# Patient Record
Sex: MALE | Race: WHITE | NOT HISPANIC OR LATINO | Employment: OTHER | URBAN - METROPOLITAN AREA
[De-identification: names, ages, dates, MRNs, and addresses within clinical notes are randomized per-mention and may not be internally consistent; named-entity substitution may affect disease eponyms.]

---

## 2017-03-09 ENCOUNTER — GENERIC CONVERSION - ENCOUNTER (OUTPATIENT)
Dept: OTHER | Facility: OTHER | Age: 57
End: 2017-03-09

## 2017-05-04 ENCOUNTER — ALLSCRIPTS OFFICE VISIT (OUTPATIENT)
Dept: OTHER | Facility: OTHER | Age: 57
End: 2017-05-04

## 2017-05-04 DIAGNOSIS — E11.40 TYPE 2 DIABETES MELLITUS WITH DIABETIC NEUROPATHY (HCC): ICD-10-CM

## 2017-05-04 DIAGNOSIS — E11.9 TYPE 2 DIABETES MELLITUS WITHOUT COMPLICATIONS (HCC): ICD-10-CM

## 2017-05-17 LAB
A/G RATIO (HISTORICAL): 1.1 (CALC) (ref 1–2.5)
ALBUMIN SERPL BCP-MCNC: 3.9 G/DL (ref 3.6–5.1)
ALP SERPL-CCNC: 105 U/L (ref 40–115)
ALT SERPL W P-5'-P-CCNC: 29 U/L (ref 9–46)
AST SERPL W P-5'-P-CCNC: 20 U/L (ref 10–35)
BILIRUB SERPL-MCNC: 0.5 MG/DL (ref 0.2–1.2)
BUN SERPL-MCNC: 14 MG/DL (ref 7–25)
BUN/CREA RATIO (HISTORICAL): ABNORMAL (CALC) (ref 6–22)
CALCIUM SERPL-MCNC: 8.9 MG/DL (ref 8.6–10.3)
CHLORIDE SERPL-SCNC: 100 MMOL/L (ref 98–110)
CO2 SERPL-SCNC: 30 MMOL/L (ref 20–31)
CREAT SERPL-MCNC: 0.95 MG/DL (ref 0.7–1.33)
EGFR AFRICAN AMERICAN (HISTORICAL): 102 ML/MIN/1.73M2
EGFR-AMERICAN CALC (HISTORICAL): 88 ML/MIN/1.73M2
GAMMA GLOBULIN (HISTORICAL): 3.6 G/DL (CALC) (ref 1.9–3.7)
GLUCOSE (HISTORICAL): 131 MG/DL (ref 65–99)
POTASSIUM SERPL-SCNC: 4.3 MMOL/L (ref 3.5–5.3)
SODIUM SERPL-SCNC: 136 MMOL/L (ref 135–146)
TOTAL PROTEIN (HISTORICAL): 7.5 G/DL (ref 6.1–8.1)

## 2017-05-18 ENCOUNTER — LAB CONVERSION - ENCOUNTER (OUTPATIENT)
Dept: OTHER | Facility: OTHER | Age: 57
End: 2017-05-18

## 2017-05-18 LAB
BASOPHILS # BLD AUTO: 0.4 %
BASOPHILS # BLD AUTO: 26 CELLS/UL (ref 0–200)
CREATININE, RANDOM URINE (HISTORICAL): 261 MG/DL (ref 20–370)
DEPRECATED RDW RBC AUTO: 15.9 % (ref 11–15)
EOSINOPHIL # BLD AUTO: 262 CELLS/UL (ref 15–500)
EOSINOPHIL # BLD AUTO: 4.1 %
HBA1C MFR BLD HPLC: 6.7 % OF TOTAL HGB
HCT VFR BLD AUTO: 38.9 % (ref 38.5–50)
HGB BLD-MCNC: 13.1 G/DL (ref 13.2–17.1)
LYMPHOCYTES # BLD AUTO: 1427 CELLS/UL (ref 850–3900)
LYMPHOCYTES # BLD AUTO: 22.3 %
MAGNESIUM, UR (HISTORICAL): 1.2 MG/DL
MCH RBC QN AUTO: 29.1 PG (ref 27–33)
MCHC RBC AUTO-ENTMCNC: 33.8 G/DL (ref 32–36)
MCV RBC AUTO: 86.2 FL (ref 80–100)
MICROALBUMIN/CREATININE RATIO (HISTORICAL): 5 MCG/MG CREAT
MONOCYTES # BLD AUTO: 339 CELLS/UL (ref 200–950)
MONOCYTES (HISTORICAL): 5.3 %
NEUTROPHILS # BLD AUTO: 4346 CELLS/UL (ref 1500–7800)
NEUTROPHILS # BLD AUTO: 67.9 %
PLATELET # BLD AUTO: 221 THOUSAND/UL (ref 140–400)
PMV BLD AUTO: 8.1 FL (ref 7.5–12.5)
RBC # BLD AUTO: 4.51 MILLION/UL (ref 4.2–5.8)
WBC # BLD AUTO: 6.4 THOUSAND/UL (ref 3.8–10.8)

## 2017-05-26 ENCOUNTER — HOSPITAL ENCOUNTER (EMERGENCY)
Facility: HOSPITAL | Age: 57
Discharge: HOME | End: 2017-05-26
Admitting: EMERGENCY MEDICINE
Payer: COMMERCIAL

## 2017-05-26 VITALS
DIASTOLIC BLOOD PRESSURE: 80 MMHG | RESPIRATION RATE: 20 BRPM | WEIGHT: 315 LBS | SYSTOLIC BLOOD PRESSURE: 143 MMHG | OXYGEN SATURATION: 94 % | HEART RATE: 88 BPM | TEMPERATURE: 98.2 F

## 2017-05-26 DIAGNOSIS — S46.212A SPRAIN, BICEP, LEFT, INITIAL ENCOUNTER: Primary | ICD-10-CM

## 2017-05-26 PROCEDURE — 99283 EMERGENCY DEPT VISIT LOW MDM: CPT

## 2017-05-26 PROCEDURE — 96372 THER/PROPH/DIAG INJ SC/IM: CPT

## 2017-05-26 RX ORDER — METHOCARBAMOL 500 MG/1
500 TABLET, FILM COATED ORAL 4 TIMES DAILY
Qty: 40 TABLET | Refills: 0 | Status: SHIPPED | OUTPATIENT
Start: 2017-05-26 | End: 2017-07-26

## 2017-05-26 RX ORDER — KETOROLAC TROMETHAMINE 30 MG/ML
15 INJECTION, SOLUTION INTRAMUSCULAR; INTRAVENOUS ONCE
Status: COMPLETED | OUTPATIENT
Start: 2017-05-26 | End: 2017-05-26

## 2017-05-26 RX ORDER — NAPROXEN 500 MG/1
500 TABLET ORAL 2 TIMES DAILY WITH MEALS
Qty: 60 TABLET | Refills: 0 | Status: SHIPPED | OUTPATIENT
Start: 2017-05-26 | End: 2017-07-26

## 2017-05-26 RX ADMIN — KETOROLAC TROMETHAMINE 15 MG: 30 INJECTION, SOLUTION INTRAMUSCULAR at 13:35

## 2017-07-03 ENCOUNTER — ALLSCRIPTS OFFICE VISIT (OUTPATIENT)
Dept: OTHER | Facility: OTHER | Age: 57
End: 2017-07-03

## 2017-07-05 DIAGNOSIS — Z01.818 ENCOUNTER FOR OTHER PREPROCEDURAL EXAMINATION: ICD-10-CM

## 2017-07-28 ENCOUNTER — APPOINTMENT (OUTPATIENT)
Dept: LAB | Facility: HOSPITAL | Age: 57
End: 2017-07-28
Attending: SURGERY
Payer: COMMERCIAL

## 2017-07-28 ENCOUNTER — TRANSCRIBE ORDERS (OUTPATIENT)
Dept: LAB | Facility: HOSPITAL | Age: 57
End: 2017-07-28

## 2017-07-28 ENCOUNTER — OFFICE VISIT (OUTPATIENT)
Dept: LAB | Facility: HOSPITAL | Age: 57
End: 2017-07-28
Attending: SURGERY
Payer: COMMERCIAL

## 2017-07-28 ENCOUNTER — ANESTHESIA EVENT (OUTPATIENT)
Dept: PERIOP | Facility: HOSPITAL | Age: 57
End: 2017-07-28

## 2017-07-28 DIAGNOSIS — Z01.818 PREOP EXAMINATION: ICD-10-CM

## 2017-07-28 DIAGNOSIS — Z01.818 PREOP EXAMINATION: Primary | ICD-10-CM

## 2017-07-28 DIAGNOSIS — Z01.818 ENCOUNTER FOR OTHER PREPROCEDURAL EXAMINATION: ICD-10-CM

## 2017-07-28 LAB
ABO GROUP BLD: NORMAL
ANION GAP SERPL CALCULATED.3IONS-SCNC: 8 MMOL/L (ref 4–13)
ATRIAL RATE: 83 BPM
BLD GP AB SCN SERPL QL: NEGATIVE
BUN SERPL-MCNC: 13 MG/DL (ref 5–25)
CALCIUM SERPL-MCNC: 8.9 MG/DL (ref 8.3–10.1)
CHLORIDE SERPL-SCNC: 103 MMOL/L (ref 100–108)
CO2 SERPL-SCNC: 27 MMOL/L (ref 21–32)
CREAT SERPL-MCNC: 0.82 MG/DL (ref 0.6–1.3)
EST. AVERAGE GLUCOSE BLD GHB EST-MCNC: 148 MG/DL
GFR SERPL CREATININE-BSD FRML MDRD: 97 ML/MIN/1.73SQ M
GLUCOSE P FAST SERPL-MCNC: 110 MG/DL (ref 65–99)
HBA1C MFR BLD: 6.8 % (ref 4.2–6.3)
P AXIS: 38 DEGREES
POTASSIUM SERPL-SCNC: 4.1 MMOL/L (ref 3.5–5.3)
PR INTERVAL: 174 MS
QRS AXIS: 8 DEGREES
QRSD INTERVAL: 90 MS
QT INTERVAL: 360 MS
QTC INTERVAL: 423 MS
RH BLD: POSITIVE
SODIUM SERPL-SCNC: 138 MMOL/L (ref 136–145)
SPECIMEN EXPIRATION DATE: NORMAL
T WAVE AXIS: 40 DEGREES
VENTRICULAR RATE: 83 BPM

## 2017-07-28 PROCEDURE — 86850 RBC ANTIBODY SCREEN: CPT

## 2017-07-28 PROCEDURE — 93005 ELECTROCARDIOGRAM TRACING: CPT

## 2017-07-28 PROCEDURE — 86901 BLOOD TYPING SEROLOGIC RH(D): CPT

## 2017-07-28 PROCEDURE — 80048 BASIC METABOLIC PNL TOTAL CA: CPT

## 2017-07-28 PROCEDURE — 83036 HEMOGLOBIN GLYCOSYLATED A1C: CPT

## 2017-07-28 PROCEDURE — 86900 BLOOD TYPING SEROLOGIC ABO: CPT

## 2017-07-28 PROCEDURE — 36415 COLL VENOUS BLD VENIPUNCTURE: CPT

## 2017-07-31 ENCOUNTER — ANESTHESIA (OUTPATIENT)
Dept: PERIOP | Facility: HOSPITAL | Age: 57
End: 2017-07-31

## 2017-08-02 LAB
ATRIAL RATE: 83 BPM
P AXIS: 38 DEGREES
PR INTERVAL: 174 MS
QRS AXIS: 8 DEGREES
QRSD INTERVAL: 90 MS
QT INTERVAL: 360 MS
QTC INTERVAL: 423 MS
T WAVE AXIS: 40 DEGREES
VENTRICULAR RATE: 83 BPM

## 2017-08-03 ENCOUNTER — ALLSCRIPTS OFFICE VISIT (OUTPATIENT)
Dept: OTHER | Facility: OTHER | Age: 57
End: 2017-08-03

## 2017-08-07 DIAGNOSIS — Z01.818 ENCOUNTER FOR OTHER PREPROCEDURAL EXAMINATION: ICD-10-CM

## 2017-08-09 ENCOUNTER — APPOINTMENT (OUTPATIENT)
Dept: LAB | Facility: HOSPITAL | Age: 57
End: 2017-08-09
Attending: SURGERY
Payer: COMMERCIAL

## 2017-08-09 DIAGNOSIS — Z01.818 ENCOUNTER FOR OTHER PREPROCEDURAL EXAMINATION: ICD-10-CM

## 2017-08-09 LAB
BASOPHILS # BLD AUTO: 0.02 THOUSANDS/ΜL (ref 0–0.1)
BASOPHILS NFR BLD AUTO: 0 % (ref 0–1)
EOSINOPHIL # BLD AUTO: 0.32 THOUSAND/ΜL (ref 0–0.61)
EOSINOPHIL NFR BLD AUTO: 5 % (ref 0–6)
ERYTHROCYTE [DISTWIDTH] IN BLOOD BY AUTOMATED COUNT: 14.8 % (ref 11.6–15.1)
HCT VFR BLD AUTO: 40.4 % (ref 36.5–49.3)
HGB BLD-MCNC: 12.8 G/DL (ref 12–17)
LYMPHOCYTES # BLD AUTO: 1.27 THOUSANDS/ΜL (ref 0.6–4.47)
LYMPHOCYTES NFR BLD AUTO: 19 % (ref 14–44)
MCH RBC QN AUTO: 28.8 PG (ref 26.8–34.3)
MCHC RBC AUTO-ENTMCNC: 31.7 G/DL (ref 31.4–37.4)
MCV RBC AUTO: 91 FL (ref 82–98)
MONOCYTES # BLD AUTO: 0.34 THOUSAND/ΜL (ref 0.17–1.22)
MONOCYTES NFR BLD AUTO: 5 % (ref 4–12)
NEUTROPHILS # BLD AUTO: 4.7 THOUSANDS/ΜL (ref 1.85–7.62)
NEUTS SEG NFR BLD AUTO: 71 % (ref 43–75)
NRBC BLD AUTO-RTO: 0 /100 WBCS
PLATELET # BLD AUTO: 215 THOUSANDS/UL (ref 149–390)
PMV BLD AUTO: 9.6 FL (ref 8.9–12.7)
RBC # BLD AUTO: 4.45 MILLION/UL (ref 3.88–5.62)
WBC # BLD AUTO: 6.67 THOUSAND/UL (ref 4.31–10.16)

## 2017-08-09 PROCEDURE — 36415 COLL VENOUS BLD VENIPUNCTURE: CPT

## 2017-08-09 PROCEDURE — 85025 COMPLETE CBC W/AUTO DIFF WBC: CPT

## 2017-08-17 ENCOUNTER — TRANSCRIBE ORDERS (OUTPATIENT)
Dept: SLEEP CENTER | Facility: CLINIC | Age: 57
End: 2017-08-17

## 2017-08-17 DIAGNOSIS — J44.9 OSA AND COPD OVERLAP SYNDROME (HCC): Primary | ICD-10-CM

## 2017-08-17 DIAGNOSIS — G47.33 OSA AND COPD OVERLAP SYNDROME (HCC): Primary | ICD-10-CM

## 2017-08-20 ENCOUNTER — ANESTHESIA EVENT (OUTPATIENT)
Dept: PERIOP | Facility: HOSPITAL | Age: 57
End: 2017-08-20
Payer: COMMERCIAL

## 2017-08-21 ENCOUNTER — HOSPITAL ENCOUNTER (OUTPATIENT)
Facility: HOSPITAL | Age: 57
Setting detail: OUTPATIENT SURGERY
Discharge: HOME | End: 2017-08-21
Attending: SURGERY | Admitting: SURGERY
Payer: COMMERCIAL

## 2017-08-21 ENCOUNTER — ANESTHESIA (OUTPATIENT)
Dept: PERIOP | Facility: HOSPITAL | Age: 57
End: 2017-08-21
Payer: COMMERCIAL

## 2017-08-21 VITALS
BODY MASS INDEX: 41.75 KG/M2 | TEMPERATURE: 98.7 F | HEART RATE: 89 BPM | OXYGEN SATURATION: 95 % | HEIGHT: 73 IN | RESPIRATION RATE: 16 BRPM | DIASTOLIC BLOOD PRESSURE: 87 MMHG | SYSTOLIC BLOOD PRESSURE: 131 MMHG | WEIGHT: 315 LBS

## 2017-08-21 DIAGNOSIS — R22.30 LOCALIZED SWELLING, MASS, OR LUMP OF UPPER EXTREMITY: ICD-10-CM

## 2017-08-21 DIAGNOSIS — R22.1 LOCALIZED SWELLING, MASS OR LUMP OF NECK: ICD-10-CM

## 2017-08-21 LAB
GLUCOSE SERPL-MCNC: 161 MG/DL (ref 65–140)
GLUCOSE SERPL-MCNC: 97 MG/DL (ref 65–140)

## 2017-08-21 PROCEDURE — 82948 REAGENT STRIP/BLOOD GLUCOSE: CPT

## 2017-08-21 PROCEDURE — 88307 TISSUE EXAM BY PATHOLOGIST: CPT | Performed by: SURGERY

## 2017-08-21 RX ORDER — PROPOFOL 10 MG/ML
INJECTION, EMULSION INTRAVENOUS CONTINUOUS PRN
Status: DISCONTINUED | OUTPATIENT
Start: 2017-08-21 | End: 2017-08-21 | Stop reason: SURG

## 2017-08-21 RX ORDER — METOCLOPRAMIDE HYDROCHLORIDE 5 MG/ML
10 INJECTION INTRAMUSCULAR; INTRAVENOUS ONCE AS NEEDED
Status: COMPLETED | OUTPATIENT
Start: 2017-08-21 | End: 2017-08-21

## 2017-08-21 RX ORDER — SODIUM CHLORIDE, SODIUM LACTATE, POTASSIUM CHLORIDE, CALCIUM CHLORIDE 600; 310; 30; 20 MG/100ML; MG/100ML; MG/100ML; MG/100ML
50 INJECTION, SOLUTION INTRAVENOUS CONTINUOUS
Status: DISCONTINUED | OUTPATIENT
Start: 2017-08-21 | End: 2017-08-21 | Stop reason: HOSPADM

## 2017-08-21 RX ORDER — SODIUM CHLORIDE, SODIUM LACTATE, POTASSIUM CHLORIDE, CALCIUM CHLORIDE 600; 310; 30; 20 MG/100ML; MG/100ML; MG/100ML; MG/100ML
125 INJECTION, SOLUTION INTRAVENOUS CONTINUOUS
Status: DISCONTINUED | OUTPATIENT
Start: 2017-08-21 | End: 2017-08-21 | Stop reason: HOSPADM

## 2017-08-21 RX ORDER — OXYCODONE HYDROCHLORIDE AND ACETAMINOPHEN 5; 325 MG/1; MG/1
2 TABLET ORAL EVERY 4 HOURS PRN
Status: DISCONTINUED | OUTPATIENT
Start: 2017-08-21 | End: 2017-08-21 | Stop reason: HOSPADM

## 2017-08-21 RX ORDER — FENTANYL CITRATE/PF 50 MCG/ML
50 SYRINGE (ML) INJECTION
Status: DISCONTINUED | OUTPATIENT
Start: 2017-08-21 | End: 2017-08-21 | Stop reason: HOSPADM

## 2017-08-21 RX ORDER — OXYCODONE HYDROCHLORIDE AND ACETAMINOPHEN 5; 325 MG/1; MG/1
1 TABLET ORAL EVERY 4 HOURS PRN
Status: DISCONTINUED | OUTPATIENT
Start: 2017-08-21 | End: 2017-08-21 | Stop reason: HOSPADM

## 2017-08-21 RX ORDER — ONDANSETRON 2 MG/ML
INJECTION INTRAMUSCULAR; INTRAVENOUS AS NEEDED
Status: DISCONTINUED | OUTPATIENT
Start: 2017-08-21 | End: 2017-08-21 | Stop reason: SURG

## 2017-08-21 RX ORDER — ONDANSETRON 2 MG/ML
4 INJECTION INTRAMUSCULAR; INTRAVENOUS ONCE AS NEEDED
Status: COMPLETED | OUTPATIENT
Start: 2017-08-21 | End: 2017-08-21

## 2017-08-21 RX ORDER — ONDANSETRON 2 MG/ML
4 INJECTION INTRAMUSCULAR; INTRAVENOUS EVERY 4 HOURS PRN
Status: DISCONTINUED | OUTPATIENT
Start: 2017-08-21 | End: 2017-08-21 | Stop reason: HOSPADM

## 2017-08-21 RX ORDER — LIDOCAINE HYDROCHLORIDE 10 MG/ML
INJECTION, SOLUTION INFILTRATION; PERINEURAL AS NEEDED
Status: DISCONTINUED | OUTPATIENT
Start: 2017-08-21 | End: 2017-08-21 | Stop reason: SURG

## 2017-08-21 RX ORDER — PROPOFOL 10 MG/ML
INJECTION, EMULSION INTRAVENOUS AS NEEDED
Status: DISCONTINUED | OUTPATIENT
Start: 2017-08-21 | End: 2017-08-21 | Stop reason: SURG

## 2017-08-21 RX ORDER — FENTANYL CITRATE 50 UG/ML
INJECTION, SOLUTION INTRAMUSCULAR; INTRAVENOUS AS NEEDED
Status: DISCONTINUED | OUTPATIENT
Start: 2017-08-21 | End: 2017-08-21 | Stop reason: SURG

## 2017-08-21 RX ORDER — OXYCODONE HYDROCHLORIDE AND ACETAMINOPHEN 5; 325 MG/1; MG/1
TABLET ORAL
Qty: 20 TABLET | Refills: 0 | Status: SHIPPED | OUTPATIENT
Start: 2017-08-21 | End: 2018-04-16 | Stop reason: ALTCHOICE

## 2017-08-21 RX ORDER — BUPIVACAINE HYDROCHLORIDE AND EPINEPHRINE 2.5; 5 MG/ML; UG/ML
INJECTION, SOLUTION EPIDURAL; INFILTRATION; INTRACAUDAL; PERINEURAL AS NEEDED
Status: DISCONTINUED | OUTPATIENT
Start: 2017-08-21 | End: 2017-08-21 | Stop reason: HOSPADM

## 2017-08-21 RX ORDER — MEPERIDINE HYDROCHLORIDE 25 MG/ML
12.5 INJECTION INTRAMUSCULAR; INTRAVENOUS; SUBCUTANEOUS ONCE AS NEEDED
Status: DISCONTINUED | OUTPATIENT
Start: 2017-08-21 | End: 2017-08-21 | Stop reason: HOSPADM

## 2017-08-21 RX ORDER — SUCCINYLCHOLINE CHLORIDE 20 MG/ML
INJECTION INTRAMUSCULAR; INTRAVENOUS AS NEEDED
Status: DISCONTINUED | OUTPATIENT
Start: 2017-08-21 | End: 2017-08-21 | Stop reason: SURG

## 2017-08-21 RX ADMIN — FENTANYL CITRATE 100 MCG: 50 INJECTION, SOLUTION INTRAMUSCULAR; INTRAVENOUS at 13:04

## 2017-08-21 RX ADMIN — ONDANSETRON 4 MG: 2 INJECTION INTRAMUSCULAR; INTRAVENOUS at 15:17

## 2017-08-21 RX ADMIN — SODIUM CHLORIDE, SODIUM LACTATE, POTASSIUM CHLORIDE, AND CALCIUM CHLORIDE 125 ML/HR: .6; .31; .03; .02 INJECTION, SOLUTION INTRAVENOUS at 12:02

## 2017-08-21 RX ADMIN — PROPOFOL 100 MCG/KG/MIN: 10 INJECTION, EMULSION INTRAVENOUS at 12:42

## 2017-08-21 RX ADMIN — DEXAMETHASONE SODIUM PHOSPHATE 10 MG: 10 INJECTION INTRAMUSCULAR; INTRAVENOUS at 12:42

## 2017-08-21 RX ADMIN — LIDOCAINE HYDROCHLORIDE 50 MG: 10 INJECTION, SOLUTION INFILTRATION; PERINEURAL at 12:30

## 2017-08-21 RX ADMIN — PROPOFOL 200 MG: 10 INJECTION, EMULSION INTRAVENOUS at 12:30

## 2017-08-21 RX ADMIN — METOCLOPRAMIDE 10 MG: 5 INJECTION, SOLUTION INTRAMUSCULAR; INTRAVENOUS at 15:52

## 2017-08-21 RX ADMIN — CEFAZOLIN SODIUM 2000 MG: 2 SOLUTION INTRAVENOUS at 12:25

## 2017-08-21 RX ADMIN — SUCCINYLCHOLINE CHLORIDE 140 MG: 20 INJECTION, SOLUTION INTRAMUSCULAR; INTRAVENOUS at 12:30

## 2017-08-21 RX ADMIN — ONDANSETRON 4 MG: 2 INJECTION INTRAMUSCULAR; INTRAVENOUS at 12:42

## 2017-08-22 LAB
LEFT EYE DIABETIC RETINOPATHY: NORMAL
RIGHT EYE DIABETIC RETINOPATHY: NORMAL

## 2017-08-25 ENCOUNTER — GENERIC CONVERSION - ENCOUNTER (OUTPATIENT)
Dept: OTHER | Facility: OTHER | Age: 57
End: 2017-08-25

## 2017-08-29 ENCOUNTER — ALLSCRIPTS OFFICE VISIT (OUTPATIENT)
Dept: OTHER | Facility: OTHER | Age: 57
End: 2017-08-29

## 2017-09-06 ENCOUNTER — GENERIC CONVERSION - ENCOUNTER (OUTPATIENT)
Dept: OTHER | Facility: OTHER | Age: 57
End: 2017-09-06

## 2017-09-20 ENCOUNTER — TRANSCRIBE ORDERS (OUTPATIENT)
Dept: SLEEP CENTER | Facility: CLINIC | Age: 57
End: 2017-09-20

## 2017-09-20 ENCOUNTER — HOSPITAL ENCOUNTER (OUTPATIENT)
Dept: SLEEP CENTER | Facility: CLINIC | Age: 57
Discharge: HOME | End: 2017-09-20
Payer: COMMERCIAL

## 2017-09-20 DIAGNOSIS — J44.9 OSA AND COPD OVERLAP SYNDROME (HCC): ICD-10-CM

## 2017-09-20 DIAGNOSIS — G47.33 OSA AND COPD OVERLAP SYNDROME (HCC): ICD-10-CM

## 2017-09-20 DIAGNOSIS — G47.33 OSA (OBSTRUCTIVE SLEEP APNEA): Primary | ICD-10-CM

## 2017-09-22 ENCOUNTER — HOSPITAL ENCOUNTER (OUTPATIENT)
Dept: SLEEP CENTER | Facility: CLINIC | Age: 57
Discharge: HOME | End: 2017-09-22
Payer: COMMERCIAL

## 2017-09-22 ENCOUNTER — ALLSCRIPTS OFFICE VISIT (OUTPATIENT)
Dept: OTHER | Facility: OTHER | Age: 57
End: 2017-09-22

## 2017-09-22 ENCOUNTER — LAB REQUISITION (OUTPATIENT)
Dept: LAB | Facility: HOSPITAL | Age: 57
End: 2017-09-22
Payer: COMMERCIAL

## 2017-09-22 DIAGNOSIS — G47.33 OSA (OBSTRUCTIVE SLEEP APNEA): ICD-10-CM

## 2017-09-22 DIAGNOSIS — R35.0 FREQUENCY OF MICTURITION: ICD-10-CM

## 2017-09-22 DIAGNOSIS — Z11.3 ENCOUNTER FOR SCREENING FOR INFECTIONS WITH PREDOMINANTLY SEXUAL MODE OF TRANSMISSION: ICD-10-CM

## 2017-09-22 LAB
BILIRUB UR QL STRIP: NEGATIVE
CLARITY UR: NORMAL
COLOR UR: YELLOW
GLUCOSE (HISTORICAL): NEGATIVE
HGB UR QL STRIP.AUTO: NEGATIVE
KETONES UR STRIP-MCNC: NEGATIVE MG/DL
LEUKOCYTE ESTERASE UR QL STRIP: NORMAL
NITRITE UR QL STRIP: NEGATIVE
PH UR STRIP.AUTO: 7.5 [PH]
PROT UR STRIP-MCNC: NORMAL MG/DL
SP GR UR STRIP.AUTO: 1.01
UROBILINOGEN UR QL STRIP.AUTO: 0.2

## 2017-09-22 PROCEDURE — 95810 POLYSOM 6/> YRS 4/> PARAM: CPT

## 2017-09-22 PROCEDURE — 87147 CULTURE TYPE IMMUNOLOGIC: CPT | Performed by: FAMILY MEDICINE

## 2017-09-22 PROCEDURE — 87086 URINE CULTURE/COLONY COUNT: CPT | Performed by: FAMILY MEDICINE

## 2017-09-22 PROCEDURE — 87491 CHLMYD TRACH DNA AMP PROBE: CPT | Performed by: FAMILY MEDICINE

## 2017-09-22 PROCEDURE — 87077 CULTURE AEROBIC IDENTIFY: CPT | Performed by: FAMILY MEDICINE

## 2017-09-22 PROCEDURE — 87591 N.GONORRHOEAE DNA AMP PROB: CPT | Performed by: FAMILY MEDICINE

## 2017-09-22 PROCEDURE — 87186 SC STD MICRODIL/AGAR DIL: CPT | Performed by: FAMILY MEDICINE

## 2017-09-24 LAB — BACTERIA UR CULT: NORMAL

## 2017-09-25 LAB
CHLAMYDIA DNA CVX QL NAA+PROBE: NORMAL
N GONORRHOEA DNA GENITAL QL NAA+PROBE: NORMAL

## 2017-10-26 ENCOUNTER — HOSPITAL ENCOUNTER (OUTPATIENT)
Dept: SLEEP CENTER | Facility: CLINIC | Age: 57
Discharge: HOME | End: 2017-10-26
Payer: COMMERCIAL

## 2017-10-26 DIAGNOSIS — G47.33 OSA (OBSTRUCTIVE SLEEP APNEA): ICD-10-CM

## 2017-10-26 PROCEDURE — 95811 POLYSOM 6/>YRS CPAP 4/> PARM: CPT

## 2017-11-20 ENCOUNTER — TRANSCRIBE ORDERS (OUTPATIENT)
Dept: SLEEP CENTER | Facility: CLINIC | Age: 57
End: 2017-11-20

## 2017-11-20 ENCOUNTER — HOSPITAL ENCOUNTER (OUTPATIENT)
Dept: SLEEP CENTER | Facility: CLINIC | Age: 57
Discharge: HOME | End: 2017-11-20
Payer: COMMERCIAL

## 2017-11-20 DIAGNOSIS — G47.33 OSA (OBSTRUCTIVE SLEEP APNEA): Primary | ICD-10-CM

## 2017-11-20 DIAGNOSIS — G47.33 OSA (OBSTRUCTIVE SLEEP APNEA): ICD-10-CM

## 2018-01-13 VITALS
DIASTOLIC BLOOD PRESSURE: 86 MMHG | SYSTOLIC BLOOD PRESSURE: 136 MMHG | WEIGHT: 315 LBS | HEART RATE: 84 BPM | HEIGHT: 73 IN | TEMPERATURE: 98.1 F | BODY MASS INDEX: 41.75 KG/M2 | RESPIRATION RATE: 16 BRPM

## 2018-01-13 VITALS
BODY MASS INDEX: 44.1 KG/M2 | RESPIRATION RATE: 16 BRPM | DIASTOLIC BLOOD PRESSURE: 82 MMHG | WEIGHT: 315 LBS | HEIGHT: 71 IN | SYSTOLIC BLOOD PRESSURE: 120 MMHG | HEART RATE: 82 BPM

## 2018-01-14 VITALS
HEIGHT: 71 IN | TEMPERATURE: 97.9 F | BODY MASS INDEX: 44.1 KG/M2 | RESPIRATION RATE: 16 BRPM | SYSTOLIC BLOOD PRESSURE: 146 MMHG | WEIGHT: 315 LBS | HEART RATE: 80 BPM | DIASTOLIC BLOOD PRESSURE: 86 MMHG

## 2018-01-15 NOTE — MISCELLANEOUS
[facility] 9, 2017      To whom it may concern,    Elena Izquierdo has recently been diagnosed with Type 2 diabetes mellitus  He is able to proceed with deep dental cleaning as indicated, and is recommended for his  course of treatment to prevent complications of diabetes  Kindly direct any questions or concerns to my attention  Sincerely,        LEVY Freitas  Electronically signed Jennie Zuñiga    Mar  9 2017 10:10AM EST          Electronically signed Kassie DYKES    May  4 2017  9:39AM EST

## 2018-01-18 NOTE — CONSULTS
Assessment  56yoM with right posterior neck/shoulder mass and midline posterior neck nodule  no prior imaging  no history of similar lesions, cancer, or radiation to this area  given the slow growth and lack of concerning symptoms and risk factors this most likely represents a benign lesion and we will forego imaging  Plan    1  OR for excision of right posterior neck mass and posterior midline neck nodule  2  discussed risks including bleeding, wound infection, damage to surrounding structures, and recurrence if capsule is violated     Chief Complaint  Chief Complaint Free Text Note Form: "I have this thing on my neck"      History of Present Illness  HPI: 59yoM with posterior right shoulder mass that has been present for 24 years  has been slow growing  it is now causing him some discomfort and he has become hyperaware of it  He also noticed a sattelite lesion in the midline of the posterior neck  this is causing him pain with neck extension  wants the nodule and mass removed  denies history of prior cancer  denies prior radiation to head and neck  Hospital Based Practices Required Assessment:   Pain Assessment   the patient states they do not have pain  (on a scale of 0 to 10, the patient rates the pain at 0 )    Prefered Language is  english  Primary Language is  english  Review of Systems  Complete-Male:   Constitutional: No fever or chills, feels well, no tiredness, no recent weight gain or weight loss  Eyes: No complaints of eye pain, no red eyes, no discharge from eyes, no itchy eyes  ENT: no complaints of earache, no hearing loss, no nosebleeds, no nasal discharge, no sore throat, no hoarseness  Cardiovascular: No complaints of slow heart rate, no fast heart rate, no chest pain, no palpitations, no leg claudication, no lower extremity  Respiratory: No complaints of shortness of breath, no wheezing, no cough, no SOB on exertion, no orthopnea or PND     Gastrointestinal: No complaints of abdominal pain, no constipation, no nausea or vomiting, no diarrhea or bloody stools  Musculoskeletal: No complaints of arthralgia, no myalgias, no joint swelling or stiffness, no limb pain or swelling  Integumentary: mass on posterior neck and R shoulder  Neurological: No compliants of headache, no confusion, no convulsions, no numbness or tingling, no dizziness or fainting, no limb weakness, no difficulty walking  Psychiatric: Is not suicidal, no sleep disturbances, no anxiety or depression, no change in personality, no emotional problems  Hematologic/Lymphatic: No complaints of swollen glands, no swollen glands in the neck, does not bleed easily, no easy bruising  Active Problems    1  Adrenal incidentaloma (255 8) (E27 8)   2  Allergic rhinitis (477 9) (J30 9)   3  Kent's esophagus (530 85) (K22 70)   4  Benign essential hypertension (401 1) (I10)   5  Benign paroxysmal positional vertigo (386 11) (H81 10)   6  Colon cancer screening (V76 51) (Z12 11)   7  Diabetic neuropathy (250 60,357 2) (E11 40)   8  Hyperlipidemia (272 4) (E78 5)   9  Lipoma of neck (214 1) (D17 0)   10  Liver lesion (573 8) (K76 9)   11  Moderate persistent asthma without complication (502 02) (E08 92)   12  Need for influenza vaccination (V04 81) (Z23)   13  Need for tetanus booster (V03 7) (Z23)   14  Numbness in left leg (782 0) (R20 0)   15  Tinnitus (388 30) (H93 19)   16  Type 2 diabetes mellitus (250 00) (E11 9)   17  Umbilical hernia (329 5) (K42 9)   18  URI with cough and congestion (465 9) (J06 9)    Past Medical History    1  History of Cough (786 2) (R05)   2  History of Dyslipidemia (272 4) (E78 5)   3  History of Encounter for screening colonoscopy (V76 51) (Z12 11)   4  History of acute bronchitis (V12 69) (Z87 09)   5  History of Screening for colon cancer (V76 51) (Z12 11)   6  History of Wheezing (786 07) (R06 2)  Active Problems And Past Medical History Reviewed:    The active problems and past medical history were reviewed and updated today  Surgical History    1  History of Diagnostic Esophagogastroduodenoscopy  Surgical History Reviewed: The surgical history was reviewed and updated today  Family History  Mother    1  Family history of Malignant Neoplasm Of The Gastrointestinal Tract (V16 0)   2  Family history of Type 2 Diabetes Mellitus  Father    3  Family history of Arteriosclerotic Cardiovascular Disease (ASCVD)   4  Family history of Dementia   5  Family history of Essential Hypercholesterolemia   6  Family history of Hypertension (V17 49)    Social History    · Being A Social Drinker   · Never A Smoker  Social History Reviewed: The social history was reviewed and updated today  Current Meds   1  Albuterol Sulfate (2 5 MG/3ML) 0 083% Inhalation Nebulization Solution; USE 1 UNIT   DOSE EVERY 4-6 HOURS AS NEEDED FOR WHEEZING ; Therapy: 34UOC5498 to ([phone])  Requested for: 42BII7773; Last   Rx:14Jan2015 Ordered   2  B-50 Complex Oral Tablet Extended Release; TAKE 1 TABLET DAILY; Therapy: 31QIX2433 to (Evaluate:92Ohi0962); Last Rx:18Jun2015 Ordered   3  Flovent  MCG/ACT Inhalation Aerosol; INHALE TWO PUFFS BY MOUTH TWICE   DAILY  RINSE MOUTH AFTER USE; Therapy: 07YTE1273 to (Revonda [place])  Requested for: 18DXC5507; Last   Rx:28Nov2016 Ordered   4  Fluticasone Propionate 50 MCG/ACT Nasal Suspension; USE 1 TO 2 SPRAYS IN EACH   NOSTRIL ONCE DAILY; Therapy: 46SYD5327 to (Last Rx:01Jun2016)  Requested for: 01Jun2016 Ordered   5  Gabapentin 300 MG Oral Capsule; TAKE 1-3 capsules at bedtime daily for feet pain; Therapy: 37MTN8443 to [phone])  Requested for: 33REF2581; Last   FF:14OZL0238 Ordered   6  Lisinopril 20 MG Oral Tablet; TAKE ONE TABLET BY MOUTH ONCE DAILY; Therapy: 28IZX1015 to (Randy Victorialuciana)  Requested for: 91NAN8358; Last   Rx:10Nov2016 Ordered   7  Loratadine 10 MG Oral Tablet; take 1 tablet daily as needed;    Therapy: 28JYP2671 to (Last Rx:10Nov2016)  Requested for: 04IAU7542 Ordered   8  Magnesium Citrate 100 MG Oral Tablet; Take 1 tablet daily; Therapy: 31FED8246 to (Evaluate:23Fcm9793); Last Rx:18Jun2015 Ordered   9  MetFORMIN HCl - 500 MG Oral Tablet; take 1 tablet by mouth twice a day; Therapy: 85QJV7371 to (Evaluate:31Oct2017); Last OZ:71MOK5777 Ordered   10  Ocean Nasal Spray 0 65 % Nasal Solution; USE 2 SPRAYS IN EACH NOSTRIL TWICE    DAILY; Therapy: 26KGT8241 to (Last Rx:01Jun2016)  Requested for: 01Jun2016 Ordered   11  Omeprazole 40 MG Oral Capsule Delayed Release; Therapy: 75TOQ2604 to Recorded   12  Simvastatin 20 MG Oral Tablet; TAKE 1 TABLET DAILY IN THE EVENING; Therapy: 33RLJ3397 to (Janie Strong)  Requested for: 49HRX8455; Last    Rx:10Nov2016 Ordered   13  Ventolin  (90 Base) MCG/ACT Inhalation Aerosol Solution; INHALE 1 TO 2 PUFFS    EVERY 4 TO 6 HOURS AS NEEDED; Therapy: 82BOS1011 to ([phone])  Requested for: 20ROF8980; Last    Rx:31Mar2016 Ordered    Allergies    1  Shellfish-derived Products    Vitals  Vital Signs    Recorded: 16FEN8964 08:45AM   Temperature 98 5 F   Heart Rate 88   Systolic 772   Diastolic 72   Height 5 ft 11 25 in   Weight 318 lb 12 18 oz   BMI Calculated 44 15   BSA Calculated 2 58     Physical Exam    Constitutional   General appearance: No acute distress, well appearing and well nourished  Eyes   Conjunctiva and lids: No swelling, erythema, or discharge  Neck palpable Right posterior neck over trapeizium 7x5cm subcutaneous mass  mobile and rubbery  non tender  also has nodule in midline of posterior neck at 11 oc lock to the mass  this is palpable  Pulmonary   Respiratory effort: No increased work of breathing or signs of respiratory distress  Cardiovascular   Auscultation of heart: Normal rate and rhythm, normal S1 and S2, without murmurs         Future Appointments    Date/Time Provider Specialty Site   08/03/2017 09:10 AM Leobardo LEVY Galvan  [address] [facility]     Signatures  Electronically signed by :  Sonia George MD; Jul  3 2017  9:33AM EST                       (Author)

## 2018-01-22 VITALS
WEIGHT: 315 LBS | SYSTOLIC BLOOD PRESSURE: 118 MMHG | TEMPERATURE: 98.5 F | BODY MASS INDEX: 44.1 KG/M2 | HEIGHT: 71 IN | HEART RATE: 88 BPM | DIASTOLIC BLOOD PRESSURE: 72 MMHG

## 2018-01-22 VITALS
SYSTOLIC BLOOD PRESSURE: 116 MMHG | DIASTOLIC BLOOD PRESSURE: 84 MMHG | BODY MASS INDEX: 44.1 KG/M2 | WEIGHT: 315 LBS | HEIGHT: 71 IN

## 2018-01-29 DIAGNOSIS — I10 ESSENTIAL HYPERTENSION: Primary | ICD-10-CM

## 2018-01-29 RX ORDER — LISINOPRIL 20 MG/1
TABLET ORAL
Qty: 90 TABLET | Refills: 0 | Status: SHIPPED | OUTPATIENT
Start: 2018-01-29 | End: 2018-04-16 | Stop reason: SDUPTHER

## 2018-02-01 DIAGNOSIS — E11.9 TYPE 2 DIABETES MELLITUS WITHOUT COMPLICATIONS (HCC): ICD-10-CM

## 2018-02-01 DIAGNOSIS — I10 ESSENTIAL (PRIMARY) HYPERTENSION: ICD-10-CM

## 2018-02-07 ENCOUNTER — TELEPHONE (OUTPATIENT)
Dept: FAMILY MEDICINE CLINIC | Facility: CLINIC | Age: 58
End: 2018-02-07

## 2018-02-12 ENCOUNTER — OFFICE VISIT (OUTPATIENT)
Dept: FAMILY MEDICINE CLINIC | Facility: CLINIC | Age: 58
End: 2018-02-12
Payer: COMMERCIAL

## 2018-02-12 VITALS
DIASTOLIC BLOOD PRESSURE: 80 MMHG | WEIGHT: 315 LBS | BODY MASS INDEX: 41.75 KG/M2 | HEIGHT: 73 IN | RESPIRATION RATE: 18 BRPM | TEMPERATURE: 98.9 F | SYSTOLIC BLOOD PRESSURE: 140 MMHG | HEART RATE: 82 BPM

## 2018-02-12 DIAGNOSIS — J45.41 MODERATE PERSISTENT ASTHMA WITH ACUTE EXACERBATION: Primary | ICD-10-CM

## 2018-02-12 DIAGNOSIS — Z13.220 SCREENING FOR CHOLESTEROL LEVEL: ICD-10-CM

## 2018-02-12 DIAGNOSIS — I10 ESSENTIAL HYPERTENSION: ICD-10-CM

## 2018-02-12 PROCEDURE — 99213 OFFICE O/P EST LOW 20 MIN: CPT | Performed by: FAMILY MEDICINE

## 2018-02-12 RX ORDER — PREDNISONE 20 MG/1
40 TABLET ORAL DAILY
Qty: 10 TABLET | Refills: 0 | Status: SHIPPED | OUTPATIENT
Start: 2018-02-12 | End: 2018-02-17

## 2018-02-12 RX ORDER — FLUTICASONE PROPIONATE 50 MCG
2 SPRAY, SUSPENSION (ML) NASAL DAILY
COMMUNITY
Start: 2016-06-01 | End: 2021-11-05 | Stop reason: SDUPTHER

## 2018-02-12 RX ORDER — ECHINACEA PURPUREA EXTRACT 125 MG
2 TABLET ORAL 2 TIMES DAILY
COMMUNITY
Start: 2016-06-01 | End: 2021-05-03

## 2018-02-12 NOTE — ASSESSMENT & PLAN NOTE
Stable, continue current medicaitons  - will order CMP and A1C due to impaired fasting glucose  - follow up with Dr Kahty Correia

## 2018-02-12 NOTE — PROGRESS NOTES
Assessment/Plan:    Screening for cholesterol level  Will order screening cholesterol level, last cholesterol was 11/2016     Essential hypertension  Stable, continue current medicaitons  - will order CMP and A1C due to impaired fasting glucose  - follow up with Dr Tanner Flores     Moderate persistent asthma with acute exacerbation  Mild exacerbation likely secondary to viral URI  - will prescribe prednisone 40 mg for 5 days and supportive care, can continue flonase BID and nasal saline spray   - can do schedule nebulizer treatment for net 24 hours until symptoms improve        Diagnoses and all orders for this visit:    Moderate persistent asthma with acute exacerbation  -     albuterol (5 mg/mL) 0 5 % nebulizer solution; Take 0 5 mL (2 5 mg total) by nebulization every 6 (six) hours as needed for wheezing  -     predniSONE 20 mg tablet; Take 2 tablets (40 mg total) by mouth daily for 5 days    Essential hypertension  -     Comprehensive metabolic panel; Future  -     Lipid panel; Future  -     Hemoglobin A1c; Future    Screening for cholesterol level  -     Lipid panel; Future            Subjective:      Patient ID: Miladys Ventura is a 59 y o  male  This 58 yo M who presents for 2 week history of body aches, cough with sputum production, fatigue and headache, and nasal congestion  He denies any fever  He has tried saline nasal spray, flonase and mucinex that has not improved his symptoms  He has a history of asthma, has Flovent as maintenance, has been using albuterol inhaler more frequently in past two week, at least once per day but at most uses twice a day  He is also is using nebulized albuterol twice per day  No smoking history           The following portions of the patient's history were reviewed and updated as appropriate: allergies, current medications, past family history, past medical history, past social history, past surgical history and problem list     Review of Systems   Constitutional: Positive for fatigue  Negative for fever  HENT: Positive for congestion and rhinorrhea  Respiratory: Positive for cough and shortness of breath  Cardiovascular: Negative for chest pain and palpitations  Gastrointestinal: Negative for abdominal pain, constipation, diarrhea, nausea and vomiting  Genitourinary: Negative for difficulty urinating  Neurological: Positive for headaches  Objective:    Vitals:    02/12/18 1448   BP: 140/80   Pulse: 82   Resp: 18   Temp: 98 9 °F (37 2 °C)        Physical Exam   Constitutional: He is oriented to person, place, and time  He appears well-developed and well-nourished  HENT:   Head: Normocephalic and atraumatic  Mouth/Throat: Oropharynx is clear and moist    Eyes: Conjunctivae are normal    Neck: Normal range of motion  Neck supple  Cardiovascular: Normal rate and regular rhythm  No murmur heard  Pulmonary/Chest: Effort normal  No respiratory distress  He has wheezes  Fair aeration, with diffuse expiratory wheezing in all lung fields  Abdominal: Soft  Bowel sounds are normal  He exhibits no distension  There is no tenderness  Musculoskeletal: He exhibits no edema  Neurological: He is alert and oriented to person, place, and time  Skin: Skin is warm and dry

## 2018-02-12 NOTE — ASSESSMENT & PLAN NOTE
Mild exacerbation likely secondary to viral URI     - will prescribe prednisone 40 mg for 5 days and supportive care, can continue flonase BID and nasal saline spray   - can do schedule nebulizer treatment for net 24 hours until symptoms improve

## 2018-02-13 ENCOUNTER — TELEPHONE (OUTPATIENT)
Dept: FAMILY MEDICINE CLINIC | Facility: CLINIC | Age: 58
End: 2018-02-13

## 2018-02-13 DIAGNOSIS — J45.41 MODERATE PERSISTENT ASTHMA WITH ACUTE EXACERBATION: Primary | ICD-10-CM

## 2018-02-13 RX ORDER — ALBUTEROL SULFATE 2.5 MG/3ML
2.5 SOLUTION RESPIRATORY (INHALATION) EVERY 6 HOURS PRN
Qty: 30 VIAL | Refills: 2 | Status: SHIPPED | OUTPATIENT
Start: 2018-02-13 | End: 2022-03-15 | Stop reason: SDUPTHER

## 2018-02-13 NOTE — TELEPHONE ENCOUNTER
Patient is not able to get saline to mix with the albuterol ordered yesterday  Are you able to change rx to premix ampules for nebulizer  If so, please send to [facility]   Thanks

## 2018-02-15 DIAGNOSIS — E78.5 HYPERLIPIDEMIA, UNSPECIFIED HYPERLIPIDEMIA TYPE: Primary | ICD-10-CM

## 2018-02-15 RX ORDER — SIMVASTATIN 20 MG
TABLET ORAL
Qty: 90 TABLET | Refills: 3 | Status: SHIPPED | OUTPATIENT
Start: 2018-02-15 | End: 2019-03-04 | Stop reason: SDUPTHER

## 2018-02-26 ENCOUNTER — OFFICE VISIT (OUTPATIENT)
Dept: SLEEP CENTER | Facility: CLINIC | Age: 58
End: 2018-02-26
Payer: COMMERCIAL

## 2018-02-26 VITALS
DIASTOLIC BLOOD PRESSURE: 82 MMHG | HEART RATE: 80 BPM | HEIGHT: 71 IN | WEIGHT: 315 LBS | SYSTOLIC BLOOD PRESSURE: 120 MMHG | BODY MASS INDEX: 44.1 KG/M2

## 2018-02-26 DIAGNOSIS — E66.01 MORBID OBESITY WITH BMI OF 40.0-44.9, ADULT (HCC): ICD-10-CM

## 2018-02-26 DIAGNOSIS — J31.0 CHRONIC RHINITIS, UNSPECIFIED TYPE: ICD-10-CM

## 2018-02-26 DIAGNOSIS — G47.33 OSA (OBSTRUCTIVE SLEEP APNEA): Primary | ICD-10-CM

## 2018-02-26 DIAGNOSIS — G47.09 OTHER INSOMNIA: ICD-10-CM

## 2018-02-26 DIAGNOSIS — I10 ESSENTIAL HYPERTENSION: ICD-10-CM

## 2018-02-26 DIAGNOSIS — F51.12 INSUFFICIENT SLEEP SYNDROME: ICD-10-CM

## 2018-02-26 DIAGNOSIS — G47.10 HYPERSOMNIA: ICD-10-CM

## 2018-02-26 NOTE — PROGRESS NOTES
Follow-Up Note - [address]  59 y o  male  KGX:[birth year]  [facility]:[id number]    CC: I saw this patient for follow-up in clinic today for his Sleep Disordered Breathing, Coexisting Sleep and Medical Problems  Medications, Past, Family & Social History were reviewed  ROS: as attached reviewed  His nasal congestion has improved  Asthma is stable  HPI:  With respect to positive airway pressure therapy (PAP) and compliance data [ he is using PAP > 4 hours per night 93 % of the time  and AHI is 1 1/hour at 90th percentile pressure of 15 2 cm H2O ]   Brittany reports:   - no difficulty tolerating PAP;  dry mouth  -   enefiting from use [   sleeping better, improved drowsiness and improved nocturia    -         Sleep Routine:  Ashli Sam reports getting 4-5 hours sleep on week nights and a little more on weekends; he has no difficulty initiating or maintaining sleep   He awakens spontaneously feeling refreshed He denies excessive drowsiness  He rated himself at Total score: 4 /24 on the [place] sleepiness scale  EXAM: Vitals are stable: Blood pressure 120/82, pulse 80, height 5' 11" (1 803 m), weight (!) 149 kg (328 lb)  Body mass index is 45 75 kg/m²  Gerhardt Sep Neck Circumference: 49 5 cm  Patient is alert, orientated, cooperative and in no distress  Mental state appears normal     Physical findings are essentially unchanged from previous  IMPRESSION:     1  KYLEIGH (obstructive sleep apnea)     2  Other insomnia     3  Hypersomnia     4  Insufficient sleep syndrome     5  Morbid obesity with BMI of 40 0-44 9, adult ([facility] [address] )     6  Chronic rhinitis, unspecified type     7  Essential hypertension      His sleep difficulties have improved, but he still does not get enough sleep  Comorbidities are stable    PLAN:  1  Treatment with  PAP is medically necessary and Ashli Sam is agreable to continue use  2  Pressure setting: [Change a Pap to 15-17 cm H2O      3   Instruction on care of equipment and strategies to improve comfort with use of PAP were discussed  Care was coordinated with DME provider and prescription to replace supplies as needed was provided  4  Need for compliance with therapy and weight reduction were emphasized  5  I also advised allowing sufficient opportunity for sleep  6  With your consent, follow-up is advised in 1 year r sooner if needed to monitor progress, compliance and to adjust therapy]  Thank you for allowing me to participate in the care of this patient      Sincerely,    Yosvany Booth MD  Board Certified Specialist

## 2018-02-26 NOTE — PROGRESS NOTES
Review of Systems      Genitourinary erectile dysfunction   Cardiology none   Gastrointestinal none   Neurology numbness/tingling of an extremity, forgetfulness and decreased concentration   Constitutional none   Integumentary none   Psychiatry none   Musculoskeletal none   Pulmonary none   ENT ringing in ears   Endocrine none   Hematological none

## 2018-03-06 ENCOUNTER — OFFICE VISIT (OUTPATIENT)
Dept: NEUROLOGY | Facility: CLINIC | Age: 58
End: 2018-03-06
Payer: COMMERCIAL

## 2018-03-06 VITALS
HEART RATE: 91 BPM | WEIGHT: 315 LBS | DIASTOLIC BLOOD PRESSURE: 84 MMHG | RESPIRATION RATE: 14 BRPM | SYSTOLIC BLOOD PRESSURE: 162 MMHG | BODY MASS INDEX: 44.1 KG/M2 | HEIGHT: 71 IN

## 2018-03-06 DIAGNOSIS — G57.12 MERALGIA PARESTHETICA OF LEFT SIDE: ICD-10-CM

## 2018-03-06 DIAGNOSIS — E11.42 DIABETIC PERIPHERAL NEUROPATHY (HCC): Primary | ICD-10-CM

## 2018-03-06 DIAGNOSIS — R20.2 PARESTHESIA: ICD-10-CM

## 2018-03-06 PROCEDURE — 99243 OFF/OP CNSLTJ NEW/EST LOW 30: CPT | Performed by: PSYCHIATRY & NEUROLOGY

## 2018-03-06 NOTE — PROGRESS NOTES
Consultation - Neurology   Smith Jamison 59 y o  male MRN: [id number]  @ Encounter: [id number]      Assessment/Plan   Problem List Items Addressed This Visit     Diabetic peripheral neuropathy ([address] ) - Primary    Relevant Orders    EMG 2 limb lower extremity    Paresthesia    Relevant Orders    EMG 2 limb lower extremity    Vitamin B12    TSH, 3rd generation    Heavy metals screen    Lyme Antibody Profile with reflex to WB    Copper Level    Protein electrophoresis, serum    Folate    Meralgia paresthetica of left side    Relevant Orders    EMG 2 limb lower extremity          Assessment:  Diabetic peripheral polyneuropathy   paresthesia   Meralgia paresthetica    Plan:  Diabetic peripheral polyneuropathy:  Patient most likely has neuropathy in his feet related to diabetes  Will obtain EMG for further evaluation of peripheral neuropathy  Will also obtain blood work to evaluate for other treatable causes of neuropathy including heavy metal screen as patient is exposed to let at work for a long time  Patient to follow-up with PCP for management of diabetes and other risk factors and strict glycemic control  Good foot hygiene and foot care is recommended  Patient recommended to send us the records for skin biopsy to evaluate for small fiber neuropathy 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  Protective footwear all the time is recommended  Meralgia paresthetica of the left: Will follow-up EMG  X-ray of the lumbar spine was unremarkable  Discussed option of using lidocaine patch and Neurontin however patient wants to hold off on any medications at this time  Will follow up the patient in 3-4 months  Reason for Consult / Principal Problem:   Paresthesias/peripheral neuropathy  HPI: Smith Jamison is a 59 y o  right handed male who presents for a neurological consultation for paresthesias/peripheral polyneuropathy and leg pain      Patient has history of diabetes for about two years and complains of numbness and tingling in both his feet and left lower extremity  Patient states that he feels that he is standing on josé mostly under the toes for about 6 months and he also has a dull pain in his feet as well  He was evaluated by Podiatrist and states he had a skin biopsy and they did not find any nerves  He also states that he is exposed to lead at work and has been working there for 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 years  He also has numbness in the left lateral and anterior aspect of the thigh for about 6 months  He feels constant numbness feeling and burning around 3:30 pm after standing most day  He also has intermittent dull pain when going up or down the stairs  He did not gain or loose weight and denies any tight belts  He denies any back pain  Review of Systems   Constitutional: Positive for fatigue and unexpected weight change  Negative for appetite change and fever  HENT: Positive for hearing loss, sinus pressure and tinnitus  Negative for trouble swallowing and voice change  Eyes: Positive for visual disturbance  Negative for photophobia and pain  Respiratory: Positive for shortness of breath  Cardiovascular: Negative  Negative for palpitations  Gastrointestinal: Negative  Negative for nausea and vomiting  Endocrine: Negative  Negative for cold intolerance and heat intolerance  Erection difficulty   Genitourinary: Positive for urgency  Negative for dysuria and frequency  Musculoskeletal: Positive for arthralgias and myalgias  Negative for neck pain  Pain while walking   Skin: Negative  Negative for rash  Neurological: Positive for dizziness and numbness  Negative for tremors, seizures, syncope, facial asymmetry, speech difficulty, weakness, light-headedness and headaches  Tingling   Hematological: Negative  Does not bruise/bleed easily  Psychiatric/Behavioral: Negative  Negative for confusion, hallucinations and sleep disturbance          Memory problems         Historical Information   Past Medical History:   Diagnosis Date    Asthma     Benign positional vertigo     Diabetes mellitus ([facility] [address] )     borderline; diet controlled    Hyperlipidemia     Hypertension     Sleep apnea     has symptoms but not been diagnosed     Past Surgical History:   Procedure Laterality Date    MASS EXCISION Right 8/21/2017    Procedure: POSTERIOR SHOULDER MASS EXCISION; POSTERIOR NECK MASS EXCISION; CHEST WALL MASS EXCISION;  Surgeon: Rhea Hendricks MD;  Location: [facility];  Service: General    THROAT SURGERY       Social History   History   Alcohol Use No     History   Drug Use No     History   Smoking Status    Never Smoker   Smokeless Tobacco    Never Used     Family History   Problem Relation Age of Onset    Hypertension Mother     Diabetes Mother     Hyperlipidemia Mother     Hypertension Father     Hyperlipidemia Father        Meds/Allergies     Current Outpatient Prescriptions:     albuterol (2 5 mg/3 mL) 0 083 % nebulizer solution, Take 3 mL (2 5 mg total) by nebulization every 6 (six) hours as needed for wheezing, Disp: 30 vial, Rfl: 2    albuterol (PROVENTIL HFA,VENTOLIN HFA) 90 mcg/act inhaler, Inhale 2 puffs every 6 (six) hours as needed for wheezing, Disp: , Rfl:     ascorbic acid (VITAMIN C) 500 mg tablet, Take 500 mg by mouth daily, Disp: , Rfl:     b complex vitamins capsule, Take 1 capsule by mouth daily, Disp: , Rfl:     fluticasone (FLONASE) 50 mcg/act nasal spray, 2 sprays into each nostril daily, Disp: , Rfl:     fluticasone (FLOVENT HFA) 220 mcg/act inhaler, Inhale 2 puffs 2 (two) times a day, Disp: , Rfl:     lisinopril (ZESTRIL) 20 mg tablet, TAKE ONE TABLET BY MOUTH ONCE DAILY, Disp: 90 tablet, Rfl: 0    loratadine (CLARITIN) 10 mg tablet, Take 10 mg by mouth daily, Disp: , Rfl:     MAGNESIUM CITRATE PO, Take 1 tablet by mouth daily  , Disp: , Rfl:     metFORMIN (GLUCOPHAGE) 500 mg tablet, Take 1 tablet by mouth 2 (two) times a day, Disp: , Rfl:     omeprazole (PriLOSEC) 40 MG capsule, Take 40 mg by mouth daily, Disp: , Rfl:     oxyCODONE-acetaminophen (PERCOCET) 5-325 mg per tablet, Take 1-2 tabs by mouth every 4-6 hours as needed for pain, Disp: 20 tablet, Rfl: 0    simvastatin (ZOCOR) 20 mg tablet, TAKE ONE TABLET BY MOUTH IN THE EVENING, Disp: 90 tablet, Rfl: 3    sodium chloride (OCEAN NASAL SPRAY) 0 65 % nasal spray, 2 sprays into each nostril 2 (two) times a day, Disp: , Rfl:     NON FORMULARY, NeuRx-TF Peripheral nerve health, Disp: , Rfl:     Allergies   Allergen Reactions    Shellfish-Derived Products Anaphylaxis     Clams and mussels, oysters  Lobster and crab ok    Diphenhydramine      Lightheadedness, nauseous, rapid heartbeat       Objective   Vitals:Blood pressure 162/84, pulse 91, resp  rate 14, height 5' 11" (1 803 m), weight (!) 152 kg (334 lb)  ,Body mass index is 46 58 kg/m²  General examination:  Patient is awake and alert  Eyes: Conjunctiva and sclera are clear  HEENT: External examination is normal  Neck: Supple  Lungs: Clear to auscultation bilaterally  CVS: S1, S2 heard  Abdomen: Soft, nontender  Extremities: No clubbing, edema, cyanosis  Skin: No rashes  Neurological examination:   Mental status: Patient is awake, alert, oriented to time place and person  Attention, concentration, fund of knowledge is intact  Language: No evidence of aphasia or dysarthria  Memory: Repetition 3/3 and recall 3/3  Cranial nerves: Pupils equal, reacting to light and accommodation  Extraocular movements intact  Visual fields are full  Fundi are difficult to visualize bilaterally  Facial sensation is intact  No facial weakness is noted  Finger rub test is intact bilaterally  Tongue and uvula are in midline  Gag is intact  Shoulder shrug is 5/5 bilaterally  Motor examination: Tone is normal  No atrophy noted  Strength is 5/5 throughout  Sensory examination: Light touch is intact  Pinprick, temperature are impaired till mid feet bilaterally   Vibration is decreased at the toes bilaterally  Proprioception is intact  Deep tendon reflexes: Difficult to elicit throughout  Plantars are downgoing bilaterally  Coordination: Finger-nose test and finger tapping intact bilaterally  Heel-to-shin test is intact bilaterally  Gait: Patient has a normal base and stride  Lab Results: The fingerstick glucose in August 2017 was 161, HbA1c in July 2017 was 6 8  Imaging Studies: I have personally reviewed pertinent reports      X-ray lumbar spine in March was normal     Counseling / Coordination of Care  I spent 50 minutes with the patient and greater than 50% of the time was spent in coordination of care and counselling

## 2018-04-11 ENCOUNTER — APPOINTMENT (OUTPATIENT)
Dept: LAB | Facility: CLINIC | Age: 58
End: 2018-04-11
Payer: COMMERCIAL

## 2018-04-11 DIAGNOSIS — R20.2 PARESTHESIA: ICD-10-CM

## 2018-04-11 DIAGNOSIS — Z13.220 SCREENING FOR CHOLESTEROL LEVEL: ICD-10-CM

## 2018-04-11 DIAGNOSIS — I10 ESSENTIAL HYPERTENSION: ICD-10-CM

## 2018-04-11 LAB
ALBUMIN SERPL BCP-MCNC: 3.6 G/DL (ref 3.5–5)
ALP SERPL-CCNC: 111 U/L (ref 46–116)
ALT SERPL W P-5'-P-CCNC: 41 U/L (ref 12–78)
ANION GAP SERPL CALCULATED.3IONS-SCNC: 5 MMOL/L (ref 4–13)
AST SERPL W P-5'-P-CCNC: 27 U/L (ref 5–45)
BILIRUB SERPL-MCNC: 0.7 MG/DL (ref 0.2–1)
BUN SERPL-MCNC: 12 MG/DL (ref 5–25)
CALCIUM SERPL-MCNC: 9 MG/DL
CHLORIDE SERPL-SCNC: 101 MMOL/L (ref 100–108)
CHOLEST SERPL-MCNC: 161 MG/DL (ref 50–200)
CO2 SERPL-SCNC: 32 MMOL/L (ref 21–32)
CREAT SERPL-MCNC: 0.87 MG/DL (ref 0.6–1.3)
EST. AVERAGE GLUCOSE BLD GHB EST-MCNC: 177 MG/DL
FOLATE SERPL-MCNC: >20 NG/ML (ref 3.1–17.5)
GFR SERPL CREATININE-BSD FRML MDRD: 94 ML/MIN/1.73SQ M
GLUCOSE P FAST SERPL-MCNC: 161 MG/DL (ref 65–99)
HBA1C MFR BLD: 7.8 % (ref 4.2–6.3)
HDLC SERPL-MCNC: 46 MG/DL (ref 40–60)
LDLC SERPL CALC-MCNC: 86 MG/DL (ref 0–100)
NONHDLC SERPL-MCNC: 115 MG/DL
POTASSIUM SERPL-SCNC: 4.2 MMOL/L (ref 3.5–5.3)
PROT SERPL-MCNC: 8.4 G/DL (ref 6.4–8.2)
SODIUM SERPL-SCNC: 138 MMOL/L (ref 136–145)
TRIGL SERPL-MCNC: 144 MG/DL
TSH SERPL DL<=0.05 MIU/L-ACNC: 2.53 UIU/ML (ref 0.36–3.74)
VIT B12 SERPL-MCNC: 456 PG/ML (ref 100–900)

## 2018-04-11 PROCEDURE — 80053 COMPREHEN METABOLIC PANEL: CPT

## 2018-04-11 PROCEDURE — 84165 PROTEIN E-PHORESIS SERUM: CPT | Performed by: PATHOLOGY

## 2018-04-11 PROCEDURE — 80061 LIPID PANEL: CPT

## 2018-04-11 PROCEDURE — 82300 ASSAY OF CADMIUM: CPT

## 2018-04-11 PROCEDURE — 82525 ASSAY OF COPPER: CPT

## 2018-04-11 PROCEDURE — 83655 ASSAY OF LEAD: CPT

## 2018-04-11 PROCEDURE — 86334 IMMUNOFIX E-PHORESIS SERUM: CPT | Performed by: PATHOLOGY

## 2018-04-11 PROCEDURE — 86618 LYME DISEASE ANTIBODY: CPT

## 2018-04-11 PROCEDURE — 36415 COLL VENOUS BLD VENIPUNCTURE: CPT

## 2018-04-11 PROCEDURE — 83036 HEMOGLOBIN GLYCOSYLATED A1C: CPT

## 2018-04-11 PROCEDURE — 86334 IMMUNOFIX E-PHORESIS SERUM: CPT

## 2018-04-11 PROCEDURE — 84165 PROTEIN E-PHORESIS SERUM: CPT

## 2018-04-11 PROCEDURE — 82175 ASSAY OF ARSENIC: CPT

## 2018-04-11 PROCEDURE — 84443 ASSAY THYROID STIM HORMONE: CPT

## 2018-04-11 PROCEDURE — 82607 VITAMIN B-12: CPT

## 2018-04-11 PROCEDURE — 82746 ASSAY OF FOLIC ACID SERUM: CPT

## 2018-04-11 PROCEDURE — 83825 ASSAY OF MERCURY: CPT

## 2018-04-12 LAB
B BURGDOR IGG SER IA-ACNC: 0.16
B BURGDOR IGM SER IA-ACNC: 0.43

## 2018-04-13 LAB
ARSENIC BLD-MCNC: 5 UG/L (ref 2–23)
CADMIUM BLD-MCNC: NORMAL UG/L (ref 0–1.2)
LEAD BLD-MCNC: 19 UG/DL (ref 0–19)
MERCURY BLD-MCNC: NORMAL UG/L (ref 0–14.9)

## 2018-04-14 LAB
ALBUMIN SERPL ELPH-MCNC: 4.31 G/DL (ref 3.5–5)
ALBUMIN SERPL ELPH-MCNC: 52.5 % (ref 52–65)
ALPHA1 GLOB SERPL ELPH-MCNC: 0.35 G/DL (ref 0.1–0.4)
ALPHA1 GLOB SERPL ELPH-MCNC: 4.3 % (ref 2.5–5)
ALPHA2 GLOB SERPL ELPH-MCNC: 0.88 G/DL (ref 0.4–1.2)
ALPHA2 GLOB SERPL ELPH-MCNC: 10.7 % (ref 7–13)
BETA GLOB ABNORMAL SERPL ELPH-MCNC: 0.48 G/DL (ref 0.4–0.8)
BETA1 GLOB SERPL ELPH-MCNC: 5.9 % (ref 5–13)
BETA2 GLOB SERPL ELPH-MCNC: 3.9 % (ref 2–8)
BETA2+GAMMA GLOB SERPL ELPH-MCNC: 0.32 G/DL (ref 0.2–0.5)
COPPER SERPL-MCNC: 121 UG/DL (ref 72–166)
GAMMA GLOB ABNORMAL SERPL ELPH-MCNC: 1.86 G/DL (ref 0.5–1.6)
GAMMA GLOB SERPL ELPH-MCNC: 22.7 % (ref 12–22)
IGG/ALB SER: 1.11 {RATIO} (ref 1.1–1.8)
INTERPRETATION UR IFE-IMP: NORMAL
M PROTEIN 1 MFR SERPL ELPH: 17.1 %
M PROTEIN 1 SERPL ELPH-MCNC: 1.4 G/DL
PROT PATTERN SERPL ELPH-IMP: ABNORMAL
PROT SERPL-MCNC: 8.2 G/DL (ref 6.4–8.2)

## 2018-04-16 ENCOUNTER — TELEPHONE (OUTPATIENT)
Dept: NEUROLOGY | Facility: CLINIC | Age: 58
End: 2018-04-16

## 2018-04-16 ENCOUNTER — OFFICE VISIT (OUTPATIENT)
Dept: FAMILY MEDICINE CLINIC | Facility: CLINIC | Age: 58
End: 2018-04-16
Payer: COMMERCIAL

## 2018-04-16 VITALS
RESPIRATION RATE: 16 BRPM | WEIGHT: 315 LBS | SYSTOLIC BLOOD PRESSURE: 138 MMHG | BODY MASS INDEX: 42.66 KG/M2 | HEIGHT: 72 IN | TEMPERATURE: 96.8 F | HEART RATE: 68 BPM | DIASTOLIC BLOOD PRESSURE: 72 MMHG

## 2018-04-16 DIAGNOSIS — D47.2 MONOCLONAL GAMMOPATHY: Primary | ICD-10-CM

## 2018-04-16 DIAGNOSIS — K22.70 BARRETT'S ESOPHAGUS WITHOUT DYSPLASIA: ICD-10-CM

## 2018-04-16 DIAGNOSIS — J45.40 MODERATE PERSISTENT ASTHMA WITHOUT COMPLICATION: ICD-10-CM

## 2018-04-16 DIAGNOSIS — G47.33 OSA (OBSTRUCTIVE SLEEP APNEA): ICD-10-CM

## 2018-04-16 DIAGNOSIS — E78.49 OTHER HYPERLIPIDEMIA: ICD-10-CM

## 2018-04-16 DIAGNOSIS — I10 ESSENTIAL HYPERTENSION: ICD-10-CM

## 2018-04-16 DIAGNOSIS — E11.42 TYPE 2 DIABETES MELLITUS WITH DIABETIC POLYNEUROPATHY, WITHOUT LONG-TERM CURRENT USE OF INSULIN (HCC): Primary | ICD-10-CM

## 2018-04-16 PROBLEM — Z13.220 SCREENING FOR CHOLESTEROL LEVEL: Status: RESOLVED | Noted: 2018-02-12 | Resolved: 2018-04-16

## 2018-04-16 PROBLEM — E11.9 TYPE 2 DIABETES MELLITUS (HCC): Status: ACTIVE | Noted: 2017-03-08

## 2018-04-16 PROBLEM — J45.41 MODERATE PERSISTENT ASTHMA WITH ACUTE EXACERBATION: Status: RESOLVED | Noted: 2018-02-12 | Resolved: 2018-04-16

## 2018-04-16 PROCEDURE — 3725F SCREEN DEPRESSION PERFORMED: CPT | Performed by: FAMILY MEDICINE

## 2018-04-16 PROCEDURE — 99214 OFFICE O/P EST MOD 30 MIN: CPT | Performed by: FAMILY MEDICINE

## 2018-04-16 RX ORDER — OMEPRAZOLE 40 MG/1
40 CAPSULE, DELAYED RELEASE ORAL DAILY
Qty: 90 CAPSULE | Refills: 3 | Status: SHIPPED | OUTPATIENT
Start: 2018-04-16 | End: 2019-12-02 | Stop reason: SDUPTHER

## 2018-04-16 RX ORDER — LISINOPRIL 20 MG/1
TABLET ORAL
Qty: 90 TABLET | Refills: 1 | Status: SHIPPED | OUTPATIENT
Start: 2018-04-16 | End: 2018-09-18 | Stop reason: SDUPTHER

## 2018-04-16 NOTE — ASSESSMENT & PLAN NOTE
Doing better with CPAP, not more sleep, but feels more rested  Continue CPAP and follow up with Sleep Center periodically  Should see improvement in metabolic issues

## 2018-04-16 NOTE — TELEPHONE ENCOUNTER
----- Message from Caden Leach PA-C sent at 4/16/2018  8:40 AM EDT -----  (Dr Dexter Blackburn patient)  Please let patient know that his protein electrophoresis showed a monoclonal gammopathy, which is essentially an abnormal protein in the blood  We test this as part of our neuropathy workup, because in some cases these proteins can contribute to neuropathy  Typically these are benign, but we usually send to a hematologist to evaluate further    If he is agreeable, I will enter heme referral

## 2018-04-16 NOTE — ASSESSMENT & PLAN NOTE
Will recommend better adherence to diet, refer for MNT  Increase metformin to 1000 mg BID  Repeat A1c in 3 months, due for urine micro-albumin screening at that time  Still advise regular Eye Exams and Podiatry for nail care (onychomycosis)  Need to optimize glucose control to help reduce symptoms of neuropathy  Still needs to follow up with Neurology regarding other workup

## 2018-04-16 NOTE — ASSESSMENT & PLAN NOTE
Acceptable control with use of MARIBEL less than even monthly  Only required once during acute respiratory illness 2 months ago  Continue Flovent BID

## 2018-04-16 NOTE — PROGRESS NOTES
Alicia Ricks 1960 male MRN: [id number]    Family Medicine Follow-up Visit    ASSESSMENT/PLAN  Problem List Items Addressed This Visit        Digestive    Kent's esophagus    Relevant Medications    omeprazole (PriLOSEC) 40 MG capsule       Endocrine    Type 2 diabetes mellitus ([address] ) - Primary     Will recommend better adherence to diet, refer for MNT  Increase metformin to 1000 mg BID  Repeat A1c in 3 months, due for urine micro-albumin screening at that time  Still advise regular Eye Exams and Podiatry for nail care (onychomycosis)  Need to optimize glucose control to help reduce symptoms of neuropathy  Still needs to follow up with Neurology regarding other workup  Relevant Medications    metFORMIN (GLUCOPHAGE) 1000 MG tablet    Other Relevant Orders    Ambulatory referral to medical nutrition therapy for diabetes    HEMOGLOBIN A1C W/ EAG ESTIMATION    Microalbumin / creatinine urine ratio    Basic metabolic panel       Respiratory    KYLEIGH (obstructive sleep apnea)     Doing better with CPAP, not more sleep, but feels more rested  Continue CPAP and follow up with Sleep Center periodically  Should see improvement in metabolic issues  Moderate persistent asthma without complication     Acceptable control with use of MARIBEL less than even monthly  Only required once during acute respiratory illness 2 months ago  Continue Flovent BID  Cardiovascular and Mediastinum    Essential hypertension     At goal <140/<90 on current lisinopril 20 mg daily  No changes  Discussed weight loss, DASH diet (referral for MNT for DM) and regular exercise  Relevant Orders    Basic metabolic panel       Other    Other hyperlipidemia     Stable on statin  Continue and repeat lipid panel annually  Follow up with me in office after labs done in 3 months        Future Appointments  Date Time Provider Greg Barrios   5/1/2018 8:00 AM [facility] [address] 7/24/2018 8:00 AM Karie Aguilar MD [facility]   3/4/2019 9:45 AM Janis Brown MD [facility]        SUBJECTIVE  CC: Follow-up    HPI:  Olga Albarran is a 59 y o  male who presents for lab follow up of chronic issues:  DM2 - Sees Ophth and Podiatry  Podiatry Rxd med for his feet but he didn't want to take it  Has gained weight since last A1c, and doesn't do much physical activity  HTN - taking his meds  Denies problems  No home BPs  Hyperlipidemia - taking statin, denies muscle pain  Asthma - MARIBEL infrequently  Was seen in mid-February with mild exacerbation  Usually has more problems in winter  Paresthesias - under evaluation by Neurology  Still present all the time, feels like walking on josé  KYLEIGH - under treatment through Sleep Center, using CPAP  Review of Systems   Constitutional: Positive for unexpected weight change (gain)  Negative for activity change, appetite change, fatigue and fever  Eyes: Negative for visual disturbance  Respiratory: Positive for shortness of breath (with exertion (brisk walk))  Negative for cough and wheezing  Cardiovascular: Positive for palpitations (two episodes since last visit, last seconds) and leg swelling (intermittent end of day)  Negative for chest pain  Endocrine: Negative for polydipsia, polyphagia and polyuria  Genitourinary: Negative for dysuria  Musculoskeletal: Positive for arthralgias and gait problem  Neurological: Positive for dizziness (intermittent, improved since adding vitmains)  Negative for headaches  Trouble with memory (to do labs)   Psychiatric/Behavioral: Positive for sleep disturbance  Negative for dysphoric mood         Historical Information   The patient history was reviewed as follows:    Past Medical History:   Diagnosis Date    Asthma     Benign positional vertigo     Diabetes mellitus ([facility] [address] )     borderline; diet controlled    Hyperlipidemia     Hypertension     Sleep apnea     has symptoms but not been diagnosed     Past Surgical History:   Procedure Laterality Date    MASS EXCISION Right 8/21/2017    Procedure: POSTERIOR SHOULDER MASS EXCISION; POSTERIOR NECK MASS EXCISION; CHEST WALL MASS EXCISION;  Surgeon: James Hernandez MD;  Location: [facility];  Service: General    THROAT SURGERY       Family History   Problem Relation Age of Onset    Hypertension Mother     Diabetes Mother     Hyperlipidemia Mother     Hypertension Father     Hyperlipidemia Father       Social History   History   Alcohol Use No     History   Drug Use No     History   Smoking Status    Never Smoker   Smokeless Tobacco    Never Used       Medications:     Current Outpatient Prescriptions:     albuterol (2 5 mg/3 mL) 0 083 % nebulizer solution, Take 3 mL (2 5 mg total) by nebulization every 6 (six) hours as needed for wheezing, Disp: 30 vial, Rfl: 2    albuterol (PROVENTIL HFA,VENTOLIN HFA) 90 mcg/act inhaler, Inhale 2 puffs every 6 (six) hours as needed for wheezing, Disp: , Rfl:     ascorbic acid (VITAMIN C) 500 mg tablet, Take 500 mg by mouth daily, Disp: , Rfl:     b complex vitamins capsule, Take 1 capsule by mouth daily, Disp: , Rfl:     fluticasone (FLONASE) 50 mcg/act nasal spray, 2 sprays into each nostril daily, Disp: , Rfl:     fluticasone (FLOVENT HFA) 220 mcg/act inhaler, Inhale 2 puffs 2 (two) times a day, Disp: , Rfl:     loratadine (CLARITIN) 10 mg tablet, Take 10 mg by mouth daily, Disp: , Rfl:     MAGNESIUM CITRATE PO, Take 1 tablet by mouth daily  , Disp: , Rfl:     metFORMIN (GLUCOPHAGE) 1000 MG tablet, Take 1 tablet (1,000 mg total) by mouth 2 (two) times a day with meals, Disp: 180 tablet, Rfl: 1    omeprazole (PriLOSEC) 40 MG capsule, Take 1 capsule (40 mg total) by mouth daily, Disp: 90 capsule, Rfl: 3    simvastatin (ZOCOR) 20 mg tablet, TAKE ONE TABLET BY MOUTH IN THE EVENING, Disp: 90 tablet, Rfl: 3    lisinopril (ZESTRIL) 20 mg tablet, TAKE ONE TABLET BY MOUTH ONCE DAILY, Disp: 90 tablet, Rfl: 1    NON FORMULARY, NeuRx-TF Peripheral nerve health, Disp: , Rfl:     sodium chloride (OCEAN NASAL SPRAY) 0 65 % nasal spray, 2 sprays into each nostril 2 (two) times a day, Disp: , Rfl:   Allergies   Allergen Reactions    Shellfish-Derived Products Anaphylaxis     Clams and mussels, oysters  Lobster and crab ok    Diphenhydramine      Lightheadedness, nauseous, rapid heartbeat       OBJECTIVE    Vitals:   Vitals:    04/16/18 0829   BP: 138/72   Pulse: 68   Resp: 16   Temp: (!) 96 8 °F (36 °C)   Weight: (!) 154 kg (338 lb 12 8 oz)   Height: 5' 11 8" (1 824 m)       Physical Exam   Constitutional: He is oriented to person, place, and time  He appears well-developed and well-nourished  No distress  Morbidly obese   Eyes: Conjunctivae and EOM are normal  No scleral icterus  Neck: Neck supple  No thyromegaly present  Cardiovascular: Normal rate, regular rhythm, normal heart sounds and intact distal pulses  Pulses are no weak pulses  Pulses:       Dorsalis pedis pulses are 2+ on the right side, and 2+ on the left side  Posterior tibial pulses are 2+ on the right side, and 2+ on the left side  No carotid bruits   Pulmonary/Chest: Effort normal and breath sounds normal  No respiratory distress  Musculoskeletal: He exhibits no edema  Feet:   Right Foot:   Skin Integrity: Negative for ulcer, callus or dry skin  Left Foot:   Skin Integrity: Negative for ulcer, callus or dry skin  Lymphadenopathy:     He has no cervical adenopathy  Neurological: He is alert and oriented to person, place, and time  No cranial nerve deficit  Skin: Skin is warm and dry  No erythema  No pallor  Psychiatric: He has a normal mood and affect  His behavior is normal  Thought content normal           Patient's shoes and socks removed  Right Foot/Ankle   Right Foot Inspection  Skin Exam: skin not intact, no dry skin, no callus, no ulcer and no callus                            Sensory Monofilament testing: intact  Vascular    The right DP pulse is 2+  The right PT pulse is 2+  Left Foot/Ankle  Left Foot Inspection  Skin Exam: skin not intact, no dry skin, no ulcer and no callus                                         Sensory       Monofilament: intact  Vascular    The left DP pulse is 2+  The left PT pulse is 2+  Assign Risk Category:  No deformity present; No loss of protective sensation;  No weak pulses       Risk: 0      Appointment on 04/11/2018   Component Date Value Ref Range Status    Sodium 04/11/2018 138  136 - 145 mmol/L Final    Potassium 04/11/2018 4 2  3 5 - 5 3 mmol/L Final    Chloride 04/11/2018 101  100 - 108 mmol/L Final    CO2 04/11/2018 32  21 - 32 mmol/L Final    Anion Gap 04/11/2018 5  4 - 13 mmol/L Final    BUN 04/11/2018 12  5 - 25 mg/dL Final    Creatinine 04/11/2018 0 87  0 60 - 1 30 mg/dL Final    Glucose, Fasting 04/11/2018 161* 65 - 99 mg/dL Final    Calcium 04/11/2018 9 0  mg/dL Final    AST 04/11/2018 27  5 - 45 U/L Final    ALT 04/11/2018 41  12 - 78 U/L Final    Alkaline Phosphatase 04/11/2018 111  46 - 116 U/L Final    Total Protein 04/11/2018 8 4* 6 4 - 8 2 g/dL Final    Albumin 04/11/2018 3 6  3 5 - 5 0 g/dL Final    Total Bilirubin 04/11/2018 0 70  0 20 - 1 00 mg/dL Final    eGFR 04/11/2018 94  ml/min/1 73sq m Final    Cholesterol 04/11/2018 161  50 - 200 mg/dL Final    Triglycerides 04/11/2018 144  <=150 mg/dL Final    HDL, Direct 04/11/2018 46  40 - 60 mg/dL Final    LDL Calculated 04/11/2018 86  0 - 100 mg/dL Final    Non-HDL-Chol (CHOL-HDL) 04/11/2018 115  mg/dl Final    Hemoglobin A1C 04/11/2018 7 8* 4 2 - 6 3 % Final    EAG 04/11/2018 177  mg/dl Final    Vitamin B-12 04/11/2018 456  100 - 900 pg/mL Final    TSH 3RD GENERATON 04/11/2018 2 530  0 358 - 3 740 uIU/mL Final    Arsenic 04/11/2018 5  2 - 23 ug/L Final    Cadmium 04/11/2018 None Detected  0 0 - 1 2 ug/L Final    Mercury 04/11/2018 None Detected  0 0 - 14 9 ug/L Final    Lead Blood 04/11/2018 19  0 - 19 ug/dL Final    LYME AB IGG 04/11/2018 0 16  0 00 - 0 79 Final    LYME AB IGM 04/11/2018 0 43  0 00 - 0 79 Final    Copper 04/11/2018 121  72 - 166 ug/dL Final    A/G Ratio 04/11/2018 1 11  1 10 - 1 80 Final    Albumin Electrophoresis 04/11/2018 52 5  52 0 - 65 0 % Final    Albumin CONC 04/11/2018 4 31  3 50 - 5 00 g/dl Final    Alpha 1 04/11/2018 4 3  2 5 - 5 0 % Final    ALPHA 1 CONC 04/11/2018 0 35  0 10 - 0 40 g/dL Final    Alpha 2 04/11/2018 10 7  7 0 - 13 0 % Final    ALPHA 2 CONC 04/11/2018 0 88  0 40 - 1 20 g/dL Final    Beta-1 04/11/2018 5 9  5 0 - 13 0 % Final    BETA 1 CONC 04/11/2018 0 48  0 40 - 0 80 g/dL Final    Beta-2 04/11/2018 3 9  2 0 - 8 0 % Final    BETA 2 CONC 04/11/2018 0 32  0 20 - 0 50 g/dL Final    Gamma Globulin 04/11/2018 22 7* 12 0 - 22 0 % Final    GAMMA CONC 04/11/2018 1 86* 0 50 - 1 60 g/dL Final    M Peak ID 1 04/11/2018 17 10  % Final    M PEAK 1 CONC 04/11/2018 1 40  g/dL Final    SPEP Interpretation 04/11/2018 The serum total protein and albumin are within normal limits  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

## 2018-04-16 NOTE — ASSESSMENT & PLAN NOTE
At goal <140/<90 on current lisinopril 20 mg daily  No changes  Discussed weight loss, DASH diet (referral for MNT for DM) and regular exercise

## 2018-04-27 ENCOUNTER — OFFICE VISIT (OUTPATIENT)
Dept: HEMATOLOGY ONCOLOGY | Facility: CLINIC | Age: 58
End: 2018-04-27
Payer: COMMERCIAL

## 2018-04-27 VITALS
RESPIRATION RATE: 16 BRPM | OXYGEN SATURATION: 96 % | HEIGHT: 71 IN | BODY MASS INDEX: 44.1 KG/M2 | WEIGHT: 315 LBS | SYSTOLIC BLOOD PRESSURE: 150 MMHG | DIASTOLIC BLOOD PRESSURE: 90 MMHG | TEMPERATURE: 97 F | HEART RATE: 97 BPM

## 2018-04-27 DIAGNOSIS — D47.2 MONOCLONAL GAMMOPATHY: Primary | ICD-10-CM

## 2018-04-27 PROCEDURE — 99244 OFF/OP CNSLTJ NEW/EST MOD 40: CPT | Performed by: INTERNAL MEDICINE

## 2018-04-27 NOTE — PROGRESS NOTES
Oncology Consult Note  Yennifer Zion 59 y o  male MRN: [id number]  Unit/Bed#:  Encounter: [id number]      Presenting Complaint: IgG kappa monoclonal gammopathy with M protein of 1 4 grams/deciliters    History of Presenting Illness: 66-year-old  male with history of obesity, asthma, hypertension, dyslipidemia, diabetes mellitus type 2, had been complaining of tingling and numbness in his feet and hand for the past year, most recently a feeling of hypersensitivity involving the anterior aspect of the left thigh area, evaluation by Neurology on March 2018 showed monoclonal gammopathy with M protein of 1 40 grams/deciliters, IgG kappa  He has normal vitamin B12 level, TSH, heavy metal screen, Lyme disease  The last CBC was done  On August 2017 showed hemoglobin of 12 8, WBC 6 6, platelets [id number], 84% neutrophils, 19% lymphocytes  He does not smoke or drink  Family history significant for father with prostate cancer and mother with colon cancer  He denies any headache blurred vision diplopia odynophagia dysphagia chest pain abdominal pain dysuria hematuria melena hematochezia  He reported shoulder pain bilaterally, upper back pain, lower back pain, tingling and numbness involving the hands and the feet    Review of Systems - As stated in the HPI otherwise the fourteen point review of systems was negative      Past Medical History:   Diagnosis Date    Asthma     Benign positional vertigo     Diabetes mellitus (HCC)     borderline; diet controlled    Hyperlipidemia     Hypertension     Sleep apnea     has symptoms but not been diagnosed       Social History     Social History    Marital status: /Civil Union     Spouse name: N/A    Number of children: N/A    Years of education: N/A     Social History Main Topics    Smoking status: Never Smoker    Smokeless tobacco: Never Used    Alcohol use No    Drug use: No    Sexual activity: Not Asked     Other Topics Concern    None     Social History Narrative    None       Family History   Problem Relation Age of Onset    Hypertension Mother     Diabetes Mother     Hyperlipidemia Mother     Hypertension Father     Hyperlipidemia Father        Allergies   Allergen Reactions    Shellfish-Derived Products Anaphylaxis     Clams and mussels, oysters  Lobster and crab ok    Diphenhydramine      Lightheadedness, nauseous, rapid heartbeat         Current Outpatient Prescriptions:     albuterol (2 5 mg/3 mL) 0 083 % nebulizer solution, Take 3 mL (2 5 mg total) by nebulization every 6 (six) hours as needed for wheezing, Disp: 30 vial, Rfl: 2    albuterol (PROVENTIL HFA,VENTOLIN HFA) 90 mcg/act inhaler, Inhale 2 puffs every 6 (six) hours as needed for wheezing, Disp: , Rfl:     ascorbic acid (VITAMIN C) 500 mg tablet, Take 500 mg by mouth daily, Disp: , Rfl:     b complex vitamins capsule, Take 1 capsule by mouth daily, Disp: , Rfl:     fluticasone (FLONASE) 50 mcg/act nasal spray, 2 sprays into each nostril daily, Disp: , Rfl:     fluticasone (FLOVENT HFA) 220 mcg/act inhaler, Inhale 2 puffs 2 (two) times a day, Disp: , Rfl:     lisinopril (ZESTRIL) 20 mg tablet, TAKE ONE TABLET BY MOUTH ONCE DAILY, Disp: 90 tablet, Rfl: 1    loratadine (CLARITIN) 10 mg tablet, Take 10 mg by mouth daily, Disp: , Rfl:     MAGNESIUM CITRATE PO, Take 1 tablet by mouth daily  , Disp: , Rfl:     metFORMIN (GLUCOPHAGE) 1000 MG tablet, Take 1 tablet (1,000 mg total) by mouth 2 (two) times a day with meals, Disp: 180 tablet, Rfl: 1    NON FORMULARY, NeuRx-TF Peripheral nerve health, Disp: , Rfl:     omeprazole (PriLOSEC) 40 MG capsule, Take 1 capsule (40 mg total) by mouth daily, Disp: 90 capsule, Rfl: 3    simvastatin (ZOCOR) 20 mg tablet, TAKE ONE TABLET BY MOUTH IN THE EVENING, Disp: 90 tablet, Rfl: 3    sodium chloride (OCEAN NASAL SPRAY) 0 65 % nasal spray, 2 sprays into each nostril 2 (two) times a day, Disp: , Rfl:       /90   Pulse 97   Temp (!) 97 °F (36 1 °C) (Tympanic)   Resp 16   Ht 5' 11" (1 803 m)   Wt (!) 151 kg (333 lb)   SpO2 96%   BMI 46 44 kg/m²       General Appearance:    Alert, oriented, obese        Eyes:    PERRL   Ears:    Normal external ear canals, both ears   Nose:   Nares normal, septum midline   Throat:   Mucosa moist  Pharynx without injection  Neck:   Supple       Lungs:     Clear to auscultation bilaterally   Chest Wall:    No tenderness or deformity    Heart:    Regular rate and rhythm       Abdomen:     Soft, non-tender, bowel sounds +, no organomegaly, abdominal wall hernia           Extremities:   Extremities no cyanosis or edema       Skin:   no rash or icterus  Lymph nodes:   Cervical, supraclavicular, and axillary nodes normal   Neurologic:   CNII-XII intact, normal strength, sensation and reflexes     Throughout             Assessment:  IgG kappa monoclonal gammopathy of undetermined significance, M protein 1 4 grams/deciliter, with tingling and numbness    Plan: this might be related to MGUS versus multiple myeloma versus sarcoidosis, will arrange for bone marrow biopsy and aspirate by IR  Bone skeletal survey  Lambda and kappa  Light chain titer in the serum, sed rate  He will follow up with Neurology for evaluation, he might have diabetic neuropathy or cervical disc disease  Follow-up in 1 month I will keep you updated

## 2018-04-27 NOTE — LETTER
April 27, 2018     Farzad Hernández MD  [facility] [address] [address]    Patient: Hue Mullen   YOB: 1960   Date of Visit: 4/27/2018       Dear Dr Boss Began: Thank you for referring Keaton Enriquez to me for evaluation  Below are my notes for this consultation  If you have questions, please do not hesitate to call me  I look forward to following your patient along with you  Sincerely,        Ck Black MD        CC: No Recipients  Ck Black MD  4/27/2018 12:35 PM  Sign at close encounter  Oncology Consult Note  Hue Mullen 59 y o  male MRN: [id number]  Unit/Bed#:  Encounter: [id number]      Presenting Complaint: IgG kappa monoclonal gammopathy with M protein of 1 4 grams/deciliters    History of Presenting Illness: 78-year-old  male with history of obesity, asthma, hypertension, dyslipidemia, diabetes mellitus type 2, had been complaining of tingling and numbness in his feet and hand for the past year, most recently a feeling of hypersensitivity involving the anterior aspect of the left thigh area, evaluation by Neurology on March 2018 showed monoclonal gammopathy with M protein of 1 40 grams/deciliters, IgG kappa  He has normal vitamin B12 level, TSH, heavy metal screen, Lyme disease  The last CBC was done  On August 2017 showed hemoglobin of 12 8, WBC 6 6, platelets [id number], 69% neutrophils, 19% lymphocytes  He does not smoke or drink  Family history significant for father with prostate cancer and mother with colon cancer  He denies any headache blurred vision diplopia odynophagia dysphagia chest pain abdominal pain dysuria hematuria melena hematochezia  He reported shoulder pain bilaterally, upper back pain, lower back pain, tingling and numbness involving the hands and the feet    Review of Systems - As stated in the HPI otherwise the fourteen point review of systems was negative      Past Medical History:   Diagnosis Date    Asthma     Benign positional vertigo     Diabetes mellitus ([facility] [address] )     borderline; diet controlled    Hyperlipidemia     Hypertension     Sleep apnea     has symptoms but not been diagnosed       Social History     Social History    Marital status: /Civil Union     Spouse name: N/A    Number of children: N/A    Years of education: N/A     Social History Main Topics    Smoking status: Never Smoker    Smokeless tobacco: Never Used    Alcohol use No    Drug use: No    Sexual activity: Not Asked     Other Topics Concern    None     Social History Narrative    None       Family History   Problem Relation Age of Onset    Hypertension Mother     Diabetes Mother     Hyperlipidemia Mother     Hypertension Father     Hyperlipidemia Father        Allergies   Allergen Reactions    Shellfish-Derived Products Anaphylaxis     Clams and mussels, oysters  Lobster and crab ok    Diphenhydramine      Lightheadedness, nauseous, rapid heartbeat         Current Outpatient Prescriptions:     albuterol (2 5 mg/3 mL) 0 083 % nebulizer solution, Take 3 mL (2 5 mg total) by nebulization every 6 (six) hours as needed for wheezing, Disp: 30 vial, Rfl: 2    albuterol (PROVENTIL HFA,VENTOLIN HFA) 90 mcg/act inhaler, Inhale 2 puffs every 6 (six) hours as needed for wheezing, Disp: , Rfl:     ascorbic acid (VITAMIN C) 500 mg tablet, Take 500 mg by mouth daily, Disp: , Rfl:     b complex vitamins capsule, Take 1 capsule by mouth daily, Disp: , Rfl:     fluticasone (FLONASE) 50 mcg/act nasal spray, 2 sprays into each nostril daily, Disp: , Rfl:     fluticasone (FLOVENT HFA) 220 mcg/act inhaler, Inhale 2 puffs 2 (two) times a day, Disp: , Rfl:     lisinopril (ZESTRIL) 20 mg tablet, TAKE ONE TABLET BY MOUTH ONCE DAILY, Disp: 90 tablet, Rfl: 1    loratadine (CLARITIN) 10 mg tablet, Take 10 mg by mouth daily, Disp: , Rfl:     MAGNESIUM CITRATE PO, Take 1 tablet by mouth daily  , Disp: , Rfl:     metFORMIN (GLUCOPHAGE) 1000 MG tablet, Take 1 tablet (1,000 mg total) by mouth 2 (two) times a day with meals, Disp: 180 tablet, Rfl: 1    NON FORMULARY, NeuRx-TF Peripheral nerve health, Disp: , Rfl:     omeprazole (PriLOSEC) 40 MG capsule, Take 1 capsule (40 mg total) by mouth daily, Disp: 90 capsule, Rfl: 3    simvastatin (ZOCOR) 20 mg tablet, TAKE ONE TABLET BY MOUTH IN THE EVENING, Disp: 90 tablet, Rfl: 3    sodium chloride (OCEAN NASAL SPRAY) 0 65 % nasal spray, 2 sprays into each nostril 2 (two) times a day, Disp: , Rfl:       /90   Pulse 97   Temp (!) 97 °F (36 1 °C) (Tympanic)   Resp 16   Ht 5' 11" (1 803 m)   Wt (!) 151 kg (333 lb)   SpO2 96%   BMI 46 44 kg/m²        General Appearance:    Alert, oriented, obese        Eyes:    PERRL   Ears:    Normal external ear canals, both ears   Nose:   Nares normal, septum midline   Throat:   Mucosa moist  Pharynx without injection  Neck:   Supple       Lungs:     Clear to auscultation bilaterally   Chest Wall:    No tenderness or deformity    Heart:    Regular rate and rhythm       Abdomen:     Soft, non-tender, bowel sounds +, no organomegaly, abdominal wall hernia           Extremities:   Extremities no cyanosis or edema       Skin:   no rash or icterus  Lymph nodes:   Cervical, supraclavicular, and axillary nodes normal   Neurologic:   CNII-XII intact, normal strength, sensation and reflexes     Throughout             Assessment:  IgG kappa monoclonal gammopathy of undetermined significance, M protein 1 4 grams/deciliter, with tingling and numbness    Plan: this might be related to MGUS versus multiple myeloma versus sarcoidosis, will arrange for bone marrow biopsy and aspirate by IR  Bone skeletal survey  Lambda and kappa  Light chain titer in the serum, sed rate  He will follow up with Neurology for evaluation, he might have diabetic neuropathy or cervical disc disease  Follow-up in 1 month I will keep you updated

## 2018-04-30 ENCOUNTER — TRANSCRIBE ORDERS (OUTPATIENT)
Dept: LAB | Facility: HOSPITAL | Age: 58
End: 2018-04-30

## 2018-04-30 ENCOUNTER — APPOINTMENT (OUTPATIENT)
Dept: LAB | Facility: HOSPITAL | Age: 58
End: 2018-04-30
Attending: INTERNAL MEDICINE
Payer: COMMERCIAL

## 2018-04-30 DIAGNOSIS — D47.2 MONOCLONAL GAMMOPATHY: ICD-10-CM

## 2018-04-30 DIAGNOSIS — R20.2 PARESTHESIA: Primary | ICD-10-CM

## 2018-04-30 DIAGNOSIS — R20.2 PARESTHESIA: ICD-10-CM

## 2018-04-30 LAB
BASOPHILS # BLD AUTO: 0.03 THOUSANDS/ΜL (ref 0–0.1)
BASOPHILS NFR BLD AUTO: 1 % (ref 0–1)
CRP SERPL QL: 12.6 MG/L
EOSINOPHIL # BLD AUTO: 0.29 THOUSAND/ΜL (ref 0–0.61)
EOSINOPHIL NFR BLD AUTO: 4 % (ref 0–6)
ERYTHROCYTE [DISTWIDTH] IN BLOOD BY AUTOMATED COUNT: 15 % (ref 11.6–15.1)
ERYTHROCYTE [SEDIMENTATION RATE] IN BLOOD: 38 MM/HOUR (ref 0–10)
FOLATE SERPL-MCNC: >20 NG/ML (ref 3.1–17.5)
HCT VFR BLD AUTO: 40.7 % (ref 36.5–49.3)
HGB BLD-MCNC: 12.9 G/DL (ref 12–17)
IGA SERPL-MCNC: 85 MG/DL (ref 70–400)
IGG SERPL-MCNC: 1870 MG/DL (ref 700–1600)
IGM SERPL-MCNC: 99 MG/DL (ref 40–230)
LYMPHOCYTES # BLD AUTO: 1.33 THOUSANDS/ΜL (ref 0.6–4.47)
LYMPHOCYTES NFR BLD AUTO: 20 % (ref 14–44)
MCH RBC QN AUTO: 28.6 PG (ref 26.8–34.3)
MCHC RBC AUTO-ENTMCNC: 31.7 G/DL (ref 31.4–37.4)
MCV RBC AUTO: 90 FL (ref 82–98)
MONOCYTES # BLD AUTO: 0.47 THOUSAND/ΜL (ref 0.17–1.22)
MONOCYTES NFR BLD AUTO: 7 % (ref 4–12)
NEUTROPHILS # BLD AUTO: 4.45 THOUSANDS/ΜL (ref 1.85–7.62)
NEUTS SEG NFR BLD AUTO: 68 % (ref 43–75)
NRBC BLD AUTO-RTO: 0 /100 WBCS
PLATELET # BLD AUTO: 232 THOUSANDS/UL (ref 149–390)
PMV BLD AUTO: 9.9 FL (ref 8.9–12.7)
RBC # BLD AUTO: 4.51 MILLION/UL (ref 3.88–5.62)
TSH SERPL DL<=0.05 MIU/L-ACNC: 2.41 UIU/ML (ref 0.36–3.74)
URATE SERPL-MCNC: 5.8 MG/DL (ref 4.2–8)
VIT B12 SERPL-MCNC: 428 PG/ML (ref 100–900)
WBC # BLD AUTO: 6.58 THOUSAND/UL (ref 4.31–10.16)

## 2018-04-30 PROCEDURE — 85025 COMPLETE CBC W/AUTO DIFF WBC: CPT

## 2018-04-30 PROCEDURE — 82607 VITAMIN B-12: CPT

## 2018-04-30 PROCEDURE — 36415 COLL VENOUS BLD VENIPUNCTURE: CPT

## 2018-04-30 PROCEDURE — 86140 C-REACTIVE PROTEIN: CPT

## 2018-04-30 PROCEDURE — 82784 ASSAY IGA/IGD/IGG/IGM EACH: CPT

## 2018-04-30 PROCEDURE — 83825 ASSAY OF MERCURY: CPT

## 2018-04-30 PROCEDURE — 82746 ASSAY OF FOLIC ACID SERUM: CPT

## 2018-04-30 PROCEDURE — 84165 PROTEIN E-PHORESIS SERUM: CPT | Performed by: PATHOLOGY

## 2018-04-30 PROCEDURE — 83883 ASSAY NEPHELOMETRY NOT SPEC: CPT

## 2018-04-30 PROCEDURE — 82525 ASSAY OF COPPER: CPT

## 2018-04-30 PROCEDURE — 84165 PROTEIN E-PHORESIS SERUM: CPT

## 2018-04-30 PROCEDURE — 86618 LYME DISEASE ANTIBODY: CPT

## 2018-04-30 PROCEDURE — 86038 ANTINUCLEAR ANTIBODIES: CPT

## 2018-04-30 PROCEDURE — 84443 ASSAY THYROID STIM HORMONE: CPT

## 2018-04-30 PROCEDURE — 83655 ASSAY OF LEAD: CPT

## 2018-04-30 PROCEDURE — 82300 ASSAY OF CADMIUM: CPT

## 2018-04-30 PROCEDURE — 82175 ASSAY OF ARSENIC: CPT

## 2018-04-30 PROCEDURE — 85652 RBC SED RATE AUTOMATED: CPT

## 2018-04-30 PROCEDURE — 84550 ASSAY OF BLOOD/URIC ACID: CPT

## 2018-05-01 ENCOUNTER — HOSPITAL ENCOUNTER (OUTPATIENT)
Dept: NEUROLOGY | Facility: AMBULATORY SURGERY CENTER | Age: 58
Discharge: HOME | End: 2018-05-01
Payer: COMMERCIAL

## 2018-05-01 DIAGNOSIS — R20.2 PARESTHESIA: ICD-10-CM

## 2018-05-01 DIAGNOSIS — E11.42 DIABETIC PERIPHERAL NEUROPATHY (HCC): ICD-10-CM

## 2018-05-01 DIAGNOSIS — G57.12 MERALGIA PARESTHETICA OF LEFT SIDE: ICD-10-CM

## 2018-05-01 LAB
B BURGDOR IGG SER IA-ACNC: 0.13
B BURGDOR IGM SER IA-ACNC: 0.35
KAPPA LC FREE SER-MCNC: 16.9 MG/L (ref 3.3–19.4)
KAPPA LC FREE/LAMBDA FREE SER: 2.09 {RATIO} (ref 0.26–1.65)
LAMBDA LC FREE SERPL-MCNC: 8.1 MG/L (ref 5.7–26.3)

## 2018-05-01 PROCEDURE — 95886 MUSC TEST DONE W/N TEST COMP: CPT | Performed by: PSYCHIATRY & NEUROLOGY

## 2018-05-01 PROCEDURE — 95911 NRV CNDJ TEST 9-10 STUDIES: CPT | Performed by: PSYCHIATRY & NEUROLOGY

## 2018-05-02 LAB
ARSENIC BLD-MCNC: 10 UG/L (ref 2–23)
CADMIUM BLD-MCNC: NORMAL UG/L (ref 0–1.2)
COPPER SERPL-MCNC: 116 UG/DL (ref 72–166)
LEAD BLD-MCNC: 17 UG/DL (ref 0–19)
MERCURY BLD-MCNC: 1.1 UG/L (ref 0–14.9)
RYE IGE QN: NEGATIVE

## 2018-05-03 LAB
ALBUMIN SERPL ELPH-MCNC: 4 G/DL (ref 3.5–5)
ALBUMIN SERPL ELPH-MCNC: 52 % (ref 52–65)
ALPHA1 GLOB SERPL ELPH-MCNC: 0.33 G/DL (ref 0.1–0.4)
ALPHA1 GLOB SERPL ELPH-MCNC: 4.3 % (ref 2.5–5)
ALPHA2 GLOB SERPL ELPH-MCNC: 0.85 G/DL (ref 0.4–1.2)
ALPHA2 GLOB SERPL ELPH-MCNC: 11 % (ref 7–13)
BETA GLOB ABNORMAL SERPL ELPH-MCNC: 0.46 G/DL (ref 0.4–0.8)
BETA1 GLOB SERPL ELPH-MCNC: 6 % (ref 5–13)
BETA2 GLOB SERPL ELPH-MCNC: 4 % (ref 2–8)
BETA2+GAMMA GLOB SERPL ELPH-MCNC: 0.31 G/DL (ref 0.2–0.5)
GAMMA GLOB ABNORMAL SERPL ELPH-MCNC: 1.75 G/DL (ref 0.5–1.6)
GAMMA GLOB SERPL ELPH-MCNC: 22.7 % (ref 12–22)
IGG/ALB SER: 1.08 {RATIO} (ref 1.1–1.8)
M PROTEIN 1 MFR SERPL ELPH: 16.4 %
M PROTEIN 1 SERPL ELPH-MCNC: 1.26 G/DL
PROT SERPL-MCNC: 7.7 G/DL (ref 6.4–8.2)

## 2018-05-09 ENCOUNTER — HOSPITAL ENCOUNTER (OUTPATIENT)
Dept: RADIOLOGY | Facility: HOSPITAL | Age: 58
Discharge: HOME | End: 2018-05-09
Attending: INTERNAL MEDICINE | Admitting: RADIOLOGY
Payer: COMMERCIAL

## 2018-05-09 VITALS
SYSTOLIC BLOOD PRESSURE: 152 MMHG | HEART RATE: 68 BPM | WEIGHT: 315 LBS | OXYGEN SATURATION: 95 % | HEIGHT: 71 IN | DIASTOLIC BLOOD PRESSURE: 86 MMHG | RESPIRATION RATE: 20 BRPM | TEMPERATURE: 98 F | BODY MASS INDEX: 44.1 KG/M2

## 2018-05-09 DIAGNOSIS — D47.2 MONOCLONAL GAMMOPATHY: ICD-10-CM

## 2018-05-09 PROCEDURE — 88341 IMHCHEM/IMCYTCHM EA ADD ANTB: CPT | Performed by: PATHOLOGY

## 2018-05-09 PROCEDURE — 88313 SPECIAL STAINS GROUP 2: CPT | Performed by: PATHOLOGY

## 2018-05-09 PROCEDURE — 99152 MOD SED SAME PHYS/QHP 5/>YRS: CPT

## 2018-05-09 PROCEDURE — 88311 DECALCIFY TISSUE: CPT | Performed by: PATHOLOGY

## 2018-05-09 PROCEDURE — 85097 BONE MARROW INTERPRETATION: CPT | Performed by: PATHOLOGY

## 2018-05-09 PROCEDURE — 99153 MOD SED SAME PHYS/QHP EA: CPT

## 2018-05-09 PROCEDURE — 88305 TISSUE EXAM BY PATHOLOGIST: CPT | Performed by: PATHOLOGY

## 2018-05-09 PROCEDURE — 88342 IMHCHEM/IMCYTCHM 1ST ANTB: CPT | Performed by: PATHOLOGY

## 2018-05-09 PROCEDURE — 88185 FLOWCYTOMETRY/TC ADD-ON: CPT

## 2018-05-09 PROCEDURE — 88184 FLOWCYTOMETRY/ TC 1 MARKER: CPT | Performed by: INTERNAL MEDICINE

## 2018-05-09 PROCEDURE — 88365 INSITU HYBRIDIZATION (FISH): CPT | Performed by: PATHOLOGY

## 2018-05-09 PROCEDURE — 88360 TUMOR IMMUNOHISTOCHEM/MANUAL: CPT | Performed by: PATHOLOGY

## 2018-05-09 PROCEDURE — 88333 PATH CONSLTJ SURG CYTO XM 1: CPT | Performed by: PATHOLOGY

## 2018-05-09 PROCEDURE — 88367 INSITU HYBRIDIZATION AUTO: CPT

## 2018-05-09 PROCEDURE — 88188 FLOWCYTOMETRY/READ 9-15: CPT | Performed by: PATHOLOGY

## 2018-05-09 PROCEDURE — 77012 CT SCAN FOR NEEDLE BIOPSY: CPT

## 2018-05-09 PROCEDURE — 38222 DX BONE MARROW BX & ASPIR: CPT

## 2018-05-09 PROCEDURE — 88374 M/PHMTRC ALYS ISHQUANT/SEMIQ: CPT | Performed by: INTERNAL MEDICINE

## 2018-05-09 PROCEDURE — 88373 M/PHMTRC ALYS ISHQUANT/SEMIQ: CPT

## 2018-05-09 RX ORDER — SODIUM CHLORIDE 9 MG/ML
75 INJECTION, SOLUTION INTRAVENOUS CONTINUOUS
Status: DISCONTINUED | OUTPATIENT
Start: 2018-05-09 | End: 2018-05-09 | Stop reason: HOSPADM

## 2018-05-09 RX ORDER — FENTANYL CITRATE 50 UG/ML
INJECTION, SOLUTION INTRAMUSCULAR; INTRAVENOUS CODE/TRAUMA/SEDATION MEDICATION
Status: COMPLETED | OUTPATIENT
Start: 2018-05-09 | End: 2018-05-09

## 2018-05-09 RX ORDER — MIDAZOLAM HYDROCHLORIDE 1 MG/ML
INJECTION INTRAMUSCULAR; INTRAVENOUS CODE/TRAUMA/SEDATION MEDICATION
Status: COMPLETED | OUTPATIENT
Start: 2018-05-09 | End: 2018-05-09

## 2018-05-09 RX ORDER — ONDANSETRON 2 MG/ML
INJECTION INTRAMUSCULAR; INTRAVENOUS CODE/TRAUMA/SEDATION MEDICATION
Status: DISCONTINUED | OUTPATIENT
Start: 2018-05-09 | End: 2018-05-09 | Stop reason: HOSPADM

## 2018-05-09 RX ADMIN — ONDANSETRON 4 MG: 2 INJECTION INTRAMUSCULAR; INTRAVENOUS at 10:35

## 2018-05-09 RX ADMIN — FENTANYL CITRATE 50 MCG: 50 INJECTION, SOLUTION INTRAMUSCULAR; INTRAVENOUS at 10:03

## 2018-05-09 RX ADMIN — SODIUM CHLORIDE 75 ML/HR: 0.9 INJECTION, SOLUTION INTRAVENOUS at 07:42

## 2018-05-09 RX ADMIN — FENTANYL CITRATE 50 MCG: 50 INJECTION, SOLUTION INTRAMUSCULAR; INTRAVENOUS at 10:09

## 2018-05-09 RX ADMIN — MIDAZOLAM 1 MG: 1 INJECTION INTRAMUSCULAR; INTRAVENOUS at 09:56

## 2018-05-09 RX ADMIN — MIDAZOLAM 1 MG: 1 INJECTION INTRAMUSCULAR; INTRAVENOUS at 10:09

## 2018-05-09 RX ADMIN — MIDAZOLAM 1 MG: 1 INJECTION INTRAMUSCULAR; INTRAVENOUS at 10:03

## 2018-05-09 RX ADMIN — FENTANYL CITRATE 50 MCG: 50 INJECTION, SOLUTION INTRAMUSCULAR; INTRAVENOUS at 09:56

## 2018-05-09 NOTE — BRIEF OP NOTE (RAD/CATH)
CT BONE MARROW BIOPSY AND ASPIRATION  Procedure Note    PATIENT NAME: Ana Whitt  : 1960  MRN: [id number]     Pre-op Diagnosis:   1  Monoclonal gammopathy      Post-op Diagnosis:   1  Monoclonal gammopathy        Surgeon:   Adis Leblanc DO  Assistants:     No qualified resident was available  Estimated Blood Loss:  None  Findings:  Right iliac bone marrow biopsy and core bone sampling  Specimens:  Bone marrow aspirate and core bone  Complications:  None      Anesthesia: Conscious sedation and Local    Adis Leblanc DO     Date: 2018  Time: 10:28 AM

## 2018-05-09 NOTE — DISCHARGE INSTRUCTIONS
Bone Marrow Biopsy     WHAT YOU NEED TO KNOW:   A bone marrow biopsy is a procedure to remove a small amount of bone marrow from your bone  Bone marrow is the soft tissue inside your bone that helps to make blood cells  The sample is tested for disease or infection  DISCHARGE INSTRUCTIONS:     1  Limit your activities day of biopsy as directed by your doctor  2  Use medication as ordered  3  Return to your normal diet  Small sips of flat soda will help with nausea  4  Remove band-aid or dressing 24 hours after procedure  Contact Interventional Radiology at [phone] Siobhan PATIENTS: Contact Interventional Radiology at [phone]) Rosario Saldana PATIENTS: Contact Interventional Radiology at [phone]) if:    1  Difficulty breathing, nausea or vomiting  2  Chills or fever above 101 F     3  Pain at biopsy site not relieved by medication  4  Develop any redness, swelling, heat, unusual drainage, heavy bruising or bleeding from biopsy site

## 2018-05-11 LAB — MISCELLANEOUS LAB TEST RESULT: NORMAL

## 2018-05-15 LAB — MISCELLANEOUS LAB TEST RESULT: NORMAL

## 2018-05-17 LAB — MISCELLANEOUS LAB TEST RESULT: NORMAL

## 2018-05-24 ENCOUNTER — TRANSCRIBE ORDERS (OUTPATIENT)
Dept: RADIOLOGY | Facility: HOSPITAL | Age: 58
End: 2018-05-24

## 2018-05-24 ENCOUNTER — HOSPITAL ENCOUNTER (OUTPATIENT)
Dept: RADIOLOGY | Facility: HOSPITAL | Age: 58
Discharge: HOME | End: 2018-05-24
Attending: INTERNAL MEDICINE
Payer: COMMERCIAL

## 2018-05-24 DIAGNOSIS — D47.2 MONOCLONAL GAMMOPATHY: ICD-10-CM

## 2018-05-24 PROCEDURE — 77075 RADEX OSSEOUS SURVEY COMPL: CPT

## 2018-05-29 ENCOUNTER — OFFICE VISIT (OUTPATIENT)
Dept: HEMATOLOGY ONCOLOGY | Facility: CLINIC | Age: 58
End: 2018-05-29
Payer: COMMERCIAL

## 2018-05-29 VITALS
TEMPERATURE: 97.1 F | DIASTOLIC BLOOD PRESSURE: 82 MMHG | OXYGEN SATURATION: 98 % | HEART RATE: 99 BPM | WEIGHT: 315 LBS | BODY MASS INDEX: 42.66 KG/M2 | HEIGHT: 72 IN | RESPIRATION RATE: 16 BRPM | SYSTOLIC BLOOD PRESSURE: 152 MMHG

## 2018-05-29 DIAGNOSIS — C90.00 MULTIPLE MYELOMA NOT HAVING ACHIEVED REMISSION (HCC): Primary | ICD-10-CM

## 2018-05-29 PROCEDURE — 99215 OFFICE O/P EST HI 40 MIN: CPT | Performed by: PHYSICIAN ASSISTANT

## 2018-05-29 NOTE — PROGRESS NOTES
Oncology Consult Note  Daniel Diehl 59 y o  male MRN: [id number]  Unit/Bed#:  Encounter: [id number]      Presenting Complaint: IgG kappa monoclonal gammopathy with M protein of 1 4 grams/deciliters    History of Presenting Illness: Yadi Chun is 57-year-old  male seen for initial consultation at the referral of Sriarm Schwarz regarding IgG kappa monoclonal gammopathy with M protein of 1 4 grams/deciliters    He underwent evaluation by Neurology on March 2018 for neuralgia/neuropathy  4/30/2018:  Hemoglobin 12 9, MCV of 90, RDW 15, white blood cell count 6 58 68% neutrophils, 20% lymphocytes  4/11/2018  calcium of 9, total protein 8 4, albumin 3 6 creatinine 0 87    EVENS negative, sed rate of 38, vitamin B12 428, uric acid 5 8, TSH 2 4, heavy metal screen normal, Lyme antibody profile normal    Folate greater than 20  Serum protein electrophoresis showed monoclonal gammopathy with M protein of 1 40 grams/deciliters, IgG kappa  Serum kappa free light chain 16 9, serum lambda free light chains 8 1, ratio 2 09  IgG 1870, IgA 85, IgM 99      PMH of obesity, asthma, hypertension, dyslipidemia, diabetes mellitus type 2  He has been complaining of tingling and numbness in his feet and hand for the past year  He has had a feeling of hypersensitivity involving the anterior aspect of the left thigh area  He reported shoulder pain bilaterally, upper back pain, lower back pain, tingling and numbness involving the hands and the feet    He does not smoke or drink  Family history significant for father with prostate cancer and mother with colon cancer    He denies any headache blurred vision diplopia odynophagia dysphagia chest pain abdominal pain dysuria hematuria melena or hematochezia  BMBX 5/9/2018: Monoclonal plasma cell dyscrasia (IgG Kappa, 12-15%),  46 XY consistent with plasma cell myeloma  5/24/2018:  Skeletal survey - Degenerative change of the spine    No destructive osseous lesion of the axial or appendicular skeleton  Interval History:  No changes  Review of Systems - As stated in the HPI otherwise the fourteen point review of systems was negative          Past Medical History:   Diagnosis Date    Asthma     Benign positional vertigo     Diabetes mellitus (HCC)     borderline; diet controlled    Hyperlipidemia     Hypertension     Sleep apnea     has symptoms but not been diagnosed       Social History     Social History    Marital status: /Civil Union     Spouse name: N/A    Number of children: N/A    Years of education: N/A     Social History Main Topics    Smoking status: Never Smoker    Smokeless tobacco: Never Used    Alcohol use No    Drug use: No    Sexual activity: Not on file     Other Topics Concern    Not on file     Social History Narrative    No narrative on file       Family History   Problem Relation Age of Onset    Hypertension Mother     Diabetes Mother     Hyperlipidemia Mother     Hypertension Father     Hyperlipidemia Father        Allergies   Allergen Reactions    Shellfish-Derived Products Anaphylaxis     Clams and mussels, oysters  Lobster and crab ok    Diphenhydramine      Lightheadedness, nauseous, rapid heartbeat         Current Outpatient Prescriptions:     albuterol (2 5 mg/3 mL) 0 083 % nebulizer solution, Take 3 mL (2 5 mg total) by nebulization every 6 (six) hours as needed for wheezing, Disp: 30 vial, Rfl: 2    albuterol (PROVENTIL HFA,VENTOLIN HFA) 90 mcg/act inhaler, Inhale 2 puffs every 6 (six) hours as needed for wheezing, Disp: , Rfl:     ascorbic acid (VITAMIN C) 500 mg tablet, Take 500 mg by mouth daily, Disp: , Rfl:     b complex vitamins capsule, Take 1 capsule by mouth daily, Disp: , Rfl:     fluticasone (FLONASE) 50 mcg/act nasal spray, 2 sprays into each nostril daily, Disp: , Rfl:     fluticasone (FLOVENT HFA) 220 mcg/act inhaler, Inhale 2 puffs 2 (two) times a day, Disp: , Rfl:     lisinopril (ZESTRIL) 20 mg tablet, TAKE ONE TABLET BY MOUTH ONCE DAILY, Disp: 90 tablet, Rfl: 1    loratadine (CLARITIN) 10 mg tablet, Take 10 mg by mouth daily, Disp: , Rfl:     MAGNESIUM CITRATE PO, Take 1 tablet by mouth daily  , Disp: , Rfl:     metFORMIN (GLUCOPHAGE) 1000 MG tablet, Take 1 tablet (1,000 mg total) by mouth 2 (two) times a day with meals, Disp: 180 tablet, Rfl: 1    NON FORMULARY, NeuRx-TF Peripheral nerve health, Disp: , Rfl:     omeprazole (PriLOSEC) 40 MG capsule, Take 1 capsule (40 mg total) by mouth daily, Disp: 90 capsule, Rfl: 3    simvastatin (ZOCOR) 20 mg tablet, TAKE ONE TABLET BY MOUTH IN THE EVENING, Disp: 90 tablet, Rfl: 3    sodium chloride (OCEAN NASAL SPRAY) 0 65 % nasal spray, 2 sprays into each nostril 2 (two) times a day, Disp: , Rfl:       There were no vitals taken for this visit  General Appearance:    Alert, oriented, obese        Eyes:    PERRL   Ears:    Normal external ear canals, both ears   Nose:   Nares normal, septum midline   Throat:   Mucosa moist  Pharynx without injection  Neck:   Supple       Lungs:     Clear to auscultation bilaterally   Chest Wall:    No tenderness or deformity    Heart:    Regular rate and rhythm       Abdomen:     Soft, non-tender, bowel sounds +, no organomegaly, abdominal wall hernia           Extremities:   Extremities no cyanosis or edema       Skin:   no rash or icterus  Lymph nodes:   Cervical, supraclavicular, and axillary nodes normal   Neurologic:   CNII-XII intact, normal strength, sensation and reflexes     Throughout             Assessment/Plan: Smoldering IgG kappa MM  SPEP performed as part of peripheral neuropathy workup  M protein 1 4 grams/deciliters  Bone marrow aspiration biopsy May 9, 2018  Monoclonal plasma cell dyscrasia (IgG Kappa, 12-15%), consistent with plasma cell myeloma  Normal cytogenetics  Normal hemoglobin, renal function and calcium    Skeletal survey normal     Reviewed with patient and his wife the criteria for MM and why his is considered smoldering MM  We discussed that his extent of bone marrow involvement is relatively small (15%)  He has normal cytogenetics  No evidence of immunoparesis  According to a validated [address] [place] [address] group trial looking at the size of the M protein in the extent of bone marrow plasma cells, he is in group 2 (<3g dL M protein and >10%BMPCs) with the median time to progression within this group of 8 years  We reviewed that the standard of care for smoldering MM is observation as there is lack of clear data of an overall survival or QOL benefit with early treatment  We reviewed that treatment for symptomatic MM is chemotherapy and currently, there is no cure for MM  This disease is certainly treatable and patients have lived many years with this malignancy on and off treatment  Copy of lab work reviewed and given  She was given resources to investigate further online  It is uncertain if his neuropathy issues are secondary to multiple myeloma  Discussed that it could be  Neuropathy is currently involving his LE bilaterally  He has a sensation of a "balled up sock" under the pads of his feet, intermittent sharp stabbing pain mid shin and left lateral thigh numbness  He is an artist working with stained glass  Will certainly need to monitor and observe for any progression to involve his UE B  In regards to the left lateral thigh numbness, we discussed that his wallet which she always puts it is left front pocket may be pushing on a nerve  He is advised to try putting in a different pocket to see if this helps  Reviewed that we will continue to monitor the M protein, IgG level, serum protein electrophoresis, CBC and CMP via peripheral blood work to evaluate for any progression  He is asked to follow up in approximately 6-8 weeks, preceded by blood work patient and his wife, [place] verbalized understanding

## 2018-07-05 ENCOUNTER — OFFICE VISIT (OUTPATIENT)
Dept: FAMILY MEDICINE CLINIC | Facility: CLINIC | Age: 58
End: 2018-07-05
Payer: COMMERCIAL

## 2018-07-05 VITALS
BODY MASS INDEX: 42.66 KG/M2 | RESPIRATION RATE: 16 BRPM | WEIGHT: 315 LBS | HEART RATE: 84 BPM | DIASTOLIC BLOOD PRESSURE: 80 MMHG | SYSTOLIC BLOOD PRESSURE: 134 MMHG | HEIGHT: 72 IN | TEMPERATURE: 97 F

## 2018-07-05 DIAGNOSIS — R10.13 EPIGASTRIC PAIN: ICD-10-CM

## 2018-07-05 DIAGNOSIS — R19.7 DIARRHEA, UNSPECIFIED TYPE: Primary | ICD-10-CM

## 2018-07-05 DIAGNOSIS — R10.84 GENERALIZED ABDOMINAL PAIN: ICD-10-CM

## 2018-07-05 PROCEDURE — 99213 OFFICE O/P EST LOW 20 MIN: CPT | Performed by: FAMILY MEDICINE

## 2018-07-05 RX ORDER — DICYCLOMINE HYDROCHLORIDE 10 MG/1
10 CAPSULE ORAL
Qty: 20 CAPSULE | Refills: 0 | Status: SHIPPED | OUTPATIENT
Start: 2018-07-05 | End: 2018-07-17 | Stop reason: SDUPTHER

## 2018-07-05 NOTE — ASSESSMENT & PLAN NOTE
4 day history,  Mostly epigastric,  It is worse with food,  Resolves with bowel movement    Diarrhea 3 times a day   wife recently diagnosed with C diff   was sent for C diff,  Right upper quadrant ultrasound,  Bentyl for cramps   if gets worse- go to ER

## 2018-07-05 NOTE — PROGRESS NOTES
Assessment/Plan:    Problem List Items Addressed This Visit        Other    Generalized abdominal pain      4 day history,  Mostly epigastric,  It is worse with food,  Resolves with bowel movement  Diarrhea 3 times a day   wife recently diagnosed with C diff   was sent for C diff,  Right upper quadrant ultrasound,  Bentyl for cramps   if gets worse- go to ER           Other Visit Diagnoses     Diarrhea, unspecified type    -  Primary    Relevant Medications    dicyclomine (BENTYL) 10 mg capsule    Other Relevant Orders    Clostridium difficile toxin by PCR    Epigastric pain        Relevant Orders    US gallbladder          Subjective:      Patient ID: Salinas Thakkar is a 59 y o  male  Patient states wife has also has ulcerative colitis  She was recently diagnosed with C diff  And treated with antibiotics  Abdominal Pain   This is a new problem  Episode onset: Four days  The onset quality is sudden  The problem occurs intermittently  The most recent episode lasted 4 days  The problem has been gradually worsening  The pain is located in the epigastric region and RUQ  The pain is moderate  The quality of the pain is colicky  The abdominal pain radiates to the epigastric region, RUQ and LUQ  Associated symptoms include diarrhea and nausea  Pertinent negatives include no anorexia, dysuria, fever, flatus, headaches or vomiting  Associated symptoms comments: 3 times a day diarrhea,  No blood  The pain is aggravated by eating  The pain is relieved by bowel movements  He has tried nothing for the symptoms  His past medical history is significant for GERD  There is no history of abdominal surgery  The following portions of the patient's history were reviewed and updated as appropriate: allergies, current medications, past family history, past medical history, past social history, past surgical history and problem list     Review of Systems   Constitutional: Negative for fever     Gastrointestinal: Positive for abdominal pain, diarrhea and nausea  Negative for anorexia, flatus and vomiting  Genitourinary: Negative for dysuria  Neurological: Negative for headaches  Objective:    Vitals:    07/05/18 1545   BP: 134/80   Pulse: 84   Resp: 16   Temp: (!) 97 °F (36 1 °C)        Physical Exam   Constitutional: He is oriented to person, place, and time  He appears well-developed and well-nourished  No distress  HENT:   Head: Normocephalic  Right Ear: External ear normal    Left Ear: External ear normal    Mouth/Throat: Oropharynx is clear and moist  No oropharyngeal exudate  Eyes: Conjunctivae are normal  Pupils are equal, round, and reactive to light  Neck: Normal range of motion  Cardiovascular: Normal rate, regular rhythm, normal heart sounds and intact distal pulses  Exam reveals no gallop and no friction rub  No murmur heard  Pulmonary/Chest: Effort normal and breath sounds normal  No respiratory distress  He has no wheezes  He has no rales  He exhibits no tenderness  Abdominal: Soft  He exhibits no distension and no mass  There is no tenderness  There is no rebound and no guarding  Umbilical hernia present,  easily reducible   Musculoskeletal: Normal range of motion  He exhibits no edema, tenderness or deformity  Lymphadenopathy:     He has no cervical adenopathy  Neurological: He is alert and oriented to person, place, and time  Skin: Skin is warm and dry  No rash noted  He is not diaphoretic  No pallor  Psychiatric: He has a normal mood and affect  Vitals reviewed

## 2018-07-06 ENCOUNTER — TRANSCRIBE ORDERS (OUTPATIENT)
Dept: LAB | Facility: CLINIC | Age: 58
End: 2018-07-06

## 2018-07-06 ENCOUNTER — APPOINTMENT (OUTPATIENT)
Dept: LAB | Facility: CLINIC | Age: 58
End: 2018-07-06
Payer: COMMERCIAL

## 2018-07-06 LAB — C DIFF TOX GENS STL QL NAA+PROBE: NORMAL

## 2018-07-06 PROCEDURE — 87493 C DIFF AMPLIFIED PROBE: CPT | Performed by: FAMILY MEDICINE

## 2018-07-07 ENCOUNTER — HOSPITAL ENCOUNTER (OUTPATIENT)
Dept: ULTRASOUND IMAGING | Facility: HOSPITAL | Age: 58
Discharge: HOME | End: 2018-07-07
Payer: COMMERCIAL

## 2018-07-07 DIAGNOSIS — R10.13 EPIGASTRIC PAIN: ICD-10-CM

## 2018-07-07 PROCEDURE — 76705 ECHO EXAM OF ABDOMEN: CPT

## 2018-07-10 ENCOUNTER — TELEPHONE (OUTPATIENT)
Dept: FAMILY MEDICINE CLINIC | Facility: CLINIC | Age: 58
End: 2018-07-10

## 2018-07-10 DIAGNOSIS — K76.0 STEATOSIS OF LIVER: Primary | ICD-10-CM

## 2018-07-10 NOTE — TELEPHONE ENCOUNTER
Pt called about his results of his u/s , I did give him the information and told him that dr Raman Richmond referred him to gastro, but he would like a call back to explain one of the findings the number to reach him at today is [phone]

## 2018-07-13 DIAGNOSIS — J45.909 UNCOMPLICATED ASTHMA, UNSPECIFIED ASTHMA SEVERITY, UNSPECIFIED WHETHER PERSISTENT: Primary | ICD-10-CM

## 2018-07-17 DIAGNOSIS — R19.7 DIARRHEA, UNSPECIFIED TYPE: ICD-10-CM

## 2018-07-18 ENCOUNTER — APPOINTMENT (OUTPATIENT)
Dept: LAB | Facility: CLINIC | Age: 58
End: 2018-07-18
Payer: COMMERCIAL

## 2018-07-18 DIAGNOSIS — C90.00 MULTIPLE MYELOMA NOT HAVING ACHIEVED REMISSION (HCC): ICD-10-CM

## 2018-07-18 LAB
ALBUMIN SERPL BCP-MCNC: 3.4 G/DL (ref 3.5–5)
ALP SERPL-CCNC: 120 U/L (ref 46–116)
ALT SERPL W P-5'-P-CCNC: 40 U/L (ref 12–78)
ANION GAP SERPL CALCULATED.3IONS-SCNC: 4 MMOL/L (ref 4–13)
AST SERPL W P-5'-P-CCNC: 24 U/L (ref 5–45)
BASOPHILS # BLD AUTO: 0.08 THOUSANDS/ΜL (ref 0–0.1)
BASOPHILS NFR BLD AUTO: 1 % (ref 0–1)
BILIRUB SERPL-MCNC: 0.43 MG/DL (ref 0.2–1)
BUN SERPL-MCNC: 13 MG/DL (ref 5–25)
CALCIUM SERPL-MCNC: 8.8 MG/DL (ref 8.3–10.1)
CHLORIDE SERPL-SCNC: 102 MMOL/L (ref 100–108)
CO2 SERPL-SCNC: 30 MMOL/L (ref 21–32)
CREAT SERPL-MCNC: 0.97 MG/DL (ref 0.6–1.3)
EOSINOPHIL # BLD AUTO: 2.29 THOUSAND/ΜL (ref 0–0.61)
EOSINOPHIL NFR BLD AUTO: 24 % (ref 0–6)
ERYTHROCYTE [DISTWIDTH] IN BLOOD BY AUTOMATED COUNT: 15.5 % (ref 11.6–15.1)
GFR SERPL CREATININE-BSD FRML MDRD: 85 ML/MIN/1.73SQ M
GLUCOSE P FAST SERPL-MCNC: 159 MG/DL (ref 65–99)
HCT VFR BLD AUTO: 42.3 % (ref 36.5–49.3)
HGB BLD-MCNC: 12.8 G/DL (ref 12–17)
IGA SERPL-MCNC: 78 MG/DL (ref 70–400)
IGG SERPL-MCNC: 1750 MG/DL (ref 700–1600)
IGM SERPL-MCNC: 103 MG/DL (ref 40–230)
IMM GRANULOCYTES # BLD AUTO: 0.02 THOUSAND/UL (ref 0–0.2)
IMM GRANULOCYTES NFR BLD AUTO: 0 % (ref 0–2)
LYMPHOCYTES # BLD AUTO: 1.87 THOUSANDS/ΜL (ref 0.6–4.47)
LYMPHOCYTES NFR BLD AUTO: 20 % (ref 14–44)
MCH RBC QN AUTO: 28.1 PG (ref 26.8–34.3)
MCHC RBC AUTO-ENTMCNC: 30.3 G/DL (ref 31.4–37.4)
MCV RBC AUTO: 93 FL (ref 82–98)
MONOCYTES # BLD AUTO: 0.58 THOUSAND/ΜL (ref 0.17–1.22)
MONOCYTES NFR BLD AUTO: 6 % (ref 4–12)
NEUTROPHILS # BLD AUTO: 4.76 THOUSANDS/ΜL (ref 1.85–7.62)
NEUTS SEG NFR BLD AUTO: 49 % (ref 43–75)
NRBC BLD AUTO-RTO: 0 /100 WBCS
PLATELET # BLD AUTO: 219 THOUSANDS/UL (ref 149–390)
PMV BLD AUTO: 10.4 FL (ref 8.9–12.7)
POTASSIUM SERPL-SCNC: 4.2 MMOL/L (ref 3.5–5.3)
PROT SERPL-MCNC: 8 G/DL (ref 6.4–8.2)
RBC # BLD AUTO: 4.56 MILLION/UL (ref 3.88–5.62)
SODIUM SERPL-SCNC: 136 MMOL/L (ref 136–145)
WBC # BLD AUTO: 9.6 THOUSAND/UL (ref 4.31–10.16)

## 2018-07-18 PROCEDURE — 80053 COMPREHEN METABOLIC PANEL: CPT

## 2018-07-18 PROCEDURE — 83883 ASSAY NEPHELOMETRY NOT SPEC: CPT

## 2018-07-18 PROCEDURE — 85025 COMPLETE CBC W/AUTO DIFF WBC: CPT

## 2018-07-18 PROCEDURE — 82784 ASSAY IGA/IGD/IGG/IGM EACH: CPT

## 2018-07-18 PROCEDURE — 36415 COLL VENOUS BLD VENIPUNCTURE: CPT

## 2018-07-18 PROCEDURE — 84165 PROTEIN E-PHORESIS SERUM: CPT

## 2018-07-18 PROCEDURE — 84165 PROTEIN E-PHORESIS SERUM: CPT | Performed by: PATHOLOGY

## 2018-07-18 RX ORDER — DICYCLOMINE HYDROCHLORIDE 10 MG/1
CAPSULE ORAL
Qty: 20 CAPSULE | Refills: 0 | Status: SHIPPED | OUTPATIENT
Start: 2018-07-18 | End: 2018-07-25 | Stop reason: SDUPTHER

## 2018-07-19 LAB
KAPPA LC FREE SER-MCNC: 18.6 MG/L (ref 3.3–19.4)
KAPPA LC FREE/LAMBDA FREE SER: 1.86 {RATIO} (ref 0.26–1.65)
LAMBDA LC FREE SERPL-MCNC: 10 MG/L (ref 5.7–26.3)

## 2018-07-20 ENCOUNTER — TELEPHONE (OUTPATIENT)
Dept: FAMILY MEDICINE CLINIC | Facility: CLINIC | Age: 58
End: 2018-07-20

## 2018-07-20 LAB
ALBUMIN SERPL ELPH-MCNC: 4.01 G/DL (ref 3.5–5)
ALBUMIN SERPL ELPH-MCNC: 52.1 % (ref 52–65)
ALPHA1 GLOB SERPL ELPH-MCNC: 0.36 G/DL (ref 0.1–0.4)
ALPHA1 GLOB SERPL ELPH-MCNC: 4.7 % (ref 2.5–5)
ALPHA2 GLOB SERPL ELPH-MCNC: 0.92 G/DL (ref 0.4–1.2)
ALPHA2 GLOB SERPL ELPH-MCNC: 12 % (ref 7–13)
BETA GLOB ABNORMAL SERPL ELPH-MCNC: 0.44 G/DL (ref 0.4–0.8)
BETA1 GLOB SERPL ELPH-MCNC: 5.7 % (ref 5–13)
BETA2 GLOB SERPL ELPH-MCNC: 4 % (ref 2–8)
BETA2+GAMMA GLOB SERPL ELPH-MCNC: 0.31 G/DL (ref 0.2–0.5)
GAMMA GLOB ABNORMAL SERPL ELPH-MCNC: 1.66 G/DL (ref 0.5–1.6)
GAMMA GLOB SERPL ELPH-MCNC: 21.5 % (ref 12–22)
IGG/ALB SER: 1.09 {RATIO} (ref 1.1–1.8)
M PROTEIN 1 MFR SERPL ELPH: 16.4 %
M PROTEIN 1 SERPL ELPH-MCNC: 1.26 G/DL
PROT SERPL-MCNC: 7.7 G/DL (ref 6.4–8.2)

## 2018-07-24 DIAGNOSIS — G63: Primary | ICD-10-CM

## 2018-07-24 DIAGNOSIS — E88.9: Primary | ICD-10-CM

## 2018-07-24 NOTE — TELEPHONE ENCOUNTER
Patient sees neurology for Diabetic peripheral neuropathy and Paresthesia   Need you to enter referral for [facility] Neurology for his follow up appointment

## 2018-07-25 ENCOUNTER — OFFICE VISIT (OUTPATIENT)
Dept: NEUROLOGY | Facility: CLINIC | Age: 58
End: 2018-07-25
Payer: COMMERCIAL

## 2018-07-25 VITALS
HEIGHT: 72 IN | SYSTOLIC BLOOD PRESSURE: 142 MMHG | WEIGHT: 315 LBS | HEART RATE: 77 BPM | DIASTOLIC BLOOD PRESSURE: 80 MMHG | BODY MASS INDEX: 42.66 KG/M2

## 2018-07-25 DIAGNOSIS — G62.9 PERIPHERAL POLYNEUROPATHY: Primary | ICD-10-CM

## 2018-07-25 DIAGNOSIS — C90.00 MULTIPLE MYELOMA NOT HAVING ACHIEVED REMISSION (HCC): ICD-10-CM

## 2018-07-25 DIAGNOSIS — R19.7 DIARRHEA, UNSPECIFIED TYPE: ICD-10-CM

## 2018-07-25 DIAGNOSIS — G57.12 MERALGIA PARESTHETICA OF LEFT SIDE: ICD-10-CM

## 2018-07-25 DIAGNOSIS — E88.9: ICD-10-CM

## 2018-07-25 DIAGNOSIS — G63: ICD-10-CM

## 2018-07-25 PROCEDURE — 99214 OFFICE O/P EST MOD 30 MIN: CPT | Performed by: PHYSICIAN ASSISTANT

## 2018-07-25 NOTE — ASSESSMENT & PLAN NOTE
EMG confirmed entrapment neuropathy of the left lateral femoral cutaneous nerve, c/w meralgia paresthetica  Suggested lidocaine patches, however he would like to hold off at this time

## 2018-07-25 NOTE — ASSESSMENT & PLAN NOTE
Patient with evidence of peripheral neuropathy in the setting of known diabetes  Additional workup ordered at consult revealed an abnormal serum protein electrophoresis  He was referred to hematology  Other labs normal including B12, folate, TSH, heavy metals, Lyme  EMG LEs demonstrated a peripheral neuropathy involving mainly the sensory fibers  There is also evidence of an entrapment neuropathy of the left lateral femoral cutaneous nerve, consistent with meralgia paresthetica  Discussed with patient that his neuropathy may be caused by his diabetes and/or Multiple myeloma  We discussed treatment for neuropathy, including medication for symptomatic control, balance therapy if needed, and control of the risk factors  At this time, he does not want any medications for his neuropathy  He feels symptoms are bearable  If he does want meds, would start with gabapentin  He should continue to work with PCP to achieve strict glycemic control, and continue to work with heme on MM treatment, if indicated  He will follow up in 3-4 months or sooner if needed  He will call the office for any new or worsening symptoms

## 2018-07-25 NOTE — ASSESSMENT & PLAN NOTE
Following with Dr Nicanor Field  Diagnosed as smoldering MM  For now, looks like they are just going to watch his labs and initiate treatment if needed  He will follow closely with heme

## 2018-07-25 NOTE — PATIENT INSTRUCTIONS
Continue to work on diabetic control with your PCP  Continue to follow with Dr Arvin Hi for multiple myeloma  As discussed, we will hold off on any medications for the neuropathy at this time  However, if you feel you need some at any time, you can always call me  We can also use lidocaine patches for the thigh if needed  Follow up in 3-4 months or sooner if needed  Call for any new or worsening symptoms

## 2018-07-26 RX ORDER — DICYCLOMINE HYDROCHLORIDE 10 MG/1
CAPSULE ORAL
Qty: 20 CAPSULE | Refills: 0 | Status: SHIPPED | OUTPATIENT
Start: 2018-07-26 | End: 2021-05-03

## 2018-07-27 ENCOUNTER — OFFICE VISIT (OUTPATIENT)
Dept: HEMATOLOGY ONCOLOGY | Facility: CLINIC | Age: 58
End: 2018-07-27
Payer: COMMERCIAL

## 2018-07-27 VITALS
HEIGHT: 72 IN | HEART RATE: 90 BPM | RESPIRATION RATE: 18 BRPM | BODY MASS INDEX: 42.66 KG/M2 | WEIGHT: 315 LBS | DIASTOLIC BLOOD PRESSURE: 82 MMHG | SYSTOLIC BLOOD PRESSURE: 121 MMHG | TEMPERATURE: 99.3 F | OXYGEN SATURATION: 95 %

## 2018-07-27 DIAGNOSIS — C90.00 INDOLENT MULTIPLE MYELOMA (HCC): Primary | ICD-10-CM

## 2018-07-27 PROCEDURE — 99214 OFFICE O/P EST MOD 30 MIN: CPT | Performed by: INTERNAL MEDICINE

## 2018-07-27 NOTE — LETTER
July 27, 2018     Shamika Carwford MD  [facility] [id number] [address]    Patient: Daniel Diehl   YOB: 1960   Date of Visit: 7/27/2018       Dear Dr Sherryle Jaegers: Thank you for referring Yadi Chun to me for evaluation  Below are my notes for this consultation  If you have questions, please do not hesitate to call me  I look forward to following your patient along with you           Sincerely,        Renee Adamson MD        CC: MD Renee Roberts MD  7/27/2018  9:12 AM  Sign at close encounter  Hematology Outpatient Follow - Up Note  Daniel Diehl 59 y o  male MRN: @ Encounter: [id number]        Date:  7/27/2018        Assessment/ Plan:   Indolent multiple myeloma, IgG kappa subtype when he presented with abnormal serum protein electrophoresis with IgG kappa M protein 1 4 grams/deciliters, he had neuropathy with tingling and numbness, diabetes mellitus type 2, obesity, a bone marrow biopsy in May 2018 showed 10-12% plasma cells, fish panel for multiple myeloma was reported normal, normal cytogenetics with deletion of chromosome Y consistent with normal finding   He has no evidence of lytic lesion in the bone skeletal survey, no evidence of hypercalcemia, renal insufficiency or anemia   For the sake of the completion I called the pathology Department to add Congo red staining to rule out amyloidosis  Otherwise follow-up in 6 months with CBC, CMP, SPEP, free light chain, quantitative immunoglobulins  There is a risk of progression into stephany multiple myeloma 10% a year that is why the need for watchful observation           HPI: 47-year-old  male with history of obesity, asthma, hypertension, dyslipidemia, diabetes mellitus type 2, had been complaining of tingling and numbness in his feet and hand for the past year, most recently a feeling of hypersensitivity involving the anterior aspect of the left thigh area, evaluation by Neurology on March 2018 showed monoclonal gammopathy with M protein of 1 40 grams/deciliters, IgG kappa  He has normal vitamin B12 level, TSH, heavy metal screen, Lyme disease  The last CBC was done  On August 2017 showed hemoglobin of 12 8, WBC 6 6, platelets [id number], 99% neutrophils, 19% lymphocytes  A bone marrow biopsy in May 2018 showed 10-12% plasma cells, fish panel for multiple myeloma was negative, cytogenetics showed normal male chromosomes and deletion of Y chromosome in some of the cells consistent with normal finding in advanced age patient   Bone skeletal survey showed no evidence of lytic lesions     Interval History:        Previous Treatment:         Test Results:    Imaging: Us Gallbladder    Result Date: 7/10/2018  Narrative: RIGHT UPPER QUADRANT ULTRASOUND INDICATION:     R10 13: Epigastric pain  COMPARISON:  CT abdomen 7/27/2015 TECHNIQUE:   Real-time ultrasound of the right upper quadrant was performed with a curvilinear transducer with both volumetric sweeps and still imaging techniques  FINDINGS: PANCREAS:  Portions of the pancreas are obscured by bowel gas  Visualized portions of the pancreas are unremarkable  AORTA AND IVC:  Visualized portions are normal for patient age  LIVER: Size:  Within normal range  The liver measures 19 3 cm in the midclavicular line  Contour:  Surface contour is smooth  Parenchyma: There is marked diffuse increased echogenicity with smooth echotexture and significant beam attenuation with loss of periportal echogenicity  Most consistent with severe hepatic steatosis  No evidence of suspicious mass  Limited imaging of the main portal vein shows it to be patent and hepatopetal   BILIARY: The gallbladder is normal in caliber  No wall thickening or pericholecystic fluid  No stones or sludge identified  No sonographic Dick's sign  No intrahepatic biliary dilatation  CBD measures 3 mm  No choledocholithiasis  KIDNEY: Right kidney measures 13 5 x 4 8 cm  Within normal limits  ASCITES:   None       Impression: Severe hepatic steatosis  No morphologic features of cirrhosis or discrete liver mass  Remainder of the examination is normal  Workstation performed: [id number]       Labs:   Lab Results   Component Value Date    WBC 9 60 07/18/2018    HGB 12 8 07/18/2018    HCT 42 3 07/18/2018    MCV 93 07/18/2018     07/18/2018     Lab Results   Component Value Date     07/18/2018    K 4 2 07/18/2018     07/18/2018    CO2 30 07/18/2018    ANIONGAP 4 07/18/2018    BUN 13 07/18/2018    CREATININE 0 97 07/18/2018    GLUCOSE 206 (H) 01/13/2015    GLUF 159 (H) 07/18/2018    CALCIUM 8 8 07/18/2018    AST 24 07/18/2018    ALT 40 07/18/2018    ALKPHOS 120 (H) 07/18/2018    PROT 8 0 07/18/2018    PROT 7 7 07/18/2018    BILITOT 0 43 07/18/2018    EGFR 85 07/18/2018       No results found for: IRON, TIBC, FERRITIN    Lab Results   Component Value Date    TWVOKFKS16 428 04/30/2018    IgG 1750, IgA 78, IgM 103, kappa light chain 18 6, lambda light chain 10 with a ratio of 1 86      ROS:   Review of Systems      Current Medications: Reviewed  Allergies: Reviewed  PMH/FH/SH:  Reviewed      Physical Exam:    Body surface area is 2 62 meters squared  Wt Readings from Last 3 Encounters:   07/27/18 (!) 147 kg (325 lb)   07/25/18 (!) 146 kg (321 lb 3 2 oz)   07/05/18 (!) 146 kg (321 lb 12 8 oz)        Temp Readings from Last 3 Encounters:   07/27/18 99 3 °F (37 4 °C) (Tympanic)   07/05/18 (!) 97 °F (36 1 °C)   05/29/18 (!) 97 1 °F (36 2 °C)        BP Readings from Last 3 Encounters:   07/27/18 121/82   07/25/18 142/80   07/05/18 134/80         Pulse Readings from Last 3 Encounters:   07/27/18 90   07/25/18 77   07/05/18 84        Physical Exam        Goals and Barriers:  Current Goal: Minimize effects of disease  Barriers: None  Patient's Capacity to Self Care:  Patient is able to self care      Code Status: [unfilled]

## 2018-07-27 NOTE — PROGRESS NOTES
Hematology Outpatient Follow - Up Note  Nicole Roberto 59 y o  male MRN: @ Encounter: [id number]        Date:  7/27/2018        Assessment/ Plan:   Indolent multiple myeloma, IgG kappa subtype when he presented with abnormal serum protein electrophoresis with IgG kappa M protein 1 4 grams/deciliters, he had neuropathy with tingling and numbness, diabetes mellitus type 2, obesity, a bone marrow biopsy in May 2018 showed 10-12% plasma cells, fish panel for multiple myeloma was reported normal, normal cytogenetics with deletion of chromosome Y consistent with normal finding   He has no evidence of lytic lesion in the bone skeletal survey, no evidence of hypercalcemia, renal insufficiency or anemia   For the sake of the completion I called the pathology Department to add Congo red staining to rule out amyloidosis  Otherwise follow-up in 6 months with CBC, CMP, SPEP, free light chain, quantitative immunoglobulins  There is a risk of progression into stephany multiple myeloma 10% a year that is why the need for watchful observation           HPI: 12-year-old  male with history of obesity, asthma, hypertension, dyslipidemia, diabetes mellitus type 2, had been complaining of tingling and numbness in his feet and hand for the past year, most recently a feeling of hypersensitivity involving the anterior aspect of the left thigh area, evaluation by Neurology on March 2018 showed monoclonal gammopathy with M protein of 1 40 grams/deciliters, IgG kappa  He has normal vitamin B12 level, TSH, heavy metal screen, Lyme disease  The last CBC was done  On August 2017 showed hemoglobin of 12 8, WBC 6 6, platelets [id number], 00% neutrophils, 19% lymphocytes  A bone marrow biopsy in May 2018 showed 10-12% plasma cells, fish panel for multiple myeloma was negative, cytogenetics showed normal male chromosomes and deletion of Y chromosome in some of the cells consistent with normal finding in advanced age patient   Bone skeletal survey showed no evidence of lytic lesions     Interval History:        Previous Treatment:         Test Results:    Imaging: Us Gallbladder    Result Date: 7/10/2018  Narrative: RIGHT UPPER QUADRANT ULTRASOUND INDICATION:     R10 13: Epigastric pain  COMPARISON:  CT abdomen 7/27/2015 TECHNIQUE:   Real-time ultrasound of the right upper quadrant was performed with a curvilinear transducer with both volumetric sweeps and still imaging techniques  FINDINGS: PANCREAS:  Portions of the pancreas are obscured by bowel gas  Visualized portions of the pancreas are unremarkable  AORTA AND IVC:  Visualized portions are normal for patient age  LIVER: Size:  Within normal range  The liver measures 19 3 cm in the midclavicular line  Contour:  Surface contour is smooth  Parenchyma: There is marked diffuse increased echogenicity with smooth echotexture and significant beam attenuation with loss of periportal echogenicity  Most consistent with severe hepatic steatosis  No evidence of suspicious mass  Limited imaging of the main portal vein shows it to be patent and hepatopetal   BILIARY: The gallbladder is normal in caliber  No wall thickening or pericholecystic fluid  No stones or sludge identified  No sonographic Dick's sign  No intrahepatic biliary dilatation  CBD measures 3 mm  No choledocholithiasis  KIDNEY: Right kidney measures 13 5 x 4 8 cm  Within normal limits  ASCITES:   None  Impression: Severe hepatic steatosis  No morphologic features of cirrhosis or discrete liver mass   Remainder of the examination is normal  Workstation performed: [id number]       Labs:   Lab Results   Component Value Date    WBC 9 60 07/18/2018    HGB 12 8 07/18/2018    HCT 42 3 07/18/2018    MCV 93 07/18/2018     07/18/2018     Lab Results   Component Value Date     07/18/2018    K 4 2 07/18/2018     07/18/2018    CO2 30 07/18/2018    ANIONGAP 4 07/18/2018    BUN 13 07/18/2018    CREATININE 0 97 07/18/2018    GLUCOSE 206 (H) 01/13/2015    GLUF 159 (H) 07/18/2018    CALCIUM 8 8 07/18/2018    AST 24 07/18/2018    ALT 40 07/18/2018    ALKPHOS 120 (H) 07/18/2018    PROT 8 0 07/18/2018    PROT 7 7 07/18/2018    BILITOT 0 43 07/18/2018    EGFR 85 07/18/2018       No results found for: IRON, TIBC, FERRITIN    Lab Results   Component Value Date    [id number] 428 04/30/2018    IgG 1750, IgA 78, IgM 103, kappa light chain 18 6, lambda light chain 10 with a ratio of 1 86      ROS:   Review of Systems      Current Medications: Reviewed  Allergies: Reviewed  PMH/FH/SH:  Reviewed      Physical Exam:    Body surface area is 2 62 meters squared  Wt Readings from Last 3 Encounters:   07/27/18 (!) 147 kg (325 lb)   07/25/18 (!) 146 kg (321 lb 3 2 oz)   07/05/18 (!) 146 kg (321 lb 12 8 oz)        Temp Readings from Last 3 Encounters:   07/27/18 99 3 °F (37 4 °C) (Tympanic)   07/05/18 (!) 97 °F (36 1 °C)   05/29/18 (!) 97 1 °F (36 2 °C)        BP Readings from Last 3 Encounters:   07/27/18 121/82   07/25/18 142/80   07/05/18 134/80         Pulse Readings from Last 3 Encounters:   07/27/18 90   07/25/18 77   07/05/18 84        Physical Exam        Goals and Barriers:  Current Goal: Minimize effects of disease  Barriers: None  Patient's Capacity to Self Care:  Patient is able to self care      Code Status: [unfilled]

## 2018-09-18 DIAGNOSIS — I10 ESSENTIAL HYPERTENSION: ICD-10-CM

## 2018-09-18 DIAGNOSIS — J45.40 MODERATE PERSISTENT ASTHMA WITHOUT COMPLICATION: Primary | ICD-10-CM

## 2018-09-18 PROCEDURE — 4010F ACE/ARB THERAPY RXD/TAKEN: CPT | Performed by: FAMILY MEDICINE

## 2018-09-18 RX ORDER — LISINOPRIL 20 MG/1
TABLET ORAL
Qty: 90 TABLET | Refills: 0 | Status: SHIPPED | OUTPATIENT
Start: 2018-09-18 | End: 2019-03-04 | Stop reason: SDUPTHER

## 2018-09-18 RX ORDER — FLUTICASONE PROPIONATE 220 UG/1
AEROSOL, METERED RESPIRATORY (INHALATION)
Qty: 1 INHALER | Refills: 1 | Status: SHIPPED | OUTPATIENT
Start: 2018-09-18 | End: 2019-03-04 | Stop reason: SDUPTHER

## 2018-09-20 ENCOUNTER — OFFICE VISIT (OUTPATIENT)
Dept: FAMILY MEDICINE CLINIC | Facility: CLINIC | Age: 58
End: 2018-09-20
Payer: COMMERCIAL

## 2018-09-20 VITALS
HEART RATE: 84 BPM | DIASTOLIC BLOOD PRESSURE: 76 MMHG | TEMPERATURE: 98.3 F | WEIGHT: 315 LBS | RESPIRATION RATE: 16 BRPM | HEIGHT: 72 IN | BODY MASS INDEX: 42.66 KG/M2 | SYSTOLIC BLOOD PRESSURE: 122 MMHG

## 2018-09-20 DIAGNOSIS — L25.9 CONTACT DERMATITIS, UNSPECIFIED CONTACT DERMATITIS TYPE, UNSPECIFIED TRIGGER: ICD-10-CM

## 2018-09-20 DIAGNOSIS — K52.9 GASTROENTERITIS: Primary | ICD-10-CM

## 2018-09-20 PROCEDURE — 99213 OFFICE O/P EST LOW 20 MIN: CPT | Performed by: FAMILY MEDICINE

## 2018-09-20 PROCEDURE — 3008F BODY MASS INDEX DOCD: CPT | Performed by: FAMILY MEDICINE

## 2018-09-20 RX ORDER — TRIAMCINOLONE ACETONIDE 1 MG/G
CREAM TOPICAL 2 TIMES DAILY
Qty: 30 G | Refills: 0 | Status: SHIPPED | OUTPATIENT
Start: 2018-09-20 | End: 2019-01-28

## 2018-09-20 NOTE — PROGRESS NOTES
Dahiana Tellez 1960 male MRN: [id number]    Family Medicine Acute Visit    ASSESSMENT/PLAN   Problem List Items Addressed This Visit     Gastroenteritis - Primary     Likely secondary to food intake   - Symptoms now resolving --> will continue supportive care, with good hydration and slow reintroduction of food   - Discussed precautions, including inability to maintain PO or intractable nausea/vomiting          Contact dermatitis     - Pt states that his work bench touches his abdomen at the area in question   - Will trial Triamcinolone cream BID and encourage being more cognizant of leaning/rubbing against surfaces on her abdomen   - Discussed precautions, including spreading of rash and/or drainage          Relevant Medications    triamcinolone (KENALOG) 0 1 % cream              Future Appointments  Date Time Provider Greg Barrios   10/8/2018 2:20 Tatiana Thornton MD S [facility]   1/21/2019 2:00 PM RADHA Faith U  8    3/4/2019 9:45 AM Meg Inman MD [facility]          SUBJECTIVE  CC: Diarrhea      HPI:  Dahiana Tellez is a 59 y o  male who presents for an acute visit  Diarrhea x 1 week  - Watery, loose stools; non-bloody; started to become more formed 2 days ago; no episodes today   - Associated with cramping ("rumbling"), diffuse, improves with bowel movement  - Took Immodium, which helped stopped the bowel movements, but not associated cramping   - Associated with decreased PO intake and appetite  - One episode of nausea without vomiting a few days ago, which has since resolved   - Recent new foods -- chinese buffet (sushi) 2 days prior; pork roll on day of symptom onset   - No known sick contacts     Review of Systems   Constitutional: Positive for appetite change, chills and fatigue  Negative for fever  Gastrointestinal: Positive for abdominal pain, diarrhea and nausea  Negative for vomiting  Musculoskeletal: Positive for myalgias  Historical Information   The patient history was reviewed as follows:  Past Medical History:   Diagnosis Date    Asthma     Benign positional vertigo     Diabetes mellitus ([facility] [address] )     borderline; diet controlled    Dyslipidemia     Hyperlipidemia     Hypertension     Lipoma of neck     last assessed - [id number]    [facility]     Sleep apnea     has symptoms but not been diagnosed    Wheezing     last assessed - [id number]         Past Surgical History:   Procedure Laterality Date    ESOPHAGOGASTRODUODENOSCOPY  2010    Diagnostic    MASS EXCISION Right 8/21/2017    Procedure: POSTERIOR SHOULDER MASS EXCISION; POSTERIOR NECK MASS EXCISION; CHEST WALL MASS EXCISION;  Surgeon: Keyon Eaton MD;  Location: [facility] MAIN OR;  Service: General    THROAT SURGERY       Family History   Problem Relation Age of Onset    Hypertension Mother     Diabetes Mother     Hyperlipidemia Mother     Cancer Mother         Malignant neoplasm of the gastrointestinal tract    Diabetes type II Mother         Type 2 diabetes mellitus    Hypertension Father         Essential hypercholesterolemia    Hyperlipidemia Father     Heart disease Father         Arteriosclerotic cardiovascular disease (ASCVD)    Dementia Father       Social History   History   Alcohol Use No     Comment: Social drinker per [employer]     History   Drug Use No     History   Smoking Status    Never Smoker   Smokeless Tobacco    Never Used       Medications:     Current Outpatient Prescriptions:     b complex vitamins capsule, Take 1 capsule by mouth daily, Disp: , Rfl:     dicyclomine (BENTYL) 10 mg capsule, TAKE 1 CAPSULE BY MOUTH 4 TIMES DAILY BEFORE MEAL(S) AND AT BEDTIME, Disp: 20 capsule, Rfl: 0    FLOVENT  MCG/ACT inhaler, INHALE TWO PUFFS BY MOUTH TWICE DAILY   RINSE MOUTH AFTER USE , Disp: 1 Inhaler, Rfl: 1    fluticasone (FLONASE) 50 mcg/act nasal spray, 2 sprays into each nostril daily, Disp: , Rfl:     lisinopril (ZESTRIL) 20 mg tablet, TAKE 1 TABLET BY MOUTH ONCE DAILY, Disp: 90 tablet, Rfl: 0    loratadine (CLARITIN) 10 mg tablet, Take 10 mg by mouth daily, Disp: , Rfl:     MAGNESIUM CITRATE PO, Take 1 tablet by mouth daily  , Disp: , Rfl:     metFORMIN (GLUCOPHAGE) 1000 MG tablet, Take 1 tablet (1,000 mg total) by mouth 2 (two) times a day with meals, Disp: 180 tablet, Rfl: 1    omeprazole (PriLOSEC) 40 MG capsule, Take 1 capsule (40 mg total) by mouth daily, Disp: 90 capsule, Rfl: 3    simvastatin (ZOCOR) 20 mg tablet, TAKE ONE TABLET BY MOUTH IN THE EVENING, Disp: 90 tablet, Rfl: 3    VENTOLIN  (90 Base) MCG/ACT inhaler, INHALE ONE TO TWO PUFFS BY MOUTH EVERY 4 TO 6 HOURS AS NEEDED, Disp: 18 each, Rfl: 11    albuterol (2 5 mg/3 mL) 0 083 % nebulizer solution, Take 3 mL (2 5 mg total) by nebulization every 6 (six) hours as needed for wheezing, Disp: 30 vial, Rfl: 2    ascorbic acid (VITAMIN C) 500 mg tablet, Take 500 mg by mouth daily, Disp: , Rfl:     NON FORMULARY, NeuRx- Peripheral nerve health, Disp: , Rfl:     sodium chloride (OCEAN NASAL SPRAY) 0 65 % nasal spray, 2 sprays into each nostril 2 (two) times a day, Disp: , Rfl:     triamcinolone (KENALOG) 0 1 % cream, Apply topically 2 (two) times a day, Disp: 30 g, Rfl: 0    Allergies   Allergen Reactions    Shellfish-Derived Products Anaphylaxis     Clams and mussels, oysters  Lobster and crab ok    Diphenhydramine      Lightheadedness, nauseous, rapid heartbeat    Other Palpitations       OBJECTIVE  Vitals:   Vitals:    09/20/18 1308   BP: 122/76   Pulse: 84   Resp: 16   Temp: 98 3 °F (36 8 °C)   Weight: (!) 144 kg (318 lb)   Height: 5' 11 9" (1 826 m)         Physical Exam   Constitutional: He is oriented to person, place, and time  He appears well-developed and well-nourished  No distress  HENT:   Mouth/Throat: Oropharynx is clear and moist    Cardiovascular: Normal rate, regular rhythm and normal heart sounds      Pulmonary/Chest: Effort normal and breath sounds normal  No respiratory distress  Abdominal: Soft  Bowel sounds are normal  He exhibits no distension  There is no tenderness  A hernia (umbilical: previously known and evaluated by surgery, non-tender, reducible) is present  Neurological: He is alert and oriented to person, place, and time  Skin: Skin is warm and dry  Rash noted  Capillary refill <2 seconds   Psychiatric: He has a normal mood and affect                    Justin Conde DO, PGY-3  [facility]   9/20/2018

## 2018-09-20 NOTE — ASSESSMENT & PLAN NOTE
Likely secondary to food intake   - Symptoms now resolving --> will continue supportive care, with good hydration and slow reintroduction of food   - Discussed precautions, including inability to maintain PO or intractable nausea/vomiting

## 2018-09-20 NOTE — ASSESSMENT & PLAN NOTE
- Pt states that his work bench touches his abdomen at the area in question   - Will trial Triamcinolone cream BID and encourage being more cognizant of leaning/rubbing against surfaces on her abdomen   - Discussed precautions, including spreading of rash and/or drainage

## 2018-10-08 ENCOUNTER — OFFICE VISIT (OUTPATIENT)
Dept: FAMILY MEDICINE CLINIC | Facility: CLINIC | Age: 58
End: 2018-10-08
Payer: COMMERCIAL

## 2018-10-08 VITALS
DIASTOLIC BLOOD PRESSURE: 72 MMHG | HEART RATE: 86 BPM | TEMPERATURE: 97.8 F | RESPIRATION RATE: 18 BRPM | HEIGHT: 72 IN | BODY MASS INDEX: 42.66 KG/M2 | WEIGHT: 315 LBS | SYSTOLIC BLOOD PRESSURE: 120 MMHG

## 2018-10-08 DIAGNOSIS — E11.42 TYPE 2 DIABETES MELLITUS WITH DIABETIC POLYNEUROPATHY, WITHOUT LONG-TERM CURRENT USE OF INSULIN (HCC): ICD-10-CM

## 2018-10-08 DIAGNOSIS — I10 ESSENTIAL HYPERTENSION: ICD-10-CM

## 2018-10-08 DIAGNOSIS — G47.33 OSA (OBSTRUCTIVE SLEEP APNEA): Primary | ICD-10-CM

## 2018-10-08 DIAGNOSIS — J45.40 MODERATE PERSISTENT ASTHMA WITHOUT COMPLICATION: ICD-10-CM

## 2018-10-08 DIAGNOSIS — Z23 NEED FOR IMMUNIZATION AGAINST INFLUENZA: ICD-10-CM

## 2018-10-08 PROBLEM — R10.84 GENERALIZED ABDOMINAL PAIN: Status: RESOLVED | Noted: 2018-07-05 | Resolved: 2018-10-08

## 2018-10-08 LAB — SL AMB POCT HEMOGLOBIN AIC: 6.8

## 2018-10-08 PROCEDURE — 99213 OFFICE O/P EST LOW 20 MIN: CPT | Performed by: FAMILY MEDICINE

## 2018-10-08 PROCEDURE — 83036 HEMOGLOBIN GLYCOSYLATED A1C: CPT | Performed by: FAMILY MEDICINE

## 2018-10-08 PROCEDURE — 90471 IMMUNIZATION ADMIN: CPT | Performed by: FAMILY MEDICINE

## 2018-10-08 PROCEDURE — 3074F SYST BP LT 130 MM HG: CPT | Performed by: FAMILY MEDICINE

## 2018-10-08 PROCEDURE — 3044F HG A1C LEVEL LT 7.0%: CPT | Performed by: FAMILY MEDICINE

## 2018-10-08 PROCEDURE — 3078F DIAST BP <80 MM HG: CPT | Performed by: FAMILY MEDICINE

## 2018-10-08 PROCEDURE — 90686 IIV4 VACC NO PRSV 0.5 ML IM: CPT | Performed by: FAMILY MEDICINE

## 2018-10-08 NOTE — PROGRESS NOTES
Assessment/Plan:    KYLEIGH (obstructive sleep apnea)  Per patient, compliant with CPAP  Follows with sleep center  Advised to lose weight as this can help with any airway obstruction AND is overall beneficial for his health  Patient states he has been dieting but admits he is not exercising  Inquiring about a weight loss program or medication  Explained the risk of weight lost medication but would consider SGLT2 inhibitor or GLP 1 agonist such as victoza which have been shown to help loose weight as well as manage BS  Since his Aic is improving will hold off and refer to MNT  Essential hypertension  At goal of less than 140/90  Continue lisinopril 20 mg daily  Limit salt intake and endorse 150 min of moderate intensity exercise a week  Moderate persistent asthma without complication  Daytime cough improving after viral illness  Lung exam unremarkable  Continue MARIBEL prn  Call with worsening symptoms  No tobacco use in the past  May consider PFT in the future to look at lung capacity  Continue ventolin prn and flovent BID  ER/ return precuations discussed     Type 2 diabetes mellitus ([facility] [address] )  Lab Results   Component Value Date    HGBA1C 6 8 10/08/2018       No results for input(s): POCGLU in the last 72 hours  Blood Sugar Average: Last 72 hrs:   Aic improved from 7 8 in April 2018  Refer to MNT  Will need eye exam and regular foot exam  Continue metformin 1000 mg BID for now as his aic is improving  No need to need check glucose routinely as he is not on a agent that would cause hypoglycemia  Continue to watch diet  Review healthy living patient handout  Recheck a1c in 6 months  Problem List Items Addressed This Visit     Essential hypertension     At goal of less than 140/90  Continue lisinopril 20 mg daily  Limit salt intake and endorse 150 min of moderate intensity exercise a week  KYLEIGH (obstructive sleep apnea) - Primary     Per patient, compliant with CPAP  Follows with sleep center  Advised to lose weight as this can help with any airway obstruction AND is overall beneficial for his health  Patient states he has been dieting but admits he is not exercising  Inquiring about a weight loss program or medication  Explained the risk of weight lost medication but would consider SGLT2 inhibitor or GLP 1 agonist such as victoza which have been shown to help loose weight as well as manage BS  Since his Aic is improving will hold off and refer to MNT  Moderate persistent asthma without complication     Daytime cough improving after viral illness  Lung exam unremarkable  Continue MARIBEL prn  Call with worsening symptoms  No tobacco use in the past  May consider PFT in the future to look at lung capacity  Continue ventolin prn and flovent BID  ER/ return precuations discussed          Type 2 diabetes mellitus ([facility] [address] )     Lab Results   Component Value Date    HGBA1C 6 8 10/08/2018       No results for input(s): POCGLU in the last 72 hours  Blood Sugar Average: Last 72 hrs:   Aic improved from 7 8 in April 2018  Refer to MNT  Will need eye exam and regular foot exam  Continue metformin 1000 mg BID for now as his aic is improving  No need to need check glucose routinely as he is not on a agent that would cause hypoglycemia  Continue to watch diet  Review healthy living patient handout  Recheck a1c in 6 months  Relevant Orders    POCT hemoglobin A1c (Completed)    Lipid panel    HEMOGLOBIN A1C W/ EAG ESTIMATION    Microalbumin / creatinine urine ratio      Other Visit Diagnoses     Need for immunization against influenza        Relevant Orders    SYRINGE/SINGLE-DOSE VIAL: influenza vaccine, 1829-1613, quadrivalent, 0 5 mL, preservative-free, for patients 3+ yr (FLUZONE) (Completed)            Subjective:      Patient ID: Miladys Ventura is a 59 y o  male  Patient is here to establish care with new provider as his old provider left the practice   Just got over a viral illness but still has a residual cough  Does have a history of asthma triggered by pollen, dust, cat hair, or cut grass  For past 3 weeks patient has used the nebulizer twice a day and ventolin a few times a day  Otherwise he uses the ventolin, 8-9 x a month  No nighttime cough  Also reports numbness on the left side  It started 3 months ago, intermittent, improves with movement  Not associated with migraines, pain, or weakness  Denies dysphagia or slurred speech  Patient also asking about getting a glucometer and if he should be doing daily gluicse checks  The following portions of the patient's history were reviewed and updated as appropriate: allergies, current medications, past family history, past medical history, past social history, past surgical history and problem list     Review of Systems   Constitutional: Negative for activity change, chills, fever and unexpected weight change  HENT: Negative for congestion, postnasal drip, sinus pressure, sore throat and trouble swallowing  Eyes: Negative for visual disturbance  Respiratory: Positive for cough (improving, almost gone ) and wheezing (ocassionally )  Negative for chest tightness and shortness of breath  Cardiovascular: Negative for chest pain, palpitations and leg swelling  Gastrointestinal: Negative for abdominal pain, constipation, diarrhea, nausea and vomiting  Genitourinary: Negative for decreased urine volume, difficulty urinating, dysuria, frequency and urgency  Musculoskeletal: Positive for back pain  Negative for arthralgias and gait problem  Skin: Negative  Allergic/Immunologic: Negative for environmental allergies  Neurological: Positive for numbness  Negative for dizziness, syncope, speech difficulty, weakness, light-headedness and headaches  Psychiatric/Behavioral: Negative for agitation, sleep disturbance and suicidal ideas  The patient is not nervous/anxious            Objective:      /72 (BP Location: Left arm, Patient Position: Sitting, Cuff Size: Large)   Pulse 86   Temp 97 8 °F (36 6 °C) (Tympanic)   Resp 18   Ht 5' 11 7" (1 821 m)   Wt (!) 146 kg (321 lb)   BMI 43 90 kg/m²          Physical Exam   Constitutional: He is oriented to person, place, and time  He appears well-developed and well-nourished  No distress  HENT:   Head: Normocephalic and atraumatic  Eyes: Pupils are equal, round, and reactive to light  EOM are normal  Right eye exhibits no discharge  Left eye exhibits no discharge  Neck: No JVD present  No thyromegaly present  Cardiovascular: Normal rate, regular rhythm, normal heart sounds and intact distal pulses  No murmur heard  Pulmonary/Chest: Effort normal and breath sounds normal  No respiratory distress  He has no wheezes  He has no rales  Abdominal: Soft  Bowel sounds are normal  He exhibits no distension  There is no tenderness  There is no rebound and no guarding  Musculoskeletal: He exhibits edema (trace edema )  Lymphadenopathy:     He has no cervical adenopathy  Neurological: He is alert and oriented to person, place, and time  Smile symmetric  Speech clear and coherent   Sensation equal in all 4 extremities   Finger to nose intact   Strength 5/5 in all 4 extremities   Handgrip 5/5 b/l   No pronator drift   EOM intact   CARMEN   Skin: Skin is warm  No rash noted  Psychiatric: He has a normal mood and affect   His behavior is normal  Judgment and thought content normal

## 2018-10-09 NOTE — ASSESSMENT & PLAN NOTE
At goal of less than 140/90  Continue lisinopril 20 mg daily  Limit salt intake and endorse 150 min of moderate intensity exercise a week

## 2018-10-09 NOTE — ASSESSMENT & PLAN NOTE
Per patient, compliant with CPAP  Follows with sleep center  Advised to lose weight as this can help with any airway obstruction AND is overall beneficial for his health  Patient states he has been dieting but admits he is not exercising  Inquiring about a weight loss program or medication  Explained the risk of weight lost medication but would consider SGLT2 inhibitor or GLP 1 agonist such as victoza which have been shown to help loose weight as well as manage BS  Since his Aic is improving will hold off and refer to MNT

## 2018-10-09 NOTE — ASSESSMENT & PLAN NOTE
Lab Results   Component Value Date    HGBA1C 6 8 10/08/2018       No results for input(s): POCGLU in the last 72 hours  Blood Sugar Average: Last 72 hrs:   Aic improved from 7 8 in April 2018  Refer to MNT  Will need eye exam and regular foot exam  Continue metformin 1000 mg BID for now as his aic is improving  No need to need check glucose routinely as he is not on a agent that would cause hypoglycemia  Continue to watch diet  Review healthy living patient handout  Recheck a1c in 6 months

## 2018-10-09 NOTE — PATIENT INSTRUCTIONS
Nutrition: How to Make Marcelina Grajeda6  A healthy diet has a lot of benefits  It can prevent certain health conditions like heart disease and cancer, and it can lower your cholesterol  It can give you more energy, help you focus, and improve your mood  It can also help you lose weight or stay at a healthy weight  Path to Improved Health  The choices you make about what you eat and drink matter  They should add up to a balanced, nutritious diet  We all have different calorie needs based on our age, sex, and activity level  Health conditions can have a role, too  Fruits and Vegetables  Fruits and vegetables are rich in fiber, vitamins, and minerals  They should be the basis of your diet  Try to get many different colors of fruits and vegetables each day to add flavor and variety  Fruits and vegetables should cover half of your plate at each meal  Try not to add saturated fats and sugar to vegetables and fruits  This means avoiding margarine, butter, mayonnaise, and sour cream  You can use yogurt, healthy oils (such as canola or olive oil), or herbs instead  Potatoes and corn are not considered vegetables  Your body processes them more like grains  FRUITS & VEGETABLES   INSTEAD OF THIS: TRY THIS:   Regular or fried vegetables served with cream, cheese, or butter Raw, steamed, boiled, sautéed, or baked vegetables tossed with olive oil, salt, and pepper, or with onions or spices added (like garlic and cumin)   Fruits served with cream cheese or sugary sauces Fresh fruit with peanut, almond, or cashew butter or plain yogurt   Fried potatoes, including french fries, hash browns, and potato chips Baked sweet potatoes or other vegetables     Grains  Choose products that list whole grains as the first ingredient  Whole grains are high in fiber, protein, and vitamins  They are digested slowly, which helps you feel full longer and keeps you from overeating  Avoid products that say enriched    Hot cereals like oatmeal are usually low in saturated fat  However, instant cereals with cream may contain processed oils and can be high in sugar  Granola cereals usually contain a lot of sugar  Cold cereals are generally made with refined grains and are high in sugars  Look for whole-grain, low-sugar options instead  Try not to eat rich sweets, such as doughnuts, rolls, and muffins  Consider fruit or a piece of dark chocolate instead to satisfy your sweet tooth  GRAINS   INSTEAD OF THIS: TRY THIS:   Croissants, rolls, biscuits, and white breads Whole-grain breads, including wheat, rye, and pumpernickel   Doughnuts, pastries, and scones Whole-grain English muffins and small whole-grain bagels   Fried tortillas Soft tortillas (corn or whole wheat) without trans fats   Sugary cereals and regular granola Whole-grain cereal, oatmeal, and reduced-sugar granola   Snack crackers Whole-grain crackers   Potato or corn chips and buttered popcorn Unbuttered popcorn   White pasta Whole-wheat pasta   White rice Brown or wild rice   Fried rice or pasta mixes Brown rice or whole-grain pasta with low-sodium vegetable sauce   All-purpose white flour Whole-wheat flour     Protein  Protein can come from animal and vegetable sources  People who get more of their protein from animal sources tend to have more health problems that can lead to illness and early death  It is healthier to eat meat less often and get most of your protein from plant sources  When you eat meat, choose leaner cuts  Vegetable Protein Sources  There are many ways to get protein in your diet even if you do not eat meat  Most vegetables have some protein  When you eat these vegetables with whole grains, seeds, nuts, and especially beans, you can get a good amount of protein  You can swap beans for meat in recipes like lasagna or chili  Soy foods such as tofu, tempeh, and edamame are also good sources of protein      Beef, Pork, Veal, and [employer] and veal cuts have the words loin or round in their names  Lean pork cuts have the words loin or leg in their names  Trim off the outside fat before cooking the meat  Trim any inside fat before eating it  Use herbs, spices, and low-sodium marinades to season meat  Baking, broiling, grilling, and roasting are the healthiest ways to cook meats  Lean cuts can be panbroiled or stir-fried  Use a nonstick pan, canola oil, or olive oil instead of butter or margarine  Don't serve meat with high-fat sauces and gravies  Poultry  Chicken breasts are a good choice because they are low in fat and high in protein  Only eat duck and goose once in a while, because they are higher in saturated fat  Remove skin and visible fat before cooking  Baking, broiling, grilling, and roasting are the healthiest ways to [employer]  Skinless poultry can be pan broiled or stir fried  Use a nonstick pan, canola oil, or olive oil instead of butter or margarine  Seafood  Most seafood is high in healthy polyunsaturated fats  Healthy omega-3 fatty acids also are found in some fish, such as salmon and cold-water trout  If good-quality fresh fish isn't available, buy frozen fish  To prepare fish, you should poach, steam, bake, broil, or grill it      PROTEIN   INSTEAD OF THIS: TRY THIS:   Prime and marbled cuts of meat Select-grade lean beef, such as round, sirloin, and loin cuts   Pork spare ribs and aparicio Lean pork, such as tenderloin and loin chop, turkey aparicio, tofu aparicio   Regular ground beef Lean or extra-lean ground beef, ground chicken or turkey, tempeh, or beans   Lunch meats, such as pepperoni, salami, bologna, and liverwurst Lean lunch meats, such as turkey, chicken, and ham   Regular hot dogs and sausage Fat-free hot dogs, turkey dogs, tofu hot dogs   Breaded fish sticks and cakes, fish canned in oil, or seafood prepared with butter or served with high-fat sauce Fish (fresh, frozen, or canned in water), grilled fish sticks and cakes, or shellfish     Dairy  Choose low-fat, skim, or nondairy milk, such as soy, rice, or almond milk  Try low-fat or part-skim cheeses and other dairy products, or choose smaller portions of foods high in saturated fat  Yogurt can replace sour cream in many recipes  It is important to pick yogurt without added sugar  Try mixing yogurt with fruit for dessert  Sorbet and frozen yogurt are lower in fat than ice cream     DAIRY   INSTEAD OF THIS: TRY THIS:   Whole milk Skim (nonfat), 1% or 2% (low fat), or nondairy milk, such as soy, rice, almond, or cashew milk   Cream or evaporated milk Evaporated skim milk   Regular buttermilk Low-fat buttermilk   Yogurt made with whole milk Low-fat or nonfat yogurt   Regular cheese, including American, blue, Brie, cheddar, [place], and Parmesan Low-fat cheese with less than 3 g fat per serving, or nondairy soy cheese   Regular cottage cheese Low-fat cottage cheese (less than 2% fat)   Regular cream cheese Low-fat cream cheese with less than 3 g fat per 1 oz, or skim ricotta   Ice cream Sorbet, sherbet, or frozen yogurt with less than 3 g fat per ½-cup serving     Fats and Oils  Although high-fat foods are higher in calories, they can help you feel satisfied with eating less  Don't be afraid to have fats in your diet, but try to limit saturated and trans fats  You need saturated and unsaturated fats in your diet, but most Americans get too much saturated fat  Heart disease, diabetes, some cancers, and arthritis have been linked to diets high in saturated fat, particularly saturated fats from animal products  FATS & OILS   INSTEAD OF THIS: TRY THIS:   Cookies Fruit or whole-grain cookies   Shortening, butter, and margarine Olive, canola, and soybean oils   Regular mayonnaise Yogurt   Regular salad dressing Vinaigrette with olive oil and vinegar   Butter or fat to grease pans Nonstick cooking spray, olive oil, or canola oil           Beverages  It is important that you stay hydrated  However, drinks that contain sugar are not healthy  This includes fruit juices, soda, sports and energy drinks, sweetened or flavored milk, and sweet tea  Artificial sweeteners may also be bad for your health  Drink mostly water or other unsweetened drinks  Don't drink too much alcohol  Women should have no more than one drink per day  Men should have no more than two drinks per day  Exercises    Set an Exercise Goal & Make a Plan  If youre ready to start getting active, its time to set a goal and make a plan  Staying Motivated  Its easy to start an exercise routine once youve decided its time for a change, but keeping it up is a challenge for many people  Positive Self-Talk Makes a Difference  The conversations you have with yourself about physical activity and your fitness abilities can have an impact on your performance  Overcoming Barriers to Activity Think about what is keeping you from being active and then check out some of our solutions to the most common barriers to physical activity  · Most adults with with type 1 and type 2 diabetes should engage in 150 min or more of moderate-to-vigorous intensity physical activity per week, spread over at least 3 days/week, with no more than 2 consecutive days without activity  [place] durations (minimum 75 min/week) of vigorous-intensity or interval training may be sufficient for younger and more physically fit individuals  · Adults with type 1  and type 2  diabetes should engage in 2-3 sessions/week of resistance exercise on nonconsecutive days  · All adults, and particularly those with type 2 diabetes, should decrease the amount of time spent in daily sedentary behavior  Prolonged sitting should be interrupted every 30 min for blood glucose benefits, particularly in adults with type 2 diabetes  · Flexibility training and balance training are recommended 2-3 times/week for older adults with diabetes   Yoga and garett chi may be included based on individual preferences to increase flexibility, muscular strength, and balance  Patient Education     [employer] Healthy Eating Plate [facility]  Department of Agriculture, Choose My Plate [employer] com br  Http://care  diabetesjournals  org/content/40/Supplement_1/S33    [employer]  org: Exercise & Fitness   http://familyG2 Web Servicesctor  org/familydoctor/en/prevention-wellness/exercise-fitness html     American Diabetes Association (ADA): Weight Loss   https://www [employer]/  org/food-and-fitness/fitness/weight-loss/? utm_source=WWW&utm_medium=DropDownFF&utm_content=WeightLoss&utm_camp aign=CON     My Fitness Pal   http://www  [employer]   Online nutrition and calorie tracker  National Diabetes Education Program (NDEP): Tasty Recipes for People with Diabetes and Their Chang Sheerer (English and Serbian)   https://www [employer]/     Drake [place]ia Recipes is a bilingual booklet filled with recipes specifically designed for Latin Americans  Recipes are accompanied by their nutritional facts table  The booklet also includes diabetes health information and resources  [employer]  org: Body Mass Index Calculator   http://familyG2 Web Servicesctor  org/familydoctor/en/health-tools/bmi-calculator html     [employer]  org: Food and Nutrition Topics   http://familyG2 Web Servicesctor  org/familydoctor/en/prevention-wellness/food-nutrition  html     ADA: Preventing Diabetes with Good Nutrition   https://www [employer]/  org/advocate/our-priorities/prevention/preventing-diabetes-nutrition  html     Health Power for Minorities: 10 Tips about Diabetes Prevention and Control   [employer]/HealthChannelDetails  aspx?gq=413

## 2018-10-09 NOTE — ASSESSMENT & PLAN NOTE
Daytime cough improving after viral illness  Lung exam unremarkable  Continue MARIBEL prn  Call with worsening symptoms  No tobacco use in the past  May consider PFT in the future to look at lung capacity  Continue ventolin prn and flovent BID   ER/ return precuations discussed

## 2018-11-12 ENCOUNTER — TELEPHONE (OUTPATIENT)
Dept: HEMATOLOGY ONCOLOGY | Facility: CLINIC | Age: 58
End: 2018-11-12

## 2018-11-12 DIAGNOSIS — M79.673 PAIN OF FOOT, UNSPECIFIED LATERALITY: Primary | ICD-10-CM

## 2018-11-12 NOTE — TELEPHONE ENCOUNTER
Spoke with patient  He had second opinion with Dr Kiley Yanez MD, oncologist affiliated with [facility]  Dr Chelita White wanted to have pet/ct performed  Reviewed with patient  Pet/ CT can evaluate for bone damage  This is not something that we routinely do for all patients with MM  Discussed that this can provide another layer of information, however he has no evidence of end organ damage  He does have B neuropathy  He states he is not inclined to have this test done  He is having 1 month history of left heel pain in addition to the neuropathy  We discussed podiatry evaluation  He is agreeable

## 2018-11-13 NOTE — TELEPHONE ENCOUNTER
Spoke with [place] (spouse) and gave her the appt info    She will give the info to the pt  Pt was scheduled for Dr Sosa December [address]  [phone]  Nov 26 at 3pm

## 2018-12-03 ENCOUNTER — OFFICE VISIT (OUTPATIENT)
Dept: FAMILY MEDICINE CLINIC | Facility: CLINIC | Age: 58
End: 2018-12-03
Payer: COMMERCIAL

## 2018-12-03 VITALS
HEART RATE: 76 BPM | TEMPERATURE: 99.4 F | WEIGHT: 315 LBS | RESPIRATION RATE: 14 BRPM | BODY MASS INDEX: 42.66 KG/M2 | DIASTOLIC BLOOD PRESSURE: 80 MMHG | HEIGHT: 72 IN | SYSTOLIC BLOOD PRESSURE: 142 MMHG

## 2018-12-03 DIAGNOSIS — J45.41 MODERATE PERSISTENT ASTHMA WITH EXACERBATION: Primary | ICD-10-CM

## 2018-12-03 PROCEDURE — 99213 OFFICE O/P EST LOW 20 MIN: CPT | Performed by: FAMILY MEDICINE

## 2018-12-03 PROCEDURE — 3008F BODY MASS INDEX DOCD: CPT | Performed by: FAMILY MEDICINE

## 2018-12-03 RX ORDER — PREDNISONE 20 MG/1
60 TABLET ORAL DAILY
Qty: 15 TABLET | Refills: 0 | Status: SHIPPED | OUTPATIENT
Start: 2018-12-03 | End: 2018-12-27 | Stop reason: SDUPTHER

## 2018-12-03 RX ORDER — ALBUTEROL SULFATE 2.5 MG/3ML
2.5 SOLUTION RESPIRATORY (INHALATION) ONCE
Status: COMPLETED | OUTPATIENT
Start: 2018-12-03 | End: 2018-12-03

## 2018-12-03 RX ORDER — GUAIFENESIN 600 MG
1200 TABLET, EXTENDED RELEASE 12 HR ORAL EVERY 12 HOURS SCHEDULED
Qty: 60 TABLET | Refills: 0 | Status: SHIPPED | OUTPATIENT
Start: 2018-12-03 | End: 2019-01-28

## 2018-12-03 RX ADMIN — ALBUTEROL SULFATE 2.5 MG: 2.5 SOLUTION RESPIRATORY (INHALATION) at 15:35

## 2018-12-03 NOTE — ASSESSMENT & PLAN NOTE
-  Progressively worsening cough and shortness of breath  -  Tan colored sputum with pleuritic chest pain  - History of asthma with exacerbations requiring hospitalization once a year  -  Physical exam:  VSS  SpO2 95% Inspiratory stridor and expiratory wheeze throughout all lung fields  Bibasilar crackles  1+ edema in lower extremities  -  Pre- peak flow measurements: 350, 275,300  Post DuoNeb treatment peak flow showed significant improvement ( 450, 400,350)   -  Suspect asthma exacerbation with possible viral bronchitis and/or pneumonia  -  Unfortunately,  DuoNeb not covered by insurance  Will continue albuterol nebulizer treatment q 6 hours for 3 days, then transition to p r n   -  Prednisone burst, 60 mg 4 x 5 days  Mucinex daily    - Return precautions reviewed in detail  Follow-up in 1 week    -  Should symptoms worsen or develops fevers, will order chest x-ray and initiate antibiotic treatment

## 2018-12-04 ENCOUNTER — TELEPHONE (OUTPATIENT)
Dept: FAMILY MEDICINE CLINIC | Facility: CLINIC | Age: 58
End: 2018-12-04

## 2018-12-04 ENCOUNTER — HOSPITAL ENCOUNTER (OUTPATIENT)
Dept: RADIOLOGY | Facility: HOSPITAL | Age: 58
Discharge: HOME | End: 2018-12-04
Payer: COMMERCIAL

## 2018-12-04 DIAGNOSIS — R05.9 COUGH: ICD-10-CM

## 2018-12-04 DIAGNOSIS — R05.9 COUGH: Primary | ICD-10-CM

## 2018-12-04 DIAGNOSIS — J06.9 UPPER RESPIRATORY TRACT INFECTION, UNSPECIFIED TYPE: ICD-10-CM

## 2018-12-04 PROCEDURE — 71046 X-RAY EXAM CHEST 2 VIEWS: CPT

## 2018-12-04 RX ORDER — AZITHROMYCIN 250 MG/1
TABLET, FILM COATED ORAL
Qty: 6 TABLET | Refills: 0 | Status: SHIPPED | OUTPATIENT
Start: 2018-12-04 | End: 2018-12-08

## 2018-12-04 NOTE — TELEPHONE ENCOUNTER
Chest x-ray negative for pneumonia  Reviewed Dr Anna Cai note, Char Gates written for patient  Advise close follow up if not improving

## 2018-12-04 NOTE — TELEPHONE ENCOUNTER
Patient of Dr Viktoriya Espinosa in yesterday astham and congestion  He was prescribed meds still with same symptoms and now coughing up blood  Patient requesting a call back with advice  Please leave message with either wife or home number

## 2018-12-04 NOTE — TELEPHONE ENCOUNTER
Reviewed Dr Neymar Whitt note, she has noted chest xray and antibiotic would be considered if symptoms worsened  Please review

## 2018-12-27 ENCOUNTER — OFFICE VISIT (OUTPATIENT)
Dept: FAMILY MEDICINE CLINIC | Facility: CLINIC | Age: 58
End: 2018-12-27

## 2018-12-27 VITALS
WEIGHT: 315 LBS | DIASTOLIC BLOOD PRESSURE: 80 MMHG | BODY MASS INDEX: 44.1 KG/M2 | OXYGEN SATURATION: 95 % | SYSTOLIC BLOOD PRESSURE: 140 MMHG | HEIGHT: 71 IN | HEART RATE: 86 BPM | TEMPERATURE: 98.7 F | RESPIRATION RATE: 18 BRPM

## 2018-12-27 DIAGNOSIS — J30.9 ALLERGIC RHINITIS, UNSPECIFIED SEASONALITY, UNSPECIFIED TRIGGER: ICD-10-CM

## 2018-12-27 DIAGNOSIS — J45.41 MODERATE PERSISTENT ASTHMA WITH EXACERBATION: Primary | ICD-10-CM

## 2018-12-27 PROBLEM — J31.0 CHRONIC RHINITIS: Status: RESOLVED | Noted: 2018-02-26 | Resolved: 2018-12-27

## 2018-12-27 PROCEDURE — 99214 OFFICE O/P EST MOD 30 MIN: CPT | Performed by: FAMILY MEDICINE

## 2018-12-27 PROCEDURE — 1036F TOBACCO NON-USER: CPT | Performed by: FAMILY MEDICINE

## 2018-12-27 RX ORDER — ALBUTEROL SULFATE 90 UG/1
2 AEROSOL, METERED RESPIRATORY (INHALATION) EVERY 6 HOURS PRN
COMMUNITY
End: 2020-08-20

## 2018-12-27 RX ORDER — MONTELUKAST SODIUM 10 MG/1
10 TABLET ORAL
Qty: 90 TABLET | Refills: 0 | Status: SHIPPED | OUTPATIENT
Start: 2018-12-27 | End: 2020-08-20

## 2018-12-27 RX ORDER — PREDNISONE 20 MG/1
40 TABLET ORAL DAILY
Qty: 10 TABLET | Refills: 1 | Status: SHIPPED | OUTPATIENT
Start: 2018-12-27 | End: 2019-01-28

## 2018-12-27 NOTE — ASSESSMENT & PLAN NOTE
Symptomatic due to exposure to cat, has known allergy  Advised to continue Claritin and add Singulair  Flonase as need it

## 2018-12-27 NOTE — ASSESSMENT & PLAN NOTE
Mild exacerbation due to allergies  Short course of Prednisone 40 mg daily for 5 days, low carbs diet discussed  Continue Flovent BID, Ventolin and nebs as need it

## 2018-12-27 NOTE — PROGRESS NOTES
Assessment/Plan:    Allergic rhinitis  Symptomatic due to exposure to cat, has known allergy  Advised to continue Claritin and add Singulair  Flonase as need it  Moderate persistent asthma with exacerbation  Mild exacerbation due to allergies  Short course of Prednisone 40 mg daily for 5 days, low carbs diet discussed  Continue Flovent BID, Ventolin and nebs as need it  Diagnoses and all orders for this visit:    Moderate persistent asthma with exacerbation  -     predniSONE 20 mg tablet; Take 2 tablets (40 mg total) by mouth daily    Allergic rhinitis, unspecified seasonality, unspecified trigger  -     montelukast (SINGULAIR) 10 mg tablet; Take 1 tablet (10 mg total) by mouth daily at bedtime    Other orders  -     albuterol (VENTOLIN HFA) 90 mcg/act inhaler; Inhale 2 puffs every 6 (six) hours as needed      FU in one month with Dr Caleb Starks    Subjective:      Patient ID: Bassem Hernández is a 59 y o  male  Was exposed to cat beginning of month, seen for exacerbation, got better after Prednisone  Started with cough, SOB and wheezing again about 2 weeks ago, progressive worse  Using rescue inhaler three times daily and nebs two times daily  Asthma   He complains of cough, shortness of breath and wheezing  Pertinent negatives include no ear pain, fever or myalgias  His past medical history is significant for asthma  The following portions of the patient's history were reviewed and updated as appropriate: allergies, current medications, past family history, past medical history, past social history, past surgical history and problem list     Review of Systems   Constitutional: Negative for chills and fever  HENT: Negative for congestion, ear pain and nosebleeds  Respiratory: Positive for cough, shortness of breath and wheezing  Negative for chest tightness  Musculoskeletal: Negative for myalgias  Hematological: Negative  Psychiatric/Behavioral: Negative            Objective:      BP 140/80   Pulse 86   Temp 98 7 °F (37 1 °C)   Resp 18   Ht 5' 11" (1 803 m)   Wt (!) 147 kg (323 lb)   SpO2 95%   BMI 45 05 kg/m²       Current Outpatient Prescriptions:     albuterol (2 5 mg/3 mL) 0 083 % nebulizer solution, Take 3 mL (2 5 mg total) by nebulization every 6 (six) hours as needed for wheezing, Disp: 30 vial, Rfl: 2    albuterol (VENTOLIN HFA) 90 mcg/act inhaler, Inhale 2 puffs every 6 (six) hours as needed, Disp: , Rfl:     ascorbic acid (VITAMIN C) 500 mg tablet, Take 500 mg by mouth daily, Disp: , Rfl:     b complex vitamins capsule, Take 1 capsule by mouth daily, Disp: , Rfl:     dicyclomine (BENTYL) 10 mg capsule, TAKE 1 CAPSULE BY MOUTH 4 TIMES DAILY BEFORE MEAL(S) AND AT BEDTIME, Disp: 20 capsule, Rfl: 0    FLOVENT  MCG/ACT inhaler, INHALE TWO PUFFS BY MOUTH TWICE DAILY   RINSE MOUTH AFTER USE , Disp: 1 Inhaler, Rfl: 1    fluticasone (FLONASE) 50 mcg/act nasal spray, 2 sprays into each nostril daily, Disp: , Rfl:     guaiFENesin (MUCINEX) 600 mg 12 hr tablet, Take 2 tablets (1,200 mg total) by mouth every 12 (twelve) hours, Disp: 60 tablet, Rfl: 0    lisinopril (ZESTRIL) 20 mg tablet, TAKE 1 TABLET BY MOUTH ONCE DAILY, Disp: 90 tablet, Rfl: 0    loratadine (CLARITIN) 10 mg tablet, Take 10 mg by mouth daily, Disp: , Rfl:     MAGNESIUM CITRATE PO, Take 1 tablet by mouth daily  , Disp: , Rfl:     metFORMIN (GLUCOPHAGE) 1000 MG tablet, Take 1 tablet (1,000 mg total) by mouth 2 (two) times a day with meals, Disp: 180 tablet, Rfl: 1    NON FORMULARY, NeuRx- Peripheral nerve health, Disp: , Rfl:     omeprazole (PriLOSEC) 40 MG capsule, Take 1 capsule (40 mg total) by mouth daily, Disp: 90 capsule, Rfl: 3    predniSONE 20 mg tablet, Take 2 tablets (40 mg total) by mouth daily, Disp: 10 tablet, Rfl: 1    simvastatin (ZOCOR) 20 mg tablet, TAKE ONE TABLET BY MOUTH IN THE EVENING, Disp: 90 tablet, Rfl: 3    sodium chloride (OCEAN NASAL SPRAY) 0 65 % nasal spray, 2 sprays into each nostril 2 (two) times a day, Disp: , Rfl:     triamcinolone (KENALOG) 0 1 % cream, Apply topically 2 (two) times a day, Disp: 30 g, Rfl: 0    VENTOLIN  (90 Base) MCG/ACT inhaler, INHALE ONE TO TWO PUFFS BY MOUTH EVERY 4 TO 6 HOURS AS NEEDED, Disp: 18 each, Rfl: 11    montelukast (SINGULAIR) 10 mg tablet, Take 1 tablet (10 mg total) by mouth daily at bedtime, Disp: 90 tablet, Rfl: 0     Physical Exam   Constitutional: He is oriented to person, place, and time  He appears well-developed and well-nourished  HENT:   Head: Normocephalic  Right Ear: External ear normal    Left Ear: External ear normal    Mouth/Throat: Oropharynx is clear and moist    Eyes: Conjunctivae are normal    Pulmonary/Chest: He is in respiratory distress  He has wheezes  He has no rales  Lymphadenopathy:     He has no cervical adenopathy  Neurological: He is alert and oriented to person, place, and time  Psychiatric: He has a normal mood and affect

## 2019-01-14 ENCOUNTER — APPOINTMENT (OUTPATIENT)
Dept: LAB | Facility: CLINIC | Age: 59
End: 2019-01-14
Payer: COMMERCIAL

## 2019-01-14 DIAGNOSIS — C90.00 INDOLENT MULTIPLE MYELOMA (HCC): ICD-10-CM

## 2019-01-14 LAB
ALBUMIN SERPL BCP-MCNC: 3.5 G/DL (ref 3.5–5)
ALP SERPL-CCNC: 108 U/L (ref 46–116)
ALT SERPL W P-5'-P-CCNC: 44 U/L (ref 12–78)
ANION GAP SERPL CALCULATED.3IONS-SCNC: 5 MMOL/L (ref 4–13)
AST SERPL W P-5'-P-CCNC: 19 U/L (ref 5–45)
BASOPHILS # BLD AUTO: 0.05 THOUSANDS/ΜL (ref 0–0.1)
BASOPHILS NFR BLD AUTO: 1 % (ref 0–1)
BILIRUB SERPL-MCNC: 0.4 MG/DL (ref 0.2–1)
BUN SERPL-MCNC: 10 MG/DL (ref 5–25)
CALCIUM SERPL-MCNC: 9.2 MG/DL (ref 8.3–10.1)
CHLORIDE SERPL-SCNC: 102 MMOL/L (ref 100–108)
CO2 SERPL-SCNC: 30 MMOL/L (ref 21–32)
CREAT SERPL-MCNC: 0.89 MG/DL (ref 0.6–1.3)
EOSINOPHIL # BLD AUTO: 0.25 THOUSAND/ΜL (ref 0–0.61)
EOSINOPHIL NFR BLD AUTO: 4 % (ref 0–6)
ERYTHROCYTE [DISTWIDTH] IN BLOOD BY AUTOMATED COUNT: 15 % (ref 11.6–15.1)
GFR SERPL CREATININE-BSD FRML MDRD: 93 ML/MIN/1.73SQ M
GLUCOSE P FAST SERPL-MCNC: 160 MG/DL (ref 65–99)
HCT VFR BLD AUTO: 41.6 % (ref 36.5–49.3)
HGB BLD-MCNC: 12.8 G/DL (ref 12–17)
IGA SERPL-MCNC: 75 MG/DL (ref 70–400)
IGG SERPL-MCNC: 1600 MG/DL (ref 700–1600)
IGM SERPL-MCNC: 87 MG/DL (ref 40–230)
IMM GRANULOCYTES # BLD AUTO: 0.02 THOUSAND/UL (ref 0–0.2)
IMM GRANULOCYTES NFR BLD AUTO: 0 % (ref 0–2)
LYMPHOCYTES # BLD AUTO: 1.3 THOUSANDS/ΜL (ref 0.6–4.47)
LYMPHOCYTES NFR BLD AUTO: 21 % (ref 14–44)
MCH RBC QN AUTO: 28.5 PG (ref 26.8–34.3)
MCHC RBC AUTO-ENTMCNC: 30.8 G/DL (ref 31.4–37.4)
MCV RBC AUTO: 93 FL (ref 82–98)
MONOCYTES # BLD AUTO: 0.38 THOUSAND/ΜL (ref 0.17–1.22)
MONOCYTES NFR BLD AUTO: 6 % (ref 4–12)
NEUTROPHILS # BLD AUTO: 4.24 THOUSANDS/ΜL (ref 1.85–7.62)
NEUTS SEG NFR BLD AUTO: 68 % (ref 43–75)
NRBC BLD AUTO-RTO: 0 /100 WBCS
PLATELET # BLD AUTO: 204 THOUSANDS/UL (ref 149–390)
PMV BLD AUTO: 10 FL (ref 8.9–12.7)
POTASSIUM SERPL-SCNC: 4.5 MMOL/L (ref 3.5–5.3)
PROT SERPL-MCNC: 7.9 G/DL (ref 6.4–8.2)
RBC # BLD AUTO: 4.49 MILLION/UL (ref 3.88–5.62)
SODIUM SERPL-SCNC: 137 MMOL/L (ref 136–145)
WBC # BLD AUTO: 6.24 THOUSAND/UL (ref 4.31–10.16)

## 2019-01-14 PROCEDURE — 36415 COLL VENOUS BLD VENIPUNCTURE: CPT

## 2019-01-14 PROCEDURE — 80053 COMPREHEN METABOLIC PANEL: CPT

## 2019-01-14 PROCEDURE — 84165 PROTEIN E-PHORESIS SERUM: CPT

## 2019-01-14 PROCEDURE — 84165 PROTEIN E-PHORESIS SERUM: CPT | Performed by: PATHOLOGY

## 2019-01-14 PROCEDURE — 83883 ASSAY NEPHELOMETRY NOT SPEC: CPT

## 2019-01-14 PROCEDURE — 85025 COMPLETE CBC W/AUTO DIFF WBC: CPT

## 2019-01-14 PROCEDURE — 82784 ASSAY IGA/IGD/IGG/IGM EACH: CPT

## 2019-01-15 LAB
KAPPA LC FREE SER-MCNC: 15.6 MG/L (ref 3.3–19.4)
KAPPA LC FREE/LAMBDA FREE SER: 1.84 {RATIO} (ref 0.26–1.65)
LAMBDA LC FREE SERPL-MCNC: 8.5 MG/L (ref 5.7–26.3)

## 2019-01-17 LAB
ALBUMIN SERPL ELPH-MCNC: 4.15 G/DL (ref 3.5–5)
ALBUMIN SERPL ELPH-MCNC: 53.9 % (ref 52–65)
ALPHA1 GLOB SERPL ELPH-MCNC: 0.35 G/DL (ref 0.1–0.4)
ALPHA1 GLOB SERPL ELPH-MCNC: 4.5 % (ref 2.5–5)
ALPHA2 GLOB SERPL ELPH-MCNC: 0.89 G/DL (ref 0.4–1.2)
ALPHA2 GLOB SERPL ELPH-MCNC: 11.6 % (ref 7–13)
BETA GLOB ABNORMAL SERPL ELPH-MCNC: 0.48 G/DL (ref 0.4–0.8)
BETA1 GLOB SERPL ELPH-MCNC: 6.2 % (ref 5–13)
BETA2 GLOB SERPL ELPH-MCNC: 3.9 % (ref 2–8)
BETA2+GAMMA GLOB SERPL ELPH-MCNC: 0.3 G/DL (ref 0.2–0.5)
GAMMA GLOB ABNORMAL SERPL ELPH-MCNC: 1.53 G/DL (ref 0.5–1.6)
GAMMA GLOB SERPL ELPH-MCNC: 19.9 % (ref 12–22)
IGG/ALB SER: 1.17 {RATIO} (ref 1.1–1.8)
M PROTEIN 1 MFR SERPL ELPH: 14.1 %
M PROTEIN 1 SERPL ELPH-MCNC: 1.09 G/DL
PROT SERPL-MCNC: 7.7 G/DL (ref 6.4–8.2)

## 2019-01-21 ENCOUNTER — OFFICE VISIT (OUTPATIENT)
Dept: HEMATOLOGY ONCOLOGY | Facility: CLINIC | Age: 59
End: 2019-01-21
Payer: COMMERCIAL

## 2019-01-21 VITALS
HEART RATE: 83 BPM | WEIGHT: 315 LBS | RESPIRATION RATE: 14 BRPM | TEMPERATURE: 97.7 F | OXYGEN SATURATION: 97 % | BODY MASS INDEX: 44.1 KG/M2 | HEIGHT: 71 IN | SYSTOLIC BLOOD PRESSURE: 148 MMHG | DIASTOLIC BLOOD PRESSURE: 88 MMHG

## 2019-01-21 DIAGNOSIS — C90.00 MULTIPLE MYELOMA NOT HAVING ACHIEVED REMISSION (HCC): Primary | ICD-10-CM

## 2019-01-21 PROCEDURE — 99214 OFFICE O/P EST MOD 30 MIN: CPT | Performed by: PHYSICIAN ASSISTANT

## 2019-01-21 NOTE — PROGRESS NOTES
Hematology/Oncology Outpatient Follow- up Note  Vic Wills 59 y o  male MRN: @ Encounter: [id number]        Date:  1/21/2019      Assessment / Plan:    Indolent multiple myeloma, IgG kappa subtype dx 4/2018 when he presented with abnormal serum protein electrophoresis with IgG kappa M protein 1 4 grams/deciliters, he had neuropathy with tingling and numbness, diabetes mellitus type 2, obesity, a bone marrow biopsy in May 2018 showed 10-12% plasma cells, fish panel for multiple myeloma was reported normal, normal cytogenetics with deletion of chromosome Y consistent with normal finding   He has no evidence of lytic lesion in the bone skeletal survey 5/2018, no evidence of hypercalcemia, renal insufficiency or anemia   For the sake of the completion  Congo red staining to rule out amyloidosis performed  Negative  We reviewed that IgG level and serum kappa light chain level decreased due to the steroids he was on for his asthma  Patient wishes to follow closer f/u than 6 months  F/U in 3 months with repeat labs              HPI: 70-year-old  male with history of obesity, asthma, hypertension, dyslipidemia, diabetes mellitus type 2, had been complaining of tingling and numbness in his feet and hand for the past year, most recently a feeling of hypersensitivity involving the anterior aspect of the left thigh area, evaluation by Neurology on March 2018 showed monoclonal gammopathy with M protein of 1 40 grams/deciliters, IgG kappa  He has normal vitamin B12 level, TSH, heavy metal screen, Lyme disease  CBC perforemed August 2017 showed hemoglobin of 12 8, WBC 6 6, platelets [id number], 58% neutrophils, 19% lymphocytes  A bone marrow biopsy in May 2018 showed 10-12% plasma cells, fish panel for multiple myeloma was negative, cytogenetics showed normal male chromosomes and deletion of Y chromosome in some of the cells consistent with normal finding in advanced age patient    Bone skeletal survey 5/2018 showed no evidence of lytic lesions       Interval History:  Spoke with patient on the phone 11/12/2018:  "He had second opinion with Dr Fredy Sharma MD, oncologist affiliated with [facility]  Dr Derek Tilley wanted to have pet/ct performed  Reviewed with patient  Pet/ CT can evaluate for bone damage  This is not something that we routinely do for all patients with MM  Discussed that this can provide another layer of information, however he has no evidence of end organ damage  He does have B neuropathy  He states he is not inclined to have this test done  He is having 1 month history of left heel pain in addition to the neuropathy  We discussed podiatry evaluation  He is agreeable "    Saw podiatrist   Dx with plantar fasciitis  Exercises help  He did have a burning feeling in his left lateral leg which has improved  Neuropathy persists  Continues to have fatigue  Was on steroids for his asthma  Test Results:        Labs:   Lab Results   Component Value Date    HGB 12 8 01/14/2019    HCT 41 6 01/14/2019    MCV 93 01/14/2019     01/14/2019    WBC 6 24 01/14/2019    NRBC 0 01/14/2019     Lab Results   Component Value Date     05/17/2017    K 4 5 01/14/2019     01/14/2019    CO2 30 01/14/2019    ANIONGAP 9 01/13/2015    BUN 10 01/14/2019    CREATININE 0 89 01/14/2019    GLUCOSE 206 (H) 01/13/2015    GLUF 160 (H) 01/14/2019    CALCIUM 9 2 01/14/2019    AST 19 01/14/2019    ALT 44 01/14/2019    ALKPHOS 108 01/14/2019    PROT 7 5 05/17/2017    BILITOT 0 5 05/17/2017    EGFR 93 01/14/2019       Imaging: No results found  ROS:  As mentioned in HPI & Interval History otherwise 14 point ROS negative  Allergies:    Allergies   Allergen Reactions    Shellfish-Derived Products Anaphylaxis     Clams and mussels, oysters  Lobster and crab ok    Diphenhydramine      Lightheadedness, nauseous, rapid heartbeat    Other Palpitations and Other (See Comments)     Clams     Current Medications: Reviewed  PMH/FH/SH:  Reviewed      Physical Exam:    There is no height or weight on file to calculate BSA  Ht Readings from Last 3 Encounters:   12/27/18 5' 11" (1 803 m)   12/03/18 5' 11 7" (1 821 m)   10/08/18 5' 11 7" (1 821 m)        Wt Readings from Last 3 Encounters:   12/27/18 (!) 147 kg (323 lb)   12/03/18 (!) 148 kg (325 lb 6 4 oz)   10/08/18 (!) 146 kg (321 lb)        Temp Readings from Last 3 Encounters:   12/27/18 98 7 °F (37 1 °C)   12/03/18 99 4 °F (37 4 °C) (Tympanic)   10/08/18 97 8 °F (36 6 °C) (Tympanic)        BP Readings from Last 3 Encounters:   12/27/18 140/80   12/03/18 142/80   10/08/18 120/72           Physical Exam   Constitutional: He is oriented to person, place, and time  He appears well-developed and well-nourished  No distress  HENT:   Head: Normocephalic and atraumatic  Eyes: Pupils are equal, round, and reactive to light  Conjunctivae and EOM are normal    Neck: Normal range of motion  Neck supple  No tracheal deviation present  Cardiovascular: Normal rate and regular rhythm  Exam reveals no gallop and no friction rub  No murmur heard  Pulmonary/Chest: Effort normal and breath sounds normal  No respiratory distress  He has no wheezes  He has no rales  He exhibits no tenderness  Abdominal: Soft  Bowel sounds are normal  He exhibits no distension  There is no tenderness  Central obesity  Lymphadenopathy:     He has no cervical adenopathy  Neurological: He is alert and oriented to person, place, and time  Skin: Skin is warm and dry  He is not diaphoretic  No erythema  Psychiatric: He has a normal mood and affect  His behavior is normal  Judgment and thought content normal    Vitals reviewed            Emergency Contacts:    Merry Lee, ,

## 2019-01-28 ENCOUNTER — OFFICE VISIT (OUTPATIENT)
Dept: FAMILY MEDICINE CLINIC | Facility: CLINIC | Age: 59
End: 2019-01-28

## 2019-01-28 VITALS
RESPIRATION RATE: 16 BRPM | BODY MASS INDEX: 44.1 KG/M2 | DIASTOLIC BLOOD PRESSURE: 70 MMHG | TEMPERATURE: 97.5 F | HEIGHT: 71 IN | SYSTOLIC BLOOD PRESSURE: 144 MMHG | HEART RATE: 80 BPM | WEIGHT: 315 LBS

## 2019-01-28 DIAGNOSIS — J45.20 MILD INTERMITTENT ASTHMA WITHOUT COMPLICATION: Primary | ICD-10-CM

## 2019-01-28 PROBLEM — Z23 NEED FOR INFLUENZA VACCINATION: Status: RESOLVED | Noted: 2018-10-08 | Resolved: 2019-01-28

## 2019-01-28 PROBLEM — J45.31 MILD PERSISTENT ASTHMA WITH EXACERBATION: Status: ACTIVE | Noted: 2018-12-03

## 2019-01-28 PROBLEM — J45.21 MILD INTERMITTENT ASTHMA WITH EXACERBATION: Status: ACTIVE | Noted: 2018-12-03

## 2019-01-28 PROBLEM — K52.9 GASTROENTERITIS: Status: RESOLVED | Noted: 2018-09-20 | Resolved: 2019-01-28

## 2019-01-28 PROBLEM — L25.9 CONTACT DERMATITIS: Status: RESOLVED | Noted: 2018-09-20 | Resolved: 2019-01-28

## 2019-01-28 PROCEDURE — 99213 OFFICE O/P EST LOW 20 MIN: CPT | Performed by: FAMILY MEDICINE

## 2019-01-28 NOTE — PROGRESS NOTES
Willam Bach is an 59 y o  male who presents for follow up of asthma  The patient is not currently having symptoms / an exacerbation  Symptoms in previous episodes have included chest tightness, dyspnea, non-productive cough and wheezing, typically lasted 1-2 weeks  Previous episodes have been triggered by cold air and upper respiratory infection  Treatments tried during prior episodes include inhaled corticosteroids, leukotriene inhibitors, short-acting inhaled beta-adrenergic agonists and systemic steroids , which usually provides complete resolution of symptoms  Current Disease Severity  Li Camara has no daytime asthma symptoms  He has no nighttime asthma symptoms  The patient is using short-acting beta agonists for symptom control less than or equal to 2 days per week  He had 2 exacerbations requiring oral systemic corticosteroids twice last year  Current limitations in activity from asthma: none  Number of urgent/emergent visits in last year: 0  The following portions of the patient's history were reviewed and updated as appropriate: allergies, current medications, past family history, past medical history, past social history, past surgical history and problem list     Review of Systems  Review of Systems   Constitutional: Negative for chills, fatigue and fever  Respiratory: Positive for shortness of breath (chronic but stable )  Negative for cough, chest tightness and wheezing  Cardiovascular: Negative for chest pain, palpitations and leg swelling  Objective   /70 (BP Location: Left arm, Patient Position: Sitting, Cuff Size: Large)   Pulse 80   Temp 97 5 °F (36 4 °C) (Tympanic)   Resp 16   Ht 5' 11" (1 803 m)   Wt (!) 149 kg (328 lb 3 2 oz)   BMI 45 77 kg/m²   Physical Exam   Constitutional: He appears well-developed and well-nourished  No distress  HENT:   Head: Normocephalic and atraumatic     Cardiovascular: Normal rate, regular rhythm, normal heart sounds and intact distal pulses  No murmur heard  Pulmonary/Chest: Breath sounds normal  No respiratory distress  He has no wheezes  He has no rales  Abdominal: Soft  He exhibits no distension  There is no tenderness  There is no rebound  Musculoskeletal: He exhibits edema (1+ pretibial pitting edema)  Lymphadenopathy:     He has no cervical adenopathy  Neurological: He is alert  Skin: Skin is warm  Psychiatric: He has a normal mood and affect  His behavior is normal  Judgment and thought content normal    Vitals reviewed  Assessment   78-year-old male with a history of morbid obesity and recent asthma exacerbation who presents several weeks following 2 treatments of prednisone burst   Reports feeling much better and no longer requiring rescue inhaler daily  He also denies nighttime cough and shortness of breath  Patient is doing well and currently classified as intermittent asthma without exacerbation    Plan   Continue Flovent 1 puff daily, Claritin 10 mg daily, and Flonase p r n  Patient self discontinued singular given side effects  Up to date on pneumococcal vaccine  Counseled on diet and exercise  Avoid triggers and adhere to medication regimen    Follow-up in 3 months

## 2019-02-25 ENCOUNTER — TELEPHONE (OUTPATIENT)
Dept: FAMILY MEDICINE CLINIC | Facility: CLINIC | Age: 59
End: 2019-02-25

## 2019-02-25 NOTE — TELEPHONE ENCOUNTER
Tova Pineda from  neuro calling - pt has appt 3/4 and needs Dr order put into Epic for Sleep Medicine  Dx codes are G47 33,G47 09,G47 10, E66 01  Can also fax to [phone] Attn: Tova Pineda    # is [phone] with any questions

## 2019-02-26 DIAGNOSIS — G47.10 PERSISTENT DISORDER OF INITIATING OR MAINTAINING WAKEFULNESS: ICD-10-CM

## 2019-02-26 DIAGNOSIS — G47.33 OBSTRUCTIVE SLEEP APNEA: Primary | ICD-10-CM

## 2019-02-26 DIAGNOSIS — E66.01 MORBID OBESITY (HCC): ICD-10-CM

## 2019-02-26 DIAGNOSIS — G47.09 OTHER INSOMNIA: ICD-10-CM

## 2019-03-04 ENCOUNTER — OFFICE VISIT (OUTPATIENT)
Dept: SLEEP CENTER | Facility: CLINIC | Age: 59
End: 2019-03-04
Payer: COMMERCIAL

## 2019-03-04 VITALS
HEART RATE: 82 BPM | SYSTOLIC BLOOD PRESSURE: 142 MMHG | HEIGHT: 71 IN | WEIGHT: 315 LBS | BODY MASS INDEX: 44.1 KG/M2 | DIASTOLIC BLOOD PRESSURE: 82 MMHG

## 2019-03-04 DIAGNOSIS — G47.10 HYPERSOMNIA: ICD-10-CM

## 2019-03-04 DIAGNOSIS — J34.89 DRY NOSE: ICD-10-CM

## 2019-03-04 DIAGNOSIS — E66.01 MORBID OBESITY (HCC): ICD-10-CM

## 2019-03-04 DIAGNOSIS — G47.33 OBSTRUCTIVE SLEEP APNEA: Primary | ICD-10-CM

## 2019-03-04 DIAGNOSIS — E78.5 HYPERLIPIDEMIA, UNSPECIFIED HYPERLIPIDEMIA TYPE: ICD-10-CM

## 2019-03-04 DIAGNOSIS — F51.12 INSUFFICIENT SLEEP SYNDROME: ICD-10-CM

## 2019-03-04 DIAGNOSIS — G47.10 PERSISTENT DISORDER OF INITIATING OR MAINTAINING WAKEFULNESS: ICD-10-CM

## 2019-03-04 DIAGNOSIS — R68.2 DRY MOUTH: ICD-10-CM

## 2019-03-04 DIAGNOSIS — E11.42 TYPE 2 DIABETES MELLITUS WITH DIABETIC POLYNEUROPATHY, WITHOUT LONG-TERM CURRENT USE OF INSULIN (HCC): ICD-10-CM

## 2019-03-04 DIAGNOSIS — I10 ESSENTIAL HYPERTENSION: ICD-10-CM

## 2019-03-04 DIAGNOSIS — J45.21 MILD INTERMITTENT ASTHMA WITH EXACERBATION: ICD-10-CM

## 2019-03-04 DIAGNOSIS — J31.0 CHRONIC RHINITIS: ICD-10-CM

## 2019-03-04 DIAGNOSIS — J45.40 MODERATE PERSISTENT ASTHMA WITHOUT COMPLICATION: ICD-10-CM

## 2019-03-04 PROCEDURE — 99214 OFFICE O/P EST MOD 30 MIN: CPT | Performed by: INTERNAL MEDICINE

## 2019-03-04 RX ORDER — LORATADINE 10 MG/1
10 TABLET ORAL DAILY
Qty: 90 TABLET | Refills: 1 | Status: SHIPPED | OUTPATIENT
Start: 2019-03-04 | End: 2019-12-30 | Stop reason: SDUPTHER

## 2019-03-04 RX ORDER — SIMVASTATIN 20 MG
20 TABLET ORAL EVERY EVENING
Qty: 90 TABLET | Refills: 1 | Status: SHIPPED | OUTPATIENT
Start: 2019-03-04 | End: 2019-11-22 | Stop reason: SDUPTHER

## 2019-03-04 RX ORDER — FLUTICASONE PROPIONATE 220 UG/1
2 AEROSOL, METERED RESPIRATORY (INHALATION) 2 TIMES DAILY
Qty: 12 INHALER | Refills: 1 | Status: SHIPPED | OUTPATIENT
Start: 2019-03-04 | End: 2019-11-22 | Stop reason: SDUPTHER

## 2019-03-04 RX ORDER — LISINOPRIL 20 MG/1
20 TABLET ORAL DAILY
Qty: 90 TABLET | Refills: 1 | Status: SHIPPED | OUTPATIENT
Start: 2019-03-04 | End: 2019-11-22 | Stop reason: SDUPTHER

## 2019-03-04 NOTE — PROGRESS NOTES
Review of Systems      Genitourinary difficulty with erection   Cardiology none   Gastrointestinal none   Neurology muscle weakness, numbness/tingling of an extremity, forgetfulness and difficulty with memory   Constitutional fatigue   Integumentary none   Psychiatry none   Musculoskeletal joint pain, muscle aches and back pain   Pulmonary shortness of breath with activity, chest tightness and wheezing   ENT ringing in ears   Endocrine none   Hematological none

## 2019-03-04 NOTE — PROGRESS NOTES
Follow-Up Note - [address]  59 y o  male  XFN:[id number]  KSW:[id number]    CC: I saw this patient for follow-up in clinic today for his Sleep Disordered Breathing, Coexisting Sleep and Medical Problems  PFSH, Problem List, Medications & Allergies were reviewed in EMR  Interval changes: none reported  He  has a past medical history of Asthma, Benign positional vertigo, Diabetes mellitus ([facility] [address] ), Dyslipidemia, Hyperlipidemia, Hypertension, Lipoma of neck, Multiple myeloma ([facility] ), Sleep apnea, and Wheezing  He has a current medication list which includes the following prescription(s): albuterol, albuterol, ascorbic acid, b complex vitamins, dicyclomine, flovent hfa, fluticasone, lisinopril, loratadine, magnesium citrate, metformin, montelukast, NON FORMULARY, omeprazole, simvastatin, sodium chloride, and ventolin hfa  ROS: Reviewed (see attached)  Significant for chronic sinus symptoms due to deviated nasal septum  He does not use nasal saline rinse and uses Flonase only occasionally  He is also having respiratory symptoms due to his asthma     COMPLIANCE DATA REVIEWED:   using PAP > 4 hours/night 100% of the time  Estimated KATHRYN 1 2/hour at pressure of 16 5cm H2O @90th percentile  SUBJECTIVE: Regarding use of PAP, Brittany reports:   · He is experiencing some adverse effects: dry mouth, dry nose and nasal congesetion  · He is   benefiting from use: sleeping better   Sleep Routine: He reports getting 4-5 hours sleep and characterizes himself as a short sleep; he has no difficulty initiating or maintaining sleep   He awakens spontaneously and feels refreshed  He has occasional excessive drowsiness and may doze off when sedentary at times  He rated himself at Total score: 8 /24 on the [place] sleepiness scale  Habits: reports that he has never smoked  He has never used smokeless tobacco ,  reports that he does not drink alcohol ,  reports that he does not use drugs  , Caffeine use: limited , Exercise routine: irregular   OBJECTIVE: /82   Pulse 82   Ht 5' 11" (1 803 m)   Wt (!) 149 kg (327 lb 6 4 oz)   BMI 45 66 kg/m²    Constitutional: Patient is well groomed; well appearing  Skin/Extrem: warm & dry; col & hydration normal; no edema  Psych: cooperativeand in no distress  Normal mood, affect & thought   CNS: Alert, orientated, clear & coherent speech  H&N: EOMI; NC/AT:no facial pressure marks, no rashes  Neck Circumference: 46 cm  ENMT Mucus membranes normal Nasal airway:  Reduced air flow Oral airway: crowded  Resp:effort is normal CVS: RRR ABD:truncal obesity MSK:Gait normal     ASSESSMENT: Primary Sleep/Secondary(to Medical or Psych conditions) & comorbidities   1  Obstructive sleep apnea  Ambulatory referral to Sleep Medicine    PAP DME Resupply/Reorder   2  Insufficient sleep syndrome     3  Hypersomnia     4  Chronic rhinitis     5  Mild intermittent asthma with exacerbation     6  Dry mouth     7  Dry nose     8  Persistent disorder of initiating or maintaining wakefulness  Ambulatory referral to Sleep Medicine   9  Morbid obesity ([address] )  Ambulatory referral to Sleep Medicine       PLAN:  1  Treatment with  PAP is medically necessary and Eduin Major is agreable to continue use  2  Care of equipment, methods to improve comfort using PAP and importance of compliance with therapy were discussed  3  Pressure setting: continue 15-17 cm H2O     4  Rx provided to replace supplies and Care coordinated with DME provider  5  I advised allowing sufficient opportunity for sleep  6  Strategies for weight reduction were discussed  7  Nasal symptoms may improve with regular nasal saline rinse followed by topical nasal steroid regularly until symptoms settle then p r n   If symptoms persists, he would warrant ENT evaluation  8  Follow-up is advised in 1 year or sooner if needed to monitor progress, compliance and to adjust therapy        Thank you for allowing me to participate in the care of this patient      Sincerely,    Authenticated electronically by Priscila Beck MD on 74/11/74   Board Certified Specialist

## 2019-04-09 LAB
CHOLEST SERPL-MCNC: 186 MG/DL
CHOLEST/HDLC SERPL: 4.7 (CALC)
EST. AVERAGE GLUCOSE BLD GHB EST-MCNC: 192 (CALC)
EST. AVERAGE GLUCOSE BLD GHB EST-SCNC: 10.6 (CALC)
HBA1C MFR BLD: 8.3 % OF TOTAL HGB
HDLC SERPL-MCNC: 40 MG/DL
LDLC SERPL CALC-MCNC: 109 MG/DL (CALC)
NONHDLC SERPL-MCNC: 146 MG/DL (CALC)
TRIGL SERPL-MCNC: 245 MG/DL

## 2019-04-10 LAB
ALBUMIN/CREAT UR: 3 MCG/MG CREAT
CREAT UR-MCNC: 111 MG/DL (ref 20–320)
MICROALBUMIN UR-MCNC: 0.3 MG/DL

## 2019-04-17 ENCOUNTER — TELEPHONE (OUTPATIENT)
Dept: HEMATOLOGY ONCOLOGY | Facility: CLINIC | Age: 59
End: 2019-04-17

## 2019-04-18 ENCOUNTER — APPOINTMENT (OUTPATIENT)
Dept: LAB | Facility: HOSPITAL | Age: 59
End: 2019-04-18
Payer: COMMERCIAL

## 2019-04-18 DIAGNOSIS — C90.00 MULTIPLE MYELOMA NOT HAVING ACHIEVED REMISSION (HCC): ICD-10-CM

## 2019-04-18 LAB
ALBUMIN SERPL BCP-MCNC: 3.4 G/DL (ref 3.5–5)
ALP SERPL-CCNC: 111 U/L (ref 46–116)
ALT SERPL W P-5'-P-CCNC: 51 U/L (ref 12–78)
ANION GAP SERPL CALCULATED.3IONS-SCNC: 2 MMOL/L (ref 4–13)
AST SERPL W P-5'-P-CCNC: 31 U/L (ref 5–45)
BASOPHILS # BLD AUTO: 0.06 THOUSANDS/ΜL (ref 0–0.1)
BASOPHILS NFR BLD AUTO: 1 % (ref 0–1)
BILIRUB SERPL-MCNC: 0.36 MG/DL (ref 0.2–1)
BUN SERPL-MCNC: 11 MG/DL (ref 5–25)
CALCIUM SERPL-MCNC: 8.8 MG/DL (ref 8.3–10.1)
CHLORIDE SERPL-SCNC: 101 MMOL/L (ref 100–108)
CO2 SERPL-SCNC: 31 MMOL/L (ref 21–32)
CREAT SERPL-MCNC: 0.86 MG/DL (ref 0.6–1.3)
CREAT UR-MCNC: 106 MG/DL
EOSINOPHIL # BLD AUTO: 0.27 THOUSAND/ΜL (ref 0–0.61)
EOSINOPHIL NFR BLD AUTO: 5 % (ref 0–6)
ERYTHROCYTE [DISTWIDTH] IN BLOOD BY AUTOMATED COUNT: 14.4 % (ref 11.6–15.1)
GFR SERPL CREATININE-BSD FRML MDRD: 94 ML/MIN/1.73SQ M
GLUCOSE P FAST SERPL-MCNC: 162 MG/DL (ref 65–99)
HCT VFR BLD AUTO: 40.1 % (ref 36.5–49.3)
HGB BLD-MCNC: 12.5 G/DL (ref 12–17)
IGA SERPL-MCNC: 80 MG/DL (ref 70–400)
IGG SERPL-MCNC: 1650 MG/DL (ref 700–1600)
IGM SERPL-MCNC: 92 MG/DL (ref 40–230)
IMM GRANULOCYTES # BLD AUTO: 0.01 THOUSAND/UL (ref 0–0.2)
IMM GRANULOCYTES NFR BLD AUTO: 0 % (ref 0–2)
LYMPHOCYTES # BLD AUTO: 1.5 THOUSANDS/ΜL (ref 0.6–4.47)
LYMPHOCYTES NFR BLD AUTO: 25 % (ref 14–44)
MCH RBC QN AUTO: 28.5 PG (ref 26.8–34.3)
MCHC RBC AUTO-ENTMCNC: 31.2 G/DL (ref 31.4–37.4)
MCV RBC AUTO: 92 FL (ref 82–98)
MICROALBUMIN UR-MCNC: 23 MG/L (ref 0–20)
MICROALBUMIN/CREAT 24H UR: 22 MG/G CREATININE (ref 0–30)
MONOCYTES # BLD AUTO: 0.36 THOUSAND/ΜL (ref 0.17–1.22)
MONOCYTES NFR BLD AUTO: 6 % (ref 4–12)
NEUTROPHILS # BLD AUTO: 3.77 THOUSANDS/ΜL (ref 1.85–7.62)
NEUTS SEG NFR BLD AUTO: 63 % (ref 43–75)
NRBC BLD AUTO-RTO: 0 /100 WBCS
PLATELET # BLD AUTO: 232 THOUSANDS/UL (ref 149–390)
PMV BLD AUTO: 10.1 FL (ref 8.9–12.7)
POTASSIUM SERPL-SCNC: 4.4 MMOL/L (ref 3.5–5.3)
PROT SERPL-MCNC: 7.9 G/DL (ref 6.4–8.2)
RBC # BLD AUTO: 4.38 MILLION/UL (ref 3.88–5.62)
SODIUM SERPL-SCNC: 134 MMOL/L (ref 136–145)
WBC # BLD AUTO: 5.97 THOUSAND/UL (ref 4.31–10.16)

## 2019-04-18 PROCEDURE — 82784 ASSAY IGA/IGD/IGG/IGM EACH: CPT

## 2019-04-18 PROCEDURE — 84165 PROTEIN E-PHORESIS SERUM: CPT | Performed by: PATHOLOGY

## 2019-04-18 PROCEDURE — 85025 COMPLETE CBC W/AUTO DIFF WBC: CPT

## 2019-04-18 PROCEDURE — 82570 ASSAY OF URINE CREATININE: CPT | Performed by: FAMILY MEDICINE

## 2019-04-18 PROCEDURE — 86334 IMMUNOFIX E-PHORESIS SERUM: CPT

## 2019-04-18 PROCEDURE — 3061F NEG MICROALBUMINURIA REV: CPT | Performed by: FAMILY MEDICINE

## 2019-04-18 PROCEDURE — 82043 UR ALBUMIN QUANTITATIVE: CPT | Performed by: FAMILY MEDICINE

## 2019-04-18 PROCEDURE — 83883 ASSAY NEPHELOMETRY NOT SPEC: CPT

## 2019-04-18 PROCEDURE — 84165 PROTEIN E-PHORESIS SERUM: CPT

## 2019-04-18 PROCEDURE — 86334 IMMUNOFIX E-PHORESIS SERUM: CPT | Performed by: PATHOLOGY

## 2019-04-18 PROCEDURE — 36415 COLL VENOUS BLD VENIPUNCTURE: CPT

## 2019-04-18 PROCEDURE — 80053 COMPREHEN METABOLIC PANEL: CPT

## 2019-04-19 LAB
ALBUMIN SERPL ELPH-MCNC: 3.91 G/DL (ref 3.5–5)
ALBUMIN SERPL ELPH-MCNC: 51.5 % (ref 52–65)
ALPHA1 GLOB SERPL ELPH-MCNC: 0.34 G/DL (ref 0.1–0.4)
ALPHA1 GLOB SERPL ELPH-MCNC: 4.5 % (ref 2.5–5)
ALPHA2 GLOB SERPL ELPH-MCNC: 0.85 G/DL (ref 0.4–1.2)
ALPHA2 GLOB SERPL ELPH-MCNC: 11.2 % (ref 7–13)
BETA GLOB ABNORMAL SERPL ELPH-MCNC: 0.46 G/DL (ref 0.4–0.8)
BETA1 GLOB SERPL ELPH-MCNC: 6.1 % (ref 5–13)
BETA2 GLOB SERPL ELPH-MCNC: 4 % (ref 2–8)
BETA2+GAMMA GLOB SERPL ELPH-MCNC: 0.3 G/DL (ref 0.2–0.5)
GAMMA GLOB ABNORMAL SERPL ELPH-MCNC: 1.73 G/DL (ref 0.5–1.6)
GAMMA GLOB SERPL ELPH-MCNC: 22.7 % (ref 12–22)
IGG/ALB SER: 1.06 {RATIO} (ref 1.1–1.8)
INTERPRETATION UR IFE-IMP: NORMAL
KAPPA LC FREE SER-MCNC: 21.1 MG/L (ref 3.3–19.4)
KAPPA LC FREE/LAMBDA FREE SER: 2.45 {RATIO} (ref 0.26–1.65)
LAMBDA LC FREE SERPL-MCNC: 8.6 MG/L (ref 5.7–26.3)
M PEAK ID 2: 1.2 %
M PROTEIN 1 MFR SERPL ELPH: 15.9 %
M PROTEIN 1 SERPL ELPH-MCNC: 1.21 G/DL
M PROTEIN 2 SERPL ELPH-MCNC: 0.09 G/DL
PROT PATTERN SERPL ELPH-IMP: ABNORMAL
PROT SERPL-MCNC: 7.6 G/DL (ref 6.4–8.2)

## 2019-04-22 ENCOUNTER — OFFICE VISIT (OUTPATIENT)
Dept: HEMATOLOGY ONCOLOGY | Facility: CLINIC | Age: 59
End: 2019-04-22
Payer: COMMERCIAL

## 2019-04-22 ENCOUNTER — OFFICE VISIT (OUTPATIENT)
Dept: FAMILY MEDICINE CLINIC | Facility: CLINIC | Age: 59
End: 2019-04-22

## 2019-04-22 VITALS
WEIGHT: 315 LBS | RESPIRATION RATE: 16 BRPM | HEIGHT: 71 IN | HEART RATE: 74 BPM | TEMPERATURE: 98.1 F | SYSTOLIC BLOOD PRESSURE: 130 MMHG | BODY MASS INDEX: 44.1 KG/M2 | DIASTOLIC BLOOD PRESSURE: 74 MMHG

## 2019-04-22 VITALS
OXYGEN SATURATION: 96 % | HEART RATE: 72 BPM | RESPIRATION RATE: 18 BRPM | DIASTOLIC BLOOD PRESSURE: 88 MMHG | HEIGHT: 71 IN | WEIGHT: 315 LBS | BODY MASS INDEX: 44.1 KG/M2 | SYSTOLIC BLOOD PRESSURE: 151 MMHG

## 2019-04-22 DIAGNOSIS — G47.33 OBSTRUCTIVE SLEEP APNEA: ICD-10-CM

## 2019-04-22 DIAGNOSIS — J45.20 MILD INTERMITTENT ASTHMA WITHOUT COMPLICATION: ICD-10-CM

## 2019-04-22 DIAGNOSIS — R60.0 BILATERAL LOWER EXTREMITY EDEMA: ICD-10-CM

## 2019-04-22 DIAGNOSIS — C90.00 MULTIPLE MYELOMA NOT HAVING ACHIEVED REMISSION (HCC): Primary | ICD-10-CM

## 2019-04-22 DIAGNOSIS — E11.42 TYPE 2 DIABETES MELLITUS WITH DIABETIC POLYNEUROPATHY, WITHOUT LONG-TERM CURRENT USE OF INSULIN (HCC): Primary | ICD-10-CM

## 2019-04-22 PROCEDURE — 99214 OFFICE O/P EST MOD 30 MIN: CPT | Performed by: INTERNAL MEDICINE

## 2019-04-22 PROCEDURE — 99213 OFFICE O/P EST LOW 20 MIN: CPT | Performed by: FAMILY MEDICINE

## 2019-04-22 RX ORDER — FUROSEMIDE 20 MG/1
20 TABLET ORAL 2 TIMES DAILY
Qty: 30 TABLET | Refills: 3 | Status: SHIPPED | OUTPATIENT
Start: 2019-04-22 | End: 2020-08-20

## 2019-04-23 PROBLEM — R60.0 BILATERAL LOWER EXTREMITY EDEMA: Status: ACTIVE | Noted: 2019-04-23

## 2019-04-23 PROBLEM — J45.21 MILD INTERMITTENT ASTHMA WITH EXACERBATION: Status: RESOLVED | Noted: 2018-12-03 | Resolved: 2019-04-23

## 2019-04-23 PROBLEM — J45.20 MILD INTERMITTENT ASTHMA WITHOUT COMPLICATION: Status: ACTIVE | Noted: 2019-04-23

## 2019-04-28 DIAGNOSIS — J45.41 MODERATE PERSISTENT ASTHMA WITH EXACERBATION: ICD-10-CM

## 2019-04-28 RX ORDER — PREDNISONE 20 MG/1
TABLET ORAL
Qty: 15 TABLET | Refills: 0 | OUTPATIENT
Start: 2019-04-28

## 2019-05-14 ENCOUNTER — HOSPITAL ENCOUNTER (OUTPATIENT)
Dept: NON INVASIVE DIAGNOSTICS | Facility: CLINIC | Age: 59
Discharge: HOME | End: 2019-05-14
Payer: COMMERCIAL

## 2019-05-14 DIAGNOSIS — R60.0 BILATERAL LOWER EXTREMITY EDEMA: ICD-10-CM

## 2019-05-14 PROCEDURE — 93306 TTE W/DOPPLER COMPLETE: CPT

## 2019-05-14 PROCEDURE — 93306 TTE W/DOPPLER COMPLETE: CPT | Performed by: INTERNAL MEDICINE

## 2019-05-17 ENCOUNTER — TELEPHONE (OUTPATIENT)
Dept: FAMILY MEDICINE CLINIC | Facility: CLINIC | Age: 59
End: 2019-05-17

## 2019-06-12 ENCOUNTER — TELEPHONE (OUTPATIENT)
Dept: HEMATOLOGY ONCOLOGY | Facility: CLINIC | Age: 59
End: 2019-06-12

## 2019-07-02 ENCOUNTER — APPOINTMENT (OUTPATIENT)
Dept: LAB | Facility: CLINIC | Age: 59
End: 2019-07-02
Payer: COMMERCIAL

## 2019-07-02 DIAGNOSIS — C90.00 MULTIPLE MYELOMA NOT HAVING ACHIEVED REMISSION (HCC): ICD-10-CM

## 2019-07-02 LAB
ALBUMIN SERPL BCP-MCNC: 3.7 G/DL (ref 3.5–5)
ALP SERPL-CCNC: 118 U/L (ref 46–116)
ALT SERPL W P-5'-P-CCNC: 45 U/L (ref 12–78)
ANION GAP SERPL CALCULATED.3IONS-SCNC: 3 MMOL/L (ref 4–13)
AST SERPL W P-5'-P-CCNC: 28 U/L (ref 5–45)
BASOPHILS # BLD AUTO: 0.05 THOUSANDS/ΜL (ref 0–0.1)
BASOPHILS NFR BLD AUTO: 1 % (ref 0–1)
BILIRUB SERPL-MCNC: 0.54 MG/DL (ref 0.2–1)
BUN SERPL-MCNC: 10 MG/DL (ref 5–25)
CALCIUM SERPL-MCNC: 9.2 MG/DL (ref 8.3–10.1)
CHLORIDE SERPL-SCNC: 103 MMOL/L (ref 100–108)
CO2 SERPL-SCNC: 30 MMOL/L (ref 21–32)
CREAT SERPL-MCNC: 0.9 MG/DL (ref 0.6–1.3)
EOSINOPHIL # BLD AUTO: 0.2 THOUSAND/ΜL (ref 0–0.61)
EOSINOPHIL NFR BLD AUTO: 4 % (ref 0–6)
ERYTHROCYTE [DISTWIDTH] IN BLOOD BY AUTOMATED COUNT: 15.1 % (ref 11.6–15.1)
GFR SERPL CREATININE-BSD FRML MDRD: 93 ML/MIN/1.73SQ M
GLUCOSE P FAST SERPL-MCNC: 181 MG/DL (ref 65–99)
HCT VFR BLD AUTO: 41.4 % (ref 36.5–49.3)
HGB BLD-MCNC: 12.7 G/DL (ref 12–17)
IGA SERPL-MCNC: 86 MG/DL (ref 70–400)
IGG SERPL-MCNC: 1860 MG/DL (ref 700–1600)
IGM SERPL-MCNC: 103 MG/DL (ref 40–230)
IMM GRANULOCYTES # BLD AUTO: 0.01 THOUSAND/UL (ref 0–0.2)
IMM GRANULOCYTES NFR BLD AUTO: 0 % (ref 0–2)
LYMPHOCYTES # BLD AUTO: 1.24 THOUSANDS/ΜL (ref 0.6–4.47)
LYMPHOCYTES NFR BLD AUTO: 24 % (ref 14–44)
MCH RBC QN AUTO: 28.4 PG (ref 26.8–34.3)
MCHC RBC AUTO-ENTMCNC: 30.7 G/DL (ref 31.4–37.4)
MCV RBC AUTO: 93 FL (ref 82–98)
MONOCYTES # BLD AUTO: 0.39 THOUSAND/ΜL (ref 0.17–1.22)
MONOCYTES NFR BLD AUTO: 8 % (ref 4–12)
NEUTROPHILS # BLD AUTO: 3.3 THOUSANDS/ΜL (ref 1.85–7.62)
NEUTS SEG NFR BLD AUTO: 63 % (ref 43–75)
NRBC BLD AUTO-RTO: 0 /100 WBCS
PLATELET # BLD AUTO: 239 THOUSANDS/UL (ref 149–390)
PMV BLD AUTO: 10.2 FL (ref 8.9–12.7)
POTASSIUM SERPL-SCNC: 3.9 MMOL/L (ref 3.5–5.3)
PROT SERPL-MCNC: 8.2 G/DL (ref 6.4–8.2)
RBC # BLD AUTO: 4.47 MILLION/UL (ref 3.88–5.62)
SODIUM SERPL-SCNC: 136 MMOL/L (ref 136–145)
WBC # BLD AUTO: 5.19 THOUSAND/UL (ref 4.31–10.16)

## 2019-07-02 PROCEDURE — 83883 ASSAY NEPHELOMETRY NOT SPEC: CPT

## 2019-07-02 PROCEDURE — 85025 COMPLETE CBC W/AUTO DIFF WBC: CPT

## 2019-07-02 PROCEDURE — 80053 COMPREHEN METABOLIC PANEL: CPT

## 2019-07-02 PROCEDURE — 84165 PROTEIN E-PHORESIS SERUM: CPT | Performed by: PATHOLOGY

## 2019-07-02 PROCEDURE — 36415 COLL VENOUS BLD VENIPUNCTURE: CPT

## 2019-07-02 PROCEDURE — 84165 PROTEIN E-PHORESIS SERUM: CPT

## 2019-07-02 PROCEDURE — 82784 ASSAY IGA/IGD/IGG/IGM EACH: CPT

## 2019-07-03 LAB
KAPPA LC FREE SER-MCNC: 21.8 MG/L (ref 3.3–19.4)
KAPPA LC FREE/LAMBDA FREE SER: 2.69 {RATIO} (ref 0.26–1.65)
LAMBDA LC FREE SERPL-MCNC: 8.1 MG/L (ref 5.7–26.3)

## 2019-07-04 LAB
ALBUMIN SERPL ELPH-MCNC: 3.97 G/DL (ref 3.5–5)
ALBUMIN SERPL ELPH-MCNC: 51.6 % (ref 52–65)
ALPHA1 GLOB SERPL ELPH-MCNC: 0.34 G/DL (ref 0.1–0.4)
ALPHA1 GLOB SERPL ELPH-MCNC: 4.4 % (ref 2.5–5)
ALPHA2 GLOB SERPL ELPH-MCNC: 0.88 G/DL (ref 0.4–1.2)
ALPHA2 GLOB SERPL ELPH-MCNC: 11.4 % (ref 7–13)
BETA GLOB ABNORMAL SERPL ELPH-MCNC: 0.44 G/DL (ref 0.4–0.8)
BETA1 GLOB SERPL ELPH-MCNC: 5.7 % (ref 5–13)
BETA2 GLOB SERPL ELPH-MCNC: 4.1 % (ref 2–8)
BETA2+GAMMA GLOB SERPL ELPH-MCNC: 0.32 G/DL (ref 0.2–0.5)
GAMMA GLOB ABNORMAL SERPL ELPH-MCNC: 1.76 G/DL (ref 0.5–1.6)
GAMMA GLOB SERPL ELPH-MCNC: 22.8 % (ref 12–22)
IGG/ALB SER: 1.07 {RATIO} (ref 1.1–1.8)
M PEAK ID 2: 0.9 %
M PROTEIN 1 MFR SERPL ELPH: 17 %
M PROTEIN 1 SERPL ELPH-MCNC: 1.31 G/DL
M PROTEIN 2 SERPL ELPH-MCNC: 0.07 G/DL
PROT PATTERN SERPL ELPH-IMP: ABNORMAL
PROT SERPL-MCNC: 7.7 G/DL (ref 6.4–8.2)

## 2019-07-10 ENCOUNTER — OFFICE VISIT (OUTPATIENT)
Dept: FAMILY MEDICINE CLINIC | Facility: CLINIC | Age: 59
End: 2019-07-10

## 2019-07-10 VITALS
HEIGHT: 71 IN | RESPIRATION RATE: 16 BRPM | SYSTOLIC BLOOD PRESSURE: 140 MMHG | TEMPERATURE: 98.3 F | HEART RATE: 76 BPM | DIASTOLIC BLOOD PRESSURE: 80 MMHG | WEIGHT: 315 LBS | BODY MASS INDEX: 44.1 KG/M2

## 2019-07-10 DIAGNOSIS — R19.7 DIARRHEA, UNSPECIFIED TYPE: ICD-10-CM

## 2019-07-10 DIAGNOSIS — R20.0 NUMBNESS AND TINGLING OF BOTH LOWER EXTREMITIES: ICD-10-CM

## 2019-07-10 DIAGNOSIS — B35.4 TINEA CORPORIS: ICD-10-CM

## 2019-07-10 DIAGNOSIS — Z86.010 HISTORY OF COLON POLYPS: ICD-10-CM

## 2019-07-10 DIAGNOSIS — E11.42 TYPE 2 DIABETES MELLITUS WITH DIABETIC POLYNEUROPATHY, WITHOUT LONG-TERM CURRENT USE OF INSULIN (HCC): Primary | ICD-10-CM

## 2019-07-10 DIAGNOSIS — R20.2 NUMBNESS AND TINGLING OF BOTH LOWER EXTREMITIES: ICD-10-CM

## 2019-07-10 LAB — SL AMB POCT HEMOGLOBIN AIC: 8.3 (ref ?–6.5)

## 2019-07-10 PROCEDURE — 99213 OFFICE O/P EST LOW 20 MIN: CPT | Performed by: FAMILY MEDICINE

## 2019-07-10 PROCEDURE — 83036 HEMOGLOBIN GLYCOSYLATED A1C: CPT | Performed by: FAMILY MEDICINE

## 2019-07-10 RX ORDER — CLOTRIMAZOLE 1 %
CREAM (GRAM) TOPICAL 2 TIMES DAILY
Qty: 30 G | Refills: 1 | Status: SHIPPED | OUTPATIENT
Start: 2019-07-10 | End: 2019-10-15

## 2019-07-10 NOTE — ASSESSMENT & PLAN NOTE
Lab Results   Component Value Date    HGBA1C 8 3 (A) 07/10/2019      -POC A1C 8 3 today, stable from last visit in April    -Advised to discuss with PCP diabetic regimen including potentially starting insulin to improve glycemic control  Continue current regimen at this time  -Discussed ways to implement exercise to help improve glycemic control, including aerobic and resistance training exercises

## 2019-07-10 NOTE — PROGRESS NOTES
Lady Orozco 1960 male MRN: [id number]    Family Medicine Acute Visit    ASSESSMENT/PLAN  Problem List Items Addressed This Visit        Endocrine    Type 2 diabetes mellitus ([address] ) - Primary     Lab Results   Component Value Date    HGBA1C 8 3 (A) 07/10/2019      -POC A1C 8 3 today, stable from last visit in April    -Advised to discuss with PCP diabetic regimen including potentially starting insulin to improve glycemic control  Continue current regimen at this time  -Discussed ways to implement exercise to help improve glycemic control, including aerobic and resistance training exercises  Relevant Orders    POCT hemoglobin A1c (Completed)      Other Visit Diagnoses     Diarrhea, unspecified type        -Given history of colonic polyps and he is due for repeat colonoscopy, will refer to Dr Celeste Hardy for further evaluation and potential colonoscopy  -follow up PRN    Relevant Orders    Ambulatory referral to Gastroenterology    Tinea corporis        -Topical antifungal cream Lotrimazole BID for 7-10 days sent to pharmacy  -follow up PRN    Relevant Medications    clotrimazole (LOTRIMIN) 1 % cream    Numbness and tingling of both lower extremities        -likely related to non-optimal diabetic control, referred patient to follow up with his PCP to discuss potentially starting insulin    History of colon polyps        -referal to GI specialist Dr Celeste Hardy to evaluate for potential repeat colonoscopy    Relevant Orders    Ambulatory referral to Gastroenterology          Unable to address memory difficulties at this visit due to time constraints  Instructed patient to follow up with his PCP for a separate visit to discuss         Future Appointments   Date Time Provider Greg Barrios   7/15/2019  2:00 PM Senia Lopez PA-C [facility]   8/13/2019  9:50 AM Corey Cortez MD [facility]   3/2/2020  9:30 AM Aristeo Trinidad MD [facility]          SUBJECTIVE  CC: Leg Pain and Numbness (in left arm )      HPI:  Nicole Roberto is a pleasant 59 y o  male who presents for acute visit for multiple complaints  1  Numbness and tingling in both upper and lower extremities: Patient notes history of chronic lower extremity numbness and tingling 2/2 diabetic induced neuropathy  Has tried Gabapentin in the past but not well tolerated  For the past several weeks, he has developed numbness and tingling in his upper arms and hands, associated with pain  Denies particular injury or inciting event  Last A1C 8 3 (4/8/19) - at last visit with his PCP they discussed insulin therapy, at which patient was reluctant and wanted a trial of diet and exercise  2  Bowel changes: Patient notes 3+ month history of waxing and waning constipation and diarrhea  At time, his stool is completely liquid  No blood, abdominal pain, or change in stool color  Last colonoscopy was 12/19/2016 by Dr Paola Squires - removed 10 polyps  Due for recheck colonoscopy the end of this year  3  Rash under his arms: Noticed rash under right arm 2 weeks ago and now under left arm for the past week  No tick bites or bug bites that he can attribute  4  Difficulty concentrating         Review of Systems   Constitutional: Negative for chills and fever  HENT: Negative for congestion, postnasal drip and sore throat  Respiratory: Negative for cough, choking, shortness of breath and wheezing  Cardiovascular: Negative for chest pain and palpitations  Gastrointestinal: Positive for constipation and diarrhea  Negative for abdominal pain, anal bleeding, blood in stool, nausea and vomiting  Genitourinary: Negative for decreased urine volume and difficulty urinating  Skin: Positive for rash  Neurological: Positive for numbness  Negative for dizziness and light-headedness  As above in HPI   Psychiatric/Behavioral: Positive for decreased concentration         Historical Information   The patient history was reviewed as follows:  Past Medical History:   Diagnosis Date    Asthma     Benign positional vertigo     Diabetes mellitus ([facility] [address] )     borderline; diet controlled    Dyslipidemia     Hyperlipidemia     Hypertension     Lipoma of neck     last assessed - [id number]    Multiple myeloma ([facility] [address] )     Sleep apnea     has symptoms but not been diagnosed    Wheezing     last assessed - [id number]         Past Surgical History:   Procedure Laterality Date    ESOPHAGOGASTRODUODENOSCOPY  2010    Diagnostic    MASS EXCISION Right 8/21/2017    Procedure: POSTERIOR SHOULDER MASS EXCISION; POSTERIOR NECK MASS EXCISION; CHEST WALL MASS EXCISION;  Surgeon: Rocky Crane MD;  Location: [facility];  Service: General    THROAT SURGERY       Family History   Problem Relation Age of Onset    Hypertension Mother     Diabetes Mother     Hyperlipidemia Mother     Cancer Mother         Malignant neoplasm of the gastrointestinal tract    Diabetes type II Mother         Type 2 diabetes mellitus    Hypertension Father         Essential hypercholesterolemia    Hyperlipidemia Father     Heart disease Father         Arteriosclerotic cardiovascular disease (ASCVD)    Dementia Father       Social History   Social History     Substance and Sexual Activity   Alcohol Use No    Comment: Social drinker per [employer]     Social History     Substance and Sexual Activity   Drug Use No     Social History     Tobacco Use   Smoking Status Never Smoker   Smokeless Tobacco Never Used       Medications:     Current Outpatient Medications:     albuterol (VENTOLIN HFA) 90 mcg/act inhaler, Inhale 2 puffs every 6 (six) hours as needed, Disp: , Rfl:     ascorbic acid (VITAMIN C) 500 mg tablet, Take 500 mg by mouth daily, Disp: , Rfl:     b complex vitamins capsule, Take 1 capsule by mouth daily, Disp: , Rfl:     fluticasone (FLOVENT HFA) 220 mcg/act inhaler, Inhale 2 puffs 2 (two) times a day Rinse mouth after use, Disp: 12 Inhaler, Rfl: 1    furosemide (LASIX) 20 mg tablet, Take 1 tablet (20 mg total) by mouth 2 (two) times a day, Disp: 30 tablet, Rfl: 3    lisinopril (ZESTRIL) 20 mg tablet, Take 1 tablet (20 mg total) by mouth daily, Disp: 90 tablet, Rfl: 1    loratadine (CLARITIN) 10 mg tablet, Take 1 tablet (10 mg total) by mouth daily, Disp: 90 tablet, Rfl: 1    MAGNESIUM CITRATE PO, Take 1 tablet by mouth daily  , Disp: , Rfl:     metFORMIN (GLUCOPHAGE) 1000 MG tablet, Take 1 tablet (1,000 mg total) by mouth 2 (two) times a day with meals, Disp: 180 tablet, Rfl: 1    omeprazole (PriLOSEC) 40 MG capsule, Take 1 capsule (40 mg total) by mouth daily, Disp: 90 capsule, Rfl: 3    simvastatin (ZOCOR) 20 mg tablet, Take 1 tablet (20 mg total) by mouth every evening, Disp: 90 tablet, Rfl: 1    albuterol (2 5 mg/3 mL) 0 083 % nebulizer solution, Take 3 mL (2 5 mg total) by nebulization every 6 (six) hours as needed for wheezing, Disp: 30 vial, Rfl: 2    clotrimazole (LOTRIMIN) 1 % cream, Apply topically 2 (two) times a day for 10 days, Disp: 30 g, Rfl: 1    dicyclomine (BENTYL) 10 mg capsule, TAKE 1 CAPSULE BY MOUTH 4 TIMES DAILY BEFORE MEAL(S) AND AT BEDTIME, Disp: 20 capsule, Rfl: 0    fluticasone (FLONASE) 50 mcg/act nasal spray, 2 sprays into each nostril daily, Disp: , Rfl:     montelukast (SINGULAIR) 10 mg tablet, Take 1 tablet (10 mg total) by mouth daily at bedtime, Disp: 90 tablet, Rfl: 0    NON FORMULARY, NeuRx-TF Peripheral nerve health, Disp: , Rfl:     sodium chloride (OCEAN NASAL SPRAY) 0 65 % nasal spray, 2 sprays into each nostril 2 (two) times a day, Disp: , Rfl:     VENTOLIN  (90 Base) MCG/ACT inhaler, INHALE ONE TO TWO PUFFS BY MOUTH EVERY 4 TO 6 HOURS AS NEEDED, Disp: 18 each, Rfl: 11    Allergies   Allergen Reactions    Shellfish-Derived Products Anaphylaxis     Clams and mussels, oysters  Lobster and crab ok    Diphenhydramine      Lightheadedness, nauseous, rapid heartbeat    Other Palpitations and Other (See Comments) Martha       OBJECTIVE  Vitals:   Vitals:    07/10/19 0850   BP: 140/80   Pulse: 76   Resp: 16   Temp: 98 3 °F (36 8 °C)   Weight: (!) 147 kg (323 lb 9 6 oz)   Height: 5' 11" (1 803 m)         Physical Exam   Constitutional: He is oriented to person, place, and time  He appears well-developed and well-nourished  No distress  HENT:   Head: Normocephalic and atraumatic  Eyes: Conjunctivae are normal  Right eye exhibits no discharge  Left eye exhibits no discharge  Neck: Neck supple  Cardiovascular: Normal rate and regular rhythm  No murmur heard  Pulmonary/Chest: Effort normal and breath sounds normal  No stridor  No respiratory distress  He has no wheezes  He has no rales  Abdominal: Soft  Bowel sounds are normal  He exhibits no distension  There is no tenderness  There is no rebound  Neurological: He is alert and oriented to person, place, and time  No cranial nerve deficit  Skin: Skin is warm and dry  He is not diaphoretic  Right arm: lesion approx 4-6 cm in diameter on the medial aspect of the upper arm  Central clearing with scaling and erythema     Left arm: similar lesion as right arm, approx 4-6 cm in diameter on the medial aspect of the upper arm  Central clearing with scaling and erythema  No other rashes or lesions appreciated   Psychiatric: He has a normal mood and affect  His behavior is normal  Judgment and thought content normal    Nursing note and vitals reviewed             Ramila Upton DO  7/10/2019

## 2019-07-12 NOTE — PROGRESS NOTES
Hematology/Oncology Outpatient Follow- up Note  Satya Gonsalez 59 y o  male MRN: @ Encounter: [id number]        Date:  7/15/2019      Assessment / Plan:    Smoldering multiple myeloma IgG kappa subtype diagnosed on 04/2018 when he presented with abnormal serum protein electrophoresis, M protein IgG kappa 1 4 grams/deciliter, neuropathy, tingling and numbness, diabetes mellitus type 2 poorly controlled, obesity bone marrow biopsy showed 10-12% plasma cells fish panel for multiple myeloma was reported normal and normal cytogenetics with deletion of chromosome Y     No evidence of lytic lesions on the bone skeletal survey no evidence of renal insufficiency, anemia, hypercalcemia     Negative for Congo red     Kappa light chain 21, lambda light chain 8 6 with a ratio of 2 4, M protein 1 3, IgG 1650, IgA 80, IgM 92     No need for treatment at this time, continue watchful observation and follow every 3-4 months with serum protein electrophoresis, free light chain, CBC, CMP and quantitative immunoglobulins      2  Obesity  Insomnia  Reviewed sleep hygiene strategies  We discussed some food alternatives to white bread, potatoes, pasta and other nutrition strategies  Patient states he would like to lose weight     HPI:  75-year-old  male with history of obesity, asthma, hypertension, dyslipidemia, diabetes mellitus type 2, had been complaining of tingling and numbness in his feet and hand for the past year, most recently a feeling of hypersensitivity involving the anterior aspect of the left thigh area, evaluation by Neurology on March 2018 showed monoclonal gammopathy with M protein of 1 40 grams/deciliters, IgG kappa    He has normal vitamin B12 level, TSH, heavy metal screen, Lyme disease  CBC perforemed August 2017 showed hemoglobin of 12 8, WBC 6 6, platelets [id number], 66% neutrophils, 19% lymphocytes  A bone marrow biopsy in May 2018 showed 10-12% plasma cells, fish panel for multiple myeloma was negative, cytogenetics showed normal male chromosomes and deletion of Y chromosome in some of the cells consistent with normal finding in advanced age patient  Bone skeletal survey 5/2018 showed no evidence of lytic lesions       Interval History:  No new changes  Test Results:        Labs:   Lab Results   Component Value Date    HGB 12 7 07/02/2019    HCT 41 4 07/02/2019    MCV 93 07/02/2019     07/02/2019    WBC 5 19 07/02/2019    NRBC 0 07/02/2019     Lab Results   Component Value Date     05/17/2017    K 3 9 07/02/2019     07/02/2019    CO2 30 07/02/2019    ANIONGAP 9 01/13/2015    BUN 10 07/02/2019    CREATININE 0 90 07/02/2019    GLUCOSE 206 (H) 01/13/2015    GLUF 181 (H) 07/02/2019    CALCIUM 9 2 07/02/2019    AST 28 07/02/2019    ALT 45 07/02/2019    ALKPHOS 118 (H) 07/02/2019    PROT 7 5 05/17/2017    BILITOT 0 5 05/17/2017    EGFR 93 07/02/2019       Imaging: No results found  ROS:  As mentioned in HPI & Interval History otherwise 14 point ROS negative  Allergies: Allergies   Allergen Reactions    Shellfish-Derived Products Anaphylaxis     Clams and mussels, oysters  Lobster and crab ok    Diphenhydramine      Lightheadedness, nauseous, rapid heartbeat    Other Palpitations and Other (See Comments)     Clams     Current Medications: Reviewed  PMH/FH/SH:  Reviewed      Physical Exam:    There is no height or weight on file to calculate BSA      Ht Readings from Last 3 Encounters:   07/10/19 5' 11" (1 803 m)   04/22/19 5' 11" (1 803 m)   04/22/19 5' 11" (1 803 m)        Wt Readings from Last 3 Encounters:   07/10/19 (!) 147 kg (323 lb 9 6 oz)   04/22/19 (!) 150 kg (330 lb 3 2 oz)   04/22/19 (!) 149 kg (328 lb)        Temp Readings from Last 3 Encounters:   07/10/19 98 3 °F (36 8 °C)   04/22/19 98 1 °F (36 7 °C) (Tympanic)   01/28/19 97 5 °F (36 4 °C) (Tympanic)        BP Readings from Last 3 Encounters:   07/10/19 140/80   04/22/19 130/74   04/22/19 151/88 Physical Exam   Constitutional: He is oriented to person, place, and time  He appears well-developed and well-nourished  No distress  HENT:   Head: Normocephalic and atraumatic  Mouth/Throat: No oropharyngeal exudate  Eyes: Pupils are equal, round, and reactive to light  Conjunctivae are normal    Neck: Normal range of motion  Neck supple  No tracheal deviation present  Cardiovascular: Normal rate and regular rhythm  Exam reveals no gallop and no friction rub  No murmur heard  Pulmonary/Chest: Effort normal and breath sounds normal  No respiratory distress  He has no wheezes  He has no rales  He exhibits no tenderness  Abdominal: Soft  He exhibits no distension  There is no tenderness  Musculoskeletal: Normal range of motion  Lymphadenopathy:     He has no cervical adenopathy  Neurological: He is alert and oriented to person, place, and time  Skin: Skin is warm and dry  No rash noted  He is not diaphoretic  No erythema  No pallor  Psychiatric: He has a normal mood and affect  His behavior is normal  Judgment and thought content normal    Vitals reviewed        ECOG:       Emergency Contacts:    Merry Lee, ,

## 2019-07-15 ENCOUNTER — OFFICE VISIT (OUTPATIENT)
Dept: HEMATOLOGY ONCOLOGY | Facility: CLINIC | Age: 59
End: 2019-07-15
Payer: COMMERCIAL

## 2019-07-15 VITALS
HEIGHT: 71 IN | BODY MASS INDEX: 44.1 KG/M2 | SYSTOLIC BLOOD PRESSURE: 128 MMHG | RESPIRATION RATE: 16 BRPM | DIASTOLIC BLOOD PRESSURE: 70 MMHG | WEIGHT: 315 LBS | TEMPERATURE: 98.4 F | HEART RATE: 86 BPM | OXYGEN SATURATION: 98 %

## 2019-07-15 DIAGNOSIS — C90.00 MULTIPLE MYELOMA NOT HAVING ACHIEVED REMISSION (HCC): Primary | ICD-10-CM

## 2019-07-15 PROCEDURE — 99214 OFFICE O/P EST MOD 30 MIN: CPT | Performed by: PHYSICIAN ASSISTANT

## 2019-08-13 ENCOUNTER — OFFICE VISIT (OUTPATIENT)
Dept: FAMILY MEDICINE CLINIC | Facility: CLINIC | Age: 59
End: 2019-08-13

## 2019-08-13 VITALS
WEIGHT: 315 LBS | TEMPERATURE: 99.5 F | SYSTOLIC BLOOD PRESSURE: 130 MMHG | HEIGHT: 71 IN | RESPIRATION RATE: 18 BRPM | DIASTOLIC BLOOD PRESSURE: 90 MMHG | BODY MASS INDEX: 44.1 KG/M2 | HEART RATE: 78 BPM

## 2019-08-13 DIAGNOSIS — E11.42 TYPE 2 DIABETES MELLITUS WITH DIABETIC POLYNEUROPATHY, WITHOUT LONG-TERM CURRENT USE OF INSULIN (HCC): Primary | ICD-10-CM

## 2019-08-13 DIAGNOSIS — R21 RASH: ICD-10-CM

## 2019-08-13 DIAGNOSIS — A69.20 ERYTHEMA CHRONICUM MIGRANS: ICD-10-CM

## 2019-08-13 PROCEDURE — 3725F SCREEN DEPRESSION PERFORMED: CPT | Performed by: FAMILY MEDICINE

## 2019-08-13 PROCEDURE — 99213 OFFICE O/P EST LOW 20 MIN: CPT | Performed by: FAMILY MEDICINE

## 2019-08-13 NOTE — PROGRESS NOTES
Assessment/Plan:    No problem-specific Assessment & Plan notes found for this encounter  {Assess/PlanSmartLinks:52716}      Subjective:      Patient ID: Parul Barton is a 62 y o  male  HPI    {[facility] SmartLinks:[id number]}    Review of Systems      Objective: There were no vitals taken for this visit           Physical Exam

## 2019-08-13 NOTE — ASSESSMENT & PLAN NOTE
Recent A1c 8 3 and unchanged from prior BW  Patient has made some lifestyle changes  Lost a total of 6 lb since our last visit  Discussed dual therapy but would prefer to continue lifestyle changes for now  Will recheck in 2 months  If A1c is unchanged, will start SGLT2 inhibitor  If worsened, may want to consider insulin therapy  In addition, we addressed ADA diet  Suggested low cholesterol diet  Encouraged patient to continue aerobic exercise    Reminded to get yearly retinal exam and foot exams

## 2019-08-13 NOTE — PROGRESS NOTES
Assessment/Plan:   Problem List Items Addressed This Visit        Endocrine    Type 2 diabetes mellitus ([facility] ) - Primary     Recent A1c 8 3 and unchanged from prior BW  Patient has made some lifestyle changes  Lost a total of 6 lb since our last visit  Discussed dual therapy but would prefer to continue lifestyle changes for now  Will recheck in 2 months  If A1c is unchanged, will start SGLT2 inhibitor  If worsened, may want to consider insulin therapy  In addition, we addressed ADA diet  Suggested low cholesterol diet  Encouraged patient to continue aerobic exercise  Reminded to get yearly retinal exam and foot exams              Musculoskeletal and Integument    Rash     Annular rash with central clearing and scaling borders on the left and right upper arm near the axilla  Reports hiking several weeks ago in the woods  Was evaluated several weeks ago and treated with topical antifungal for presumed tenia corporis  Given his recent hike or order Lyme antibody/Western blot to rule out Lyme disease  Will defer antibiotic treatment as the patient does not recall a tick bite  Rash resembles tenia corporis given the papular scaly circumferential presentation yet want to definitively rule out lyme  Follow-up in 1 month  Will call with test results  Return precautions discussed         Relevant Orders    Lyme Ab/Western Blot Reflex      Other Visit Diagnoses     Erythema chronicum migrans        Relevant Orders    Lyme Ab/Western Blot Reflex                Subjective:     Patient ID: Aliyah Quinn is a 62 y o  male  Patient is presenting primarily for follow up on 2 rashes in his upper arm that was previously diagnosed as ringworm  Patient states that the area of the rash has expanded, but that the rash has gotten lighter/paler in color since he started medicating with topical clotrimazole a month ago  When questioned about his Diabetes   The patient attested to increased exercise with small changes to his diet which include further incorporation of salads and a decrease in total protein consumption per day  Patient states that he has had trouble exercising over the past week due to his vertigo  Patient endorses continued numbness and tingling in both upper and lower extremities  Patient's last A1C was 8 3  Patient has tried Gabapentin in the past but medication was not well tolerated  Patient also vocalized a reluctancy to be started on insulin because he states "I already have enough problems"  Finally, patient mentions decreased sensation on soles of feet "as if there are calluses"    Patient's previous complains of bowel changes which included a 3 month history of waxing and waning constipation with diarrhea has now resolved  Patient did not follow up with GI because symptoms have resolved but he is due for a recheck colonoscopy since last colonoscopy 3 years lead to the removal of 10 polyps  Patient mentions new onset of palpitations that last for a few seconds prior to resolving on their own  He is asymptomatic at this time  Patient denies knowing of any inciting factor or activity for these symptoms  Review of Systems   Constitutional: Positive for activity change  Negative for appetite change, fatigue, fever and unexpected weight change  HENT: Positive for tinnitus  Negative for sinus pressure and sinus pain  Eyes: Positive for visual disturbance  Negative for pain and discharge  Respiratory: Negative  Cardiovascular: Positive for palpitations  Negative for chest pain  Gastrointestinal: Positive for nausea  Negative for abdominal pain, blood in stool, constipation, diarrhea and vomiting  Genitourinary: Negative  Neurological: Positive for dizziness and light-headedness  Negative for headaches  Psychiatric/Behavioral: Negative  Objective:     Physical Exam   Constitutional: He is oriented to person, place, and time  HENT:   Head: Normocephalic and atraumatic  Eyes: EOM are normal    Neck: Neck supple  No JVD present  Cardiovascular: Normal rate, regular rhythm, normal heart sounds and intact distal pulses  Pulmonary/Chest: Effort normal and breath sounds normal  No respiratory distress  Abdominal: Soft  Bowel sounds are normal    Musculoskeletal: Normal range of motion  Lymphadenopathy:     He has no cervical adenopathy  Neurological: He is alert and oriented to person, place, and time  Skin:   Erythematous macular annular rash with central clearing scaling and papules on the borders located on the left upper arm near the axilla and right upper extremity in the flexural surface  Psychiatric: He has a normal mood and affect

## 2019-08-14 ENCOUNTER — TRANSCRIBE ORDERS (OUTPATIENT)
Dept: LAB | Facility: CLINIC | Age: 59
End: 2019-08-14

## 2019-08-14 ENCOUNTER — APPOINTMENT (OUTPATIENT)
Dept: LAB | Facility: CLINIC | Age: 59
End: 2019-08-14
Payer: COMMERCIAL

## 2019-08-14 DIAGNOSIS — A69.20 ERYTHEMA CHRONICUM MIGRANS: ICD-10-CM

## 2019-08-14 DIAGNOSIS — R21 RASH: ICD-10-CM

## 2019-08-14 DIAGNOSIS — A69.20 LYME DISEASE: Primary | ICD-10-CM

## 2019-08-14 PROCEDURE — 86618 LYME DISEASE ANTIBODY: CPT

## 2019-08-14 PROCEDURE — 36415 COLL VENOUS BLD VENIPUNCTURE: CPT

## 2019-08-14 NOTE — ASSESSMENT & PLAN NOTE
Annular rash with central clearing and scaling borders on the left and right upper arm near the axilla  Reports hiking several weeks ago in the woods  Was evaluated several weeks ago and treated with topical antifungal for presumed tenia corporis  Given his recent hike or order Lyme antibody/Western blot to rule out Lyme disease  Will defer antibiotic treatment as the patient does not recall a tick bite  Rash resembles tenia corporis given the papular scaly circumferential presentation yet want to definitively rule out lyme  Follow-up in 2 weeks    Return precautions discussed

## 2019-08-16 LAB
B BURGDOR IGG SER IA-ACNC: 0.13
B BURGDOR IGM SER IA-ACNC: 0.17

## 2019-10-15 ENCOUNTER — APPOINTMENT (OUTPATIENT)
Dept: LAB | Facility: CLINIC | Age: 59
End: 2019-10-15
Payer: COMMERCIAL

## 2019-10-15 ENCOUNTER — OFFICE VISIT (OUTPATIENT)
Dept: FAMILY MEDICINE CLINIC | Facility: CLINIC | Age: 59
End: 2019-10-15

## 2019-10-15 VITALS
BODY MASS INDEX: 42.66 KG/M2 | HEART RATE: 80 BPM | TEMPERATURE: 98.2 F | DIASTOLIC BLOOD PRESSURE: 80 MMHG | WEIGHT: 315 LBS | SYSTOLIC BLOOD PRESSURE: 150 MMHG | HEIGHT: 72 IN | RESPIRATION RATE: 16 BRPM

## 2019-10-15 DIAGNOSIS — Z23 ENCOUNTER FOR IMMUNIZATION: Primary | ICD-10-CM

## 2019-10-15 DIAGNOSIS — E11.42 TYPE 2 DIABETES MELLITUS WITH DIABETIC POLYNEUROPATHY, WITHOUT LONG-TERM CURRENT USE OF INSULIN (HCC): ICD-10-CM

## 2019-10-15 DIAGNOSIS — E66.01 MORBID OBESITY WITH BMI OF 40.0-44.9, ADULT (HCC): ICD-10-CM

## 2019-10-15 DIAGNOSIS — C90.00 MULTIPLE MYELOMA NOT HAVING ACHIEVED REMISSION (HCC): ICD-10-CM

## 2019-10-15 DIAGNOSIS — I10 ESSENTIAL HYPERTENSION: ICD-10-CM

## 2019-10-15 LAB
ALBUMIN SERPL BCP-MCNC: 3.5 G/DL (ref 3.5–5)
ALP SERPL-CCNC: 125 U/L (ref 46–116)
ALT SERPL W P-5'-P-CCNC: 54 U/L (ref 12–78)
ANION GAP SERPL CALCULATED.3IONS-SCNC: 4 MMOL/L (ref 4–13)
AST SERPL W P-5'-P-CCNC: 30 U/L (ref 5–45)
BASOPHILS # BLD AUTO: 0.06 THOUSANDS/ΜL (ref 0–0.1)
BASOPHILS NFR BLD AUTO: 1 % (ref 0–1)
BILIRUB SERPL-MCNC: 0.48 MG/DL (ref 0.2–1)
BUN SERPL-MCNC: 11 MG/DL (ref 5–25)
CALCIUM SERPL-MCNC: 9 MG/DL (ref 8.3–10.1)
CHLORIDE SERPL-SCNC: 102 MMOL/L (ref 100–108)
CO2 SERPL-SCNC: 30 MMOL/L (ref 21–32)
CREAT SERPL-MCNC: 0.93 MG/DL (ref 0.6–1.3)
EOSINOPHIL # BLD AUTO: 0.28 THOUSAND/ΜL (ref 0–0.61)
EOSINOPHIL NFR BLD AUTO: 5 % (ref 0–6)
ERYTHROCYTE [DISTWIDTH] IN BLOOD BY AUTOMATED COUNT: 14.5 % (ref 11.6–15.1)
GFR SERPL CREATININE-BSD FRML MDRD: 88 ML/MIN/1.73SQ M
GLUCOSE P FAST SERPL-MCNC: 226 MG/DL (ref 65–99)
HCT VFR BLD AUTO: 42.1 % (ref 36.5–49.3)
HGB BLD-MCNC: 12.9 G/DL (ref 12–17)
IGA SERPL-MCNC: 91 MG/DL (ref 70–400)
IGG SERPL-MCNC: 1760 MG/DL (ref 700–1600)
IGM SERPL-MCNC: 98 MG/DL (ref 40–230)
IMM GRANULOCYTES # BLD AUTO: 0.02 THOUSAND/UL (ref 0–0.2)
IMM GRANULOCYTES NFR BLD AUTO: 0 % (ref 0–2)
LYMPHOCYTES # BLD AUTO: 1.3 THOUSANDS/ΜL (ref 0.6–4.47)
LYMPHOCYTES NFR BLD AUTO: 25 % (ref 14–44)
MCH RBC QN AUTO: 29 PG (ref 26.8–34.3)
MCHC RBC AUTO-ENTMCNC: 30.6 G/DL (ref 31.4–37.4)
MCV RBC AUTO: 95 FL (ref 82–98)
MONOCYTES # BLD AUTO: 0.38 THOUSAND/ΜL (ref 0.17–1.22)
MONOCYTES NFR BLD AUTO: 7 % (ref 4–12)
NEUTROPHILS # BLD AUTO: 3.18 THOUSANDS/ΜL (ref 1.85–7.62)
NEUTS SEG NFR BLD AUTO: 62 % (ref 43–75)
NRBC BLD AUTO-RTO: 0 /100 WBCS
PLATELET # BLD AUTO: 207 THOUSANDS/UL (ref 149–390)
PMV BLD AUTO: 10.1 FL (ref 8.9–12.7)
POTASSIUM SERPL-SCNC: 4.4 MMOL/L (ref 3.5–5.3)
PROT SERPL-MCNC: 8 G/DL (ref 6.4–8.2)
RBC # BLD AUTO: 4.45 MILLION/UL (ref 3.88–5.62)
SL AMB POCT HEMOGLOBIN AIC: 9.4 (ref ?–6.5)
SODIUM SERPL-SCNC: 136 MMOL/L (ref 136–145)
WBC # BLD AUTO: 5.22 THOUSAND/UL (ref 4.31–10.16)

## 2019-10-15 PROCEDURE — 83036 HEMOGLOBIN GLYCOSYLATED A1C: CPT | Performed by: FAMILY MEDICINE

## 2019-10-15 PROCEDURE — 83883 ASSAY NEPHELOMETRY NOT SPEC: CPT

## 2019-10-15 PROCEDURE — 85025 COMPLETE CBC W/AUTO DIFF WBC: CPT

## 2019-10-15 PROCEDURE — 3046F HEMOGLOBIN A1C LEVEL >9.0%: CPT | Performed by: FAMILY MEDICINE

## 2019-10-15 PROCEDURE — 84165 PROTEIN E-PHORESIS SERUM: CPT

## 2019-10-15 PROCEDURE — 3008F BODY MASS INDEX DOCD: CPT | Performed by: FAMILY MEDICINE

## 2019-10-15 PROCEDURE — 80053 COMPREHEN METABOLIC PANEL: CPT

## 2019-10-15 PROCEDURE — 99213 OFFICE O/P EST LOW 20 MIN: CPT | Performed by: FAMILY MEDICINE

## 2019-10-15 PROCEDURE — 82784 ASSAY IGA/IGD/IGG/IGM EACH: CPT

## 2019-10-15 PROCEDURE — 90471 IMMUNIZATION ADMIN: CPT | Performed by: FAMILY MEDICINE

## 2019-10-15 PROCEDURE — 36415 COLL VENOUS BLD VENIPUNCTURE: CPT

## 2019-10-15 PROCEDURE — 84165 PROTEIN E-PHORESIS SERUM: CPT | Performed by: PATHOLOGY

## 2019-10-15 PROCEDURE — 90686 IIV4 VACC NO PRSV 0.5 ML IM: CPT | Performed by: FAMILY MEDICINE

## 2019-10-15 NOTE — ASSESSMENT & PLAN NOTE
BMI Counseling: Body mass index is 42 76 kg/m²  He has lost a total of 9 lbs with diet modification  Patient congratulated on his weight loss  The BMI is above normal  Nutrition recommendations include reducing portion sizes, decreasing overall calorie intake and 3-5 servings of fruits/vegetables daily  Exercise recommendations include moderate aerobic physical activity for 150 minutes/week

## 2019-10-15 NOTE — PROGRESS NOTES
Assessment/Plan:     Problem List Items Addressed This Visit        Endocrine    Type 2 diabetes mellitus ([facility] )       Lab Results   Component Value Date    HGBA1C 9 4 (A) 10/15/2019     Uncontrolled diabetes with an A1c of 9 4, up from 8 3 on metformin 2000 mg daily  Patient admits that he often forgets to take his evening dose of metformin  We discussed different treatment options which include GLP 1 agonist, SGLT 2 inhibiotrs, and insulin  Patient is apprehensive about starting medication and would like to continue lifestyle modification     Given his worsening A1c, stressed the importance of starting any medication to help reduce a1c minimize any risk factors associated with uncontrolled diabetes  Patient agreed to start Jardiance 10 mg daily  Side effect profile reviewed with patient  Also stressed the importance of taking both morning and nighttime metformin dose  Did suggest setting an alarm for evening dose  ADA diet reviewed  Follow-up in 3 months         Relevant Medications    Empagliflozin (JARDIANCE) 10 MG TABS    Other Relevant Orders    POCT hemoglobin A1c (Completed)       Cardiovascular and Mediastinum    Essential hypertension     Blood pressure today 150/80, goal less than 140/90  Patient states he did not take lisinopril this morning  Will have patient return in 1 week for blood pressure check  If within normal range will continue lisinopril 20 mg daily  If persistently elevated, will increase to 40 mg daily  Dash diet reviewed            Other    Encounter for immunization - Primary    Relevant Orders    influenza vaccine, [id number], quadrivalent, 0 5 mL, preservative-free, for adult and pediatric patients 6 mos+ (AFLURIA, FLUARIX, FLULAVAL, FLUZONE) (Completed)    Morbid obesity with BMI of 40 0-44 9, adult ([facility] )     BMI Counseling: Body mass index is 42 76 kg/m²  He has lost a total of 9 lbs with diet modification  Patient congratulated on his weight loss    The BMI is above normal  Nutrition recommendations include reducing portion sizes, decreasing overall calorie intake and 3-5 servings of fruits/vegetables daily  Exercise recommendations include moderate aerobic physical activity for 150 minutes/week  Subjective:      Patient ID: Rhoda Hughes is a 62 y o  male  58-year-old male here for follow-up of diabetes and rash  Patient states the rash resolved shortly after last visit  He has also lost additional lb with diet modification  Patient admits he has not been compliant with metformin  He often forgets to take his eating dose of metformin  Diarrhea and constipation has also resolved  Otherwise done well  Recently welcomed a new grandchild and is inquiring about tetanus shot  He is also requesting a flu shot  The following portions of the patient's history were reviewed and updated as appropriate: allergies, current medications, past family history, past medical history, past social history, past surgical history and problem list     Review of Systems   Constitutional: Negative for fatigue, fever and unexpected weight change  Respiratory: Positive for shortness of breath (With exertion  This is chronic and unchanged)  Negative for cough, chest tightness and wheezing  Cardiovascular: Negative for chest pain  Gastrointestinal: Negative  Endocrine: Negative for polydipsia, polyphagia and polyuria  Genitourinary: Negative  Musculoskeletal: Negative  Skin: Negative  Neurological: Negative for weakness, light-headedness, numbness and headaches  Hematological: Negative  Psychiatric/Behavioral: Negative for agitation  The patient is not nervous/anxious            Objective:      /80 (BP Location: Left arm, Patient Position: Sitting, Cuff Size: Large)   Pulse 80   Temp 98 2 °F (36 8 °C) (Tympanic)   Resp 16   Ht 6' 0 4" (1 839 m)   Wt (!) 145 kg (318 lb 12 8 oz)   BMI 42 76 kg/m²          Physical Exam   Constitutional: He appears well-developed and well-nourished  No distress  Eyes: EOM are normal  Right eye exhibits no discharge  Left eye exhibits no discharge  Cardiovascular: Normal rate, regular rhythm and normal heart sounds  Exam reveals no gallop and no friction rub  No murmur heard  Pulmonary/Chest: Breath sounds normal  No stridor  No respiratory distress  He has no wheezes  He has no rales  Musculoskeletal: He exhibits edema (1+ lower extremity pitting edema bilaterally )  Neurological: He is alert  Skin: Skin is warm  Psychiatric: He has a normal mood and affect  Vitals reviewed

## 2019-10-15 NOTE — ASSESSMENT & PLAN NOTE
Blood pressure today 150/80, goal less than 140/90  Patient states he did not take lisinopril this morning  Will have patient return in 1 week for blood pressure check  If within normal range will continue lisinopril 20 mg daily  If persistently elevated, will increase to 40 mg daily    Dash diet reviewed

## 2019-10-15 NOTE — ASSESSMENT & PLAN NOTE
Lab Results   Component Value Date    HGBA1C 9 4 (A) 10/15/2019     Uncontrolled diabetes with an A1c of 9 4, up from 8 3 on metformin 2000 mg daily  Patient admits that he often forgets to take his evening dose of metformin  We discussed different treatment options which include GLP 1 agonist, SGLT 2 inhibiotrs, and insulin  Patient is apprehensive about starting medication and would like to continue lifestyle modification     Given his worsening A1c, stressed the importance of starting any medication to help reduce a1c minimize any risk factors associated with uncontrolled diabetes  Patient agreed to start Jardiance 10 mg daily  Side effect profile reviewed with patient  Also stressed the importance of taking both morning and nighttime metformin dose  Did suggest setting an alarm for evening dose    ADA diet reviewed  Follow-up in 3 months

## 2019-10-16 LAB
KAPPA LC FREE SER-MCNC: 21.9 MG/L (ref 3.3–19.4)
KAPPA LC FREE/LAMBDA FREE SER: 2.58 {RATIO} (ref 0.26–1.65)
LAMBDA LC FREE SERPL-MCNC: 8.5 MG/L (ref 5.7–26.3)

## 2019-10-17 LAB
ALBUMIN SERPL ELPH-MCNC: 4.03 G/DL (ref 3.5–5)
ALBUMIN SERPL ELPH-MCNC: 52.3 % (ref 52–65)
ALPHA1 GLOB SERPL ELPH-MCNC: 0.35 G/DL (ref 0.1–0.4)
ALPHA1 GLOB SERPL ELPH-MCNC: 4.6 % (ref 2.5–5)
ALPHA2 GLOB SERPL ELPH-MCNC: 0.85 G/DL (ref 0.4–1.2)
ALPHA2 GLOB SERPL ELPH-MCNC: 11.1 % (ref 7–13)
BETA GLOB ABNORMAL SERPL ELPH-MCNC: 0.43 G/DL (ref 0.4–0.8)
BETA1 GLOB SERPL ELPH-MCNC: 5.6 % (ref 5–13)
BETA2 GLOB SERPL ELPH-MCNC: 3.9 % (ref 2–8)
BETA2+GAMMA GLOB SERPL ELPH-MCNC: 0.3 G/DL (ref 0.2–0.5)
GAMMA GLOB ABNORMAL SERPL ELPH-MCNC: 1.73 G/DL (ref 0.5–1.6)
GAMMA GLOB SERPL ELPH-MCNC: 22.5 % (ref 12–22)
IGG/ALB SER: 1.1 {RATIO} (ref 1.1–1.8)
M PEAK ID 2: 1.1 %
M PROTEIN 1 MFR SERPL ELPH: 14.9 %
M PROTEIN 1 SERPL ELPH-MCNC: 1.15 G/DL
M PROTEIN 2 SERPL ELPH-MCNC: 0.08 G/DL
PROT PATTERN SERPL ELPH-IMP: ABNORMAL
PROT SERPL-MCNC: 7.7 G/DL (ref 6.4–8.2)

## 2019-10-21 ENCOUNTER — OFFICE VISIT (OUTPATIENT)
Dept: HEMATOLOGY ONCOLOGY | Facility: CLINIC | Age: 59
End: 2019-10-21
Payer: COMMERCIAL

## 2019-10-21 ENCOUNTER — TELEPHONE (OUTPATIENT)
Dept: FAMILY MEDICINE CLINIC | Facility: CLINIC | Age: 59
End: 2019-10-21

## 2019-10-21 VITALS
OXYGEN SATURATION: 95 % | DIASTOLIC BLOOD PRESSURE: 80 MMHG | TEMPERATURE: 97.8 F | HEIGHT: 73 IN | BODY MASS INDEX: 41.75 KG/M2 | RESPIRATION RATE: 16 BRPM | HEART RATE: 85 BPM | SYSTOLIC BLOOD PRESSURE: 120 MMHG | WEIGHT: 315 LBS

## 2019-10-21 DIAGNOSIS — D47.2 SMOLDERING MULTIPLE MYELOMA: Primary | ICD-10-CM

## 2019-10-21 PROCEDURE — 99213 OFFICE O/P EST LOW 20 MIN: CPT | Performed by: INTERNAL MEDICINE

## 2019-10-21 NOTE — PROGRESS NOTES
Hematology Outpatient Follow - Up Note  Judy Booth 62 y o  male MRN: @ Encounter: [id number]        Date:  10/21/2019        Assessment/ Plan:    Low risk smoldering multiple myeloma IgG kappa subtype M protein stable 1 4, kappa and lambda ratio of 2 5, bone marrow biopsy on May 2018 showed plasma cell 12-15%, normal cytogenetics and fish panel for multiple myeloma    No evidence of end-organ damage, continue watchful observation and follow-up in 6 months with CBC, CMP, SPEP, free light chain, quantitative immunoglobulins      HPI:  58-year-old  male with history of obesity, asthma, hypertension, dyslipidemia, diabetes mellitus type 2, had been complaining of tingling and numbness in his feet and hand for the past year, most recently a feeling of hypersensitivity involving the anterior aspect of the left thigh area, evaluation by Neurology on March 2018 showed monoclonal gammopathy with M protein of 1 40 grams/deciliters, IgG kappa  He has normal vitamin B12 level, TSH, heavy metal screen, Lyme disease  CBC perforemed August 2017 showed hemoglobin of 12 8, WBC 6 6, platelets [id number], 01% neutrophils, 19% lymphocytes  A bone marrow biopsy in May 2018 showed 10-12% plasma cells, fish panel for multiple myeloma was negative, cytogenetics showed normal male chromosomes and deletion of Y chromosome in some of the cells consistent with normal finding in advanced age patient  Bone skeletal survey 5/2018 showed no evidence of lytic lesions     Interval History:        Previous Treatment:         Test Results:    Imaging: No results found      Labs:   Lab Results   Component Value Date    WBC 5 22 10/15/2019    HGB 12 9 10/15/2019    HCT 42 1 10/15/2019    MCV 95 10/15/2019     10/15/2019     Lab Results   Component Value Date     05/17/2017    K 4 4 10/15/2019     10/15/2019    CO2 30 10/15/2019    ANIONGAP 9 01/13/2015    BUN 11 10/15/2019    CREATININE 0 93 10/15/2019    GLUCOSE 206 (H) 01/13/2015    GLUF 226 (H) 10/15/2019    CALCIUM 9 0 10/15/2019    AST 30 10/15/2019    ALT 54 10/15/2019    ALKPHOS 125 (H) 10/15/2019    PROT 7 5 05/17/2017    BILITOT 0 5 05/17/2017    EGFR 88 10/15/2019       No results found for: IRON, TIBC, FERRITIN    SPEP showed IgG kappa M protein of 1 23 g per dL, stable from 6 months ago  Rosita light chain 21 9, lambda light chain 8 5 with a ratio of 2 5      ROS:   Review of Systems   Constitutional: Negative for activity change, appetite change, chills, diaphoresis, fatigue, fever and unexpected weight change  HENT: Negative for congestion, dental problem, facial swelling, hearing loss, mouth sores, nosebleeds, postnasal drip, rhinorrhea, sore throat, trouble swallowing and voice change  Eyes: Negative for photophobia, pain, discharge, redness, itching and visual disturbance  Respiratory: Negative for cough, choking, chest tightness, shortness of breath and wheezing  Cardiovascular: Negative for chest pain, palpitations and leg swelling  Gastrointestinal: Negative for abdominal distention, abdominal pain, anal bleeding, blood in stool, constipation, diarrhea, nausea, rectal pain and vomiting  Endocrine: Negative for cold intolerance and heat intolerance  Genitourinary: Negative for decreased urine volume, difficulty urinating, dysuria, flank pain, frequency, hematuria and urgency  Musculoskeletal: Negative for arthralgias, back pain, gait problem, joint swelling, myalgias, neck pain and neck stiffness  Skin: Negative for color change, pallor, rash and wound  Allergic/Immunologic: Negative for immunocompromised state  Neurological: Positive for numbness  Negative for dizziness, tremors, seizures, syncope, facial asymmetry, speech difficulty, weakness, light-headedness and headaches  Hematological: Negative for adenopathy  Does not bruise/bleed easily     Psychiatric/Behavioral: Negative for agitation, confusion, decreased concentration, dysphoric mood and sleep disturbance  The patient is not nervous/anxious  All other systems reviewed and are negative  Current Medications: Reviewed  Allergies: Reviewed  PMH/FH/SH:  Reviewed      Physical Exam:    Body surface area is 2 61 meters squared  Wt Readings from Last 3 Encounters:   10/21/19 (!) 145 kg (319 lb)   10/15/19 (!) 145 kg (318 lb 12 8 oz)   19 (!) 146 kg (321 lb)        Temp Readings from Last 3 Encounters:   10/21/19 97 8 °F (36 6 °C) (Tympanic)   10/15/19 98 2 °F (36 8 °C) (Tympanic)   19 99 5 °F (37 5 °C)        BP Readings from Last 3 Encounters:   10/21/19 120/80   10/15/19 150/80   19 130/90         Pulse Readings from Last 3 Encounters:   10/21/19 85   10/15/19 80   19 78        Physical Exam   Constitutional: He is oriented to person, place, and time  He appears well-developed and well-nourished  No distress  Obese   HENT:   Head: Normocephalic and atraumatic  Eyes: Conjunctivae are normal    Neck: Normal range of motion  Neck supple  No tracheal deviation present  Cardiovascular: Normal rate and regular rhythm  Exam reveals no gallop and no friction rub  No murmur heard  Pulmonary/Chest: Effort normal and breath sounds normal  No respiratory distress  He has no wheezes  He has no rales  He exhibits no tenderness  Abdominal: Soft  He exhibits no distension  There is no tenderness  Musculoskeletal: He exhibits edema (Plus two edema bilaterally)  Lymphadenopathy:     He has no cervical adenopathy  Neurological: He is alert and oriented to person, place, and time  Skin: Skin is warm and dry  He is not diaphoretic  No erythema  No pallor  Psychiatric: He has a normal mood and affect  His behavior is normal  Judgment and thought content normal    Vitals reviewed  ECO    Goals and Barriers:  Current Goal: Minimize effects of disease  Barriers: None  Patient's Capacity to Self Care:  Patient is able to self care      Code Status: @[facility]@

## 2019-10-21 NOTE — TELEPHONE ENCOUNTER
Patient called to cancel his blood pressure check appt with Dr Jamila Leblanc today   He was seen at Hema/Onc today and the checked his BP it was 120/80

## 2019-11-22 DIAGNOSIS — J45.40 MODERATE PERSISTENT ASTHMA WITHOUT COMPLICATION: ICD-10-CM

## 2019-11-22 DIAGNOSIS — E78.5 HYPERLIPIDEMIA, UNSPECIFIED HYPERLIPIDEMIA TYPE: ICD-10-CM

## 2019-11-22 DIAGNOSIS — E11.42 TYPE 2 DIABETES MELLITUS WITH DIABETIC POLYNEUROPATHY, WITHOUT LONG-TERM CURRENT USE OF INSULIN (HCC): ICD-10-CM

## 2019-11-22 DIAGNOSIS — I10 ESSENTIAL HYPERTENSION: ICD-10-CM

## 2019-11-22 PROCEDURE — 4010F ACE/ARB THERAPY RXD/TAKEN: CPT | Performed by: FAMILY MEDICINE

## 2019-11-22 RX ORDER — FLUTICASONE PROPIONATE 220 UG/1
AEROSOL, METERED RESPIRATORY (INHALATION)
Qty: 1 INHALER | Refills: 3 | Status: SHIPPED | OUTPATIENT
Start: 2019-11-22 | End: 2020-06-30 | Stop reason: SDUPTHER

## 2019-11-22 RX ORDER — SIMVASTATIN 20 MG
TABLET ORAL
Qty: 90 TABLET | Refills: 1 | Status: SHIPPED | OUTPATIENT
Start: 2019-11-22 | End: 2020-04-07

## 2019-11-22 RX ORDER — LISINOPRIL 20 MG/1
TABLET ORAL
Qty: 90 TABLET | Refills: 1 | Status: SHIPPED | OUTPATIENT
Start: 2019-11-22 | End: 2020-06-12 | Stop reason: SDUPTHER

## 2019-12-02 DIAGNOSIS — K22.70 BARRETT'S ESOPHAGUS WITHOUT DYSPLASIA: ICD-10-CM

## 2019-12-02 RX ORDER — OMEPRAZOLE 40 MG/1
40 CAPSULE, DELAYED RELEASE ORAL DAILY
Qty: 90 CAPSULE | Refills: 3 | Status: SHIPPED | OUTPATIENT
Start: 2019-12-02 | End: 2021-01-08 | Stop reason: SDUPTHER

## 2019-12-30 DIAGNOSIS — J45.40 MODERATE PERSISTENT ASTHMA WITHOUT COMPLICATION: ICD-10-CM

## 2019-12-30 RX ORDER — LORATADINE 10 MG/1
10 TABLET ORAL DAILY
Qty: 90 TABLET | Refills: 1 | Status: SHIPPED | OUTPATIENT
Start: 2019-12-30 | End: 2020-12-31 | Stop reason: SDUPTHER

## 2020-02-11 ENCOUNTER — TELEPHONE (OUTPATIENT)
Dept: FAMILY MEDICINE CLINIC | Facility: CLINIC | Age: 60
End: 2020-02-11

## 2020-02-11 NOTE — TELEPHONE ENCOUNTER
Call patient in reference to Diabetes Education, wife answer phone  Left message to call clinic back, set up diabetes education

## 2020-02-24 ENCOUNTER — TELEPHONE (OUTPATIENT)
Dept: FAMILY MEDICINE CLINIC | Facility: CLINIC | Age: 60
End: 2020-02-24

## 2020-02-24 DIAGNOSIS — G47.10 PERSISTENT DISORDER OF INITIATING OR MAINTAINING WAKEFULNESS: ICD-10-CM

## 2020-02-24 DIAGNOSIS — G47.33 OBSTRUCTIVE SLEEP APNEA (ADULT) (PEDIATRIC): Primary | ICD-10-CM

## 2020-02-24 DIAGNOSIS — F51.12 INSUFFICIENT SLEEP SYNDROME: ICD-10-CM

## 2020-02-24 NOTE — TELEPHONE ENCOUNTER
Pt has appt 3/2 with Sleep medicine (not sleep study) needs Dr edwards put into Epic  dx G47 33, F51 12, G47 10, E66 01

## 2020-03-02 ENCOUNTER — OFFICE VISIT (OUTPATIENT)
Dept: SLEEP CENTER | Facility: CLINIC | Age: 60
End: 2020-03-02
Payer: COMMERCIAL

## 2020-03-02 VITALS
HEIGHT: 71 IN | DIASTOLIC BLOOD PRESSURE: 82 MMHG | HEART RATE: 82 BPM | SYSTOLIC BLOOD PRESSURE: 142 MMHG | BODY MASS INDEX: 44.1 KG/M2 | WEIGHT: 315 LBS

## 2020-03-02 DIAGNOSIS — J45.21 MILD INTERMITTENT ASTHMA WITH EXACERBATION: ICD-10-CM

## 2020-03-02 DIAGNOSIS — E11.42 DIABETIC PERIPHERAL NEUROPATHY (HCC): ICD-10-CM

## 2020-03-02 DIAGNOSIS — J31.0 CHRONIC RHINITIS: ICD-10-CM

## 2020-03-02 DIAGNOSIS — J34.89 DRY NOSE: ICD-10-CM

## 2020-03-02 DIAGNOSIS — G47.33 OBSTRUCTIVE SLEEP APNEA: Primary | ICD-10-CM

## 2020-03-02 DIAGNOSIS — R68.2 DRY MOUTH: ICD-10-CM

## 2020-03-02 DIAGNOSIS — F51.12 INSUFFICIENT SLEEP SYNDROME: ICD-10-CM

## 2020-03-02 DIAGNOSIS — E66.01 MORBID OBESITY (HCC): ICD-10-CM

## 2020-03-02 PROCEDURE — 99214 OFFICE O/P EST MOD 30 MIN: CPT | Performed by: INTERNAL MEDICINE

## 2020-03-02 PROCEDURE — 1036F TOBACCO NON-USER: CPT | Performed by: INTERNAL MEDICINE

## 2020-03-02 PROCEDURE — 3008F BODY MASS INDEX DOCD: CPT | Performed by: INTERNAL MEDICINE

## 2020-03-02 PROCEDURE — 3079F DIAST BP 80-89 MM HG: CPT | Performed by: INTERNAL MEDICINE

## 2020-03-02 PROCEDURE — 3077F SYST BP >= 140 MM HG: CPT | Performed by: INTERNAL MEDICINE

## 2020-03-02 NOTE — PROGRESS NOTES
Follow-Up Note - [address]  62 y o  male  FGM:[birth year]  JOSEPHINE:[id number]    CC: I saw this patient for follow-up in clinic today for his Sleep Disordered Breathing, Coexisting Sleep and Medical Problems  A diagnostic study in September of 2017 demonstrated an AHI of 38 7 per hour, considerably higher during REM at 63 per hour  Minimum oxygen saturation was 75% and 18 9% of the study was spent with saturations less than 90%  During the subsequent therapeutic study, the AHI was reduced to 6 3 per hour at 15 cm H2O  Auto titrating Pap 14-17 cm H2O was initiated  PFSH, Problem List, Medications & Allergies were reviewed in EMR  Interval changes: none reported  He  has a past medical history of Asthma, Benign positional vertigo, Diabetes mellitus ([facility] [address] ), Dyslipidemia, Gastroenteritis, Hyperlipidemia, Hypertension, Lipoma of neck, [facility] ), Sleep apnea, and Wheezing  He has a current medication list which includes the following prescription(s): albuterol, albuterol, ascorbic acid, b complex vitamins, dicyclomine, empagliflozin, flovent hfa, fluticasone, furosemide, lisinopril, loratadine, magnesium citrate, metformin, montelukast, NON FORMULARY, omeprazole, simvastatin, sodium chloride, and ventolin hfa  ROS: A 10 point review of systems undertaken (as attached)  Significant for he has nasal symptoms due to environmental allergies  Asthma is controlled on current medication  Neuropathy symptoms are not disturbing sleep  DATA REVIEWED:  using PAP > 4 hours/night 97% of the time  Estimated KATHRYN 1 1/hour at pressure of 16cm H2O @90th percentile  SUBJECTIVE: Regarding use of PAP, Brittany reports:   · He is experiencing some adverse effects: dry mouth/throat, dry nose and nasal congestion  · He is   benefiting from use: sleeping better   Sleep Routine: He reports getting 5-6 hrs sleep  ; he has no difficulty initiating or maintaining sleep      He awakens spontaneously and feels refreshed  He has excessive drowsiness and naps for about 20 minutes in the afternoons  He rated himself at Total score: 8 /24 on the Hutchinson sleepiness scale  Habits: reports that he has never smoked  He has never used smokeless tobacco ,  reports that he drinks alcohol ,  reports that he does not use drugs  , Caffeine use: rarely , Exercise routine: none   OBJECTIVE: /82   Pulse 82   Ht 5' 11" (1 803 m)   Wt (!) 143 kg (316 lb)   BMI 44 07 kg/m²    Constitutional: Patient is well groomed; well appearing  Skin/Extrem: warm & dry; col & hydration normal; no edema  Psych: cooperativeand in no distress  Mental State appears normal   CNS: Alert, orientated, clear & coherent speech  H&N: EOMI; NC/AT:no facial pressure marks, no rashes  Neck Circumference: 18"  ENMT Mucus membranes normal Nasal airway:patent  Oral airway: crowded  Resp:effort is normal CVS: RRR ABD:truncal obesity MSK:Gait normal     ASSESSMENT: Primary Sleep/Secondary(to Medical or Psych conditions) & comorbidities   1  Obstructive sleep apnea  PAP DME Resupply/Reorder   2  Insufficient sleep syndrome     3  Dry nose     4  Dry mouth     5  Chronic rhinitis     6  Mild intermittent asthma with exacerbation     7  Morbid obesity ([address] )     8  Diabetic peripheral neuropathy ([facility] [address] )         PLAN:  1  Treatment with  PAP is medically necessary and Beckham Bowbells is agreable to continue use  2  Care of equipment, methods to improve comfort using PAP and importance of compliance with therapy were discussed  3  Pressure setting: continue 14-17 cmH2O     4  Rx provided to replace supplies and Care coordinated with DME provider  5  Strategies for weight reduction were discussed  6  Nasal symptoms may improve with regular nasal saline rinse followed by topical nasal steroid if necessary  7   Strategies to improve dry mouth were discussed  Specifically, adequate treatment of nasal allergies, using Biotene mouthwash &/or Xylimelt  8  Follow-up is advised in 1 year or sooner if needed to monitor progress, compliance and to adjust therapy  Thank you for allowing me to participate in the care of this patient      Sincerely,    Authenticated electronically by Hillary Walker MD on 27/77/48   Board Certified Specialist

## 2020-03-02 NOTE — PROGRESS NOTES
Review of Systems      Genitourinary difficulty with erection   Cardiology none   Gastrointestinal none   Neurology numbness/tingling of an extremity and difficulty with memory   Constitutional fatigue   Integumentary none   Psychiatry none   Musculoskeletal joint pain   Pulmonary shortness of breath with activity   ENT ringing in ears   Endocrine none   Hematological none

## 2020-03-02 NOTE — PATIENT INSTRUCTIONS

## 2020-03-03 ENCOUNTER — TELEPHONE (OUTPATIENT)
Dept: SLEEP CENTER | Facility: CLINIC | Age: 60
End: 2020-03-03

## 2020-04-07 DIAGNOSIS — E78.5 HYPERLIPIDEMIA, UNSPECIFIED HYPERLIPIDEMIA TYPE: ICD-10-CM

## 2020-04-07 RX ORDER — SIMVASTATIN 20 MG
TABLET ORAL
Qty: 90 TABLET | Refills: 0 | Status: SHIPPED | OUTPATIENT
Start: 2020-04-07 | End: 2020-09-01 | Stop reason: SDUPTHER

## 2020-04-20 ENCOUNTER — TELEPHONE (OUTPATIENT)
Dept: FAMILY MEDICINE CLINIC | Facility: CLINIC | Age: 60
End: 2020-04-20

## 2020-04-20 DIAGNOSIS — E11.42 TYPE 2 DIABETES MELLITUS WITH DIABETIC POLYNEUROPATHY, WITHOUT LONG-TERM CURRENT USE OF INSULIN (HCC): Primary | ICD-10-CM

## 2020-04-20 DIAGNOSIS — R39.9 UTI SYMPTOMS: ICD-10-CM

## 2020-04-23 ENCOUNTER — TELEPHONE (OUTPATIENT)
Dept: HEMATOLOGY ONCOLOGY | Facility: CLINIC | Age: 60
End: 2020-04-23

## 2020-04-24 ENCOUNTER — APPOINTMENT (OUTPATIENT)
Dept: LAB | Facility: CLINIC | Age: 60
End: 2020-04-24
Payer: COMMERCIAL

## 2020-04-24 ENCOUNTER — TRANSCRIBE ORDERS (OUTPATIENT)
Dept: LAB | Facility: CLINIC | Age: 60
End: 2020-04-24

## 2020-04-24 DIAGNOSIS — E11.42 TYPE 2 DIABETES MELLITUS WITH DIABETIC POLYNEUROPATHY, WITHOUT LONG-TERM CURRENT USE OF INSULIN (HCC): ICD-10-CM

## 2020-04-24 DIAGNOSIS — D47.2 SMOLDERING MULTIPLE MYELOMA: ICD-10-CM

## 2020-04-24 LAB
ALBUMIN SERPL BCP-MCNC: 3.4 G/DL (ref 3.5–5)
ALP SERPL-CCNC: 115 U/L (ref 46–116)
ALT SERPL W P-5'-P-CCNC: 59 U/L (ref 12–78)
ANION GAP SERPL CALCULATED.3IONS-SCNC: 7 MMOL/L (ref 4–13)
AST SERPL W P-5'-P-CCNC: 39 U/L (ref 5–45)
BACTERIA UR QL AUTO: ABNORMAL /HPF
BASOPHILS # BLD AUTO: 0.05 THOUSANDS/ΜL (ref 0–0.1)
BASOPHILS NFR BLD AUTO: 1 % (ref 0–1)
BILIRUB SERPL-MCNC: 0.59 MG/DL (ref 0.2–1)
BILIRUB UR QL STRIP: NEGATIVE
BUN SERPL-MCNC: 10 MG/DL (ref 5–25)
CALCIUM SERPL-MCNC: 8.8 MG/DL (ref 8.3–10.1)
CHLORIDE SERPL-SCNC: 97 MMOL/L (ref 100–108)
CHOLEST SERPL-MCNC: 160 MG/DL (ref 50–200)
CLARITY UR: CLEAR
CO2 SERPL-SCNC: 29 MMOL/L (ref 21–32)
COLOR UR: YELLOW
CREAT SERPL-MCNC: 0.99 MG/DL (ref 0.6–1.3)
CREAT UR-MCNC: 315 MG/DL
EOSINOPHIL # BLD AUTO: 0.17 THOUSAND/ΜL (ref 0–0.61)
EOSINOPHIL NFR BLD AUTO: 3 % (ref 0–6)
ERYTHROCYTE [DISTWIDTH] IN BLOOD BY AUTOMATED COUNT: 14.8 % (ref 11.6–15.1)
EST. AVERAGE GLUCOSE BLD GHB EST-MCNC: 266 MG/DL
GFR SERPL CREATININE-BSD FRML MDRD: 82 ML/MIN/1.73SQ M
GLUCOSE P FAST SERPL-MCNC: 300 MG/DL (ref 65–99)
GLUCOSE UR STRIP-MCNC: ABNORMAL MG/DL
HBA1C MFR BLD: 10.9 %
HCT VFR BLD AUTO: 43.3 % (ref 36.5–49.3)
HDLC SERPL-MCNC: 33 MG/DL
HGB BLD-MCNC: 13.4 G/DL (ref 12–17)
HGB UR QL STRIP.AUTO: ABNORMAL
IGA SERPL-MCNC: 102 MG/DL (ref 70–400)
IGG SERPL-MCNC: 1860 MG/DL (ref 700–1600)
IGM SERPL-MCNC: 114 MG/DL (ref 40–230)
IMM GRANULOCYTES # BLD AUTO: 0.03 THOUSAND/UL (ref 0–0.2)
IMM GRANULOCYTES NFR BLD AUTO: 1 % (ref 0–2)
KETONES UR STRIP-MCNC: ABNORMAL MG/DL
LDLC SERPL CALC-MCNC: 91 MG/DL (ref 0–100)
LEUKOCYTE ESTERASE UR QL STRIP: NEGATIVE
LYMPHOCYTES # BLD AUTO: 1.2 THOUSANDS/ΜL (ref 0.6–4.47)
LYMPHOCYTES NFR BLD AUTO: 20 % (ref 14–44)
MCH RBC QN AUTO: 28 PG (ref 26.8–34.3)
MCHC RBC AUTO-ENTMCNC: 30.9 G/DL (ref 31.4–37.4)
MCV RBC AUTO: 90 FL (ref 82–98)
MICROALBUMIN UR-MCNC: 331 MG/L (ref 0–20)
MICROALBUMIN/CREAT 24H UR: 105 MG/G CREATININE (ref 0–30)
MONOCYTES # BLD AUTO: 0.36 THOUSAND/ΜL (ref 0.17–1.22)
MONOCYTES NFR BLD AUTO: 6 % (ref 4–12)
NEUTROPHILS # BLD AUTO: 4.06 THOUSANDS/ΜL (ref 1.85–7.62)
NEUTS SEG NFR BLD AUTO: 69 % (ref 43–75)
NITRITE UR QL STRIP: NEGATIVE
NON-SQ EPI CELLS URNS QL MICRO: ABNORMAL /HPF
NONHDLC SERPL-MCNC: 127 MG/DL
NRBC BLD AUTO-RTO: 0 /100 WBCS
PH UR STRIP.AUTO: 6 [PH]
PLATELET # BLD AUTO: 215 THOUSANDS/UL (ref 149–390)
PMV BLD AUTO: 10 FL (ref 8.9–12.7)
POTASSIUM SERPL-SCNC: 4.3 MMOL/L (ref 3.5–5.3)
PROT SERPL-MCNC: 8.1 G/DL (ref 6.4–8.2)
PROT UR STRIP-MCNC: ABNORMAL MG/DL
RBC # BLD AUTO: 4.79 MILLION/UL (ref 3.88–5.62)
RBC #/AREA URNS AUTO: ABNORMAL /HPF
SODIUM SERPL-SCNC: 133 MMOL/L (ref 136–145)
SP GR UR STRIP.AUTO: 1.02 (ref 1–1.03)
TRIGL SERPL-MCNC: 179 MG/DL
UROBILINOGEN UR QL STRIP.AUTO: 0.2 E.U./DL
WBC # BLD AUTO: 5.87 THOUSAND/UL (ref 4.31–10.16)
WBC #/AREA URNS AUTO: ABNORMAL /HPF

## 2020-04-24 PROCEDURE — 82784 ASSAY IGA/IGD/IGG/IGM EACH: CPT

## 2020-04-24 PROCEDURE — 86334 IMMUNOFIX E-PHORESIS SERUM: CPT

## 2020-04-24 PROCEDURE — 84165 PROTEIN E-PHORESIS SERUM: CPT

## 2020-04-24 PROCEDURE — 3060F POS MICROALBUMINURIA REV: CPT | Performed by: INTERNAL MEDICINE

## 2020-04-24 PROCEDURE — 86334 IMMUNOFIX E-PHORESIS SERUM: CPT | Performed by: PATHOLOGY

## 2020-04-24 PROCEDURE — 85025 COMPLETE CBC W/AUTO DIFF WBC: CPT

## 2020-04-24 PROCEDURE — 83883 ASSAY NEPHELOMETRY NOT SPEC: CPT

## 2020-04-24 PROCEDURE — 82043 UR ALBUMIN QUANTITATIVE: CPT

## 2020-04-24 PROCEDURE — 3060F POS MICROALBUMINURIA REV: CPT | Performed by: FAMILY MEDICINE

## 2020-04-24 PROCEDURE — 80053 COMPREHEN METABOLIC PANEL: CPT

## 2020-04-24 PROCEDURE — 83036 HEMOGLOBIN GLYCOSYLATED A1C: CPT

## 2020-04-24 PROCEDURE — 80061 LIPID PANEL: CPT

## 2020-04-24 PROCEDURE — 84165 PROTEIN E-PHORESIS SERUM: CPT | Performed by: PATHOLOGY

## 2020-04-24 PROCEDURE — 36415 COLL VENOUS BLD VENIPUNCTURE: CPT

## 2020-04-24 PROCEDURE — 81001 URINALYSIS AUTO W/SCOPE: CPT

## 2020-04-24 PROCEDURE — 82570 ASSAY OF URINE CREATININE: CPT

## 2020-04-25 LAB
KAPPA LC FREE SER-MCNC: 20.3 MG/L (ref 3.3–19.4)
KAPPA LC FREE/LAMBDA FREE SER: 2.11 {RATIO} (ref 0.26–1.65)
LAMBDA LC FREE SERPL-MCNC: 9.6 MG/L (ref 5.7–26.3)

## 2020-04-27 ENCOUNTER — TELEMEDICINE (OUTPATIENT)
Dept: FAMILY MEDICINE CLINIC | Facility: CLINIC | Age: 60
End: 2020-04-27

## 2020-04-27 ENCOUNTER — TELEMEDICINE (OUTPATIENT)
Dept: HEMATOLOGY ONCOLOGY | Facility: CLINIC | Age: 60
End: 2020-04-27
Payer: COMMERCIAL

## 2020-04-27 ENCOUNTER — TELEPHONE (OUTPATIENT)
Dept: FAMILY MEDICINE CLINIC | Facility: CLINIC | Age: 60
End: 2020-04-27

## 2020-04-27 VITALS
HEIGHT: 71 IN | DIASTOLIC BLOOD PRESSURE: 87 MMHG | WEIGHT: 315 LBS | TEMPERATURE: 98.6 F | SYSTOLIC BLOOD PRESSURE: 156 MMHG | BODY MASS INDEX: 44.1 KG/M2

## 2020-04-27 DIAGNOSIS — D47.2 SMOLDERING MULTIPLE MYELOMA: Primary | ICD-10-CM

## 2020-04-27 DIAGNOSIS — N30.01 ACUTE CYSTITIS WITH HEMATURIA: ICD-10-CM

## 2020-04-27 DIAGNOSIS — E11.42 TYPE 2 DIABETES MELLITUS WITH DIABETIC POLYNEUROPATHY, WITHOUT LONG-TERM CURRENT USE OF INSULIN (HCC): Primary | ICD-10-CM

## 2020-04-27 PROBLEM — R31.0 GROSS HEMATURIA: Status: ACTIVE | Noted: 2020-04-27

## 2020-04-27 LAB
ALBUMIN SERPL ELPH-MCNC: 4.01 G/DL (ref 3.5–5)
ALBUMIN SERPL ELPH-MCNC: 52.1 % (ref 52–65)
ALPHA1 GLOB SERPL ELPH-MCNC: 0.34 G/DL (ref 0.1–0.4)
ALPHA1 GLOB SERPL ELPH-MCNC: 4.4 % (ref 2.5–5)
ALPHA2 GLOB SERPL ELPH-MCNC: 0.85 G/DL (ref 0.4–1.2)
ALPHA2 GLOB SERPL ELPH-MCNC: 11 % (ref 7–13)
BETA GLOB ABNORMAL SERPL ELPH-MCNC: 0.47 G/DL (ref 0.4–0.8)
BETA1 GLOB SERPL ELPH-MCNC: 6.1 % (ref 5–13)
BETA2 GLOB SERPL ELPH-MCNC: 4.1 % (ref 2–8)
BETA2+GAMMA GLOB SERPL ELPH-MCNC: 0.32 G/DL (ref 0.2–0.5)
GAMMA GLOB ABNORMAL SERPL ELPH-MCNC: 1.72 G/DL (ref 0.5–1.6)
GAMMA GLOB SERPL ELPH-MCNC: 22.3 % (ref 12–22)
IGG/ALB SER: 1.09 {RATIO} (ref 1.1–1.8)
INTERPRETATION UR IFE-IMP: NORMAL
M PEAK ID 2: 1.1 %
M PROTEIN 1 MFR SERPL ELPH: 15.6 %
M PROTEIN 1 SERPL ELPH-MCNC: 1.2 G/DL
M PROTEIN 2 SERPL ELPH-MCNC: 0.08 G/DL
PROT PATTERN SERPL ELPH-IMP: ABNORMAL
PROT SERPL-MCNC: 7.7 G/DL (ref 6.4–8.2)

## 2020-04-27 PROCEDURE — G2012 BRIEF CHECK IN BY MD/QHP: HCPCS | Performed by: PHYSICIAN ASSISTANT

## 2020-04-27 PROCEDURE — T1015 CLINIC SERVICE: HCPCS | Performed by: FAMILY MEDICINE

## 2020-04-27 PROCEDURE — G2012 BRIEF CHECK IN BY MD/QHP: HCPCS | Performed by: FAMILY MEDICINE

## 2020-04-27 RX ORDER — GLIMEPIRIDE 1 MG/1
1 TABLET ORAL
Qty: 30 TABLET | Refills: 5 | Status: SHIPPED | OUTPATIENT
Start: 2020-04-27 | End: 2020-08-12 | Stop reason: ALTCHOICE

## 2020-04-27 RX ORDER — NITROFURANTOIN 25; 75 MG/1; MG/1
100 CAPSULE ORAL 2 TIMES DAILY
Qty: 10 CAPSULE | Refills: 0 | Status: SHIPPED | OUTPATIENT
Start: 2020-04-27 | End: 2020-05-02

## 2020-04-30 ENCOUNTER — TELEMEDICINE (OUTPATIENT)
Dept: FAMILY MEDICINE CLINIC | Facility: CLINIC | Age: 60
End: 2020-04-30

## 2020-04-30 VITALS
BODY MASS INDEX: 44.1 KG/M2 | HEIGHT: 71 IN | WEIGHT: 315 LBS | SYSTOLIC BLOOD PRESSURE: 156 MMHG | DIASTOLIC BLOOD PRESSURE: 87 MMHG | TEMPERATURE: 98.6 F

## 2020-04-30 DIAGNOSIS — J45.20 MILD INTERMITTENT ASTHMA WITHOUT COMPLICATION: ICD-10-CM

## 2020-04-30 DIAGNOSIS — K22.719 BARRETT'S ESOPHAGUS WITH DYSPLASIA: ICD-10-CM

## 2020-04-30 DIAGNOSIS — E11.42 DIABETIC PERIPHERAL NEUROPATHY (HCC): ICD-10-CM

## 2020-04-30 DIAGNOSIS — E11.42 TYPE 2 DIABETES MELLITUS WITH DIABETIC POLYNEUROPATHY, WITHOUT LONG-TERM CURRENT USE OF INSULIN (HCC): Primary | ICD-10-CM

## 2020-04-30 DIAGNOSIS — N30.01 HEMATURIA DUE TO ACUTE CYSTITIS: ICD-10-CM

## 2020-04-30 PROCEDURE — G2012 BRIEF CHECK IN BY MD/QHP: HCPCS | Performed by: FAMILY MEDICINE

## 2020-04-30 PROCEDURE — T1015 CLINIC SERVICE: HCPCS | Performed by: FAMILY MEDICINE

## 2020-05-01 ENCOUNTER — TELEPHONE (OUTPATIENT)
Dept: FAMILY MEDICINE CLINIC | Facility: CLINIC | Age: 60
End: 2020-05-01

## 2020-05-01 DIAGNOSIS — E11.42 TYPE 2 DIABETES MELLITUS WITH DIABETIC POLYNEUROPATHY, WITHOUT LONG-TERM CURRENT USE OF INSULIN (HCC): Primary | ICD-10-CM

## 2020-05-01 RX ORDER — LANCETS
EACH MISCELLANEOUS 3 TIMES DAILY
Qty: 100 EACH | Refills: 5 | Status: SHIPPED | OUTPATIENT
Start: 2020-05-01 | End: 2021-08-18 | Stop reason: SDUPTHER

## 2020-05-01 RX ORDER — BLOOD-GLUCOSE METER
EACH MISCELLANEOUS 3 TIMES DAILY
Qty: 1 KIT | Refills: 0 | Status: SHIPPED | OUTPATIENT
Start: 2020-05-01

## 2020-05-05 ENCOUNTER — TELEPHONE (OUTPATIENT)
Dept: FAMILY MEDICINE CLINIC | Facility: CLINIC | Age: 60
End: 2020-05-05

## 2020-05-16 ENCOUNTER — NURSE TRIAGE (OUTPATIENT)
Dept: FAMILY MEDICINE CLINIC | Facility: CLINIC | Age: 60
End: 2020-05-16

## 2020-05-16 ENCOUNTER — HOSPITAL ENCOUNTER (EMERGENCY)
Facility: HOSPITAL | Age: 60
Discharge: HOME | End: 2020-05-16
Attending: EMERGENCY MEDICINE | Admitting: EMERGENCY MEDICINE
Payer: COMMERCIAL

## 2020-05-16 VITALS
RESPIRATION RATE: 18 BRPM | TEMPERATURE: 98.4 F | HEIGHT: 72 IN | HEART RATE: 96 BPM | WEIGHT: 313.05 LBS | OXYGEN SATURATION: 97 % | BODY MASS INDEX: 42.4 KG/M2 | SYSTOLIC BLOOD PRESSURE: 139 MMHG | DIASTOLIC BLOOD PRESSURE: 79 MMHG

## 2020-05-16 DIAGNOSIS — N39.0 UTI (URINARY TRACT INFECTION): Primary | ICD-10-CM

## 2020-05-16 LAB
ALBUMIN SERPL BCP-MCNC: 3.2 G/DL (ref 3.5–5)
ALP SERPL-CCNC: 98 U/L (ref 46–116)
ALT SERPL W P-5'-P-CCNC: 58 U/L (ref 12–78)
ANION GAP SERPL CALCULATED.3IONS-SCNC: 9 MMOL/L (ref 4–13)
AST SERPL W P-5'-P-CCNC: 39 U/L (ref 5–45)
BACTERIA UR QL AUTO: ABNORMAL /HPF
BASOPHILS # BLD AUTO: 0.06 THOUSANDS/ΜL (ref 0–0.1)
BASOPHILS NFR BLD AUTO: 1 % (ref 0–1)
BILIRUB SERPL-MCNC: 0.34 MG/DL (ref 0.2–1)
BILIRUB UR QL STRIP: NEGATIVE
BUN SERPL-MCNC: 11 MG/DL (ref 5–25)
CALCIUM SERPL-MCNC: 8.6 MG/DL (ref 8.3–10.1)
CHLORIDE SERPL-SCNC: 102 MMOL/L (ref 100–108)
CLARITY UR: ABNORMAL
CO2 SERPL-SCNC: 27 MMOL/L (ref 21–32)
COLOR UR: YELLOW
CREAT SERPL-MCNC: 1.03 MG/DL (ref 0.6–1.3)
EOSINOPHIL # BLD AUTO: 0.25 THOUSAND/ΜL (ref 0–0.61)
EOSINOPHIL NFR BLD AUTO: 4 % (ref 0–6)
ERYTHROCYTE [DISTWIDTH] IN BLOOD BY AUTOMATED COUNT: 15 % (ref 11.6–15.1)
GFR SERPL CREATININE-BSD FRML MDRD: 78 ML/MIN/1.73SQ M
GLUCOSE SERPL-MCNC: 174 MG/DL (ref 65–140)
GLUCOSE UR STRIP-MCNC: NEGATIVE MG/DL
HCT VFR BLD AUTO: 40.7 % (ref 36.5–49.3)
HGB BLD-MCNC: 12.6 G/DL (ref 12–17)
HGB UR QL STRIP.AUTO: ABNORMAL
IMM GRANULOCYTES # BLD AUTO: 0.02 THOUSAND/UL (ref 0–0.2)
IMM GRANULOCYTES NFR BLD AUTO: 0 % (ref 0–2)
KETONES UR STRIP-MCNC: ABNORMAL MG/DL
LEUKOCYTE ESTERASE UR QL STRIP: ABNORMAL
LYMPHOCYTES # BLD AUTO: 1.62 THOUSANDS/ΜL (ref 0.6–4.47)
LYMPHOCYTES NFR BLD AUTO: 23 % (ref 14–44)
MCH RBC QN AUTO: 28.1 PG (ref 26.8–34.3)
MCHC RBC AUTO-ENTMCNC: 31 G/DL (ref 31.4–37.4)
MCV RBC AUTO: 91 FL (ref 82–98)
MONOCYTES # BLD AUTO: 0.4 THOUSAND/ΜL (ref 0.17–1.22)
MONOCYTES NFR BLD AUTO: 6 % (ref 4–12)
NEUTROPHILS # BLD AUTO: 4.77 THOUSANDS/ΜL (ref 1.85–7.62)
NEUTS SEG NFR BLD AUTO: 66 % (ref 43–75)
NITRITE UR QL STRIP: NEGATIVE
NON-SQ EPI CELLS URNS QL MICRO: ABNORMAL /HPF
NRBC BLD AUTO-RTO: 0 /100 WBCS
PH UR STRIP.AUTO: 6 [PH]
PLATELET # BLD AUTO: 209 THOUSANDS/UL (ref 149–390)
PMV BLD AUTO: 9.8 FL (ref 8.9–12.7)
POTASSIUM SERPL-SCNC: 3.9 MMOL/L (ref 3.5–5.3)
PROT SERPL-MCNC: 7.6 G/DL (ref 6.4–8.2)
PROT UR STRIP-MCNC: ABNORMAL MG/DL
RBC # BLD AUTO: 4.48 MILLION/UL (ref 3.88–5.62)
RBC #/AREA URNS AUTO: ABNORMAL /HPF
SODIUM SERPL-SCNC: 138 MMOL/L (ref 136–145)
SP GR UR STRIP.AUTO: >=1.03 (ref 1–1.03)
UROBILINOGEN UR QL STRIP.AUTO: 0.2 E.U./DL
WBC # BLD AUTO: 7.12 THOUSAND/UL (ref 4.31–10.16)
WBC #/AREA URNS AUTO: ABNORMAL /HPF

## 2020-05-16 PROCEDURE — 81001 URINALYSIS AUTO W/SCOPE: CPT | Performed by: NURSE PRACTITIONER

## 2020-05-16 PROCEDURE — 51798 US URINE CAPACITY MEASURE: CPT

## 2020-05-16 PROCEDURE — 36415 COLL VENOUS BLD VENIPUNCTURE: CPT | Performed by: NURSE PRACTITIONER

## 2020-05-16 PROCEDURE — 99283 EMERGENCY DEPT VISIT LOW MDM: CPT

## 2020-05-16 PROCEDURE — 85025 COMPLETE CBC W/AUTO DIFF WBC: CPT | Performed by: NURSE PRACTITIONER

## 2020-05-16 PROCEDURE — 99284 EMERGENCY DEPT VISIT MOD MDM: CPT | Performed by: NURSE PRACTITIONER

## 2020-05-16 PROCEDURE — 80053 COMPREHEN METABOLIC PANEL: CPT | Performed by: NURSE PRACTITIONER

## 2020-05-16 PROCEDURE — 87086 URINE CULTURE/COLONY COUNT: CPT | Performed by: NURSE PRACTITIONER

## 2020-05-16 RX ORDER — CEPHALEXIN 500 MG/1
500 CAPSULE ORAL EVERY 6 HOURS SCHEDULED
Qty: 40 CAPSULE | Refills: 0 | Status: SHIPPED | OUTPATIENT
Start: 2020-05-16 | End: 2020-05-26

## 2020-05-18 LAB — BACTERIA UR CULT: NORMAL

## 2020-05-19 ENCOUNTER — TELEMEDICINE (OUTPATIENT)
Dept: FAMILY MEDICINE CLINIC | Facility: CLINIC | Age: 60
End: 2020-05-19

## 2020-05-19 VITALS — HEIGHT: 72 IN | TEMPERATURE: 98.6 F | WEIGHT: 313 LBS | BODY MASS INDEX: 42.39 KG/M2

## 2020-05-19 DIAGNOSIS — N30.01 ACUTE CYSTITIS WITH HEMATURIA: ICD-10-CM

## 2020-05-19 DIAGNOSIS — E11.42 TYPE 2 DIABETES MELLITUS WITH DIABETIC POLYNEUROPATHY, WITHOUT LONG-TERM CURRENT USE OF INSULIN (HCC): Primary | ICD-10-CM

## 2020-05-19 PROCEDURE — T1015 CLINIC SERVICE: HCPCS | Performed by: FAMILY MEDICINE

## 2020-05-19 PROCEDURE — G2012 BRIEF CHECK IN BY MD/QHP: HCPCS | Performed by: FAMILY MEDICINE

## 2020-06-12 DIAGNOSIS — I10 ESSENTIAL HYPERTENSION: ICD-10-CM

## 2020-06-12 PROCEDURE — 4010F ACE/ARB THERAPY RXD/TAKEN: CPT | Performed by: INTERNAL MEDICINE

## 2020-06-12 PROCEDURE — 4010F ACE/ARB THERAPY RXD/TAKEN: CPT | Performed by: FAMILY MEDICINE

## 2020-06-12 RX ORDER — LISINOPRIL 20 MG/1
20 TABLET ORAL DAILY
Qty: 90 TABLET | Refills: 2 | Status: SHIPPED | OUTPATIENT
Start: 2020-06-12 | End: 2021-02-18 | Stop reason: SDUPTHER

## 2020-06-30 DIAGNOSIS — J45.40 MODERATE PERSISTENT ASTHMA WITHOUT COMPLICATION: ICD-10-CM

## 2020-06-30 RX ORDER — FLUTICASONE PROPIONATE 220 UG/1
AEROSOL, METERED RESPIRATORY (INHALATION)
Qty: 12 G | Refills: 3 | Status: SHIPPED | OUTPATIENT
Start: 2020-06-30 | End: 2020-11-09 | Stop reason: SDUPTHER

## 2020-07-27 ENCOUNTER — TELEPHONE (OUTPATIENT)
Dept: HEMATOLOGY ONCOLOGY | Facility: CLINIC | Age: 60
End: 2020-07-27

## 2020-08-12 ENCOUNTER — OFFICE VISIT (OUTPATIENT)
Dept: FAMILY MEDICINE CLINIC | Facility: CLINIC | Age: 60
End: 2020-08-12

## 2020-08-12 VITALS
HEIGHT: 72 IN | DIASTOLIC BLOOD PRESSURE: 88 MMHG | HEART RATE: 82 BPM | TEMPERATURE: 97.8 F | WEIGHT: 309 LBS | SYSTOLIC BLOOD PRESSURE: 144 MMHG | BODY MASS INDEX: 41.85 KG/M2

## 2020-08-12 DIAGNOSIS — I10 ESSENTIAL HYPERTENSION: ICD-10-CM

## 2020-08-12 DIAGNOSIS — E11.42 TYPE 2 DIABETES MELLITUS WITH DIABETIC POLYNEUROPATHY, WITHOUT LONG-TERM CURRENT USE OF INSULIN (HCC): Primary | ICD-10-CM

## 2020-08-12 DIAGNOSIS — Z11.4 SCREENING FOR HIV (HUMAN IMMUNODEFICIENCY VIRUS): ICD-10-CM

## 2020-08-12 LAB — SL AMB POCT HEMOGLOBIN AIC: 8.6 (ref ?–6.5)

## 2020-08-12 PROCEDURE — 1036F TOBACCO NON-USER: CPT | Performed by: NURSE PRACTITIONER

## 2020-08-12 PROCEDURE — 3052F HG A1C>EQUAL 8.0%<EQUAL 9.0%: CPT | Performed by: NURSE PRACTITIONER

## 2020-08-12 PROCEDURE — 3060F POS MICROALBUMINURIA REV: CPT | Performed by: NURSE PRACTITIONER

## 2020-08-12 PROCEDURE — 99214 OFFICE O/P EST MOD 30 MIN: CPT | Performed by: NURSE PRACTITIONER

## 2020-08-12 PROCEDURE — 83036 HEMOGLOBIN GLYCOSYLATED A1C: CPT | Performed by: NURSE PRACTITIONER

## 2020-08-12 PROCEDURE — 3052F HG A1C>EQUAL 8.0%<EQUAL 9.0%: CPT | Performed by: INTERNAL MEDICINE

## 2020-08-12 PROCEDURE — 3077F SYST BP >= 140 MM HG: CPT | Performed by: NURSE PRACTITIONER

## 2020-08-12 PROCEDURE — 3079F DIAST BP 80-89 MM HG: CPT | Performed by: NURSE PRACTITIONER

## 2020-08-12 PROCEDURE — 3052F HG A1C>EQUAL 8.0%<EQUAL 9.0%: CPT | Performed by: FAMILY MEDICINE

## 2020-08-12 PROCEDURE — 3008F BODY MASS INDEX DOCD: CPT | Performed by: NURSE PRACTITIONER

## 2020-08-12 NOTE — PROGRESS NOTES
Assessment/Plan:    Type 2 diabetes mellitus ([place] [address] )    Lab Results   Component Value Date    HGBA1C 8 6 (A) 08/12/2020   A1c improving with lifestyle changes  Patient stopped Victoza because of side effects  Will continue with lifestyle changes, DASH diet  Added Januvia to regimen to add in further A1c reduction  Will continue with metformin  Stopped glimepiride  Will reevaluate in 3 months  Essential hypertension  BP today not at goal since he does not take medication before his appointment  Continue with lisinopril 20 mg daily  144/88 today would like to get to 130/80 range  Problem List Items Addressed This Visit        Endocrine    Type 2 diabetes mellitus ([place] [address] ) - Primary       Lab Results   Component Value Date    HGBA1C 8 6 (A) 08/12/2020   A1c improving with lifestyle changes  Patient stopped Victoza because of side effects  Will continue with lifestyle changes, DASH diet  Added Januvia to regimen to add in further A1c reduction  Will continue with metformin  Stopped glimepiride  Will reevaluate in 3 months  Relevant Medications    sitaGLIPtin (JANUVIA) 100 mg tablet    Other Relevant Orders    POCT hemoglobin A1c (Completed)       Cardiovascular and Mediastinum    Essential hypertension     BP today not at goal since he does not take medication before his appointment  Continue with lisinopril 20 mg daily  144/88 today would like to get to 130/80 range  Other    Screening for HIV (human immunodeficiency virus)            Subjective:      Patient ID: Nannette Rodrigues is a 56 y o  male  56year old presents for management of DM2  He was Dr Alexander Miller patient  He was recently put on Glimepiride and Victoza and he stopped Victoza due to nausea and vomiting  He takes metformin BID also  He is making lifestyle changes   Recent A1c > 10   BP slightly elevated today but he did not take BP medication this AM         The following portions of the patient's history were reviewed and updated as appropriate: allergies, current medications, past family history, past medical history, past social history, past surgical history and problem list     Review of Systems   Constitutional: Positive for fatigue  HENT: Negative  Respiratory: Negative  Cardiovascular: Negative  Endocrine: Negative  Allergic/Immunologic: Positive for immunocompromised state  Neurological: Negative  Psychiatric/Behavioral: Negative  All other systems reviewed and are negative  Objective:      /88 (BP Location: Left arm, Patient Position: Sitting, Cuff Size: Large)   Pulse 82   Temp 97 8 °F (36 6 °C) (Tympanic)   Ht 6' (1 829 m)   Wt (!) 140 kg (309 lb)   BMI 41 91 kg/m²          Physical Exam  Vitals signs reviewed  Constitutional:       Appearance: Normal appearance  Cardiovascular:      Rate and Rhythm: Normal rate and regular rhythm  Pulmonary:      Effort: Pulmonary effort is normal       Breath sounds: Normal breath sounds  Skin:     General: Skin is warm and dry  Neurological:      Mental Status: He is alert

## 2020-08-12 NOTE — ASSESSMENT & PLAN NOTE
Lab Results   Component Value Date    HGBA1C 8 6 (A) 08/12/2020   A1c improving with lifestyle changes  Patient stopped Victoza because of side effects  Will continue with lifestyle changes, DASH diet  Added Januvia to regimen to add in further A1c reduction  Will continue with metformin  Stopped glimepiride  Will reevaluate in 3 months

## 2020-08-12 NOTE — ASSESSMENT & PLAN NOTE
BP today not at goal since he does not take medication before his appointment  Continue with lisinopril 20 mg daily  144/88 today would like to get to 130/80 range

## 2020-08-12 NOTE — PATIENT INSTRUCTIONS
Stop Glimepiride and start Januvia   Keep blood sugar log, fasting in the AM and in the evening  A1c today was 8 6januvia  Sitagliptin (By mouth)   Sitagliptin (sit-a-GLIP-tin)  Treats type 2 diabetes  Brand Name(s): Januvia   There may be other brand names for this medicine  When This Medicine Should Not Be Used: This medicine is not right for everyone  Do not use it if you had an allergic reaction to sitagliptin  How to Use This Medicine:   Tablet  · Take your medicine as directed  Your dose may need to be changed several times to find what works best for you  · This medicine should come with a Medication Guide  Ask your pharmacist for a copy if you do not have one  · Missed dose: Take a dose as soon as you remember  If it is almost time for your next dose, wait until then and take a regular dose  Do not take extra medicine to make up for a missed dose  · Store the medicine in a closed container at room temperature, away from heat, moisture, and direct light  Drugs and Foods to Avoid:   Ask your doctor or pharmacist before using any other medicine, including over-the-counter medicines, vitamins, and herbal products  · Some medicines can affect how sitagliptin works  Tell your doctor if you are using digoxin or insulin or another diabetes medicine  Warnings While Using This Medicine:   · Tell your doctor if you are pregnant or breastfeeding, or if you have kidney disease or a history of pancreas problems  · This medicine may cause the following problems:  ¨ Pancreatitis  ¨ Low blood sugar  ¨ Kidney problems  ¨ Serious skin reactions  · Your doctor will do lab tests at regular visits to check on the effects of this medicine  Keep all appointments  · Keep all medicine out of the reach of children  Never share your medicine with anyone    Possible Side Effects While Using This Medicine:   Call your doctor right away if you notice any of these side effects:  · Allergic reaction: Itching or hives, swelling in your face or hands, swelling or tingling in your mouth or throat, chest tightness, trouble breathing  · Blistering, peeling, red skin rash  · Change in how much or how often you urinate  · Large, hard skin blisters  · Severe joint pain  · Shaking, trembling, sweating, fast or pounding heartbeat, faintness or lightheadedness, hunger, confusion  · Sudden and severe stomach pain, nausea, vomiting, fever, and lightheadedness  If you notice other side effects that you think are caused by this medicine, tell your doctor  Call your doctor for medical advice about side effects  You may report side effects to FDA at 9-605-FDA-6813  © 2017 [address] Information is for End User's use only and may not be sold, redistributed or otherwise used for commercial purposes  The above information is an  only  It is not intended as medical advice for individual conditions or treatments  Talk to your doctor, nurse or pharmacist before following any medical regimen to see if it is safe and effective for you

## 2020-08-17 DIAGNOSIS — E11.42 TYPE 2 DIABETES MELLITUS WITH DIABETIC POLYNEUROPATHY, WITHOUT LONG-TERM CURRENT USE OF INSULIN (HCC): ICD-10-CM

## 2020-08-21 ENCOUNTER — OFFICE VISIT (OUTPATIENT)
Dept: FAMILY MEDICINE CLINIC | Facility: CLINIC | Age: 60
End: 2020-08-21

## 2020-08-21 VITALS
WEIGHT: 310.6 LBS | DIASTOLIC BLOOD PRESSURE: 82 MMHG | BODY MASS INDEX: 42.07 KG/M2 | SYSTOLIC BLOOD PRESSURE: 140 MMHG | HEIGHT: 72 IN | HEART RATE: 82 BPM | RESPIRATION RATE: 18 BRPM | TEMPERATURE: 99 F

## 2020-08-21 DIAGNOSIS — L91.8 SKIN TAG: Primary | ICD-10-CM

## 2020-08-21 PROCEDURE — 99213 OFFICE O/P EST LOW 20 MIN: CPT | Performed by: FAMILY MEDICINE

## 2020-08-21 PROCEDURE — 1036F TOBACCO NON-USER: CPT | Performed by: FAMILY MEDICINE

## 2020-08-21 PROCEDURE — 88304 TISSUE EXAM BY PATHOLOGIST: CPT | Performed by: PATHOLOGY

## 2020-08-21 PROCEDURE — 11200 RMVL SKIN TAGS UP TO&INC 15: CPT | Performed by: FAMILY MEDICINE

## 2020-08-21 NOTE — PROGRESS NOTES
Skin tag removal    Date/Time: 8/21/2020 12:37 PM  Performed by: Kenny Morris MD  Authorized by: Kenny Morris MD     Procedure Details - Skin Tag Destruction:     Up to 15      Body area:  Lower extremity    Lower extremity location:  L upper leg    Initial size (mm):  0 5    Final defect size (mm):  0 1    Malignancy: benign lesion      Destruction method: scissors used for extraction      Cosmetic?: Yes    Lesion 2:     Initial size (mm):  0 5    Final defect size (mm):  0 1    Malignancy: benign lesion      Destruction method: scissors used for extraction      Cosmetic?: Yes    Lesion 3:     Body area:  Lower extremity    Lower extremity location:  L upper leg    Initial size (mm):  0 5    Final defect size (mm):  0 1    Malignancy: benign lesion      Cosmetic?: Yes    Lesion 4:     Body area:  Lower extremity    Lower extremity location:  L upper leg    Initial size (mm):  15    Final defect size (mm):  0 5    Malignancy: benign lesion      Cosmetic?: Yes    Lesion 6:      Patient tolerated procedure well  On first 3 skin tags silver nitrite applied for minimal bleeding  On last lesion he had 1 sutured placed given size of defect and also to control bleeding  Patient instructed to come back in 1 week to have suture removed   Will call back with pathology results

## 2020-08-28 ENCOUNTER — OFFICE VISIT (OUTPATIENT)
Dept: FAMILY MEDICINE CLINIC | Facility: CLINIC | Age: 60
End: 2020-08-28

## 2020-08-28 VITALS
BODY MASS INDEX: 41.58 KG/M2 | HEART RATE: 80 BPM | TEMPERATURE: 98 F | RESPIRATION RATE: 18 BRPM | SYSTOLIC BLOOD PRESSURE: 146 MMHG | HEIGHT: 72 IN | WEIGHT: 307 LBS | DIASTOLIC BLOOD PRESSURE: 90 MMHG

## 2020-08-28 DIAGNOSIS — Z11.59 ENCOUNTER FOR HEPATITIS C SCREENING TEST FOR LOW RISK PATIENT: ICD-10-CM

## 2020-08-28 DIAGNOSIS — Z48.02 VISIT FOR SUTURE REMOVAL: Primary | ICD-10-CM

## 2020-08-28 PROCEDURE — 3008F BODY MASS INDEX DOCD: CPT | Performed by: FAMILY MEDICINE

## 2020-08-28 PROCEDURE — 3008F BODY MASS INDEX DOCD: CPT | Performed by: INTERNAL MEDICINE

## 2020-08-28 NOTE — PROGRESS NOTES
Suture removal    Date/Time: 8/28/2020 3:24 PM  Performed by: Noelle Gentile DO  Authorized by: Noelle Gentile DO     Patient location:  Clinic  Other Assisting Provider: Yes (comment) Ella Beck    Consent:     Consent obtained:  Verbal    Consent given by:  Patient  Universal protocol:     Procedure explained and questions answered to patient or proxy's satisfaction: yes      Patient identity confirmed:  Verbally with patient  Location:     Laterality:  Left    Location:  Lower extremity    Lower extremity location:  Leg    Leg location:  L upper leg  Procedure details: Tools used:  Suture removal kit    Wound appearance:  No sign(s) of infection    Number of sutures removed:  1  Post-procedure details:     Post-removal:  No dressing applied    Patient tolerance of procedure:   Tolerated well, no immediate complications

## 2020-09-01 DIAGNOSIS — E78.5 HYPERLIPIDEMIA, UNSPECIFIED HYPERLIPIDEMIA TYPE: ICD-10-CM

## 2020-09-03 RX ORDER — SIMVASTATIN 20 MG
20 TABLET ORAL EVERY EVENING
Qty: 90 TABLET | Refills: 1 | Status: SHIPPED | OUTPATIENT
Start: 2020-09-03 | End: 2021-02-18

## 2020-10-29 ENCOUNTER — APPOINTMENT (OUTPATIENT)
Dept: LAB | Facility: CLINIC | Age: 60
End: 2020-10-29
Payer: COMMERCIAL

## 2020-10-29 ENCOUNTER — LAB (OUTPATIENT)
Dept: LAB | Facility: CLINIC | Age: 60
End: 2020-10-29
Payer: COMMERCIAL

## 2020-10-29 DIAGNOSIS — Z11.59 ENCOUNTER FOR HEPATITIS C SCREENING TEST FOR LOW RISK PATIENT: ICD-10-CM

## 2020-10-29 DIAGNOSIS — D47.2 SMOLDERING MULTIPLE MYELOMA: ICD-10-CM

## 2020-10-29 LAB
ALBUMIN SERPL BCP-MCNC: 3.3 G/DL (ref 3.5–5)
ALP SERPL-CCNC: 108 U/L (ref 46–116)
ALT SERPL W P-5'-P-CCNC: 48 U/L (ref 12–78)
ANION GAP SERPL CALCULATED.3IONS-SCNC: 8 MMOL/L (ref 4–13)
AST SERPL W P-5'-P-CCNC: 28 U/L (ref 5–45)
BASOPHILS # BLD AUTO: 0.05 THOUSANDS/ΜL (ref 0–0.1)
BASOPHILS NFR BLD AUTO: 1 % (ref 0–1)
BILIRUB SERPL-MCNC: 0.46 MG/DL (ref 0.2–1)
BUN SERPL-MCNC: 8 MG/DL (ref 5–25)
CALCIUM ALBUM COR SERPL-MCNC: 9.6 MG/DL (ref 8.3–10.1)
CALCIUM SERPL-MCNC: 9 MG/DL (ref 8.3–10.1)
CHLORIDE SERPL-SCNC: 101 MMOL/L (ref 100–108)
CO2 SERPL-SCNC: 30 MMOL/L (ref 21–32)
CREAT SERPL-MCNC: 0.87 MG/DL (ref 0.6–1.3)
EOSINOPHIL # BLD AUTO: 0.28 THOUSAND/ΜL (ref 0–0.61)
EOSINOPHIL NFR BLD AUTO: 5 % (ref 0–6)
ERYTHROCYTE [DISTWIDTH] IN BLOOD BY AUTOMATED COUNT: 14.5 % (ref 11.6–15.1)
GFR SERPL CREATININE-BSD FRML MDRD: 92 ML/MIN/1.73SQ M
GLUCOSE P FAST SERPL-MCNC: 175 MG/DL (ref 65–99)
HCT VFR BLD AUTO: 39.8 % (ref 36.5–49.3)
HCV AB SER QL: NORMAL
HGB BLD-MCNC: 12.2 G/DL (ref 12–17)
IMM GRANULOCYTES # BLD AUTO: 0.04 THOUSAND/UL (ref 0–0.2)
IMM GRANULOCYTES NFR BLD AUTO: 1 % (ref 0–2)
LYMPHOCYTES # BLD AUTO: 1.32 THOUSANDS/ΜL (ref 0.6–4.47)
LYMPHOCYTES NFR BLD AUTO: 21 % (ref 14–44)
MCH RBC QN AUTO: 27.9 PG (ref 26.8–34.3)
MCHC RBC AUTO-ENTMCNC: 30.7 G/DL (ref 31.4–37.4)
MCV RBC AUTO: 91 FL (ref 82–98)
MONOCYTES # BLD AUTO: 0.39 THOUSAND/ΜL (ref 0.17–1.22)
MONOCYTES NFR BLD AUTO: 6 % (ref 4–12)
NEUTROPHILS # BLD AUTO: 4.13 THOUSANDS/ΜL (ref 1.85–7.62)
NEUTS SEG NFR BLD AUTO: 66 % (ref 43–75)
NRBC BLD AUTO-RTO: 0 /100 WBCS
PLATELET # BLD AUTO: 213 THOUSANDS/UL (ref 149–390)
PMV BLD AUTO: 9.1 FL (ref 8.9–12.7)
POTASSIUM SERPL-SCNC: 4 MMOL/L (ref 3.5–5.3)
PROT SERPL-MCNC: 8.1 G/DL (ref 6.4–8.2)
RBC # BLD AUTO: 4.37 MILLION/UL (ref 3.88–5.62)
SODIUM SERPL-SCNC: 139 MMOL/L (ref 136–145)
WBC # BLD AUTO: 6.21 THOUSAND/UL (ref 4.31–10.16)

## 2020-10-29 PROCEDURE — 36415 COLL VENOUS BLD VENIPUNCTURE: CPT

## 2020-10-29 PROCEDURE — 86803 HEPATITIS C AB TEST: CPT

## 2020-10-29 PROCEDURE — 84165 PROTEIN E-PHORESIS SERUM: CPT

## 2020-10-29 PROCEDURE — 80053 COMPREHEN METABOLIC PANEL: CPT

## 2020-10-29 PROCEDURE — 85025 COMPLETE CBC W/AUTO DIFF WBC: CPT

## 2020-10-29 PROCEDURE — 84165 PROTEIN E-PHORESIS SERUM: CPT | Performed by: PATHOLOGY

## 2020-10-29 PROCEDURE — 83883 ASSAY NEPHELOMETRY NOT SPEC: CPT

## 2020-10-30 LAB
ALBUMIN SERPL ELPH-MCNC: 3.79 G/DL (ref 3.5–5)
ALBUMIN SERPL ELPH-MCNC: 49.2 % (ref 52–65)
ALPHA1 GLOB SERPL ELPH-MCNC: 0.39 G/DL (ref 0.1–0.4)
ALPHA1 GLOB SERPL ELPH-MCNC: 5.1 % (ref 2.5–5)
ALPHA2 GLOB SERPL ELPH-MCNC: 0.89 G/DL (ref 0.4–1.2)
ALPHA2 GLOB SERPL ELPH-MCNC: 11.6 % (ref 7–13)
BETA GLOB ABNORMAL SERPL ELPH-MCNC: 0.49 G/DL (ref 0.4–0.8)
BETA1 GLOB SERPL ELPH-MCNC: 6.3 % (ref 5–13)
BETA2 GLOB SERPL ELPH-MCNC: 4.2 % (ref 2–8)
BETA2+GAMMA GLOB SERPL ELPH-MCNC: 0.32 G/DL (ref 0.2–0.5)
GAMMA GLOB ABNORMAL SERPL ELPH-MCNC: 1.82 G/DL (ref 0.5–1.6)
GAMMA GLOB SERPL ELPH-MCNC: 23.6 % (ref 12–22)
IGG/ALB SER: 0.97 {RATIO} (ref 1.1–1.8)
KAPPA LC FREE SER-MCNC: 26.2 MG/L (ref 3.3–19.4)
KAPPA LC FREE/LAMBDA FREE SER: 2.57 {RATIO} (ref 0.26–1.65)
LAMBDA LC FREE SERPL-MCNC: 10.2 MG/L (ref 5.7–26.3)
M PEAK ID 2: 1.2 %
M PROTEIN 1 MFR SERPL ELPH: 16.3 %
M PROTEIN 1 SERPL ELPH-MCNC: 1.26 G/DL
M PROTEIN 2 SERPL ELPH-MCNC: 0.09 G/DL
PROT PATTERN SERPL ELPH-IMP: ABNORMAL
PROT SERPL-MCNC: 7.7 G/DL (ref 6.4–8.2)

## 2020-11-02 ENCOUNTER — OFFICE VISIT (OUTPATIENT)
Dept: HEMATOLOGY ONCOLOGY | Facility: CLINIC | Age: 60
End: 2020-11-02
Payer: COMMERCIAL

## 2020-11-02 VITALS
OXYGEN SATURATION: 98 % | HEIGHT: 72 IN | TEMPERATURE: 98.6 F | HEART RATE: 94 BPM | WEIGHT: 314.5 LBS | DIASTOLIC BLOOD PRESSURE: 90 MMHG | BODY MASS INDEX: 42.6 KG/M2 | RESPIRATION RATE: 18 BRPM | SYSTOLIC BLOOD PRESSURE: 144 MMHG

## 2020-11-02 DIAGNOSIS — D47.2 SMOLDERING MULTIPLE MYELOMA: Primary | ICD-10-CM

## 2020-11-02 DIAGNOSIS — R39.11 URINARY HESITANCY: ICD-10-CM

## 2020-11-02 PROCEDURE — 99213 OFFICE O/P EST LOW 20 MIN: CPT | Performed by: PHYSICIAN ASSISTANT

## 2020-11-02 PROCEDURE — 1036F TOBACCO NON-USER: CPT | Performed by: PHYSICIAN ASSISTANT

## 2020-11-03 DIAGNOSIS — J45.40 MODERATE PERSISTENT ASTHMA WITHOUT COMPLICATION: ICD-10-CM

## 2020-11-03 DIAGNOSIS — E11.42 TYPE 2 DIABETES MELLITUS WITH DIABETIC POLYNEUROPATHY, WITHOUT LONG-TERM CURRENT USE OF INSULIN (HCC): ICD-10-CM

## 2020-11-03 RX ORDER — FLUTICASONE PROPIONATE 220 UG/1
2 AEROSOL, METERED RESPIRATORY (INHALATION) 2 TIMES DAILY
Qty: 12 G | Refills: 3 | Status: CANCELLED | OUTPATIENT
Start: 2020-11-03

## 2020-11-09 DIAGNOSIS — E11.42 TYPE 2 DIABETES MELLITUS WITH DIABETIC POLYNEUROPATHY, WITHOUT LONG-TERM CURRENT USE OF INSULIN (HCC): ICD-10-CM

## 2020-11-09 DIAGNOSIS — J45.40 MODERATE PERSISTENT ASTHMA WITHOUT COMPLICATION: ICD-10-CM

## 2020-11-11 ENCOUNTER — TELEPHONE (OUTPATIENT)
Dept: FAMILY MEDICINE CLINIC | Facility: CLINIC | Age: 60
End: 2020-11-11

## 2020-11-11 ENCOUNTER — OFFICE VISIT (OUTPATIENT)
Dept: FAMILY MEDICINE CLINIC | Facility: CLINIC | Age: 60
End: 2020-11-11

## 2020-11-11 VITALS
HEIGHT: 72 IN | DIASTOLIC BLOOD PRESSURE: 70 MMHG | SYSTOLIC BLOOD PRESSURE: 140 MMHG | BODY MASS INDEX: 42.42 KG/M2 | HEART RATE: 78 BPM | RESPIRATION RATE: 16 BRPM | TEMPERATURE: 98.5 F | WEIGHT: 313.2 LBS

## 2020-11-11 DIAGNOSIS — E11.11 TYPE 2 DIABETES MELLITUS WITH KETOACIDOTIC COMA, WITHOUT LONG-TERM CURRENT USE OF INSULIN (HCC): Primary | ICD-10-CM

## 2020-11-11 DIAGNOSIS — Z23 ENCOUNTER FOR IMMUNIZATION: ICD-10-CM

## 2020-11-11 DIAGNOSIS — E08.41 DIABETIC MONONEUROPATHY ASSOCIATED WITH DIABETES MELLITUS DUE TO UNDERLYING CONDITION (HCC): ICD-10-CM

## 2020-11-11 PROCEDURE — 1036F TOBACCO NON-USER: CPT | Performed by: NURSE PRACTITIONER

## 2020-11-11 PROCEDURE — 99214 OFFICE O/P EST MOD 30 MIN: CPT | Performed by: NURSE PRACTITIONER

## 2020-11-11 PROCEDURE — 3008F BODY MASS INDEX DOCD: CPT | Performed by: PHYSICIAN ASSISTANT

## 2020-11-11 PROCEDURE — 3078F DIAST BP <80 MM HG: CPT | Performed by: NURSE PRACTITIONER

## 2020-11-11 PROCEDURE — 90471 IMMUNIZATION ADMIN: CPT | Performed by: NURSE PRACTITIONER

## 2020-11-11 PROCEDURE — 3077F SYST BP >= 140 MM HG: CPT | Performed by: NURSE PRACTITIONER

## 2020-11-11 PROCEDURE — 90682 RIV4 VACC RECOMBINANT DNA IM: CPT | Performed by: NURSE PRACTITIONER

## 2020-11-11 PROCEDURE — 3008F BODY MASS INDEX DOCD: CPT | Performed by: NURSE PRACTITIONER

## 2020-11-11 RX ORDER — GLIPIZIDE 5 MG/1
5 TABLET ORAL
Qty: 30 TABLET | Refills: 5 | Status: SHIPPED | OUTPATIENT
Start: 2020-11-11 | End: 2021-03-03 | Stop reason: ALTCHOICE

## 2020-11-11 RX ORDER — FLUTICASONE PROPIONATE 220 UG/1
2 AEROSOL, METERED RESPIRATORY (INHALATION) 2 TIMES DAILY
Qty: 12 G | Refills: 3 | Status: SHIPPED | OUTPATIENT
Start: 2020-11-11 | End: 2021-03-09

## 2020-11-11 RX ORDER — GLIMEPIRIDE 1 MG/1
TABLET ORAL
Qty: 30 TABLET | OUTPATIENT
Start: 2020-11-11

## 2020-11-11 RX ORDER — GABAPENTIN 100 MG/1
100 CAPSULE ORAL
Qty: 30 CAPSULE | Refills: 1 | Status: SHIPPED | OUTPATIENT
Start: 2020-11-11 | End: 2021-01-21 | Stop reason: SDUPTHER

## 2020-11-13 ENCOUNTER — TRANSCRIBE ORDERS (OUTPATIENT)
Dept: LAB | Facility: CLINIC | Age: 60
End: 2020-11-13

## 2020-11-13 ENCOUNTER — LAB (OUTPATIENT)
Dept: LAB | Facility: CLINIC | Age: 60
End: 2020-11-13
Payer: COMMERCIAL

## 2020-11-13 DIAGNOSIS — E11.11 TYPE 2 DIABETES MELLITUS WITH KETOACIDOTIC COMA, WITHOUT LONG-TERM CURRENT USE OF INSULIN (HCC): ICD-10-CM

## 2020-11-13 LAB
EST. AVERAGE GLUCOSE BLD GHB EST-MCNC: 197 MG/DL
HBA1C MFR BLD: 8.5 %

## 2020-11-13 PROCEDURE — 83036 HEMOGLOBIN GLYCOSYLATED A1C: CPT

## 2020-11-13 PROCEDURE — 36415 COLL VENOUS BLD VENIPUNCTURE: CPT

## 2020-11-13 PROCEDURE — 3052F HG A1C>EQUAL 8.0%<EQUAL 9.0%: CPT | Performed by: PHYSICIAN ASSISTANT

## 2020-11-13 PROCEDURE — 3052F HG A1C>EQUAL 8.0%<EQUAL 9.0%: CPT | Performed by: NURSE PRACTITIONER

## 2020-12-28 DIAGNOSIS — J45.40 MODERATE PERSISTENT ASTHMA WITHOUT COMPLICATION: ICD-10-CM

## 2020-12-31 DIAGNOSIS — J45.40 MODERATE PERSISTENT ASTHMA WITHOUT COMPLICATION: ICD-10-CM

## 2020-12-31 RX ORDER — LORATADINE 10 MG/1
10 TABLET ORAL DAILY
Qty: 90 TABLET | Refills: 1 | Status: SHIPPED | OUTPATIENT
Start: 2020-12-31 | End: 2021-07-30

## 2021-01-04 ENCOUNTER — OFFICE VISIT (OUTPATIENT)
Dept: UROLOGY | Facility: AMBULATORY SURGERY CENTER | Age: 61
End: 2021-01-04
Payer: COMMERCIAL

## 2021-01-04 VITALS
BODY MASS INDEX: 42.53 KG/M2 | HEIGHT: 72 IN | DIASTOLIC BLOOD PRESSURE: 78 MMHG | WEIGHT: 314 LBS | SYSTOLIC BLOOD PRESSURE: 140 MMHG | HEART RATE: 98 BPM

## 2021-01-04 DIAGNOSIS — Z80.42 FAMILY HISTORY OF PROSTATE CANCER IN FATHER: ICD-10-CM

## 2021-01-04 DIAGNOSIS — R39.11 URINARY HESITANCY: ICD-10-CM

## 2021-01-04 DIAGNOSIS — N40.1 BENIGN PROSTATIC HYPERPLASIA WITH LOWER URINARY TRACT SYMPTOMS, SYMPTOM DETAILS UNSPECIFIED: Primary | ICD-10-CM

## 2021-01-04 DIAGNOSIS — Z12.5 SCREENING FOR PROSTATE CANCER: ICD-10-CM

## 2021-01-04 LAB
BACTERIA UR QL AUTO: ABNORMAL /HPF
BILIRUB UR QL STRIP: NEGATIVE
CLARITY UR: CLEAR
COLOR UR: ABNORMAL
GLUCOSE UR STRIP-MCNC: ABNORMAL MG/DL
HGB UR QL STRIP.AUTO: NEGATIVE
HYALINE CASTS #/AREA URNS LPF: ABNORMAL /LPF
KETONES UR STRIP-MCNC: ABNORMAL MG/DL
LEUKOCYTE ESTERASE UR QL STRIP: NEGATIVE
NITRITE UR QL STRIP: NEGATIVE
NON-SQ EPI CELLS URNS QL MICRO: ABNORMAL /HPF
PH UR STRIP.AUTO: 6 [PH]
POST-VOID RESIDUAL VOLUME, ML POC: 42 ML
PROT UR STRIP-MCNC: ABNORMAL MG/DL
RBC #/AREA URNS AUTO: ABNORMAL /HPF
SL AMB  POCT GLUCOSE, UA: 100
SL AMB LEUKOCYTE ESTERASE,UA: NORMAL
SL AMB POCT BILIRUBIN,UA: NORMAL
SL AMB POCT BLOOD,UA: NORMAL
SL AMB POCT CLARITY,UA: CLEAR
SL AMB POCT COLOR,UA: YELLOW
SL AMB POCT KETONES,UA: NORMAL
SL AMB POCT NITRITE,UA: NORMAL
SL AMB POCT PH,UA: 6
SL AMB POCT SPECIFIC GRAVITY,UA: 1.03
SL AMB POCT URINE PROTEIN: NORMAL
SL AMB POCT UROBILINOGEN: 0.2
SP GR UR STRIP.AUTO: 1.03 (ref 1–1.03)
UROBILINOGEN UR QL STRIP.AUTO: 0.2 E.U./DL
WBC #/AREA URNS AUTO: ABNORMAL /HPF

## 2021-01-04 PROCEDURE — 3008F BODY MASS INDEX DOCD: CPT | Performed by: NURSE PRACTITIONER

## 2021-01-04 PROCEDURE — 99204 OFFICE O/P NEW MOD 45 MIN: CPT | Performed by: NURSE PRACTITIONER

## 2021-01-04 PROCEDURE — 51798 US URINE CAPACITY MEASURE: CPT | Performed by: NURSE PRACTITIONER

## 2021-01-04 PROCEDURE — 81002 URINALYSIS NONAUTO W/O SCOPE: CPT | Performed by: NURSE PRACTITIONER

## 2021-01-04 PROCEDURE — 87086 URINE CULTURE/COLONY COUNT: CPT | Performed by: NURSE PRACTITIONER

## 2021-01-04 PROCEDURE — 1036F TOBACCO NON-USER: CPT | Performed by: NURSE PRACTITIONER

## 2021-01-04 PROCEDURE — 3078F DIAST BP <80 MM HG: CPT | Performed by: NURSE PRACTITIONER

## 2021-01-04 PROCEDURE — 3077F SYST BP >= 140 MM HG: CPT | Performed by: NURSE PRACTITIONER

## 2021-01-04 PROCEDURE — 81001 URINALYSIS AUTO W/SCOPE: CPT | Performed by: NURSE PRACTITIONER

## 2021-01-04 NOTE — PROGRESS NOTES
1/4/2021    Nikole Catherine  1960  [id number]      Assessment  -BPH with lower urinary tract symptoms  -strong family history of prostate cancer    Nazanin Patrick is a 56 y o  male who presents in consultation     1  BPH with lower urinary tract symptoms- PVR today is 42 mL  His urine dip in the office today shows no evidence of infection or blood  We will send for urinalysis with microscopic and culture and call with any significant findings  There were no abnormal findings noted on CYNDIE, however difficult to palpate due to body habitus  Discussed starting an alpha-blocker for management of his mild lower urinary tract symptoms, but he defers at this time  Reviewed dietary and behavioral modifications  2  Strong family history of prostate cancer - We will obtain a PSA due to his father's history of prostate cancer  Instructed patient to wait at least 1-2 weeks to minimize any chances of false elevation  He will return to the office in 4 months to re-evaluate his urinary pattern     -All questions answered, patient agrees with plan      History of Present Illness  56 y o  male who presents in consultation for evaluation of BPH and prostate cancer screening  Patient referred by his PCP  He reports worsening urinary hesitancy and frequency over the last 3 years  Patient feels he is emptying his bladder to completion  He also reports 1 episode of gross hematuria several years ago  He is a lifetime nonsmoker and denies any family history of bladder or kidney malignancy  There are no prior PSAs available in epic to review  He states his father had a history of prostate cancer and underwent radiation therapy  Patient states he has not received a digital rectal examination in several years  Review of Systems  Review of Systems   Constitutional: Negative  HENT: Negative  Respiratory: Negative  Cardiovascular: Negative  Gastrointestinal: Negative      Genitourinary: Positive for frequency  Negative for decreased urine volume, difficulty urinating, dysuria, flank pain, hematuria and urgency  Musculoskeletal: Negative  Skin: Negative  Neurological: Negative  Psychiatric/Behavioral: Negative  AUA SYMPTOM SCORE      Most Recent Value   AUA SYMPTOM SCORE   How often have you had a sensation of not emptying your bladder completely after you finished urinating? 0   How often have you had to urinate again less than two hours after you finished urinating? 2   How often have you found you stopped and started again several times when you urinate?  0   How often have you found it difficult to postpone urination? 1   How often have you had a weak urinary stream?  4   How often have you had to push or strain to begin urination?   1   How many times did you most typically get up to urinate from the time you went to bed at night until the time you got up in the morning?  0   Quality of Life: If you were to spend the rest of your life with your urinary condition just the way it is now, how would you feel about that?  1   AUA SYMPTOM SCORE  8          Past Medical History  Past Medical History:   Diagnosis Date    Asthma     Benign positional vertigo     Diabetes mellitus ([facility] [address] )     borderline; diet controlled    Dyslipidemia     Gastroenteritis     Hyperlipidemia     Hypertension     Lipoma of neck     last assessed - [id number]    Multiple myeloma ([facility] [address] )     Sleep apnea     has symptoms but not been diagnosed    Wheezing     last assessed - [id number]       Past Social History  Past Surgical History:   Procedure Laterality Date    ESOPHAGOGASTRODUODENOSCOPY  2010    Diagnostic    MASS EXCISION Right 8/21/2017    Procedure: POSTERIOR SHOULDER MASS EXCISION; POSTERIOR NECK MASS EXCISION; CHEST WALL MASS EXCISION;  Surgeon: Marvis Seip, MD;  [facility]: [facility];  Service: General    THROAT SURGERY         Past Family History  Family History   Problem Relation Age of Onset    Hypertension Mother     Diabetes Mother     Hyperlipidemia Mother     Cancer Mother         Malignant neoplasm of the gastrointestinal tract    Diabetes type II Mother         Type 2 diabetes mellitus    Hypertension Father         Essential hypercholesterolemia    Hyperlipidemia Father     Heart disease Father         Arteriosclerotic cardiovascular disease (ASCVD)    Dementia Father        Past Social history  Social History     Socioeconomic History    Marital status: /Civil Union     Spouse name: Not on file    Number of children: Not on file    Years of education: Not on file    Highest education level: Not on file   Occupational History    Occupation: Self-employed   Social Needs    Financial resource strain: Not hard at all   Com[employer] insecurity     Worry: Never true     Inability: Never true    Transportation needs     Medical: No     Non-medical: No   Tobacco Use    Smoking status: Never Smoker    Smokeless tobacco: Never Used   Substance and Sexual Activity    Alcohol use: Yes     Frequency: Monthly or less     Comment: Social drinker per [employer]    Drug use: No    Sexual activity: Not on file   Lifestyle    Physical activity     Days per week: Not on file     Minutes per session: Not on file    Stress: Not on file   Relationships    Social connections     Talks on phone: Not on file     Gets together: Not on file     Attends Faith service: Not on file     Active member of club or organization: Not on file     Attends meetings of clubs or organizations: Not on file     Relationship status: Not on file    Intimate partner violence     Fear of current or ex partner: Not on file     Emotionally abused: Not on file     Physically abused: Not on file     Forced sexual activity: Not on file   Other Topics Concern    Not on file   Social History Narrative    Not on file       Current Medications  Current Outpatient Medications   Medication Sig Dispense Refill    albuterol (2 5 mg/3 mL) 0 083 % nebulizer solution Take 3 mL (2 5 mg total) by nebulization every 6 (six) hours as needed for wheezing 30 vial 2    ascorbic acid (VITAMIN C) 500 mg tablet Take 500 mg by mouth daily      b complex vitamins capsule Take 1 capsule by mouth daily      Blood Glucose Monitoring Suppl (ONETOUCH VERIO) w/Device KIT by Does not apply route 3 (three) times a day 1 kit 0    dicyclomine (BENTYL) 10 mg capsule TAKE 1 CAPSULE BY MOUTH 4 TIMES DAILY BEFORE MEAL(S) AND AT BEDTIME 20 capsule 0    fluticasone (FLONASE) 50 mcg/act nasal spray 2 sprays into each nostril daily      fluticasone (Flovent HFA) 220 mcg/act inhaler Inhale 2 puffs 2 (two) times a day Rinse mouth after use   12 g 3    gabapentin (NEURONTIN) 100 mg capsule Take 1 capsule (100 mg total) by mouth daily at bedtime 30 capsule 1    glipiZIDE (GLUCOTROL) 5 mg tablet Take 1 tablet (5 mg total) by mouth daily with breakfast 30 tablet 5    glucose blood (OneTouch Verio) test strip 1 each by Other route 3 (three) times a day Use as instructed 100 each 5    Lancets (ONETOUCH ULTRASOFT) lancets by Other route 3 (three) times a day Use as instructed 100 each 5    lisinopril (ZESTRIL) 20 mg tablet Take 1 tablet (20 mg total) by mouth daily 90 tablet 2    loratadine (CLARITIN) 10 mg tablet Take 1 tablet (10 mg total) by mouth daily 90 tablet 1    MAGNESIUM CITRATE PO Take 1 tablet by mouth daily        metFORMIN (GLUCOPHAGE) 1000 MG tablet Take 1 tablet (1,000 mg total) by mouth 2 (two) times a day with meals 180 tablet 2    NON FORMULARY NeuRx-TF  Peripheral nerve health      omeprazole (PriLOSEC) 40 MG capsule Take 1 capsule (40 mg total) by mouth daily 90 capsule 3    simvastatin (ZOCOR) 20 mg tablet Take 1 tablet (20 mg total) by mouth every evening 90 tablet 1    sodium chloride (OCEAN NASAL SPRAY) 0 65 % nasal spray 2 sprays into each nostril 2 (two) times a day      VENTOLIN  (90 Base) MCG/ACT inhaler INHALE ONE TO TWO PUFFS BY MOUTH EVERY 4 TO 6 HOURS AS NEEDED 18 each 11     No current facility-administered medications for this visit  Allergies  Allergies   Allergen Reactions    Shellfish-Derived Products Anaphylaxis     Clams and mussels, oysters  Lobster and crab ok    Diphenhydramine      Lightheadedness, nauseous, rapid heartbeat    Other Palpitations and Other (See Comments)     Clams       Past Medical History, Social History, Family History, medications and allergies were reviewed  Vitals  Vitals:    01/04/21 1501   BP: 140/78   BP Location: Left arm   Patient Position: Sitting   Cuff Size: Adult   Pulse: 98   Weight: (!) 142 kg (314 lb)   Height: 6' (1 829 m)       Physical Exam  Physical Exam  Constitutional:       Appearance: Normal appearance  He is well-developed  He is obese  HENT:      Head: Normocephalic  Eyes:      Pupils: Pupils are equal, round, and reactive to light  Neck:      Musculoskeletal: Normal range of motion  Pulmonary:      Effort: Pulmonary effort is normal    Abdominal:      Palpations: Abdomen is soft  Genitourinary:     Prostate: Normal       Rectum: Normal       Comments: Prostate nontender, no significant nodules appreciated, however difficult to fully palpate due to body habitus  Musculoskeletal: Normal range of motion  Skin:     General: Skin is warm and dry  Neurological:      General: No focal deficit present  Mental Status: He is alert and oriented to person, place, and time  Psychiatric:         Mood and Affect: Mood normal          Behavior: Behavior normal          Thought Content:  Thought content normal          Judgment: Judgment normal          Results    I have personally reviewed all pertinent lab results and reviewed with patient  No results found for: PSA  Lab Results   Component Value Date    GLUCOSE 206 (H) 01/13/2015    CALCIUM 9 0 10/29/2020     05/17/2017    K 4 0 10/29/2020    CO2 30 10/29/2020     10/29/2020    BUN 8 10/29/2020 CREATININE 0 87 10/29/2020     Lab Results   Component Value Date    WBC 6 21 10/29/2020    HGB 12 2 10/29/2020    HCT 39 8 10/29/2020    MCV 91 10/29/2020     10/29/2020     Recent Results (from the past 1 hour(s))   POCT urine dip    Collection Time: 01/04/21  3:10 PM   Result Value Ref Range    LEUKOCYTE ESTERASE,UA -     NITRITE,UA -     SL AMB POCT UROBILINOGEN 0 2     POCT URINE PROTEIN +      PH,UA 6 0     BLOOD,UA -     SPECIFIC GRAVITY,UA 1 030     KETONES,UA -     BILIRUBIN,UA -     GLUCOSE,       COLOR,UA yellow     CLARITY,UA clear    POCT Measure PVR    Collection Time: 01/04/21  3:10 PM   Result Value Ref Range    POST-VOID RESIDUAL VOLUME, ML POC 42 mL

## 2021-01-05 LAB — BACTERIA UR CULT: NORMAL

## 2021-01-05 RX ORDER — LORATADINE 10 MG/1
TABLET ORAL
Qty: 90 TABLET | Refills: 1 | OUTPATIENT
Start: 2021-01-05

## 2021-01-06 DIAGNOSIS — K22.70 BARRETT'S ESOPHAGUS WITHOUT DYSPLASIA: ICD-10-CM

## 2021-01-07 RX ORDER — OMEPRAZOLE 40 MG/1
40 CAPSULE, DELAYED RELEASE ORAL DAILY
Qty: 90 CAPSULE | Refills: 3 | OUTPATIENT
Start: 2021-01-07

## 2021-01-08 DIAGNOSIS — K22.70 BARRETT'S ESOPHAGUS WITHOUT DYSPLASIA: ICD-10-CM

## 2021-01-08 RX ORDER — OMEPRAZOLE 40 MG/1
40 CAPSULE, DELAYED RELEASE ORAL DAILY
Qty: 90 CAPSULE | Refills: 3 | Status: SHIPPED | OUTPATIENT
Start: 2021-01-08 | End: 2022-01-12

## 2021-01-10 DIAGNOSIS — E08.41 DIABETIC MONONEUROPATHY ASSOCIATED WITH DIABETES MELLITUS DUE TO UNDERLYING CONDITION (HCC): ICD-10-CM

## 2021-01-13 DIAGNOSIS — E08.41 DIABETIC MONONEUROPATHY ASSOCIATED WITH DIABETES MELLITUS DUE TO UNDERLYING CONDITION (HCC): ICD-10-CM

## 2021-01-13 RX ORDER — GABAPENTIN 100 MG/1
100 CAPSULE ORAL
Qty: 30 CAPSULE | Refills: 5 | OUTPATIENT
Start: 2021-01-13

## 2021-01-13 RX ORDER — GABAPENTIN 100 MG/1
CAPSULE ORAL
Qty: 30 CAPSULE | Refills: 1 | OUTPATIENT
Start: 2021-01-13

## 2021-01-19 LAB — PSA SERPL-MCNC: 2.5 NG/ML

## 2021-01-21 DIAGNOSIS — E08.41 DIABETIC MONONEUROPATHY ASSOCIATED WITH DIABETES MELLITUS DUE TO UNDERLYING CONDITION (HCC): ICD-10-CM

## 2021-01-21 RX ORDER — GABAPENTIN 100 MG/1
100 CAPSULE ORAL
Qty: 30 CAPSULE | Refills: 1 | Status: SHIPPED | OUTPATIENT
Start: 2021-01-21 | End: 2021-03-19

## 2021-01-21 NOTE — TELEPHONE ENCOUNTER
Warren Espinal first prescribed Rx in November, entered new Rx  Are you able to sign off as Warren Espinal will not be here until next week

## 2021-02-16 DIAGNOSIS — E78.5 HYPERLIPIDEMIA, UNSPECIFIED HYPERLIPIDEMIA TYPE: ICD-10-CM

## 2021-02-18 DIAGNOSIS — I10 ESSENTIAL HYPERTENSION: ICD-10-CM

## 2021-02-18 RX ORDER — SIMVASTATIN 20 MG
TABLET ORAL
Qty: 90 TABLET | Refills: 1 | Status: SHIPPED | OUTPATIENT
Start: 2021-02-18 | End: 2021-08-16

## 2021-02-19 PROCEDURE — 4010F ACE/ARB THERAPY RXD/TAKEN: CPT | Performed by: INTERNAL MEDICINE

## 2021-02-19 RX ORDER — LISINOPRIL 20 MG/1
20 TABLET ORAL DAILY
Qty: 90 TABLET | Refills: 2 | Status: SHIPPED | OUTPATIENT
Start: 2021-02-19 | End: 2021-05-06 | Stop reason: DRUGHIGH

## 2021-03-01 ENCOUNTER — TELEPHONE (OUTPATIENT)
Dept: FAMILY MEDICINE CLINIC | Facility: CLINIC | Age: 61
End: 2021-03-01

## 2021-03-01 DIAGNOSIS — F51.12 INSUFFICIENT SLEEP SYNDROME: ICD-10-CM

## 2021-03-01 DIAGNOSIS — G47.33 OBSTRUCTIVE SLEEP APNEA: Primary | ICD-10-CM

## 2021-03-01 NOTE — TELEPHONE ENCOUNTER
Lauren Cotton calling from [place] [address] [facility] and stated, " This patient needs an Ambulatory referral to sleep medicine s physician to physician, not as a study      Daignosis codes  G47 33                                F51 12

## 2021-03-03 ENCOUNTER — OFFICE VISIT (OUTPATIENT)
Dept: FAMILY MEDICINE CLINIC | Facility: CLINIC | Age: 61
End: 2021-03-03

## 2021-03-03 VITALS
HEIGHT: 72 IN | HEART RATE: 88 BPM | TEMPERATURE: 97.1 F | DIASTOLIC BLOOD PRESSURE: 82 MMHG | BODY MASS INDEX: 42.66 KG/M2 | WEIGHT: 315 LBS | SYSTOLIC BLOOD PRESSURE: 120 MMHG | RESPIRATION RATE: 18 BRPM | OXYGEN SATURATION: 97 %

## 2021-03-03 DIAGNOSIS — E11.11 TYPE 2 DIABETES MELLITUS WITH KETOACIDOTIC COMA, WITHOUT LONG-TERM CURRENT USE OF INSULIN (HCC): ICD-10-CM

## 2021-03-03 DIAGNOSIS — E11.21 TYPE 2 DIABETES MELLITUS WITH DIABETIC NEPHROPATHY, WITHOUT LONG-TERM CURRENT USE OF INSULIN (HCC): Primary | ICD-10-CM

## 2021-03-03 DIAGNOSIS — I10 ESSENTIAL HYPERTENSION: ICD-10-CM

## 2021-03-03 LAB — SL AMB POCT HEMOGLOBIN AIC: 8.6 (ref ?–6.5)

## 2021-03-03 PROCEDURE — 3052F HG A1C>EQUAL 8.0%<EQUAL 9.0%: CPT | Performed by: INTERNAL MEDICINE

## 2021-03-03 PROCEDURE — 83036 HEMOGLOBIN GLYCOSYLATED A1C: CPT | Performed by: NURSE PRACTITIONER

## 2021-03-03 PROCEDURE — 99213 OFFICE O/P EST LOW 20 MIN: CPT | Performed by: NURSE PRACTITIONER

## 2021-03-03 PROCEDURE — 3066F NEPHROPATHY DOC TX: CPT | Performed by: INTERNAL MEDICINE

## 2021-03-03 RX ORDER — GLIPIZIDE 10 MG/1
10 TABLET, FILM COATED, EXTENDED RELEASE ORAL DAILY
Qty: 30 TABLET | Refills: 5 | Status: SHIPPED | OUTPATIENT
Start: 2021-03-03 | End: 2021-03-04

## 2021-03-03 NOTE — PROGRESS NOTES
Assessment/Plan:    Diabetic peripheral neuropathy (HCC)    Lab Results   Component Value Date    HGBA1C 8 6 (A) 03/03/2021   Increased Glucotrol XL to 10 mg daily in the AM  Continue with 1000 mg of metformin BID  Make one diet change, decrease sweets and add in complex carbs  Continue with gabapentin 100 mg at hs  Referral to ophthlamology and podiatry  Walking for exercise  Eat 3 smaller meals a day and fruit for snacks  A1c in 3 months        Essential hypertension  BP at goal and will continue with 20 mg lisinopril daily  Problem List Items Addressed This Visit        Cardiovascular and Mediastinum    Essential hypertension     BP at goal and will continue with 20 mg lisinopril daily  Other Visit Diagnoses     Type 2 diabetes mellitus with diabetic nephropathy, without long-term current use of insulin ([facility] [address] )    -  Primary    Relevant Orders    POCT hemoglobin A1c (Completed)    HEMOGLOBIN A1C W/ EAG ESTIMATION    Ambulatory referral to Ophthalmology    Ambulatory referral to Podiatry            Subjective:      Patient ID: Sylvia Suazo is a 56 y o  male  56year old presents for management of DM2 and essential hypertension  He does have some LE neuropathy and started using gabapentin which he states is effective  Discussed increasing the dose if needed  At this time he does not wish to do so  Deferred foot and eye exam today and wants to see eye doctor and podiatry  The following portions of the patient's history were reviewed and updated as appropriate: allergies, current medications, past family history, past medical history, past social history, past surgical history and problem list     Review of Systems   Constitutional: Negative  Respiratory: Negative  Cardiovascular: Negative  Endocrine: Negative  Skin: Negative  Neurological:        LE neuropathy in both feet  All other systems reviewed and are negative          Objective:      /82 (BP Location: Left arm, Patient Position: Sitting, Cuff Size: Standard)   Pulse 88   Temp (!) 97 1 °F (36 2 °C) (Tympanic)   Resp 18   Ht 6' (1 829 m)   Wt (!) 144 kg (318 lb)   SpO2 97%   BMI 43 13 kg/m²          Physical Exam  Constitutional:       Appearance: Normal appearance  HENT:      Head: Normocephalic and atraumatic  Cardiovascular:      Rate and Rhythm: Normal rate and regular rhythm  Heart sounds: Normal heart sounds  Pulmonary:      Effort: Pulmonary effort is normal       Breath sounds: Normal breath sounds  Skin:     General: Skin is warm and dry  Neurological:      Mental Status: He is alert     Psychiatric:         Mood and Affect: Mood normal          Behavior: Behavior normal

## 2021-03-03 NOTE — PATIENT INSTRUCTIONS
I increased your glucotrol to 10 mg in the AM; you need to eat lunch  Have OJ on hand in case sugar would go too low  Set up appointments with podiatry and eye doc    Get A1c in 3 months

## 2021-03-03 NOTE — PROGRESS NOTES
Patient ID: Lulu Valenzuela is a 59 y o  male  Assessment/Plan:    Peripheral polyneuropathy  Patient with evidence of peripheral neuropathy in the setting of known diabetes  Additional workup ordered at consult revealed an abnormal serum protein electrophoresis  He was referred to hematology  Other labs normal including B12, folate, TSH, heavy metals, Lyme  EMG LEs demonstrated a peripheral neuropathy involving mainly the sensory fibers  There is also evidence of an entrapment neuropathy of the left lateral femoral cutaneous nerve, consistent with meralgia paresthetica  Discussed with patient that his neuropathy may be caused by his diabetes and/or Multiple myeloma  We discussed treatment for neuropathy, including medication for symptomatic control, balance therapy if needed, and control of the risk factors  At this time, he does not want any medications for his neuropathy  He feels symptoms are bearable  If he does want meds, would start with gabapentin  He should continue to work with PCP to achieve strict glycemic control, and continue to work with heme on MM treatment, if indicated  He will follow up in 3-4 months or sooner if needed  He will call the office for any new or worsening symptoms  Meralgia paresthetica of left side  EMG confirmed entrapment neuropathy of the left lateral femoral cutaneous nerve, c/w meralgia paresthetica  Suggested lidocaine patches, however he would like to hold off at this time  Multiple myeloma not having achieved remission [facility])  Following with Dr Rosy Fernandes  Diagnosed as smoldering MM  For now, looks like they are just going to watch his labs and initiate treatment if needed  He will follow closely with heme         Diagnoses and all orders for this visit:    Peripheral polyneuropathy    Meralgia paresthetica of left side    Multiple myeloma not having achieved remission (HCC)           Subjective:    DIOR Valenzuela is a 59 y o  right handed male who presents for a follow up of paresthesias/peripheral polyneuropathy and leg pain  He was last seen in March 2018 for initial consultation with Dr Omid Corbin  Interval history/todays visit:  At last visit, labs and EMG were ordered  Patient had decided to hold off on any meds for neuropathy  EMG of the natasha LEs completed 5/1/18 demonstrated a peripheral neuropathy involving mainly the sensory fibers  There is evidence of an entrapment neuropathy of the left lateral femoral cutaneous nerve, consistent with meralgia paresthetica  Labs demonstrated B12 456, negative Lyme, negative heavy metals screen  TSH 2 53, Copper 121, Folate >20  He did have an abnormal serum protein electrophoresis with monoclonal gammopathy identified as IgG kappa  He was referred to hematology and saw Dr Elinor Whitney  Additional workup completed including bone marrow biopsy  He was diagnosed with smoldering multiple myeloma, which they are going to continue to monitor closely  He reports his neuropathy symptoms are annoying, feels like socks are bunched up under his feet, but not unbearable  He continues to have numbness on the left lateral and anterior thigh  Denies falls  He says he has balance problems only when his BPPV is acting up, but not due to his feet  Denies symptoms in the hands  History from consult with Dr Omid Corbin 3/6/18:  Patient has history of diabetes for about two years and complains of numbness and tingling in both his feet and left lower extremity  Patient states that he feels that he is standing on josé mostly under the toes for about 6 months and he also has a dull pain in his feet as well  He was evaluated by Podiatrist and states he had a skin biopsy and they did not find any nerves  He also states that he is exposed to lead at work and has been working there for about 45 years  He also has numbness in the left lateral and anterior aspect of the thigh for about 6 months   He feels constant numbness feeling and burning around 3:30 pm after standing most days  He also has intermittent dull pain when going up or down the stairs  He did not gain or loose weight and denies any tight belts  He denies any back pain  The following portions of the patient's history were reviewed and updated as appropriate: current medications, past family history, past medical history, past social history, past surgical history and problem list          Objective:    Blood pressure 142/80, pulse 77, height 6' (1 829 m), weight (!) 146 kg (321 lb 3 2 oz)  Physical Exam   Constitutional: He appears well-developed and well-nourished  HENT:   Head: Normocephalic and atraumatic  Eyes: EOM are normal  Pupils are equal, round, and reactive to light  Cardiovascular: Intact distal pulses  Pulmonary/Chest: Effort normal    Neurological: He has normal strength  Gait and coordination normal    Skin: Skin is warm and dry  Psychiatric: He has a normal mood and affect  His speech is normal        Neurological Exam    Mental Status  The patient is alert and oriented to person, place, time, and situation  His recent and remote memory are normal  His speech is normal  His language is fluent with no aphasia  He has normal attention span and concentration  He has a normal fund of knowledge  Cranial Nerves    CN II: The patient's visual acuity and visual fields are normal   CN III, IV, VI: The patient's pupils are equally round and reactive to light and ocular movements are normal   CN V: The patient has normal facial sensation  CN VII:  The patient has symmetric facial movement  CN VIII:  The patient's hearing is normal   CN IX, X: The patient has symmetric palate movement and normal gag reflex  CN XI: The patient's shoulder shrug strength is normal   CN XII: The patient's tongue is midline without atrophy or fasciculations  Motor  The patient has normal muscle bulk throughout   His overall muscle tone is normal throughout  His strength is 5/5 throughout all four extremities  Sensory    Light touch is intact  Pinprick, temperature are impaired till mid feet bilaterally  Vibration is decreased at the toes bilaterally  Proprioception is intact     Reflexes  Difficult to elicit throughout, plantars downgoing bilaterally     Gait and Coordination  The patient has normal gait and station  He has normal coordination bilaterally  ROS:    Review of Systems   Constitutional: Positive for fatigue  Negative for appetite change and fever  HENT: Negative  Negative for hearing loss, tinnitus, trouble swallowing and voice change  Eyes: Negative  Negative for photophobia and pain  Respiratory: Negative  Negative for shortness of breath  Cardiovascular: Negative  Negative for palpitations  Gastrointestinal: Positive for abdominal pain, diarrhea and vomiting  Negative for nausea  Endocrine: Negative  Negative for cold intolerance and heat intolerance  Genitourinary: Negative  Negative for dysuria, frequency and urgency  Musculoskeletal: Positive for back pain  Negative for myalgias and neck pain  Skin: Negative  Negative for rash  Allergic/Immunologic: Negative  Neurological: Positive for dizziness, tremors, weakness and numbness  Negative for seizures, syncope, facial asymmetry, speech difficulty, light-headedness and headaches  Balance problems     Hematological: Negative  Does not bruise/bleed easily  Psychiatric/Behavioral: Negative  Negative for confusion, hallucinations and sleep disturbance  Less than monthly

## 2021-03-04 PROBLEM — E11.21 TYPE 2 DIABETES WITH NEPHROPATHY (HCC): Status: RESOLVED | Noted: 2017-03-08 | Resolved: 2021-03-04

## 2021-03-04 PROBLEM — E11.21 TYPE 2 DIABETES WITH NEPHROPATHY (HCC): Status: ACTIVE | Noted: 2017-03-08

## 2021-03-04 RX ORDER — GLIPIZIDE 10 MG/1
TABLET, FILM COATED, EXTENDED RELEASE ORAL
Qty: 90 TABLET | Refills: 1 | Status: SHIPPED | OUTPATIENT
Start: 2021-03-04 | End: 2021-10-11

## 2021-03-04 NOTE — ASSESSMENT & PLAN NOTE
Lab Results   Component Value Date    HGBA1C 8 6 (A) 03/03/2021   Increased Glucotrol XL to 10 mg daily in the AM  Continue with 1000 mg of metformin BID  Make one diet change, decrease sweets and add in complex carbs  Continue with gabapentin 100 mg at hs  Referral to ophthlamology and podiatry  Walking for exercise  Eat 3 smaller meals a day and fruit for snacks  A1c in 3 months

## 2021-03-05 DIAGNOSIS — J45.40 MODERATE PERSISTENT ASTHMA WITHOUT COMPLICATION: ICD-10-CM

## 2021-03-08 ENCOUNTER — OFFICE VISIT (OUTPATIENT)
Dept: SLEEP CENTER | Facility: CLINIC | Age: 61
End: 2021-03-08
Payer: COMMERCIAL

## 2021-03-08 VITALS
BODY MASS INDEX: 42.66 KG/M2 | DIASTOLIC BLOOD PRESSURE: 80 MMHG | WEIGHT: 315 LBS | SYSTOLIC BLOOD PRESSURE: 120 MMHG | HEIGHT: 72 IN

## 2021-03-08 DIAGNOSIS — G47.33 OBSTRUCTIVE SLEEP APNEA: Primary | ICD-10-CM

## 2021-03-08 DIAGNOSIS — F51.12 INSUFFICIENT SLEEP SYNDROME: ICD-10-CM

## 2021-03-08 DIAGNOSIS — J34.89 DRY NOSE: ICD-10-CM

## 2021-03-08 DIAGNOSIS — E11.42 DIABETIC PERIPHERAL NEUROPATHY (HCC): ICD-10-CM

## 2021-03-08 DIAGNOSIS — R68.2 DRY MOUTH: ICD-10-CM

## 2021-03-08 DIAGNOSIS — J31.0 CHRONIC RHINITIS: ICD-10-CM

## 2021-03-08 DIAGNOSIS — J45.21 MILD INTERMITTENT ASTHMA WITH EXACERBATION: ICD-10-CM

## 2021-03-08 DIAGNOSIS — E66.01 MORBID OBESITY (HCC): ICD-10-CM

## 2021-03-08 PROCEDURE — 1036F TOBACCO NON-USER: CPT | Performed by: INTERNAL MEDICINE

## 2021-03-08 PROCEDURE — 3008F BODY MASS INDEX DOCD: CPT | Performed by: INTERNAL MEDICINE

## 2021-03-08 PROCEDURE — 99214 OFFICE O/P EST MOD 30 MIN: CPT | Performed by: INTERNAL MEDICINE

## 2021-03-08 NOTE — PROGRESS NOTES
Review of Systems      Genitourinary difficulty with erection   Cardiology none   Gastrointestinal none   Neurology numbness/tingling of an extremity, forgetfulness, poor concentration or confusion, , difficulty with memory and balance problems   Constitutional fatigue   Integumentary none   Psychiatry none   Musculoskeletal muscle aches and leg cramps   Pulmonary shortness of breath with activity and difficulty breathing when lying flat    ENT none   Endocrine none   Hematological none

## 2021-03-08 NOTE — PATIENT INSTRUCTIONS

## 2021-03-08 NOTE — PROGRESS NOTES
Follow-Up Note - [address] [place]  56 y o  male  ICT:2/4/[birth year]2  WXL:[id number]    CC: I saw this patient for follow-up in clinic today for Sleep disordered breathing, Coexisting Sleep and Medical Problems  A diagnostic study in September of 2017 demonstrated an AHI of 38 7 per hour, considerably higher during REM at 63 per hour  Minimum oxygen saturation was 75% and 18 9% of the study was spent with saturations less than 90%  During the subsequent therapeutic study, the AHI was reduced to 6 3 per hour at 15 cm H2O  Auto titrating Pap 14-17 cm H2O was initiated  PFSH, Problem List, Medications & Allergies were reviewed in EMR  Interval changes: none reported  He  has a past medical history of Asthma, Benign positional vertigo, Diabetes mellitus ([facility] [address] ), Dyslipidemia, Gastroenteritis, Hyperlipidemia, Hypertension, Lipoma of neck, Multiple myeloma ([facility] [address] ), Sleep apnea, and Wheezing  He has a current medication list which includes the following prescription(s): ascorbic acid, b complex vitamins, fluticasone, flovent hfa, gabapentin, glipizide, lisinopril, loratadine, magnesium citrate, metformin, NON FORMULARY, omeprazole, simvastatin, sodium chloride, ventolin hfa, albuterol, onetouch verio, dicyclomine, glucose blood, and onetouch ultrasoft  ROS: as attached  Significant for weight has been stable  Asthma is controlled  His diabetes remains poorly controlled and hemoglobin A1c was 8 6 mg%  He recently had increase in dose of gabapentin for neuropathy pain that disturb sleep  PHYSIOLOGICAL DATA REVIEW AND INTERPRETATION:   using PAP > 4 hours/night 90%   Estimated KATHRYN 1/hour with pressure of 17cm H2O @90th percentile  Compliance: Very good; Sleep disordered breathing:stable & within target range    SUBJECTIVE: Regarding use of PAP, Brittany reports:   · He is experiencing some adverse effects: dry mouth/throat and mask leaks   · He is benefiting from use: sleeping better   Sleep Routine: He reports getting 6 hrs sleep (where as previously he was only getting around 4 hours); he no difficulty initiating or maintaining sleep   He awakens spontaneously and feels refreshed  Toshia Elmore reports Excessive Daytime Sleepiness and takes planned naps in the afternoons mainly for pain relief and rated himself at Total score: 10 /24 on the [place] Sleepiness Scale  Habits: reports that he has never smoked  He has never used smokeless tobacco ,  reports current alcohol use ,  reports no history of drug use , Caffeine use: very little , Exercise routine: none   EXAM: /80   Ht 6' (1 829 m)   Wt (!) 144 kg (317 lb)   BMI 42 99 kg/m²     Patient is well groomed; well appearing  Skin/Extrem: col & hydration normal; no edema  Psych: cooperativeand in no distress  Mental state:appears normal   CNS: Alert, orientated, clear & coherent speech  H&N: EOMI; NC/AT:no facial pressure marks, no rashes  Physical findings otherwise essentially unchanged from previous  IMPRESSION: Problem List Items & Comorbidities Addressed this Visit    1  Obstructive sleep apnea  Ambulatory referral to Sleep Medicine    PAP DME Resupply/Reorder   2  Insufficient sleep syndrome  Ambulatory referral to Sleep Medicine   3  Dry nose     4  Dry mouth     5  Chronic rhinitis     6  Mild intermittent asthma with exacerbation     7  Morbid obesity ([address] )     8  Diabetic peripheral neuropathy ([facility] [address] )         PLAN:  1  I reviewed results of prior studies and physiologic data with the patient  I discussed treatment options with risks and benefits  2  Treatment with  PAP is medically necessary and Toshia Elmore is agreable to continue use  3  Care of equipment, methods to improve comfort using PAP and importance of compliance with therapy were discussed  4  Pressure setting: continue 15-17 cmH2O     5  Rx provided to replace supplies and Care coordinated with DME provider     6  Nasal symptoms may improve with regular nasal saline rinse followed by topical nasal steroid if necessary  7  He may benefit from further increase of gabapentin q h s  With asymmetric dosing to improved daytime symptoms  Alternately, Cymbalta may be considered  He will follow-up with his PCP in this regard  8  Discussed  strategies for weight reduction  9  Follow-up is advised in 1 year or sooner if needed to monitor progress, compliance and to adjust therapy  Thank you for allowing me to participate in the care of this patient      Sincerely,    Authenticated electronically by Yogi Degroot MD on 64/76/03   Board Certified Specialist

## 2021-03-09 ENCOUNTER — TELEPHONE (OUTPATIENT)
Dept: SLEEP CENTER | Facility: CLINIC | Age: 61
End: 2021-03-09

## 2021-03-09 DIAGNOSIS — J45.40 MODERATE PERSISTENT ASTHMA WITHOUT COMPLICATION: ICD-10-CM

## 2021-03-09 RX ORDER — FLUTICASONE PROPIONATE 220 UG/1
2 AEROSOL, METERED RESPIRATORY (INHALATION) 2 TIMES DAILY
Qty: 12 G | Refills: 5 | Status: CANCELLED | OUTPATIENT
Start: 2021-03-09

## 2021-03-09 RX ORDER — FLUTICASONE PROPIONATE 220 UG/1
AEROSOL, METERED RESPIRATORY (INHALATION)
Qty: 12 G | Refills: 3 | Status: SHIPPED | OUTPATIENT
Start: 2021-03-09 | End: 2022-05-26

## 2021-03-16 ENCOUNTER — TELEMEDICINE (OUTPATIENT)
Dept: FAMILY MEDICINE CLINIC | Facility: CLINIC | Age: 61
End: 2021-03-16

## 2021-03-16 DIAGNOSIS — R06.00 DYSPNEA, UNSPECIFIED TYPE: ICD-10-CM

## 2021-03-16 DIAGNOSIS — E66.01 MORBID OBESITY WITH BMI OF 40.0-44.9, ADULT (HCC): Primary | ICD-10-CM

## 2021-03-16 PROCEDURE — 99213 OFFICE O/P EST LOW 20 MIN: CPT | Performed by: FAMILY MEDICINE

## 2021-03-16 NOTE — ASSESSMENT & PLAN NOTE
Dyspnea with exercise likely due to deconditioning  Advised patient to continue with daily walks  Encouraged gym membership and exercise program   Would also benefit from weight loss  BMI 42 and he weighs 317 lb  Will refer to weight management  Symptoms do not appear to be coronary artery related but will check lipid panel  Denies any family history of significant cardiac problems  ED Precautions were discussed

## 2021-03-16 NOTE — PROGRESS NOTES
Virtual Brief Visit    Assessment/Plan:    Problem List Items Addressed This Visit        Other    Morbid obesity with BMI of 40 0-44 9, adult ([facility] [address] ) - Primary    Relevant Orders    Lipid panel    Ambulatory referral to Weight Management    Dyspnea     Dyspnea with exercise likely due to deconditioning  Advised patient to continue with daily walks  Encouraged gym membership and exercise program   Would also benefit from weight loss  BMI 42 and he weighs 317 lb  Will refer to weight management  Symptoms do not appear to be coronary artery related but will check lipid panel  Denies any family history of significant cardiac problems  ED Precautions were discussed  Reason for visit is   Chief Complaint   Patient presents with    Shortness of Breath    Virtual Brief Visit        Encounter provider Victoriano Ny MD    Provider located at [address] [place] [id number] [phone]    Recent Visits  No visits were found meeting these conditions  Showing recent visits within past 7 days and meeting all other requirements     Today's Visits  Date Type Provider Dept   03/16/21 Flavio Pearson MD [facility] today's visits and meeting all other requirements     Future Appointments  No visits were found meeting these conditions  Showing future appointments within next 150 days and meeting all other requirements        After connecting through telephone, the patient was identified by name and date of birth  [facility] was informed that this is a telemedicine visit and that the visit is being conducted through telephone  My office door was closed  No one else was in the room  He acknowledged consent and understanding of privacy and security of the platform  The patient has agreed to participate and understands he can discontinue the visit at any time      Patient is aware this is a billable service  Subjective    Barak Wiseman is a 56 y o  male   Today's visit is for shortness of breath  Noticed increasing shortness of breath when walking with his wife the other day  He lives a sedentary lifestyle and does not exercise much  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 but is not doing any intensive work  Denies any shortness of breath at sleep  No orthopnea coughing or pedal edema  When walking he denies any chest pain, nausea, vomiting, diaphoresis, abdominal pain, visual disturbances  Past Medical History:   Diagnosis Date    Asthma     Benign positional vertigo     Diabetes mellitus (HCC)     borderline; diet controlled    Dyslipidemia     Gastroenteritis     Hyperlipidemia     Hypertension     Lipoma of neck     last assessed - [id number]    Multiple myeloma ([facility] [address] )     Sleep apnea     has symptoms but not been diagnosed    Wheezing     last assessed - [id number]       Past Surgical History:   Procedure Laterality Date    ESOPHAGOGASTRODUODENOSCOPY  2010    Diagnostic    MASS EXCISION Right 8/21/2017    Procedure: POSTERIOR SHOULDER MASS EXCISION; POSTERIOR NECK MASS EXCISION; CHEST WALL MASS EXCISION;  Surgeon: Lora Hudson MD;  Location: [facility];  Service: General    THROAT SURGERY         Current Outpatient Medications   Medication Sig Dispense Refill    Flovent  MCG/ACT inhaler INHALE 2 PUFFS BY MOUTH TWICE DAILY   RINSE MOUTH AFTER USE 12 g 3    fluticasone (FLONASE) 50 mcg/act nasal spray 2 sprays into each nostril daily      gabapentin (NEURONTIN) 100 mg capsule Take 1 capsule (100 mg total) by mouth daily at bedtime 30 capsule 1    glipiZIDE (GLUCOTROL XL) 10 mg 24 hr tablet TAKE 1 TABLET(10 MG) BY MOUTH DAILY 90 tablet 1    lisinopril (ZESTRIL) 20 mg tablet Take 1 tablet (20 mg total) by mouth daily 90 tablet 2    loratadine (CLARITIN) 10 mg tablet Take 1 tablet (10 mg total) by mouth daily 90 tablet 1    MAGNESIUM CITRATE PO Take 1 tablet by mouth daily        metFORMIN (GLUCOPHAGE) 1000 MG tablet Take 1 tablet (1,000 mg total) by mouth 2 (two) times a day with meals 180 tablet 2    NON FORMULARY NeuRx-TF  Peripheral nerve health      omeprazole (PriLOSEC) 40 MG capsule Take 1 capsule (40 mg total) by mouth daily 90 capsule 3    simvastatin (ZOCOR) 20 mg tablet TAKE 1 TABLET(20 MG) BY MOUTH EVERY EVENING 90 tablet 1    sodium chloride (OCEAN NASAL SPRAY) 0 65 % nasal spray 2 sprays into each nostril 2 (two) times a day      VENTOLIN  (90 Base) MCG/ACT inhaler INHALE ONE TO TWO PUFFS BY MOUTH EVERY 4 TO 6 HOURS AS NEEDED 18 each 11    albuterol (2 5 mg/3 mL) 0 083 % nebulizer solution Take 3 mL (2 5 mg total) by nebulization every 6 (six) hours as needed for wheezing (Patient not taking: Reported on 3/3/2021) 30 vial 2    ascorbic acid (VITAMIN C) 500 mg tablet Take 500 mg by mouth daily      b complex vitamins capsule Take 1 capsule by mouth daily      Blood Glucose Monitoring Suppl (ONETOUCH VERIO) w/Device KIT by Does not apply route 3 (three) times a day (Patient not taking: Reported on 3/3/2021) 1 kit 0    dicyclomine (BENTYL) 10 mg capsule TAKE 1 CAPSULE BY MOUTH 4 TIMES DAILY BEFORE MEAL(S) AND AT BEDTIME (Patient not taking: Reported on 3/3/2021) 20 capsule 0    glucose blood (OneTouch Verio) test strip 1 each by Other route 3 (three) times a day Use as instructed (Patient not taking: Reported on 3/3/2021) 100 each 5    Lancets (ONETOUCH ULTRASOFT) lancets by Other route 3 (three) times a day Use as instructed (Patient not taking: Reported on 3/3/2021) 100 each 5     No current facility-administered medications for this visit           Allergies   Allergen Reactions    Shellfish-Derived Products Anaphylaxis     Clams and mussels, oysters  Lobster and crab ok    Diphenhydramine      Lightheadedness, nauseous, rapid heartbeat    Other Palpitations and Other (See Comments)     Clams       Review of Systems Constitutional: Negative for activity change, appetite change, chills, diaphoresis, fatigue, fever and unexpected weight change  Eyes: Negative for visual disturbance  Respiratory: Positive for shortness of breath  Negative for cough, choking, chest tightness, wheezing and stridor  Cardiovascular: Negative for chest pain, palpitations and leg swelling  Gastrointestinal: Negative for abdominal pain, nausea and vomiting  Neurological: Negative for dizziness, syncope, weakness, light-headedness, numbness and headaches  All other systems reviewed and are negative  There were no vitals filed for this visit  I spent 11 minutes directly with the patient during this visit    VIRTUAL VISIT Graham Puentes acknowledges that he has consented to an online visit or consultation  He understands that the online visit is based solely on information provided by him, and that, in the absence of a face-to-face physical evaluation by the physician, the diagnosis he receives is both limited and provisional in terms of accuracy and completeness  This is not intended to replace a full medical face-to-face evaluation by the physician  Sofia Lopez understands and accepts these terms

## 2021-03-19 DIAGNOSIS — E08.41 DIABETIC MONONEUROPATHY ASSOCIATED WITH DIABETES MELLITUS DUE TO UNDERLYING CONDITION (HCC): ICD-10-CM

## 2021-03-19 RX ORDER — GABAPENTIN 100 MG/1
CAPSULE ORAL
Qty: 30 CAPSULE | Refills: 1 | Status: SHIPPED | OUTPATIENT
Start: 2021-03-19 | End: 2021-05-18

## 2021-04-14 ENCOUNTER — OFFICE VISIT (OUTPATIENT)
Dept: PODIATRY | Facility: CLINIC | Age: 61
End: 2021-04-14
Payer: COMMERCIAL

## 2021-04-14 VITALS
HEIGHT: 72 IN | BODY MASS INDEX: 42.66 KG/M2 | HEART RATE: 106 BPM | SYSTOLIC BLOOD PRESSURE: 151 MMHG | WEIGHT: 315 LBS | DIASTOLIC BLOOD PRESSURE: 83 MMHG

## 2021-04-14 DIAGNOSIS — M20.41 HAMMERTOE, BILATERAL: ICD-10-CM

## 2021-04-14 DIAGNOSIS — E11.42 DIABETIC PERIPHERAL NEUROPATHY (HCC): Primary | ICD-10-CM

## 2021-04-14 DIAGNOSIS — M20.42 HAMMERTOE, BILATERAL: ICD-10-CM

## 2021-04-14 PROCEDURE — 3077F SYST BP >= 140 MM HG: CPT | Performed by: PODIATRIST

## 2021-04-14 PROCEDURE — 99243 OFF/OP CNSLTJ NEW/EST LOW 30: CPT | Performed by: PODIATRIST

## 2021-04-14 PROCEDURE — 3079F DIAST BP 80-89 MM HG: CPT | Performed by: PODIATRIST

## 2021-04-14 NOTE — LETTER
April 15, 2021     Margaretta Sever, [address] [place] [address]    Patient: Diane Olivarez   YOB: 1960   Date of Visit: 4/14/2021       Dear Dr Estelle Gordillo:    Thank you for referring Susi Brown to me for evaluation  Below are my notes for this consultation  If you have questions, please do not hesitate to call me  I look forward to following your patient along with you  Sincerely,        Aamir Aguilar DPM        CC: No Recipients  DONOVAN [facility]  4/15/2021  7:55 AM  Signed  Assessment/Plan:         Diagnoses and all orders for this visit:    Diabetic peripheral neuropathy ([address] )  -     Diabetic Shoe    Hammertoe, bilateral  -     Diabetic Shoe      -Educated on DM risk to lower extremities, proper shoe gear, and daily monitoring of feet    -Educated on A1C and the risks of poorly controlled Diabetes   -Discussed weight loss and suitable exercise regiment    -Dm foot risk 1, poorly controlled DM, parasthesia  -Patient has general sensation in his feet and good perfusion  He prefers annual DM foot exam but will call if new symptoms arise  He is getting parasthesia in his feet, an early sign of neuropathy  We discussed his A1C greater than 8 is improved from previous but should be closer to 7  Reinforced lifestyle changes    Data Reviewed: Independent review of image: none    Labs: A1C 3/3/21 8  6  Previous values over 10, this is improving    Other provider documentation: 3/3/21 PCP Margaretta Sever, [address]  Glucotrol increased, lifestyle modification, diet and exercise discussed  Risk of Complication:  moderate            Subjective:      Patient ID: Diane Olivarez is a 56 y o  male  Patient presents for DM foot exam  Her was referred by his PCP  HE saw a podiatrist years ago but never went back  His A1C is 8 6 but used to be 11  He gets numbness in his feet and wears DM inserts  There is a tingling sensation at night  He denies any new exercises   He thinks he lost a few pounds but not much significant  Patient was diagnosed with multiple myeloma a few years ago which is being monitored  He states it is not an aggressive cancer  The following portions of the patient's history were reviewed and updated as appropriate: allergies, current medications, past family history, past medical history, past social history, past surgical history and problem list     Review of Systems   Constitutional: Negative  HENT: Negative for sinus pressure and sinus pain  Respiratory: Negative for cough and shortness of breath  Cardiovascular: Negative for chest pain and leg swelling  Gastrointestinal: Negative for diarrhea, nausea and vomiting  Musculoskeletal: Negative for arthralgias and gait problem  Skin: Negative for color change and wound  Neurological: Negative for weakness and numbness  Objective:      /83   Pulse (!) 106   Ht 6' (1 829 m) Comment: verbal  Wt (!) 144 kg (318 lb 3 2 oz)   BMI 43 16 kg/m²     Contains abnormal data POCT hemoglobin A1c  Order: [id number]  Status:  Final result   Visible to patient:  Yes ([facility] My[facility])   Next appt:  04/21/2021 at 08:30 AM in [address] [place] [address] ([address] [facility])   Dx:  Type 2 diabetes mellitus with diabeti    Ref Range & Units 3/3/21 1:03 PM   Hemoglobin A1C 6 5 8 6Abnormal           Specimen Collected: 03/03/21  1:03 PM   Last Resulted: 03/03/21  1:03 PM                Physical Exam  Cardiovascular:      Pulses: no weak pulses          Dorsalis pedis pulses are 2+ on the right side and 2+ on the left side  Posterior tibial pulses are 2+ on the right side and 2+ on the left side  Musculoskeletal:      Right foot: Normal range of motion  Deformity present  No bunion, Charcot foot, foot drop or prominent metatarsal heads  Left foot: Normal range of motion  Deformity (  Mild adductovarus 5th hammertoe deformity bilaterally) present   No bunion, Charcot foot, foot drop or prominent metatarsal heads         Feet:    Feet:      Right foot:      Protective Sensation: 10 sites tested  10 sites sensed  Skin integrity: Dry skin present  No ulcer, blister, skin breakdown or callus  Toenail Condition: Right toenails are normal       Left foot:      Protective Sensation: 10 sites tested  10 sites sensed  Skin integrity: Dry skin present  No ulcer, skin breakdown or callus  Toenail Condition: Left toenails are normal       Comments:  Absent pedal hair        Diabetic Foot Exam    Patient's shoes and socks removed  Right Foot/Ankle   Right Foot Inspection  Skin Exam: skin intact and dry skin no blister, no callus, no maceration, no ulcer and no callus                          Toe Exam: right toe deformityno swelling, no tenderness and erythema  Sensory   Vibration: diminished  Proprioception: intact   Monofilament testing: intact  Vascular  Capillary refills: < 3 seconds  The right DP pulse is 2+  The right PT pulse is 2+  Right Toe  - Comprehensive Exam  Arch: normal  Hammertoes: fifth toe  Hallux valgus: no    Left Foot/Ankle  Left Foot Inspection  Skin Exam: skin intact and dry skinno maceration, no ulcer and no callus                         Toe Exam: left toe deformityno swelling, no tenderness and no erythema                   Sensory   Vibration: diminished  Proprioception: intact  Monofilament: intact  Vascular  Capillary refills: < 3 seconds  The left DP pulse is 2+  The left PT pulse is 2+  Left Toe  - Comprehensive Exam  Arch: normal  Hammertoes: fifth toe  Hallux valgus: no  Assign Risk Category:  Deformity present; No loss of protective sensation;  No weak pulses       Risk: 1

## 2021-04-14 NOTE — PATIENT INSTRUCTIONS
Foot Care for People with Diabetes   WHAT YOU NEED TO KNOW:   · Foot care helps protect your feet and prevent foot ulcers or sores  Long-term high blood sugar levels can damage the blood vessels and nerves in your legs and feet  This damage makes it hard to feel pressure, pain, temperature, and touch  You may not be able to feel a cut or sore, or shoes that are too tight  Foot care is needed to prevent serious problems, such as an infection or amputation  · Diabetes may cause your toes to become crooked or curved under  These changes may affect the way you walk and can lead to increased pressure on your foot  The pressure can decrease blood flow to your feet  Lack of blood flow increases your risk for a foot ulcer  Do not ignore small problems, such as dry skin or small wounds  These can become life-threatening over time without proper care  DISCHARGE INSTRUCTIONS:   Call your care team provider if:   · Your feet become numb, weak, or hard to move  · You have pus draining from a sore on your foot  · You have a wound on your foot that gets bigger, deeper, or does not heal      · You see blisters, cuts, scratches, calluses, or sores on your foot  · You have a fever, and your feet become red, warm, and swollen  · Your toenails become thick, curled, or yellow  · You find it hard to check your feet because your vision is poor  · You have questions or concerns about your condition or care  Foot care:   · Check your feet each day  Look at your whole foot, including the bottom, and between and under your toes  Check for wounds, corns, and calluses  Use a mirror to see the bottom of your feet  The skin on your feet may be shiny, tight, or darker than normal  Your feet may also be cold and pale  Feel your feet by running your hands along the tops, bottoms, sides, and between your toes  Redness, swelling, and warmth are signs of blood flow problems that can lead to a foot ulcer   Do not try to remove corns or calluses yourself  · Wash your feet each day with soap and warm water  Do not use hot water, because this can injure your foot  Dry your feet gently with a towel after you wash them  Dry between and under your toes  · Apply lotion or a moisturizer on your dry feet  Ask your care team provider what lotions are best to use  Do not put lotion or moisturizer between your toes  Moisture between your toes could lead to skin breakdown  · Cut your toenails correctly  File or cut your toenails straight across  Use a soft brush to clean around your toenails  If your toenails are very thick, you may need to have a care team provider or specialist cut them  · Protect your feet  Do not walk barefoot or wear your shoes without socks  Check your shoes for rocks or other objects that can hurt your feet  Wear cotton socks to help keep your feet dry  Wear socks without toe seams, or wear them with the seams inside out  Change your socks each day  Do not wear socks that are dirty or damp  · Wear shoes that fit well  Wear shoes that do not rub against any area of your feet  Your shoes should be ½ to ¾ inch (1 to 2 centimeters) longer than your feet  Your shoes should also have extra space around the widest part of your feet  Walking or athletic shoes with laces or straps that adjust are best  Ask your care team provider for help to choose shoes that fit you best  Ask him or her if you need to wear an insert, orthotic, or bandage on your feet  · Do not smoke  Smoking can damage your blood vessels and put you at increased risk for foot ulcers  Ask your care team provider for information if you currently smoke and need help to quit  E-cigarettes or smokeless tobacco still contain nicotine  Talk to your care team provider before you use these products  Follow up with your diabetes care team provider or foot specialist as directed:   You will need to have your feet checked at least once a jim Malik may need a foot exam more often if you have nerve damage, foot deformities, or ulcers  Write down your questions so you remember to ask them during your visits  © Copyright [address] Information is for End User's use only and may not be sold, redistributed or otherwise used for commercial purposes  All illustrations and images included in CareNotes® are the copyrighted property of A D A M , Inc  or [facility]   The above information is an  only  It is not intended as medical advice for individual conditions or treatments  Talk to your doctor, nurse or pharmacist before following any medical regimen to see if it is safe and effective for you

## 2021-04-14 NOTE — PROGRESS NOTES
Assessment/Plan:         Diagnoses and all orders for this visit:    Diabetic peripheral neuropathy ([facility] [address] )  -     Diabetic Shoe    Hammertoe, bilateral  -     Diabetic Shoe      -Educated on DM risk to lower extremities, proper shoe gear, and daily monitoring of feet    -Educated on A1C and the risks of poorly controlled Diabetes   -Discussed weight loss and suitable exercise regiment    -Dm foot risk 1, poorly controlled DM, parasthesia  -Patient has general sensation in his feet and good perfusion  He prefers annual DM foot exam but will call if new symptoms arise  He is getting parasthesia in his feet, an early sign of neuropathy  We discussed his A1C greater than 8 is improved from previous but should be closer to 7  Reinforced lifestyle changes    Data Reviewed: Independent review of image: none    Labs: A1C 3/3/21 8  6  Previous values over 10, this is improving    Other provider documentation: 3/3/21 PCP Taylor Thompson  Glucotrol increased, lifestyle modification, diet and exercise discussed  Risk of Complication:  moderate            Subjective:      Patient ID: Deep Clemons is a 56 y o  male  Patient presents for DM foot exam  Her was referred by his PCP  HE saw a podiatrist years ago but never went back  His A1C is 8 6 but used to be 11  He gets numbness in his feet and wears DM inserts  There is a tingling sensation at night  He denies any new exercises  He thinks he lost a few pounds but not much significant  Patient was diagnosed with multiple myeloma a few years ago which is being monitored  He states it is not an aggressive cancer  The following portions of the patient's history were reviewed and updated as appropriate: allergies, current medications, past family history, past medical history, past social history, past surgical history and problem list     Review of Systems   Constitutional: Negative  HENT: Negative for sinus pressure and sinus pain      Respiratory: Negative for cough and shortness of breath  Cardiovascular: Negative for chest pain and leg swelling  Gastrointestinal: Negative for diarrhea, nausea and vomiting  Musculoskeletal: Negative for arthralgias and gait problem  Skin: Negative for color change and wound  Neurological: Negative for weakness and numbness  Objective:      /83   Pulse (!) 106   Ht 6' (1 829 m) Comment: verbal  Wt (!) 144 kg (318 lb 3 2 oz)   BMI 43 16 kg/m²     Contains abnormal data POCT hemoglobin A1c  Order: [id number]  Status:  Final result   [facility]:  Yes ([facility]   Next appt:  04/21/2021 at 08:30 AM in [address] [place] [address] ([address] [place] [place], [facility])   Dx:  Type 2 diabetes mellitus with diabeti    Ref Range & Units 3/3/21 1:03 PM   Hemoglobin A1C 6 5 8 6Abnormal           Specimen Collected: 03/03/21  1:03 PM   Last Resulted: 03/03/21  1:03 PM                Physical Exam  Cardiovascular:      Pulses: no weak pulses          Dorsalis pedis pulses are 2+ on the right side and 2+ on the left side  Posterior tibial pulses are 2+ on the right side and 2+ on the left side  Musculoskeletal:      Right foot: Normal range of motion  Deformity present  No bunion, Charcot foot, foot drop or prominent metatarsal heads  Left foot: Normal range of motion  Deformity (  Mild adductovarus 5th hammertoe deformity bilaterally) present  No bunion, Charcot foot, foot drop or prominent metatarsal heads  Feet:    Feet:      Right foot:      Protective Sensation: 10 sites tested  10 sites sensed  Skin integrity: Dry skin present  No ulcer, blister, skin breakdown or callus  Toenail Condition: Right toenails are normal       Left foot:      Protective Sensation: 10 sites tested  10 sites sensed  Skin integrity: Dry skin present  No ulcer, skin breakdown or callus        Toenail Condition: Left toenails are normal       Comments:  Absent pedal hair        Diabetic Foot Exam    Patient's shoes and socks removed  Right Foot/Ankle   Right Foot Inspection  Skin Exam: skin intact and dry skin no blister, no callus, no maceration, no ulcer and no callus                          Toe Exam: right toe deformityno swelling, no tenderness and erythema  Sensory   Vibration: diminished  Proprioception: intact   Monofilament testing: intact  Vascular  Capillary refills: < 3 seconds  The right DP pulse is 2+  The right PT pulse is 2+  Right Toe  - Comprehensive Exam  Arch: normal  Hammertoes: fifth toe  Hallux valgus: no    Left Foot/Ankle  Left Foot Inspection  Skin Exam: skin intact and dry skinno maceration, no ulcer and no callus                         Toe Exam: left toe deformityno swelling, no tenderness and no erythema                   Sensory   Vibration: diminished  Proprioception: intact  Monofilament: intact  Vascular  Capillary refills: < 3 seconds  The left DP pulse is 2+  The left PT pulse is 2+  Left Toe  - Comprehensive Exam  Arch: normal  Hammertoes: fifth toe  Hallux valgus: no  Assign Risk Category:  Deformity present; No loss of protective sensation;  No weak pulses       Risk: 1

## 2021-04-21 ENCOUNTER — CONSULT (OUTPATIENT)
Dept: BARIATRICS | Facility: CLINIC | Age: 61
End: 2021-04-21
Payer: COMMERCIAL

## 2021-04-21 ENCOUNTER — OFFICE VISIT (OUTPATIENT)
Dept: BARIATRICS | Facility: CLINIC | Age: 61
End: 2021-04-21

## 2021-04-21 VITALS
TEMPERATURE: 98.6 F | DIASTOLIC BLOOD PRESSURE: 90 MMHG | WEIGHT: 313.9 LBS | HEART RATE: 98 BPM | SYSTOLIC BLOOD PRESSURE: 160 MMHG | HEIGHT: 71 IN | BODY MASS INDEX: 43.94 KG/M2

## 2021-04-21 DIAGNOSIS — E66.01 MORBID OBESITY WITH BMI OF 40.0-44.9, ADULT (HCC): ICD-10-CM

## 2021-04-21 DIAGNOSIS — G47.33 OBSTRUCTIVE SLEEP APNEA: Chronic | ICD-10-CM

## 2021-04-21 DIAGNOSIS — E78.49 OTHER HYPERLIPIDEMIA: Primary | ICD-10-CM

## 2021-04-21 DIAGNOSIS — R63.5 ABNORMAL WEIGHT GAIN: ICD-10-CM

## 2021-04-21 DIAGNOSIS — J45.20 MILD INTERMITTENT ASTHMA WITHOUT COMPLICATION: ICD-10-CM

## 2021-04-21 DIAGNOSIS — I10 ESSENTIAL HYPERTENSION: ICD-10-CM

## 2021-04-21 DIAGNOSIS — E11.42 DIABETIC PERIPHERAL NEUROPATHY (HCC): ICD-10-CM

## 2021-04-21 PROCEDURE — WMDI30

## 2021-04-21 PROCEDURE — 1036F TOBACCO NON-USER: CPT | Performed by: PHYSICIAN ASSISTANT

## 2021-04-21 PROCEDURE — RECHECK

## 2021-04-21 PROCEDURE — 99244 OFF/OP CNSLTJ NEW/EST MOD 40: CPT | Performed by: PHYSICIAN ASSISTANT

## 2021-04-21 NOTE — ASSESSMENT & PLAN NOTE
-Discussed options of Healthy[facility]-Intensive Lifestyle Intervention Program, Very Low Calorie Diet-VLCD, Conservative Program, Peace-En-Y Gastric Bypass and Vertical Sleeve Gastrectomy and the role of weight loss medications   -Initial weight loss goal of 5-10% weight loss for improved health  -Screening labs  Recommend checking lab coverage before having labs drawn  -a1c reviewed from 3/3/21-elevated   Needs cmp, lipid and tsh   -Patient is interested in pursuing Conservative Program with menu planning   -discussed gabapentin is a weight gaining medication

## 2021-04-21 NOTE — ASSESSMENT & PLAN NOTE
Taking lisinopril  -should improve with weight loss, dietary, and lifestyle changes  -continue management with prescribing provider  -did not take medication today-encouraged to go Home and do so  -patient to monitor bp at home and if consistently above 140/90 needs to follow up with pcp  -discussed if patient develops shortness of breath, chest pain, lightheadedness, dizziness, blurred vision, severe headache or extremity weakness needs to report to the ER immediately

## 2021-04-21 NOTE — ASSESSMENT & PLAN NOTE
Lab Results   Component Value Date    HGBA1C 8 6 (A) 03/03/2021   taking metformin   -should improve with weight loss, dietary, and lifestyle changes  -continue management with prescribing provider

## 2021-04-21 NOTE — ASSESSMENT & PLAN NOTE
Taking simvastatin  -should improve with weight loss, dietary, and lifestyle changes  -continue management with prescribing provider None

## 2021-04-21 NOTE — PROGRESS NOTES
Weight Management Medical Nutrition Assessment  Abiola Farooq is here for meal planning  Just seen by PA  Current wt 313 9 lbs  Largest concern is what kind of meals to eat  Does a lot of cooking and gardening  Diet currently high in refined carbs (white bread, desserts)  Often skips lunch and then overeats at dinner  Benefits of interval eating discussed  Discussed importance of consistent carbohydrate intake throughout the day which will help to better manage blood sugars  Meal plan provided which provides for ~40% of calories coming from carb  He will f/u with PA in 6 wks  Patient seen by Medical Provider in past 6 months:  yes  Requested to schedule appointment with Medical Provider: No    Anthropometric Measurements  Start Weight (#): 313 9 lbs 4/21/21  Ideal Body Weight (#): 172 lbs  Goal Weight (#): no definite     Weight Loss History  Previous weight loss attempts: Exercise  Self Created Diets (Portion Control, Healthy Food Choices, etc )    Food and Nutrition Related History  Wake up: 7:30   Bed Time: 2 a m  Food Recall  Breakfast: 8:30: 2 boiled eggs, english muffin or 2 toast (white or rye), occasionally aparicio or other meat, sometimes jelly, 2 tsp butter or less OR occasionally bagel w/cream cheese  Snack: skip  Lunch: apple or banana OR leftovers OR more often than not skip  Snack: skip  Dinner: 7:00: maybe 12 oz protein, >1 cup carb, >1 cup veggie, little butter/oil   Snack: 9:00: piece of pie or cake or cookies    Beverages: coffee/tea, water, oj, ice tea sweetened with peach juice  Volume of beverage intake:     Weekends: Same  Cravings:  Not identified   Trouble area of day: night    Frequency of Eating out: irregularly  Food restrictions:n/a  Cooking: both  Food Shopping: both    Physical Activity Intake  Activity: no specific  Frequency:rarely  Physical limitations/barriers to exercise: n/a    Estimated Needs  Energy  Bear Darlin Energy Needs:  BMR : 4407  1-2# loss weekly sedentary:  8524-5171 1-2# loss weekly lightly active: 7641-3167  Maintenance calories for sedentary activity level: 2695  Protein: gm      (1 2-1 5g/kg IBW)  Fluid: 91 oz   (35mL/kg IBW)    Nutrition Diagnosis  Yes;     Overweight/obesity  related to Excess energy intake as evidenced by  BMI more than normative standard for age and sex (obesity-grade III 36+)       Nutrition Intervention    Nutrition Prescription  Calories:8931-2840  Protein:  gm  Carb: 145-185 gm    Meal Plan (Flaco/Pro/Carb)  Breakfast: 400-450, 20, 45  Snack: skip  Lunch: 350-450, 25-30, 40-50  Snack: 100-150, 5-10, 10-20  Dinner: 550-600, 42, 40-50  Snack: 100-150, 5-10, 10-20    Nutrition Education:    Calorie controlled menu  Lean protein food choices  Healthy snack options  Food journaling tips      Nutrition Counseling:  Strategies: meal planning, portion sizes, healthy snack choices, hydration, fiber intake, protein intake, exercise, food journal      Monitoring and Evaluation:  Evaluation criteria:  Energy Intake  Meet protein needs  Maintain adequate hydration  Monitor weekly weight  Meal planning/preparation  Food journal   Decreased portions at mealtimes and snacks  Physical activity     Barriers to learning:none  Readiness to change: Preparation:  (Getting ready to change)   Comprehension: good  Expected Compliance: good

## 2021-04-21 NOTE — PROGRESS NOTES
Assessment/Plan: Morbid obesity with BMI of 40 0-44 9, adult ([facility] )  -Discussed options of HealthyCORE-Intensive Lifestyle Intervention Program, Very Low Calorie Diet-VLCD, Conservative Program, Peace-En-Y Gastric Bypass and Vertical Sleeve Gastrectomy and the role of weight loss medications   -Initial weight loss goal of 5-10% weight loss for improved health  -Screening labs  Recommend checking lab coverage before having labs drawn  -a1c reviewed from 3/3/21-elevated  Needs cmp, lipid and tsh   -Patient is interested in pursuing Conservative Program with menu planning   -discussed gabapentin is a weight gaining medication       Other hyperlipidemia  Taking simvastatin  -should improve with weight loss, dietary, and lifestyle changes  -continue management with prescribing provider      Essential hypertension  Taking lisinopril  -should improve with weight loss, dietary, and lifestyle changes  -continue management with prescribing provider  -did not take medication today-encouraged to go Home and do so  -patient to monitor bp at home and if consistently above 140/90 needs to follow up with pcp  -discussed if patient develops shortness of breath, chest pain, lightheadedness, dizziness, blurred vision, severe headache or extremity weakness needs to report to the ER immediately       Obstructive sleep apnea  -encouraged continued use of CPAP machine  -may improve with 20-30% weight loss      Mild intermittent asthma without complication  Taking ventolin  -should improve with weight loss, dietary, and lifestyle changes  -continue management with prescribing provider      Diabetic peripheral neuropathy ([place] [address] )    Lab Results   Component Value Date    HGBA1C 8 6 (A) 03/03/2021   taking metformin   -should improve with weight loss, dietary, and lifestyle changes  -continue management with prescribing provider        Follow up in approximately 2 months with Non-Surgical Physician/Advanced Practitioner      Diagnoses and all orders for this visit:    Other hyperlipidemia    Morbid obesity with BMI of 40 0-44 9, adult ([facility] )  -     Ambulatory referral to Weight Management    Essential hypertension    Obstructive sleep apnea    Mild intermittent asthma without complication    Diabetic peripheral neuropathy (HCC)          Subjective:   Chief Complaint   Patient presents with    Consult     mwm consult       Patient ID: Miladys Ventura  is a 56 y o  male with excess weight/obesity here to pursue weight management  Past Medical History:   Diagnosis Date    Asthma     Benign positional vertigo     Diabetes mellitus (HCC)     borderline; diet controlled    Dyslipidemia     Gastroenteritis     Hyperlipidemia     Hypertension     Lipoma of neck     last assessed - [id number]    Multiple myeloma ([facility] )     Sleep apnea     has symptoms but not been diagnosed    Wheezing     last assessed - [id number]       HPI:  Obesity/Excess Weight:  Severity: Severe  Onset:  Since age 1     Modifiers: Diet and Exercise  Contributing factors: Poor Food Choices, portion size   Associated symptoms: comorbid conditions, decreased exercise capacity, increased shortness of breath, decreased mobility and inability to do certain activities    Goals: healthy weight   Hydration: 3 bottles of water, 3 cup of iced tea with juice for sweetner, 1-2 cup of coffee with sugar and half and half, in winter 1 cup of hot tea with honey and 7/7  Alcohol: in winter occasionally 7/7 in hot tea -once every 3 months     Colonoscopy-Completed 12/19/2016      The following portions of the patient's history were reviewed and updated as appropriate: allergies, current medications, past family history, past medical history, past social history, past surgical history and problem list     Review of Systems   HENT: Negative for sore throat  Respiratory: Positive for shortness of breath (with extertion )  Negative for cough      Cardiovascular: Negative for chest pain and palpitations  Gastrointestinal: Negative for abdominal pain, constipation, diarrhea, nausea and vomiting         + GERD-controlled with meds    Endocrine: Negative for cold intolerance and heat intolerance  Genitourinary: Negative for dysuria  Musculoskeletal: Positive for arthralgias  Negative for back pain  Joint swelling: shoulders and knees  Skin: Negative for rash  Neurological: Negative for headaches  Psychiatric/Behavioral: Negative for suicidal ideas (denies HI)  Denies Depression and anxiety       Objective:    /90 (BP Location: Left arm, Patient Position: Sitting, Cuff Size: Large)   Pulse 98   Temp 98 6 °F (37 °C)   Ht 5' 11" (1 803 m)   Wt (!) 142 kg (313 lb 14 4 oz)   BMI 43 78 kg/m²     Physical Exam  Vitals signs and nursing note reviewed  Constitutional   General appearance: Abnormal   well developed and morbidly obese  Eyes No conjunctival pallor  Ears, Nose, Mouth, and Throat Bilateral external ears- no discharge, edema, erythema   Pulmonary   Respiratory effort: No increased work of breathing or signs of respiratory distress  Auscultation of lungs: Clear to auscultation, equal breath sounds bilaterally, no wheezes, no rales, no rhonci  Cardiovascular   Auscultation of heart: Normal rate and rhythm, normal S1 and S2, without murmurs  Examination of extremities for edema and/or varicosities: Normal   no edema  Abdomen   Abdomen: Abnormal   The abdomen was obese  Bowel sounds were normal  The abdomen was soft and nontender     Musculoskeletal   Gait and station: Normal     Psychiatric   Orientation to person, place and time: Normal     Affect: appropriate

## 2021-04-21 NOTE — ASSESSMENT & PLAN NOTE
Taking ventolin  -should improve with weight loss, dietary, and lifestyle changes  -continue management with prescribing provider

## 2021-04-22 ENCOUNTER — APPOINTMENT (OUTPATIENT)
Dept: LAB | Facility: CLINIC | Age: 61
End: 2021-04-22
Payer: COMMERCIAL

## 2021-04-22 DIAGNOSIS — E66.01 MORBID OBESITY WITH BMI OF 40.0-44.9, ADULT (HCC): ICD-10-CM

## 2021-04-22 DIAGNOSIS — Z12.5 SCREENING FOR PROSTATE CANCER: ICD-10-CM

## 2021-04-22 DIAGNOSIS — D47.2 SMOLDERING MULTIPLE MYELOMA: ICD-10-CM

## 2021-04-22 DIAGNOSIS — E11.21 TYPE 2 DIABETES MELLITUS WITH DIABETIC NEPHROPATHY, WITHOUT LONG-TERM CURRENT USE OF INSULIN (HCC): ICD-10-CM

## 2021-04-22 LAB
ALBUMIN SERPL BCP-MCNC: 3.5 G/DL (ref 3.5–5)
ALP SERPL-CCNC: 98 U/L (ref 46–116)
ALT SERPL W P-5'-P-CCNC: 39 U/L (ref 12–78)
ANION GAP SERPL CALCULATED.3IONS-SCNC: 7 MMOL/L (ref 4–13)
AST SERPL W P-5'-P-CCNC: 24 U/L (ref 5–45)
BASOPHILS # BLD AUTO: 0.05 THOUSANDS/ΜL (ref 0–0.1)
BASOPHILS NFR BLD AUTO: 1 % (ref 0–1)
BILIRUB SERPL-MCNC: 0.38 MG/DL (ref 0.2–1)
BUN SERPL-MCNC: 12 MG/DL (ref 5–25)
CALCIUM SERPL-MCNC: 8.9 MG/DL (ref 8.3–10.1)
CHLORIDE SERPL-SCNC: 102 MMOL/L (ref 100–108)
CHOLEST SERPL-MCNC: 156 MG/DL (ref 50–200)
CO2 SERPL-SCNC: 28 MMOL/L (ref 21–32)
CREAT SERPL-MCNC: 0.85 MG/DL (ref 0.6–1.3)
EOSINOPHIL # BLD AUTO: 0.27 THOUSAND/ΜL (ref 0–0.61)
EOSINOPHIL NFR BLD AUTO: 5 % (ref 0–6)
ERYTHROCYTE [DISTWIDTH] IN BLOOD BY AUTOMATED COUNT: 15.7 % (ref 11.6–15.1)
EST. AVERAGE GLUCOSE BLD GHB EST-MCNC: 206 MG/DL
GFR SERPL CREATININE-BSD FRML MDRD: 93 ML/MIN/1.73SQ M
GLUCOSE P FAST SERPL-MCNC: 185 MG/DL (ref 65–99)
HBA1C MFR BLD: 8.8 %
HCT VFR BLD AUTO: 38.1 % (ref 36.5–49.3)
HDLC SERPL-MCNC: 35 MG/DL
HGB BLD-MCNC: 11.2 G/DL (ref 12–17)
IGA SERPL-MCNC: 84 MG/DL (ref 70–400)
IGG SERPL-MCNC: 1880 MG/DL (ref 700–1600)
IGM SERPL-MCNC: 107 MG/DL (ref 40–230)
IMM GRANULOCYTES # BLD AUTO: 0.03 THOUSAND/UL (ref 0–0.2)
IMM GRANULOCYTES NFR BLD AUTO: 1 % (ref 0–2)
LDLC SERPL CALC-MCNC: 83 MG/DL (ref 0–100)
LYMPHOCYTES # BLD AUTO: 1.38 THOUSANDS/ΜL (ref 0.6–4.47)
LYMPHOCYTES NFR BLD AUTO: 23 % (ref 14–44)
MCH RBC QN AUTO: 26.2 PG (ref 26.8–34.3)
MCHC RBC AUTO-ENTMCNC: 29.4 G/DL (ref 31.4–37.4)
MCV RBC AUTO: 89 FL (ref 82–98)
MONOCYTES # BLD AUTO: 0.37 THOUSAND/ΜL (ref 0.17–1.22)
MONOCYTES NFR BLD AUTO: 6 % (ref 4–12)
NEUTROPHILS # BLD AUTO: 3.94 THOUSANDS/ΜL (ref 1.85–7.62)
NEUTS SEG NFR BLD AUTO: 64 % (ref 43–75)
NONHDLC SERPL-MCNC: 121 MG/DL
NRBC BLD AUTO-RTO: 0 /100 WBCS
PLATELET # BLD AUTO: 228 THOUSANDS/UL (ref 149–390)
PMV BLD AUTO: 9.3 FL (ref 8.9–12.7)
POTASSIUM SERPL-SCNC: 4.4 MMOL/L (ref 3.5–5.3)
PROT SERPL-MCNC: 8 G/DL (ref 6.4–8.2)
PSA SERPL-MCNC: 1.7 NG/ML (ref 0–4)
RBC # BLD AUTO: 4.27 MILLION/UL (ref 3.88–5.62)
SODIUM SERPL-SCNC: 137 MMOL/L (ref 136–145)
TRIGL SERPL-MCNC: 190 MG/DL
WBC # BLD AUTO: 6.04 THOUSAND/UL (ref 4.31–10.16)

## 2021-04-22 PROCEDURE — 84165 PROTEIN E-PHORESIS SERUM: CPT | Performed by: PATHOLOGY

## 2021-04-22 PROCEDURE — 84165 PROTEIN E-PHORESIS SERUM: CPT

## 2021-04-22 PROCEDURE — 3052F HG A1C>EQUAL 8.0%<EQUAL 9.0%: CPT | Performed by: PHYSICIAN ASSISTANT

## 2021-04-22 PROCEDURE — 83036 HEMOGLOBIN GLYCOSYLATED A1C: CPT

## 2021-04-22 PROCEDURE — G0103 PSA SCREENING: HCPCS

## 2021-04-22 PROCEDURE — 80061 LIPID PANEL: CPT

## 2021-04-22 PROCEDURE — 3066F NEPHROPATHY DOC TX: CPT | Performed by: PHYSICIAN ASSISTANT

## 2021-04-22 PROCEDURE — 86334 IMMUNOFIX E-PHORESIS SERUM: CPT | Performed by: PATHOLOGY

## 2021-04-22 PROCEDURE — 86334 IMMUNOFIX E-PHORESIS SERUM: CPT

## 2021-04-22 PROCEDURE — 36415 COLL VENOUS BLD VENIPUNCTURE: CPT

## 2021-04-22 PROCEDURE — 82784 ASSAY IGA/IGD/IGG/IGM EACH: CPT

## 2021-04-22 PROCEDURE — 80053 COMPREHEN METABOLIC PANEL: CPT

## 2021-04-22 PROCEDURE — 85025 COMPLETE CBC W/AUTO DIFF WBC: CPT

## 2021-04-22 PROCEDURE — 83883 ASSAY NEPHELOMETRY NOT SPEC: CPT

## 2021-04-23 LAB
ALBUMIN SERPL ELPH-MCNC: 3.86 G/DL (ref 3.5–5)
ALBUMIN SERPL ELPH-MCNC: 50.8 % (ref 52–65)
ALPHA1 GLOB SERPL ELPH-MCNC: 0.33 G/DL (ref 0.1–0.4)
ALPHA1 GLOB SERPL ELPH-MCNC: 4.4 % (ref 2.5–5)
ALPHA2 GLOB SERPL ELPH-MCNC: 0.87 G/DL (ref 0.4–1.2)
ALPHA2 GLOB SERPL ELPH-MCNC: 11.4 % (ref 7–13)
BETA GLOB ABNORMAL SERPL ELPH-MCNC: 0.5 G/DL (ref 0.4–0.8)
BETA1 GLOB SERPL ELPH-MCNC: 6.6 % (ref 5–13)
BETA2 GLOB SERPL ELPH-MCNC: 3.9 % (ref 2–8)
BETA2+GAMMA GLOB SERPL ELPH-MCNC: 0.3 G/DL (ref 0.2–0.5)
GAMMA GLOB ABNORMAL SERPL ELPH-MCNC: 1.74 G/DL (ref 0.5–1.6)
GAMMA GLOB SERPL ELPH-MCNC: 22.9 % (ref 12–22)
IGG/ALB SER: 1.03 {RATIO} (ref 1.1–1.8)
INTERPRETATION UR IFE-IMP: NORMAL
KAPPA LC FREE SER-MCNC: 26.3 MG/L (ref 3.3–19.4)
KAPPA LC FREE/LAMBDA FREE SER: 2.68 {RATIO} (ref 0.26–1.65)
LAMBDA LC FREE SERPL-MCNC: 9.8 MG/L (ref 5.7–26.3)
M PEAK ID 2: 1.2 %
M PROTEIN 1 MFR SERPL ELPH: 15.8 %
M PROTEIN 1 SERPL ELPH-MCNC: 1.2 G/DL
M PROTEIN 2 SERPL ELPH-MCNC: 0.09 G/DL
PROT PATTERN SERPL ELPH-IMP: ABNORMAL
PROT SERPL-MCNC: 7.6 G/DL (ref 6.4–8.2)

## 2021-04-30 ENCOUNTER — TELEPHONE (OUTPATIENT)
Dept: OTHER | Facility: OTHER | Age: 61
End: 2021-04-30

## 2021-05-01 NOTE — TELEPHONE ENCOUNTER
Patient's wife called to Cancel his Appointment for May 4, 2021 at 9:15am  Patient will call back to Reschedule

## 2021-05-03 ENCOUNTER — OFFICE VISIT (OUTPATIENT)
Dept: HEMATOLOGY ONCOLOGY | Facility: CLINIC | Age: 61
End: 2021-05-03
Payer: COMMERCIAL

## 2021-05-03 VITALS
WEIGHT: 313.5 LBS | SYSTOLIC BLOOD PRESSURE: 112 MMHG | TEMPERATURE: 98.8 F | BODY MASS INDEX: 43.89 KG/M2 | DIASTOLIC BLOOD PRESSURE: 76 MMHG | OXYGEN SATURATION: 94 % | RESPIRATION RATE: 18 BRPM | HEIGHT: 71 IN | HEART RATE: 92 BPM

## 2021-05-03 DIAGNOSIS — D47.2 SMOLDERING MULTIPLE MYELOMA: Primary | ICD-10-CM

## 2021-05-03 DIAGNOSIS — R06.00 DYSPNEA, UNSPECIFIED TYPE: ICD-10-CM

## 2021-05-03 DIAGNOSIS — E11.42 DIABETIC PERIPHERAL NEUROPATHY (HCC): ICD-10-CM

## 2021-05-03 PROBLEM — D64.9 ANEMIA, UNSPECIFIED: Status: ACTIVE | Noted: 2021-05-03

## 2021-05-03 PROCEDURE — 99214 OFFICE O/P EST MOD 30 MIN: CPT | Performed by: PHYSICIAN ASSISTANT

## 2021-05-03 PROCEDURE — 3008F BODY MASS INDEX DOCD: CPT | Performed by: PHYSICIAN ASSISTANT

## 2021-05-03 NOTE — PROGRESS NOTES
Hematology/Oncology Outpatient Follow- up Note  Ivan Rashid 56 y o  male MRN: @ Encounter: [id number]        Date:  5/3/2021      Assessment / Plan:    Low risk smoldering multiple myeloma IgG kappa subtype dx 5/2018   M protein 1 4 g/dL, serum kappa free light chain 17, ratio of 2 09 at time of diagnosis 5/2018   Bone marrow biopsy May 2018 showed plasma cell 12-15%, normal cytogenetics and fish panel for multiple myeloma         M protein 1 1 - 1 4 since 4/2018  Serum free kappa light chains increasing very slowly  18 6 7/2018;  22 10/2019;  26 10/2020  Ratio 1 8-2 5        multiple myeloma labs stable     No evidence of end-organ damage, continue watchful observation and follow-up in 4 months with CBC, CMP, SPEP, free light chain, quantitative immunoglobulins      2    Mild anemia  Will investigate for other potential causes  3     Dyspnea on exertion  In this patient with obesity, diabetes, hyperlipidemia, we discussed possibility of cardiac etiology    He is in agreement for cardiac evaluation         HPI: 68-year-old  male with history of obesity, asthma, hypertension, dyslipidemia, diabetes mellitus type 2, had been complaining of tingling and numbness in his feet and hand for the past year, most recently a feeling of hypersensitivity involving the anterior aspect of the left thigh area, evaluation by Neurology on March 2018 showed monoclonal gammopathy with M protein of 1 40 grams/deciliters, IgG kappa      He has normal vitamin B12 level, TSH, heavy metal screen, Lyme disease  CBC performed August 2017 showed hemoglobin of 12 8, WBC 6 6, platelets [id number], 37% neutrophils, 19% lymphocytes     A bone marrow biopsy in May 2018 showed 10-12% plasma cells, fish panel for multiple myeloma was negative, cytogenetics showed normal male chromosomes and deletion of Y chromosome in some of the cells consistent with normal finding in advanced age patient      Bone skeletal survey 5/2018 showed no evidence of lytic lesions           Interval History:        Test Results:        Labs:   Lab Results   Component Value Date    HGB 11 2 (L) 04/22/2021    HCT 38 1 04/22/2021    MCV 89 04/22/2021     04/22/2021    WBC 6 04 04/22/2021    NRBC 0 04/22/2021     Lab Results   Component Value Date     05/17/2017    K 4 4 04/22/2021     04/22/2021    CO2 28 04/22/2021    ANIONGAP 9 01/13/2015    BUN 12 04/22/2021    CREATININE 0 85 04/22/2021    GLUCOSE 206 (H) 01/13/2015    GLUF 185 (H) 04/22/2021    CALCIUM 8 9 04/22/2021    CORRECTEDCA 9 6 10/29/2020    AST 24 04/22/2021    ALT 39 04/22/2021    ALKPHOS 98 04/22/2021    PROT 7 5 05/17/2017    BILITOT 0 5 05/17/2017    EGFR 93 04/22/2021       Imaging: No results found  ROS:  As mentioned in HPI & Interval History otherwise 14 point ROS negative  Allergies: Allergies   Allergen Reactions    Shellfish-Derived Products - Food Allergy Anaphylaxis     Clams and mussels, oysters  Lobster and crab ok    Diphenhydramine      Lightheadedness, nauseous, rapid heartbeat    Other Palpitations and Other (See Comments)     Clams     Current Medications: Reviewed  PMH/FH/SH:  Reviewed      Physical Exam:    2 55 meters squared    Ht Readings from Last 3 Encounters:   05/03/21 5' 11" (1 803 m)   04/21/21 5' 11" (1 803 m)   04/14/21 6' (1 829 m)        Wt Readings from Last 3 Encounters:   05/03/21 (!) 142 kg (313 lb 8 oz)   04/21/21 (!) 142 kg (313 lb 14 4 oz)   04/14/21 (!) 144 kg (318 lb 3 2 oz)        Temp Readings from Last 3 Encounters:   05/03/21 98 8 °F (37 1 °C) (Temporal)   04/21/21 98 6 °F (37 °C)   03/03/21 (!) 97 1 °F (36 2 °C) (Tympanic)        BP Readings from Last 3 Encounters:   05/03/21 112/76   04/21/21 160/90   04/14/21 151/83           Physical Exam  Vitals signs reviewed  Constitutional:       General: He is not in acute distress  Appearance: He is well-developed  He is not diaphoretic     HENT:      Head: Normocephalic and atraumatic  Eyes:      Conjunctiva/sclera: Conjunctivae normal    Neck:      Musculoskeletal: Normal range of motion and neck supple  Trachea: No tracheal deviation  Cardiovascular:      Rate and Rhythm: Normal rate and regular rhythm  Heart sounds: No murmur  No friction rub  No gallop  Pulmonary:      Effort: Pulmonary effort is normal  No respiratory distress  Breath sounds: Normal breath sounds  No wheezing or rales  Chest:      Chest wall: No tenderness  Abdominal:      General: There is no distension  Palpations: Abdomen is soft  Tenderness: There is no abdominal tenderness  Lymphadenopathy:      Cervical: No cervical adenopathy  Skin:     General: Skin is warm and dry  Coloration: Skin is not pale  Findings: No erythema  Neurological:      Mental Status: He is alert and oriented to person, place, and time  Psychiatric:         Behavior: Behavior normal          Thought Content:  Thought content normal          Judgment: Judgment normal          ECO      Emergency Contacts:    Merry Lee, ,

## 2021-05-06 ENCOUNTER — CONSULT (OUTPATIENT)
Dept: CARDIOLOGY CLINIC | Facility: CLINIC | Age: 61
End: 2021-05-06
Payer: COMMERCIAL

## 2021-05-06 VITALS
DIASTOLIC BLOOD PRESSURE: 82 MMHG | WEIGHT: 315 LBS | BODY MASS INDEX: 44.1 KG/M2 | HEART RATE: 92 BPM | SYSTOLIC BLOOD PRESSURE: 148 MMHG | HEIGHT: 71 IN | OXYGEN SATURATION: 98 %

## 2021-05-06 DIAGNOSIS — Z82.49 FAMILY HISTORY OF EARLY CAD: ICD-10-CM

## 2021-05-06 DIAGNOSIS — R06.00 DOE (DYSPNEA ON EXERTION): ICD-10-CM

## 2021-05-06 DIAGNOSIS — I10 ESSENTIAL HYPERTENSION: ICD-10-CM

## 2021-05-06 DIAGNOSIS — E78.2 MIXED HYPERLIPIDEMIA: ICD-10-CM

## 2021-05-06 DIAGNOSIS — I10 ESSENTIAL HYPERTENSION: Primary | ICD-10-CM

## 2021-05-06 PROBLEM — R06.09 DOE (DYSPNEA ON EXERTION): Status: ACTIVE | Noted: 2021-05-06

## 2021-05-06 PROBLEM — E78.49 OTHER HYPERLIPIDEMIA: Status: RESOLVED | Noted: 2018-04-16 | Resolved: 2021-05-06

## 2021-05-06 PROBLEM — E66.01 MORBID OBESITY WITH BMI OF 40.0-44.9, ADULT (HCC): Status: RESOLVED | Noted: 2018-02-26 | Resolved: 2021-05-06

## 2021-05-06 PROCEDURE — 93000 ELECTROCARDIOGRAM COMPLETE: CPT | Performed by: INTERNAL MEDICINE

## 2021-05-06 PROCEDURE — 1036F TOBACCO NON-USER: CPT | Performed by: INTERNAL MEDICINE

## 2021-05-06 PROCEDURE — 99245 OFF/OP CONSLTJ NEW/EST HI 55: CPT | Performed by: INTERNAL MEDICINE

## 2021-05-06 RX ORDER — ATORVASTATIN CALCIUM 40 MG/1
40 TABLET, FILM COATED ORAL DAILY
Qty: 90 TABLET | Refills: 4 | Status: SHIPPED | OUTPATIENT
Start: 2021-05-06 | End: 2022-05-25

## 2021-05-06 RX ORDER — LISINOPRIL 10 MG/1
40 TABLET ORAL DAILY
Qty: 90 TABLET | Refills: 4 | Status: SHIPPED | OUTPATIENT
Start: 2021-05-06 | End: 2021-05-07 | Stop reason: CLARIF

## 2021-05-06 NOTE — PROGRESS NOTES
Consultation - Cardiology   Bassem Hernández 56 y o  male MRN: [id number]  Unit/Bed#:  Encounter: [id number]  Physician Requesting Consult: No att  providers found  Reason for Consult / Principal Problem: HAMEED    Assessment:  1  HAMEED  2  HTN  3  Hyperlipidemia  4  FH of CAD  5  DM  6  Multiple Myeloma    Plan:  I am very concerned that his HAMEED is his anginal equivalant  He is not able to walk on a treadmill  1  Lexiscan Myoview Stress Test  2  Switch Zocor to Lipitor 40 mg daily  3  Increase Lisinopril to 40 mg daily  4  Advised him to cut back on his salt intake  5  He needs better control of his DM    RTO 3 months depending on stress test results  History of Present Illness     HPI: Bassem Hernández is a 56y o  year old male who denies any past cardiac history  He has significant risk factors for CAD including HTN ( not optimally controlled ) , DM ( A1C 8 8 ) , LDL 83 on Zocor, FH of CAD ( father had CABG in his 62s )  He is not very activity and likely has peripheral neuropathy  He also has multiple myeloma - never required treatment yet  ECHO 2019 - normal EF  Creatinine 0 85    This Spring he has noticed new symptoms of HAMEED at low levels of activity  He denies CP or chest pressure  He gets occasional LE edema  He does not watch his salt intake        Review of Systems:    Alert awake oriented, comfortable, denies any complaints  No fevers chills nausea vomiting  No weakness, dizziness, seizures      positive for - shortness of breath  Denies any palpitations, chest pain, diaphoresis  Denies leg edema, pain or paresthesias  Denies any skin rashes  Denies abdominal pain, bloody stools, masses  Denies any depression or suicidal ideations      Historical Information   Past Medical History:   Diagnosis Date    Asthma     Benign positional vertigo     Diabetes mellitus ([address] )     borderline; diet controlled    Dyslipidemia     Gastroenteritis     Hyperlipidemia     Hypertension     Lipoma of neck     last assessed - [id number]    [facility] myel[facility] ([facility] )     Sleep apnea     has symptoms but not been diagnosed    Wheezing     last assessed - [id number]     Past Surgical History:   Procedure Laterality Date    ESOPHAGOGASTRODUODENOSCOPY  2010    Diagnostic    MASS EXCISION Right 8/21/2017    Procedure: POSTERIOR SHOULDER MASS EXCISION; POSTERIOR NECK MASS EXCISION; CHEST WALL MASS EXCISION;  Surgeon: David aJma MD;  Location: [facility];  Service: General    THROAT SURGERY       Social History     Substance and Sexual Activity   Alcohol Use Not Currently    Frequency: Monthly or less    Comment: Social drinker per [employer]     Social History     Substance and Sexual Activity   Drug Use No     Social History     Tobacco Use   Smoking Status Never Smoker   Smokeless Tobacco Never Used     Family History: non-contributory    Meds/Allergies   all current active meds have been reviewed  Allergies   Allergen Reactions    Shellfish-Derived Products - Food Allergy Anaphylaxis     Clams and mussels, oysters  Lobster and crab ok    Diphenhydramine      Lightheadedness, nauseous, rapid heartbeat    Other Palpitations and Other (See Comments)     Clams       Objective   Vitals: Blood pressure 148/82, pulse 92, height 5' 11" (1 803 m), weight (!) 144 kg (317 lb), SpO2 98 %  , Body mass index is 44 21 kg/m²  ,   Weight (last 2 days)     Date/Time   Weight    05/06/21 0802   (!) 144 (317)                        Physical Exam:  GEN: [address] appears well, alert and oriented x 3, pleasant and cooperative   HEENT: pupils equal, round, and reactive to light; extraocular muscles intact  NECK: supple, no carotid bruits   HEART: regular rhythm, normal S1 and S2, no murmurs, clicks, gallops or rubs   LUNGS: clear to auscultation bilaterally; no wheezes, rales, or rhonchi   ABDOMEN: normal bowel sounds, soft, no tenderness, no distention  EXTREMITIES: peripheral pulses normal; no clubbing, cyanosis, or edema  NEURO: no focal findings   SKIN: normal without suspicious lesions on exposed skin    Lab Results:   No visits with results within 1 Day(s) from this visit     Latest known visit with results is:   Appointment on 04/22/2021   Component Date Value Ref Range Status    WBC 04/22/2021 6 04  4 31 - 10 16 Thousand/uL Final    RBC 04/22/2021 4 27  3 88 - 5 62 Million/uL Final    Hemoglobin 04/22/2021 11 2* 12 0 - 17 0 g/dL Final    Hematocrit 04/22/2021 38 1  36 5 - 49 3 % Final    MCV 04/22/2021 89  82 - 98 fL Final    MCH 04/22/2021 26 2* 26 8 - 34 3 pg Final    MCHC 04/22/2021 29 4* 31 4 - 37 4 g/dL Final    RDW 04/22/2021 15 7* 11 6 - 15 1 % Final    MPV 04/22/2021 9 3  8 9 - 12 7 fL Final    Platelets 60/57/9021 228  149 - 390 Thousands/uL Final    nRBC 04/22/2021 0  /100 WBCs Final    Neutrophils Relative 04/22/2021 64  43 - 75 % Final    Immat GRANS % 04/22/2021 1  0 - 2 % Final    Lymphocytes Relative 04/22/2021 23  14 - 44 % Final    Monocytes Relative 04/22/2021 6  4 - 12 % Final    Eosinophils Relative 04/22/2021 5  0 - 6 % Final    Basophils Relative 04/22/2021 1  0 - 1 % Final    Neutrophils Absolute 04/22/2021 3 94  1 85 - 7 62 Thousands/µL Final    Immature Grans Absolute 04/22/2021 0 03  0 00 - 0 20 Thousand/uL Final    Lymphocytes Absolute 04/22/2021 1 38  0 60 - 4 47 Thousands/µL Final    Monocytes Absolute 04/22/2021 0 37  0 17 - 1 22 Thousand/µL Final    Eosinophils Absolute 04/22/2021 0 27  0 00 - 0 61 Thousand/µL Final    Basophils Absolute 04/22/2021 0 05  0 00 - 0 10 Thousands/µL Final    Sodium 04/22/2021 137  136 - 145 mmol/L Final    Potassium 04/22/2021 4 4  3 5 - 5 3 mmol/L Final    Chloride 04/22/2021 102  100 - 108 mmol/L Final    CO2 04/22/2021 28  21 - 32 mmol/L Final    ANION GAP 04/22/2021 7  4 - 13 mmol/L Final    BUN 04/22/2021 12  5 - 25 mg/dL Final    Creatinine 04/22/2021 0 85  0 60 - 1 30 mg/dL Final    Standardized to IDMS reference method    Glucose, Fasting 04/22/2021 185* 65 - 99 mg/dL Final    Specimen collection should occur prior to Sulfasalazine administration due to the potential for falsely depressed results  Specimen collection should occur prior to Sulfapyridine administration due to the potential for falsely elevated results   Calcium 04/22/2021 8 9  8 3 - 10 1 mg/dL Final    AST 04/22/2021 24  5 - 45 U/L Final    Specimen collection should occur prior to Sulfasalazine administration due to the potential for falsely depressed results   ALT 04/22/2021 39  12 - 78 U/L Final    Specimen collection should occur prior to Sulfasalazine administration due to the potential for falsely depressed results   Alkaline Phosphatase 04/22/2021 98  46 - 116 U/L Final    Total Protein 04/22/2021 8 0  6 4 - 8 2 g/dL Final    Albumin 04/22/2021 3 5  3 5 - 5 0 g/dL Final    Total Bilirubin 04/22/2021 0 38  0 20 - 1 00 mg/dL Final    Use of this assay is not recommended for patients undergoing treatment with eltrombopag due to the potential for falsely elevated results      eGFR 04/22/2021 93  ml/min/1 73sq m Final    IGA 04/22/2021 84 0  70 0 - 400 0 mg/dL Final    IGG 04/22/2021 1,880 0* 700 0-1,600 0 mg/dL Final    IGM 04/22/2021 107 0  40 0 - 230 0 mg/dL Final    A/G Ratio 04/22/2021 1 03* 1 10 - 1 80 Final    Albumin Electrophoresis 04/22/2021 50 8* 52 0 - 65 0 % Final    Albumin CONC 04/22/2021 3 86  3 50 - 5 00 g/dl Final    Alpha 1 04/22/2021 4 4  2 5 - 5 0 % Final    ALPHA 1 CONC 04/22/2021 0 33  0 10 - 0 40 g/dL Final    Alpha 2 04/22/2021 11 4  7 0 - 13 0 % Final    ALPHA 2 CONC 04/22/2021 0 87  0 40 - 1 20 g/dL Final    Beta-1 04/22/2021 6 6  5 0 - 13 0 % Final    BETA 1 CONC 04/22/2021 0 50  0 40 - 0 80 g/dL Final    Beta-2 04/22/2021 3 9  2 0 - 8 0 % Final    BETA 2 CONC 04/22/2021 0 30  0 20 - 0 50 g/dL Final    Gamma Globulin 04/22/2021 22 9* 12 0 - 22 0 % Final    GAMMA CONC 04/22/2021 1 74* 0 50 - 1 60 g/dL Final    M Peak ID 1 04/22/2021 15 80  % Final    M PEAK 1 CONC 04/22/2021 1 20  g/dL Final    M Peak ID 2 04/22/2021 1 20  % Final    M Peak 2 CONC 04/22/2021 0 09  g/dL Final    Total Protein 04/22/2021 7 6  6 4 - 8 2 g/dL Final    SPEP Interpretation 04/22/2021 See Comment   Final    The SPEP shows a biclonal gammopathy  Previous immunofixation identified a biclonal gammopathy as IgG kappa  Repeat immunofixation to be performed  Reviewed by:  Shanelle Gray MD  ([id number]) Electronic Signature    Ig Kappa Free Light Chain 04/22/2021 26 3* 3 3 - 19 4 mg/L Final    Ig Lambda Free Light Chain 04/22/2021 9 8  5 7 - 26 3 mg/L Final    Kappa/Lambda FluidC Ratio 04/22/2021 2 68* 0 26 - 1 65 Final    PSA 04/22/2021 1 7  0 0 - 4 0 ng/mL Final    American Urological Association Guidelines define biochemical recurrence of prostate cancer as a detectable or rising PSA value post-radical prostatectomy that is greater than or equal to 0 2 ng/mL with a second confirmatory level of greater than or equal to 0 2 ng/mL   Hemoglobin A1C 04/22/2021 8 8* Normal 3 8-5 6%; PreDiabetic 5 7-6 4%; Diabetic >=6 5%; Glycemic control for adults with diabetes <7 0% % Final    EAG 04/22/2021 206  mg/dl Final    Cholesterol 04/22/2021 156  50 - 200 mg/dL Final    Cholesterol:       Desirable         <200 mg/dl       Borderline         200-239 mg/dl       High              >239           Triglycerides 04/22/2021 190* <=150 mg/dL Final    Triglyceride:     Normal          <150 mg/dl     Borderline High 150-199 mg/dl     High            200-499 mg/dl        Very High       >499 mg/dl    Specimen collection should occur prior to N-Acetylcysteine or Metamizole administration due to the potential for falsely depressed results   HDL, Direct 04/22/2021 35* >=40 mg/dL Final    HDL Cholesterol:       Low     <41 mg/dL  Specimen collection should occur prior to Metamizole administration due to the potential for falsley depressed results      LDL Calculated 04/22/2021 83  0 - 100 mg/dL Final    LDL Cholesterol:     Optimal           <100 mg/dl     Near Optimal      100-129 mg/dl     Above Optimal       Borderline High 130-159 mg/dl       High            160-189 mg/dl       Very High       >189 mg/dl         This screening LDL is a calculated result  It does not have the accuracy of the Direct Measured LDL in the monitoring of patients with hyperlipidemia and/or statin therapy  Direct Measure LDL (BEJ300) must be ordered separately in these patients   Non-HDL-Chol (CHOL-HDL) 04/22/2021 121  mg/dl Final    Immunofixation Interpretation 04/22/2021 See Comment   Final    Serum immunofixation shows a biclonal gammopathy identified as IgG kappa (M-Peak 1 = 1 20 g/dL) and (M-Peak 2 = 0 09 g/dL)  Reviewed by: Cam Weathers MD ([id number])  Electronic Signature                       Imaging: I have personally reviewed pertinent reports

## 2021-05-07 DIAGNOSIS — I10 ESSENTIAL HYPERTENSION: Primary | ICD-10-CM

## 2021-05-07 PROCEDURE — 4010F ACE/ARB THERAPY RXD/TAKEN: CPT | Performed by: INTERNAL MEDICINE

## 2021-05-07 RX ORDER — LISINOPRIL 10 MG/1
TABLET ORAL
Qty: 368 TABLET | OUTPATIENT
Start: 2021-05-07

## 2021-05-07 RX ORDER — LISINOPRIL 40 MG/1
40 TABLET ORAL DAILY
Qty: 90 TABLET | Refills: 4 | Status: SHIPPED | OUTPATIENT
Start: 2021-05-07 | End: 2022-08-08 | Stop reason: SDUPTHER

## 2021-05-08 ENCOUNTER — IMMUNIZATIONS (OUTPATIENT)
Dept: FAMILY MEDICINE CLINIC | Facility: HOSPITAL | Age: 61
End: 2021-05-08

## 2021-05-08 DIAGNOSIS — Z23 ENCOUNTER FOR IMMUNIZATION: Primary | ICD-10-CM

## 2021-05-08 PROCEDURE — 91300 SARS-COV-2 / COVID-19 MRNA VACCINE (PFIZER-BIONTECH) 30 MCG: CPT

## 2021-05-08 PROCEDURE — 0001A SARS-COV-2 / COVID-19 MRNA VACCINE (PFIZER-BIONTECH) 30 MCG: CPT

## 2021-05-17 DIAGNOSIS — E08.41 DIABETIC MONONEUROPATHY ASSOCIATED WITH DIABETES MELLITUS DUE TO UNDERLYING CONDITION (HCC): ICD-10-CM

## 2021-05-18 RX ORDER — GABAPENTIN 100 MG/1
CAPSULE ORAL
Qty: 30 CAPSULE | Refills: 1 | Status: SHIPPED | OUTPATIENT
Start: 2021-05-18 | End: 2021-07-16

## 2021-05-19 ENCOUNTER — OFFICE VISIT (OUTPATIENT)
Dept: FAMILY MEDICINE CLINIC | Facility: CLINIC | Age: 61
End: 2021-05-19

## 2021-05-19 VITALS
HEART RATE: 82 BPM | HEIGHT: 71 IN | DIASTOLIC BLOOD PRESSURE: 70 MMHG | WEIGHT: 312.6 LBS | RESPIRATION RATE: 16 BRPM | BODY MASS INDEX: 43.76 KG/M2 | SYSTOLIC BLOOD PRESSURE: 136 MMHG | TEMPERATURE: 98.4 F

## 2021-05-19 DIAGNOSIS — E08.21 DIABETES MELLITUS DUE TO UNDERLYING CONDITION WITH DIABETIC NEPHROPATHY, WITH LONG-TERM CURRENT USE OF INSULIN (HCC): Primary | ICD-10-CM

## 2021-05-19 DIAGNOSIS — I10 ESSENTIAL HYPERTENSION: ICD-10-CM

## 2021-05-19 DIAGNOSIS — Z79.4 DIABETES MELLITUS DUE TO UNDERLYING CONDITION WITH DIABETIC NEPHROPATHY, WITH LONG-TERM CURRENT USE OF INSULIN (HCC): Primary | ICD-10-CM

## 2021-05-19 PROCEDURE — 99213 OFFICE O/P EST LOW 20 MIN: CPT | Performed by: NURSE PRACTITIONER

## 2021-05-19 PROCEDURE — 3008F BODY MASS INDEX DOCD: CPT | Performed by: INTERNAL MEDICINE

## 2021-05-19 PROCEDURE — 3066F NEPHROPATHY DOC TX: CPT | Performed by: INTERNAL MEDICINE

## 2021-05-19 RX ORDER — INSULIN GLARGINE 100 [IU]/ML
10 INJECTION, SOLUTION SUBCUTANEOUS
Qty: 15 ML | Refills: 1 | Status: SHIPPED | OUTPATIENT
Start: 2021-05-19 | End: 2021-06-23

## 2021-05-19 NOTE — ASSESSMENT & PLAN NOTE
Lab Results   Component Value Date    HGBA1C 8 8 (H) 04/22/2021   continue with gabapentin 100 mg daily and will consider increasing dose if needed

## 2021-05-19 NOTE — PATIENT INSTRUCTIONS
Call me with your blood sugar numbers after a week on the insulin  Do not skip meals  Have OJ or hard candy around in case of low blood sugar    I started you on insulin to take in the evening  10 units to start

## 2021-05-19 NOTE — ASSESSMENT & PLAN NOTE
Lab Results   Component Value Date    HGBA1C 8 8 (H) 04/22/2021   started on Lantus 10 units at hs  Discussed pathophysiology of insulin dependent DM2 and the need to lose weight  Advised to test BS in the AM and 2 hours after the evening meal and call me with the results  Will make adjustments to insulin as needed based on this data  Continue with nutritionist and continue to make diet changes  He was given DASH diet at last visit  He had GI distress with SGLT2 and GLP-1 in the past according to the patient  Discussed the need to eat 3 low fat/low carb meals a day  He has not experienced hypoglycemia

## 2021-05-26 ENCOUNTER — TELEPHONE (OUTPATIENT)
Dept: FAMILY MEDICINE CLINIC | Facility: CLINIC | Age: 61
End: 2021-05-26

## 2021-05-26 NOTE — TELEPHONE ENCOUNTER
Patient called concerning Lantus order  Patient was questioning if it was still safe to take his lantus with a BS of 171 and Fasting BS of 164 the following morning  Double checked with Drew Ramires, patient should still take his night time lantus  Patient educated on long acting insulin and the reasons he should be taking it  Patient agreeable and will start tonight  He will call at a later date to update

## 2021-05-29 ENCOUNTER — IMMUNIZATIONS (OUTPATIENT)
Dept: FAMILY MEDICINE CLINIC | Facility: HOSPITAL | Age: 61
End: 2021-05-29

## 2021-05-29 DIAGNOSIS — Z23 ENCOUNTER FOR IMMUNIZATION: Primary | ICD-10-CM

## 2021-05-29 PROCEDURE — 91300 SARS-COV-2 / COVID-19 MRNA VACCINE (PFIZER-BIONTECH) 30 MCG: CPT

## 2021-05-29 PROCEDURE — 0002A SARS-COV-2 / COVID-19 MRNA VACCINE (PFIZER-BIONTECH) 30 MCG: CPT

## 2021-06-23 ENCOUNTER — TELEPHONE (OUTPATIENT)
Dept: FAMILY MEDICINE CLINIC | Facility: CLINIC | Age: 61
End: 2021-06-23

## 2021-06-23 ENCOUNTER — TELEMEDICINE (OUTPATIENT)
Dept: FAMILY MEDICINE CLINIC | Facility: CLINIC | Age: 61
End: 2021-06-23

## 2021-06-23 DIAGNOSIS — Z79.4 DIABETES MELLITUS DUE TO UNDERLYING CONDITION WITH DIABETIC NEPHROPATHY, WITH LONG-TERM CURRENT USE OF INSULIN (HCC): Primary | ICD-10-CM

## 2021-06-23 DIAGNOSIS — E08.21 DIABETES MELLITUS DUE TO UNDERLYING CONDITION WITH DIABETIC NEPHROPATHY, WITH LONG-TERM CURRENT USE OF INSULIN (HCC): Primary | ICD-10-CM

## 2021-06-23 PROCEDURE — G2012 BRIEF CHECK IN BY MD/QHP: HCPCS | Performed by: NURSE PRACTITIONER

## 2021-06-23 PROCEDURE — T1015 CLINIC SERVICE: HCPCS | Performed by: NURSE PRACTITIONER

## 2021-06-23 PROCEDURE — 3066F NEPHROPATHY DOC TX: CPT | Performed by: PHYSICIAN ASSISTANT

## 2021-06-23 RX ORDER — INSULIN GLARGINE 100 [IU]/ML
20 INJECTION, SOLUTION SUBCUTANEOUS
Qty: 15 ML | Refills: 1
Start: 2021-06-23 | End: 2021-07-16 | Stop reason: SDUPTHER

## 2021-06-23 NOTE — TELEPHONE ENCOUNTER
Patient called requesting a call back from Parveen Villanueva because patient states the insulin that was prescribed at last visit seems to not be working for him  Patient states his blood sugars are up and down for example 17, [id number]   Patient would like to know what to do

## 2021-06-23 NOTE — ASSESSMENT & PLAN NOTE
Lab Results   Component Value Date    HGBA1C 8 8 (H) 04/22/2021   Increase Lantus to 20 units in the evening and test BS BID and call with results and will adjust insulin dose  He may need to inject BID  Goal is for FBS  and 2 hour pp 140 -180  Continue with lifestyle changes

## 2021-06-23 NOTE — PROGRESS NOTES
Virtual Brief Visit    Assessment/Plan:    Problem List Items Addressed This Visit        Endocrine    Diabetes mellitus due to underlying condition with diabetic nephropathy, with long-term current use of insulin ([address] ) - Primary       Lab Results   Component Value Date    HGBA1C 8 8 (H) 04/22/2021   Increase Lantus to 20 units in the evening and test BS BID and call with results and will adjust insulin dose  He may need to inject BID  Goal is for FBS  and 2 hour pp 140 -180  Continue with lifestyle changes  Relevant Medications    insulin glargine (Lantus SoloStar) 100 units/mL injection pen                Reason for visit is   Chief Complaint   Patient presents with    Virtual Brief Visit        Encounter provider NICHOLAS Stanley    Provider located at [address] [place] [id number] [phone]    Recent Visits  No visits were found meeting these conditions  Showing recent visits within past 7 days and meeting all other requirements  Today's Visits  Date Type Provider Dept   06/23/21 Telemedicine Rafaela Currie, [address]    06/23/21 Telephone [phone] today's visits and meeting all other requirements  Future Appointments  No visits were found meeting these conditions  Showing future appointments within next 150 days and meeting all other requirements       After connecting through telephone, the patient was identified by name and date of birth  Lauri Encarnacion was informed that this is a telemedicine visit and that the visit is being conducted through telephone  My office door was closed  No one else was in the room  He acknowledged consent and understanding of privacy and security of the platform  The patient has agreed to participate and understands he can discontinue the visit at any time  Patient is aware this is a billable service       Subjective    Lauri Encarnacion is a 56 y o  male   56year old patient presents for phone visit for management of DM2  He started on Lantus and was calling to discuss BS levels and management  He is making lifestyle changes related to diet and exercise  Cut back on carbs and is getting a treadmill  Started on Lantus 10 units at hs and not having any problems with insulin management  Tests AM and PM   to 200 and  range  Past Medical History:   Diagnosis Date    Asthma     Benign positional vertigo     Diabetes mellitus ([facility] )     borderline; diet controlled    Diabetes mellitus due to underlying condition with diabetic nephropathy, with long-term current use of insulin ([facility] ) 5/19/2021    Dyslipidemia     Gastroenteritis     Hyperlipidemia     Hypertension     Lipoma of neck     last assessed - 03Aug2017    Multiple myeloma ([facility] )     Sleep apnea     has symptoms but not been diagnosed    Wheezing     last assessed - [id number]       Past Surgical History:   Procedure Laterality Date    ESOPHAGOGASTRODUODENOSCOPY  2010    Diagnostic    MASS EXCISION Right 8/21/2017    Procedure: POSTERIOR SHOULDER MASS EXCISION; POSTERIOR NECK MASS EXCISION; CHEST WALL MASS EXCISION;  Surgeon: Sophia Farias MD;  Location: [facility];  Service: General    THROAT SURGERY         Current Outpatient Medications   Medication Sig Dispense Refill    albuterol (2 5 mg/3 mL) 0 083 % nebulizer solution Take 3 mL (2 5 mg total) by nebulization every 6 (six) hours as needed for wheezing 30 vial 2    ascorbic acid (VITAMIN C) 500 mg tablet Take 500 mg by mouth daily      atorvastatin (LIPITOR) 40 mg tablet Take 1 tablet (40 mg total) by mouth daily 90 tablet 4    b complex vitamins capsule Take 1 capsule by mouth daily      Blood Glucose Monitoring Suppl (ONETOUCH VERIO) w/Device KIT by Does not apply route 3 (three) times a day 1 kit 0    Flovent  MCG/ACT inhaler INHALE 2 PUFFS BY MOUTH TWICE DAILY   RINSE MOUTH AFTER USE 12 g 3    fluticasone (FLONASE) 50 mcg/act nasal spray 2 sprays into each nostril daily      gabapentin (NEURONTIN) 100 mg capsule TAKE 1 CAPSULE(100 MG) BY MOUTH DAILY AT BEDTIME 30 capsule 1    glipiZIDE (GLUCOTROL XL) 10 mg 24 hr tablet TAKE 1 TABLET(10 MG) BY MOUTH DAILY 90 tablet 1    glucose blood (OneTouch Verio) test strip 1 each by Other route 3 (three) times a day Use as instructed 100 each 5    insulin glargine (Lantus SoloStar) 100 units/mL injection pen Inject 20 Units under the skin daily at bedtime 15 mL 1    Insulin Pen Needle 31G X 5 MM MISC Use daily 100 each 1    Lancets (ONETOUCH ULTRASOFT) lancets by Other route 3 (three) times a day Use as instructed 100 each 5    lisinopril (ZESTRIL) 40 mg tablet Take 1 tablet (40 mg total) by mouth daily 90 tablet 4    loratadine (CLARITIN) 10 mg tablet Take 1 tablet (10 mg total) by mouth daily 90 tablet 1    MAGNESIUM CITRATE PO Take 1 tablet by mouth daily        metFORMIN (GLUCOPHAGE) 1000 MG tablet Take 1 tablet (1,000 mg total) by mouth 2 (two) times a day with meals 180 tablet 2    Multiple Vitamins-Minerals (MULTIVITAMIN ADULTS 50+ PO) Take by mouth daily      omeprazole (PriLOSEC) 40 MG capsule Take 1 capsule (40 mg total) by mouth daily 90 capsule 3    simvastatin (ZOCOR) 20 mg tablet TAKE 1 TABLET(20 MG) BY MOUTH EVERY EVENING 90 tablet 1    VENTOLIN  (90 Base) MCG/ACT inhaler INHALE ONE TO TWO PUFFS BY MOUTH EVERY 4 TO 6 HOURS AS NEEDED 18 each 11     No current facility-administered medications for this visit  Allergies   Allergen Reactions    Shellfish-Derived Products - Food Allergy Anaphylaxis     Clams and mussels, oysters  Lobster and crab ok    Diphenhydramine      Lightheadedness, nauseous, rapid heartbeat    Other Palpitations and Other (See Comments)     Clams       Review of Systems   Constitutional: Negative  Respiratory: Negative  Cardiovascular: Negative  Endocrine: Negative      All other systems reviewed and are negative  There were no vitals filed for this visit  I spent 15 minutes directly with the patient during this visit    VIRTUAL VISIT Graham Puentes acknowledges that he has consented to an online visit or consultation  He understands that the online visit is based solely on information provided by him, and that, in the absence of a face-to-face physical evaluation by the physician, the diagnosis he receives is both limited and provisional in terms of accuracy and completeness  This is not intended to replace a full medical face-to-face evaluation by the physician  Oral Mantel understands and accepts these terms

## 2021-06-30 ENCOUNTER — HOSPITAL ENCOUNTER (OUTPATIENT)
Dept: NON INVASIVE DIAGNOSTICS | Facility: CLINIC | Age: 61
Discharge: HOME | End: 2021-06-30
Payer: COMMERCIAL

## 2021-06-30 DIAGNOSIS — R06.00 DOE (DYSPNEA ON EXERTION): ICD-10-CM

## 2021-06-30 PROCEDURE — 93017 CV STRESS TEST TRACING ONLY: CPT

## 2021-06-30 PROCEDURE — 93018 CV STRESS TEST I&R ONLY: CPT | Performed by: INTERNAL MEDICINE

## 2021-06-30 PROCEDURE — G1004 CDSM NDSC: HCPCS

## 2021-06-30 PROCEDURE — 93016 CV STRESS TEST SUPVJ ONLY: CPT | Performed by: INTERNAL MEDICINE

## 2021-06-30 PROCEDURE — 78452 HT MUSCLE IMAGE SPECT MULT: CPT

## 2021-06-30 PROCEDURE — 78452 HT MUSCLE IMAGE SPECT MULT: CPT | Performed by: INTERNAL MEDICINE

## 2021-06-30 PROCEDURE — A9502 TC99M TETROFOSMIN: HCPCS

## 2021-06-30 RX ADMIN — REGADENOSON 0.4 MG: 0.08 INJECTION, SOLUTION INTRAVENOUS at 13:10

## 2021-07-16 ENCOUNTER — TELEPHONE (OUTPATIENT)
Dept: FAMILY MEDICINE CLINIC | Facility: CLINIC | Age: 61
End: 2021-07-16

## 2021-07-16 DIAGNOSIS — E08.41 DIABETIC MONONEUROPATHY ASSOCIATED WITH DIABETES MELLITUS DUE TO UNDERLYING CONDITION (HCC): ICD-10-CM

## 2021-07-16 DIAGNOSIS — Z79.4 DIABETES MELLITUS DUE TO UNDERLYING CONDITION WITH DIABETIC NEPHROPATHY, WITH LONG-TERM CURRENT USE OF INSULIN (HCC): ICD-10-CM

## 2021-07-16 DIAGNOSIS — E08.21 DIABETES MELLITUS DUE TO UNDERLYING CONDITION WITH DIABETIC NEPHROPATHY, WITH LONG-TERM CURRENT USE OF INSULIN (HCC): ICD-10-CM

## 2021-07-16 RX ORDER — INSULIN GLARGINE 100 [IU]/ML
20 INJECTION, SOLUTION SUBCUTANEOUS
Qty: 45 ML | Refills: 3 | Status: SHIPPED | OUTPATIENT
Start: 2021-07-16 | End: 2021-08-18 | Stop reason: SDUPTHER

## 2021-07-16 RX ORDER — GABAPENTIN 100 MG/1
CAPSULE ORAL
Qty: 30 CAPSULE | Refills: 1 | Status: SHIPPED | OUTPATIENT
Start: 2021-07-16 | End: 2021-10-11

## 2021-07-19 NOTE — TELEPHONE ENCOUNTER
Patient is calling regarding the insuline that PCP needs to give a new prescription since the dose was increased  He can be reached at   [phone]

## 2021-07-20 NOTE — TELEPHONE ENCOUNTER
Called pharmacy, Rx was submitted on 7/16/21, however insurance not refilling until 7/21/21  Pharmacist will call insurance as dose was increased  Left message for patient with information

## 2021-07-30 DIAGNOSIS — J45.40 MODERATE PERSISTENT ASTHMA WITHOUT COMPLICATION: ICD-10-CM

## 2021-07-30 RX ORDER — LORATADINE 10 MG/1
TABLET ORAL
Qty: 90 TABLET | Refills: 1 | Status: SHIPPED | OUTPATIENT
Start: 2021-07-30 | End: 2022-01-13 | Stop reason: SDUPTHER

## 2021-08-15 DIAGNOSIS — E78.5 HYPERLIPIDEMIA, UNSPECIFIED HYPERLIPIDEMIA TYPE: ICD-10-CM

## 2021-08-16 RX ORDER — SIMVASTATIN 20 MG
TABLET ORAL
Qty: 90 TABLET | Refills: 1 | Status: SHIPPED | OUTPATIENT
Start: 2021-08-16

## 2021-08-18 ENCOUNTER — OFFICE VISIT (OUTPATIENT)
Dept: FAMILY MEDICINE CLINIC | Facility: CLINIC | Age: 61
End: 2021-08-18

## 2021-08-18 ENCOUNTER — APPOINTMENT (OUTPATIENT)
Dept: LAB | Facility: CLINIC | Age: 61
End: 2021-08-18
Payer: COMMERCIAL

## 2021-08-18 VITALS
SYSTOLIC BLOOD PRESSURE: 130 MMHG | WEIGHT: 311 LBS | TEMPERATURE: 98.1 F | RESPIRATION RATE: 18 BRPM | HEIGHT: 71 IN | OXYGEN SATURATION: 96 % | BODY MASS INDEX: 43.54 KG/M2 | DIASTOLIC BLOOD PRESSURE: 84 MMHG | HEART RATE: 90 BPM

## 2021-08-18 DIAGNOSIS — Z79.4 DIABETES MELLITUS DUE TO UNDERLYING CONDITION WITH DIABETIC NEPHROPATHY, WITH LONG-TERM CURRENT USE OF INSULIN (HCC): Primary | ICD-10-CM

## 2021-08-18 DIAGNOSIS — N52.8 OTHER MALE ERECTILE DYSFUNCTION: ICD-10-CM

## 2021-08-18 DIAGNOSIS — E11.42 TYPE 2 DIABETES MELLITUS WITH DIABETIC POLYNEUROPATHY, WITHOUT LONG-TERM CURRENT USE OF INSULIN (HCC): ICD-10-CM

## 2021-08-18 DIAGNOSIS — E08.21 DIABETES MELLITUS DUE TO UNDERLYING CONDITION WITH DIABETIC NEPHROPATHY, WITH LONG-TERM CURRENT USE OF INSULIN (HCC): Primary | ICD-10-CM

## 2021-08-18 DIAGNOSIS — D47.2 SMOLDERING MULTIPLE MYELOMA: ICD-10-CM

## 2021-08-18 LAB
ALBUMIN SERPL BCP-MCNC: 3.5 G/DL (ref 3.5–5)
ALP SERPL-CCNC: 117 U/L (ref 46–116)
ALT SERPL W P-5'-P-CCNC: 36 U/L (ref 12–78)
ANION GAP SERPL CALCULATED.3IONS-SCNC: 6 MMOL/L (ref 4–13)
AST SERPL W P-5'-P-CCNC: 20 U/L (ref 5–45)
BASOPHILS # BLD AUTO: 0.05 THOUSANDS/ΜL (ref 0–0.1)
BASOPHILS NFR BLD AUTO: 1 % (ref 0–1)
BILIRUB SERPL-MCNC: 0.47 MG/DL (ref 0.2–1)
BUN SERPL-MCNC: 14 MG/DL (ref 5–25)
CALCIUM SERPL-MCNC: 9.1 MG/DL (ref 8.3–10.1)
CHLORIDE SERPL-SCNC: 101 MMOL/L (ref 100–108)
CO2 SERPL-SCNC: 30 MMOL/L (ref 21–32)
CREAT SERPL-MCNC: 0.94 MG/DL (ref 0.6–1.3)
EOSINOPHIL # BLD AUTO: 0.29 THOUSAND/ΜL (ref 0–0.61)
EOSINOPHIL NFR BLD AUTO: 5 % (ref 0–6)
ERYTHROCYTE [DISTWIDTH] IN BLOOD BY AUTOMATED COUNT: 16.3 % (ref 11.6–15.1)
FERRITIN SERPL-MCNC: 11 NG/ML (ref 8–388)
GFR SERPL CREATININE-BSD FRML MDRD: 86 ML/MIN/1.73SQ M
GLUCOSE P FAST SERPL-MCNC: 160 MG/DL (ref 65–99)
HCT VFR BLD AUTO: 37.6 % (ref 36.5–49.3)
HGB BLD-MCNC: 11.2 G/DL (ref 12–17)
IGA SERPL-MCNC: 74 MG/DL (ref 70–400)
IGG SERPL-MCNC: 2000 MG/DL (ref 700–1600)
IGM SERPL-MCNC: 101 MG/DL (ref 40–230)
IMM GRANULOCYTES # BLD AUTO: 0.03 THOUSAND/UL (ref 0–0.2)
IMM GRANULOCYTES NFR BLD AUTO: 1 % (ref 0–2)
IRON SATN MFR SERPL: 12 %
IRON SERPL-MCNC: 40 UG/DL (ref 65–175)
LYMPHOCYTES # BLD AUTO: 1.42 THOUSANDS/ΜL (ref 0.6–4.47)
LYMPHOCYTES NFR BLD AUTO: 23 % (ref 14–44)
MCH RBC QN AUTO: 26.5 PG (ref 26.8–34.3)
MCHC RBC AUTO-ENTMCNC: 29.8 G/DL (ref 31.4–37.4)
MCV RBC AUTO: 89 FL (ref 82–98)
MONOCYTES # BLD AUTO: 0.31 THOUSAND/ΜL (ref 0.17–1.22)
MONOCYTES NFR BLD AUTO: 5 % (ref 4–12)
NEUTROPHILS # BLD AUTO: 4.15 THOUSANDS/ΜL (ref 1.85–7.62)
NEUTS SEG NFR BLD AUTO: 65 % (ref 43–75)
NRBC BLD AUTO-RTO: 0 /100 WBCS
PLATELET # BLD AUTO: 257 THOUSANDS/UL (ref 149–390)
PMV BLD AUTO: 9.6 FL (ref 8.9–12.7)
POTASSIUM SERPL-SCNC: 4.7 MMOL/L (ref 3.5–5.3)
PROT SERPL-MCNC: 8.5 G/DL (ref 6.4–8.2)
RBC # BLD AUTO: 4.22 MILLION/UL (ref 3.88–5.62)
SL AMB POCT HEMOGLOBIN AIC: 7.4 (ref ?–6.5)
SODIUM SERPL-SCNC: 137 MMOL/L (ref 136–145)
TIBC SERPL-MCNC: 345 UG/DL (ref 250–450)
WBC # BLD AUTO: 6.25 THOUSAND/UL (ref 4.31–10.16)

## 2021-08-18 PROCEDURE — 83550 IRON BINDING TEST: CPT

## 2021-08-18 PROCEDURE — 82784 ASSAY IGA/IGD/IGG/IGM EACH: CPT

## 2021-08-18 PROCEDURE — 83883 ASSAY NEPHELOMETRY NOT SPEC: CPT

## 2021-08-18 PROCEDURE — 82728 ASSAY OF FERRITIN: CPT

## 2021-08-18 PROCEDURE — 84165 PROTEIN E-PHORESIS SERUM: CPT

## 2021-08-18 PROCEDURE — 99213 OFFICE O/P EST LOW 20 MIN: CPT | Performed by: NURSE PRACTITIONER

## 2021-08-18 PROCEDURE — 83036 HEMOGLOBIN GLYCOSYLATED A1C: CPT | Performed by: NURSE PRACTITIONER

## 2021-08-18 PROCEDURE — 84165 PROTEIN E-PHORESIS SERUM: CPT | Performed by: PATHOLOGY

## 2021-08-18 PROCEDURE — 83540 ASSAY OF IRON: CPT

## 2021-08-18 PROCEDURE — 85025 COMPLETE CBC W/AUTO DIFF WBC: CPT

## 2021-08-18 PROCEDURE — 80053 COMPREHEN METABOLIC PANEL: CPT

## 2021-08-18 PROCEDURE — 3051F HG A1C>EQUAL 7.0%<8.0%: CPT | Performed by: PHYSICIAN ASSISTANT

## 2021-08-18 PROCEDURE — 36415 COLL VENOUS BLD VENIPUNCTURE: CPT

## 2021-08-18 RX ORDER — LANCETS
EACH MISCELLANEOUS 3 TIMES DAILY
Qty: 100 EACH | Refills: 5 | Status: SHIPPED | OUTPATIENT
Start: 2021-08-18 | End: 2021-08-23 | Stop reason: CLARIF

## 2021-08-18 RX ORDER — TADALAFIL 10 MG/1
10 TABLET ORAL DAILY PRN
Qty: 10 TABLET | Refills: 1 | Status: SHIPPED | OUTPATIENT
Start: 2021-08-18 | End: 2022-06-17

## 2021-08-18 RX ORDER — BLOOD SUGAR DIAGNOSTIC
1 STRIP MISCELLANEOUS 3 TIMES DAILY
Qty: 100 EACH | Refills: 5 | Status: SHIPPED | OUTPATIENT
Start: 2021-08-18 | End: 2022-08-02

## 2021-08-18 RX ORDER — INSULIN GLARGINE 100 [IU]/ML
20 INJECTION, SOLUTION SUBCUTANEOUS
Qty: 45 ML | Refills: 3 | Status: SHIPPED | OUTPATIENT
Start: 2021-08-18

## 2021-08-18 NOTE — PATIENT INSTRUCTIONS
Increase insulin to 5 units in the AM and continue with 20 units at bed time  I started you on Cialis to use before sexual activity  Diet and exercise       Erectile Dysfunction   WHAT YOU SHOULD KNOW:   Erectile dysfunction (ED), or impotence, is when you cannot get or keep an erection for sexual activity  INSTRUCTIONS:   Medicines:  · ED medicines  that help you have an erection may be prescribed  These medicines are taken before you have sex  Follow your healthcare provider's instructions on when and how to take these medicines  You may have a life-threatening reaction if you mix these medicines with medicines that contain nitrates  Medicines with nitrates include nitroglycerin and other heart medicines  · Testosterone  may be given to increase the levels in your blood and improve your ED  You may need to use a skin cream or wear a patch  · Take your medicine as directed  Call your healthcare provider if you think your medicine is not helping or if you have side effects  Tell him if you are allergic to any medicine  Keep a list of the medicines, vitamins, and herbs you take  Include the amounts, and when and why you take them  Bring the list or the pill bottles to follow-up visits  Carry your medicine list with you in case of an emergency  Follow up with your healthcare provider as directed:  Write down your questions so you remember to ask them during your visits  Manage your risk factors for ED:   · Do not smoke  If you smoke, it is never too late to quit  Ask for information about how to stop smoking if you need help  · Limit alcohol  Men should limit alcohol to 2 drinks a day  A drink of alcohol is 12 ounces of beer, 5 ounces of wine, or 1½ ounces of liquor  · Manage your medical conditions  Eat a variety of healthy foods and stay active  This can help control high blood pressure, diabetes, or obesity  · Reduce stress   Learn ways to relax, such as deep breathing, meditation, and listening to music  Contact your healthcare provider if:   · You have changes in your vision, headaches, or back pain after you take ED medicine  · You have a painful erection  · You have questions or concerns about your condition or care  Return to the emergency department if:   · You have chest pain, dizziness, or nausea after you take ED medicines or during or after sex  · You have an erection for more than 4 hours after you take your ED medicine  · You see blood in your urine  © 2014 [address] is for End User's use only and may not be sold, redistributed or otherwise used for commercial purposes  All illustrations and images included in CareNotes® are the copyrighted property of A [employer] M [employer]  or Jamal King  The above information is an  only  It is not intended as medical advice for individual conditions or treatments  Talk to your doctor, nurse or pharmacist before following any medical regimen to see if it is safe and effective for you

## 2021-08-18 NOTE — ASSESSMENT & PLAN NOTE
Lab Results   Component Value Date    HGBA1C 7 4 (A) 08/18/2021   Decrease in A1c from 8 6  Continue with Lantus and Glipizide  Increased Lantus to BID dosing - added in 5 units in the AM and continue with 20 units in the PM  He will continue to check BD BID  PM readings were high 180-200  Goal is to get to < 7%  Discussed diet and exercise  He is motivated to make these changes  Neuropathies are controlled with 100 mg gabapentin at hs

## 2021-08-18 NOTE — PROGRESS NOTES
Assessment/Plan:    Diabetes mellitus due to underlying condition with diabetic nephropathy, with long-term current use of insulin ([facility])    Lab Results   Component Value Date    HGBA1C 7 4 (A) 08/18/2021   Decrease in A1c from 8 6  Continue with Lantus and Glipizide  Increased Lantus to BID dosing - added in 5 units in the AM and continue with 20 units in the PM  He will continue to check BD BID  PM readings were high 180-200  Goal is to get to < 7%  Discussed diet and exercise  He is motivated to make these changes  Neuropathies are controlled with 100 mg gabapentin at hs  Other male erectile dysfunction  RX Cialis 10 mg  Ordered Testosterone levels  Problem List Items Addressed This Visit        Endocrine    Diabetes mellitus due to underlying condition with diabetic nephropathy, with long-term current use of insulin ([facility] ) - Primary       Lab Results   Component Value Date    HGBA1C 7 4 (A) 08/18/2021   Decrease in A1c from 8 6  Continue with Lantus and Glipizide  Increased Lantus to BID dosing - added in 5 units in the AM and continue with 20 units in the PM  He will continue to check BD BID  PM readings were high 180-200  Goal is to get to < 7%  Discussed diet and exercise  He is motivated to make these changes  Neuropathies are controlled with 100 mg gabapentin at hs  Relevant Medications    insulin glargine (Lantus SoloStar) 100 units/mL injection pen    Insulin Pen Needle 31G X 5 MM MISC    Other Relevant Orders    POCT hemoglobin A1c (Completed)       Other    Other male erectile dysfunction     RX Cialis 10 mg  Ordered Testosterone levels           Relevant Medications    tadalafil (CIALIS) 10 MG tablet    Other Relevant Orders    Testosterone, free, total      Other Visit Diagnoses     Type 2 diabetes mellitus with diabetic polyneuropathy, without long-term current use of insulin ([facility])        Relevant Medications    insulin glargine (Lantus SoloStar) 100 units/mL injection pen Lancets (onetouch ultrasoft) lancets    glucose blood (OneTouch Verio) test strip            Subjective:      Patient ID: Slime Wyatt is a 59 y o  male  59year old presents for management of DM2  A1c in the office today is 7 4 which is a decrease from the last visit which was 8 6  He is taking medication as directed and watching diet and doing more exercise  Reviewed logs and PM BS are elevated  He only using Lantus at hs with 20 units  He has night time neuropathy and controls it with 100 mg gabapentin at this point  Review of stress test and it was normal     C/O ED and would like to get medication  The following portions of the patient's history were reviewed and updated as appropriate: allergies, current medications, past family history, past medical history, past social history, past surgical history and problem list     Review of Systems   Constitutional: Negative  Respiratory: Negative  Cardiovascular: Negative  Endocrine:        LE neuropathy   Genitourinary:        ED   All other systems reviewed and are negative  Objective:      /84 (BP Location: Left arm, Patient Position: Sitting, Cuff Size: Large)   Pulse 90   Temp 98 1 °F (36 7 °C) (Temporal)   Resp 18   Ht 5' 11" (1 803 m)   Wt (!) 141 kg (311 lb)   SpO2 96%   BMI 43 38 kg/m²          Physical Exam  Vitals reviewed  Constitutional:       Appearance: Normal appearance  HENT:      Head: Normocephalic and atraumatic  Cardiovascular:      Rate and Rhythm: Normal rate and regular rhythm  Heart sounds: Normal heart sounds  Pulmonary:      Effort: Pulmonary effort is normal       Breath sounds: Normal breath sounds  Musculoskeletal:      Cervical back: Normal range of motion and neck supple  Neurological:      General: No focal deficit present  Mental Status: He is alert and oriented to person, place, and time     Psychiatric:         Mood and Affect: Mood normal

## 2021-08-19 ENCOUNTER — TELEPHONE (OUTPATIENT)
Dept: FAMILY MEDICINE CLINIC | Facility: CLINIC | Age: 61
End: 2021-08-19

## 2021-08-19 ENCOUNTER — APPOINTMENT (OUTPATIENT)
Dept: LAB | Facility: CLINIC | Age: 61
End: 2021-08-19
Payer: COMMERCIAL

## 2021-08-19 DIAGNOSIS — Z79.4 DIABETES MELLITUS DUE TO UNDERLYING CONDITION WITH DIABETIC NEPHROPATHY, WITH LONG-TERM CURRENT USE OF INSULIN (HCC): Primary | ICD-10-CM

## 2021-08-19 DIAGNOSIS — E08.21 DIABETES MELLITUS DUE TO UNDERLYING CONDITION WITH DIABETIC NEPHROPATHY, WITH LONG-TERM CURRENT USE OF INSULIN (HCC): Primary | ICD-10-CM

## 2021-08-19 LAB
KAPPA LC FREE SER-MCNC: 30 MG/L (ref 3.3–19.4)
KAPPA LC FREE/LAMBDA FREE SER: 2.88 {RATIO} (ref 0.26–1.65)
LAMBDA LC FREE SERPL-MCNC: 10.4 MG/L (ref 5.7–26.3)

## 2021-08-19 PROCEDURE — 36415 COLL VENOUS BLD VENIPUNCTURE: CPT

## 2021-08-19 PROCEDURE — 84403 ASSAY OF TOTAL TESTOSTERONE: CPT

## 2021-08-19 PROCEDURE — 84402 ASSAY OF FREE TESTOSTERONE: CPT

## 2021-08-19 NOTE — TELEPHONE ENCOUNTER
Pt calling had visit with John Wray yesterday and wrong lancets were sent to pharmacy   [facility], gave prescription # [phone]

## 2021-08-20 ENCOUNTER — TELEPHONE (OUTPATIENT)
Dept: HEMATOLOGY ONCOLOGY | Facility: CLINIC | Age: 61
End: 2021-08-20

## 2021-08-20 LAB
ALBUMIN SERPL ELPH-MCNC: 3.99 G/DL (ref 3.5–5)
ALBUMIN SERPL ELPH-MCNC: 49.3 % (ref 52–65)
ALPHA1 GLOB SERPL ELPH-MCNC: 0.36 G/DL (ref 0.1–0.4)
ALPHA1 GLOB SERPL ELPH-MCNC: 4.5 % (ref 2.5–5)
ALPHA2 GLOB SERPL ELPH-MCNC: 0.99 G/DL (ref 0.4–1.2)
ALPHA2 GLOB SERPL ELPH-MCNC: 12.2 % (ref 7–13)
BETA GLOB ABNORMAL SERPL ELPH-MCNC: 0.48 G/DL (ref 0.4–0.8)
BETA1 GLOB SERPL ELPH-MCNC: 5.9 % (ref 5–13)
BETA2 GLOB SERPL ELPH-MCNC: 3.8 % (ref 2–8)
BETA2+GAMMA GLOB SERPL ELPH-MCNC: 0.31 G/DL (ref 0.2–0.5)
GAMMA GLOB ABNORMAL SERPL ELPH-MCNC: 1.97 G/DL (ref 0.5–1.6)
GAMMA GLOB SERPL ELPH-MCNC: 24.3 % (ref 12–22)
IGG/ALB SER: 0.97 {RATIO} (ref 1.1–1.8)
M PEAK ID 2: 1.6 %
M PROTEIN 1 MFR SERPL ELPH: 15.8 %
M PROTEIN 1 SERPL ELPH-MCNC: 1.28 G/DL
M PROTEIN 2 SERPL ELPH-MCNC: 0.13 G/DL
PROT PATTERN SERPL ELPH-IMP: ABNORMAL
PROT SERPL-MCNC: 8.1 G/DL (ref 6.4–8.2)

## 2021-08-20 NOTE — TELEPHONE ENCOUNTER
Appointment Cancellation Or Reschedule     Person calling in Patient    Provider [facility] Visit Date and Time  08/30/21   Office Visit Location [facility]   Did patient want to reschedule their office appointment? If so, when was it scheduled to? Yes 08/23 at 8:30 am    Is this patient calling to reschedule an infusion appointment? no   When is their next infusion appointment? No appt    Is this patient a Chemo patient? no   Reason for Cancellation or Reschedule Patient has a personal inconvenience/S appt for this Monday  If the patient is a treatment patient, please route this to the office nurse  If the patient is not on treatment, please route to the office MA

## 2021-08-21 LAB
TESTOST FREE SERPL-MCNC: 5.4 PG/ML (ref 6.6–18.1)
TESTOST SERPL-MCNC: 300 NG/DL (ref 264–916)

## 2021-08-23 ENCOUNTER — OFFICE VISIT (OUTPATIENT)
Dept: HEMATOLOGY ONCOLOGY | Facility: CLINIC | Age: 61
End: 2021-08-23
Payer: COMMERCIAL

## 2021-08-23 VITALS
HEART RATE: 83 BPM | DIASTOLIC BLOOD PRESSURE: 72 MMHG | WEIGHT: 311.5 LBS | OXYGEN SATURATION: 96 % | TEMPERATURE: 98 F | RESPIRATION RATE: 18 BRPM | SYSTOLIC BLOOD PRESSURE: 124 MMHG | BODY MASS INDEX: 43.45 KG/M2

## 2021-08-23 DIAGNOSIS — D50.9 IRON DEFICIENCY ANEMIA, UNSPECIFIED IRON DEFICIENCY ANEMIA TYPE: ICD-10-CM

## 2021-08-23 DIAGNOSIS — D47.2 SMOLDERING MULTIPLE MYELOMA: Primary | ICD-10-CM

## 2021-08-23 DIAGNOSIS — Z79.4 DIABETES MELLITUS DUE TO UNDERLYING CONDITION WITH DIABETIC NEPHROPATHY, WITH LONG-TERM CURRENT USE OF INSULIN (HCC): Primary | ICD-10-CM

## 2021-08-23 DIAGNOSIS — E08.21 DIABETES MELLITUS DUE TO UNDERLYING CONDITION WITH DIABETIC NEPHROPATHY, WITH LONG-TERM CURRENT USE OF INSULIN (HCC): Primary | ICD-10-CM

## 2021-08-23 PROCEDURE — 99214 OFFICE O/P EST MOD 30 MIN: CPT | Performed by: PHYSICIAN ASSISTANT

## 2021-08-23 PROCEDURE — 3074F SYST BP LT 130 MM HG: CPT | Performed by: PHYSICIAN ASSISTANT

## 2021-08-23 PROCEDURE — 3078F DIAST BP <80 MM HG: CPT | Performed by: PHYSICIAN ASSISTANT

## 2021-08-23 RX ORDER — SODIUM CHLORIDE 9 MG/ML
20 INJECTION, SOLUTION INTRAVENOUS ONCE
Status: CANCELLED | OUTPATIENT
Start: 2021-08-31

## 2021-08-23 RX ORDER — LANCETS 30 GAUGE
1 EACH MISCELLANEOUS 3 TIMES DAILY
Qty: 100 EACH | Refills: 5 | Status: SHIPPED | OUTPATIENT
Start: 2021-08-23 | End: 2021-09-01

## 2021-08-23 NOTE — PROGRESS NOTES
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 dx 5/2018   M protein 1 4 g/dL, serum kappa free light chain 17, ratio of 2 09 at time of diagnosis 5/2018   Bone marrow biopsy May 2018 showed plasma cell 12-15%, normal cytogenetics and fish panel for multiple myeloma         M protein 1 1 - 1 4 since 4/2018   Serum free kappa light chains increasing very slowly   18 6 7/2018;  22 10/2019;  26 10/2020  30 8/2021   Ratio 1 8-2 8   Total protein increasing as is IgG      2  Mild anemia  Iron is decreased on 8/18/21 assessment  He denies any evidence of bleeding  Regardless, he is encouraged to follow-up with Dr Kassandra Thomas  We discussed IV iron replacement  He is in agreement  Feraheme weekly x2 will be arranged      3  Dyspnea on exertion  He has met with cardiology  NM myocardial perfusion 6/30/21 was normal           Will replace his iron  Reviewed with patient and his wife that he is slowly progressing  He is asked to follow-up in 2 months  If his myeloma labs are stable to increased, recommend bone marrow aspiration and biopsy  We discussed possibility of initiation of treatment        HPI: 60-year-old  male with history of obesity, asthma, hypertension, dyslipidemia, diabetes mellitus type 2, had been complaining of tingling and numbness in his feet and hand for the past year, most recently a feeling of hypersensitivity involving the anterior aspect of the left thigh area, evaluation by Neurology on March 2018 showed monoclonal gammopathy with M protein of 1 40 grams/deciliters, IgG kappa      He has normal vitamin B12 level, TSH, heavy metal screen, Lyme disease  CBC performed August 2017 showed hemoglobin of 12 8, WBC 6 6, platelets [id number], 28% neutrophils, 19% lymphocytes     A bone marrow biopsy in May 2018 showed 10-12% plasma cells, fish panel for multiple myeloma was negative, cytogenetics showed normal male chromosomes and deletion of Y chromosome in some of the cells consistent with normal finding in advanced age patient      Bone skeletal survey 5/2018 showed no evidence of lytic lesions     Interval History:  Tired  Still gets lightheaded if he bends over  Denies any evidence of bleeding  Test Results:        Labs:   Lab Results   Component Value Date    HGB 11 2 (L) 08/18/2021    HCT 37 6 08/18/2021    MCV 89 08/18/2021     08/18/2021    WBC 6 25 08/18/2021    NRBC 0 08/18/2021     Lab Results   Component Value Date     05/17/2017    K 4 7 08/18/2021     08/18/2021    CO2 30 08/18/2021    ANIONGAP 9 01/13/2015    BUN 14 08/18/2021    CREATININE 0 94 08/18/2021    GLUCOSE 206 (H) 01/13/2015    GLUF 160 (H) 08/18/2021    CALCIUM 9 1 08/18/2021    CORRECTEDCA 9 6 10/29/2020    AST 20 08/18/2021    ALT 36 08/18/2021    ALKPHOS 117 (H) 08/18/2021    PROT 7 5 05/17/2017    BILITOT 0 5 05/17/2017    EGFR 86 08/18/2021       Imaging: No results found  ROS:  As mentioned in HPI & Interval History otherwise 14 point ROS negative  Allergies:    Allergies   Allergen Reactions    Shellfish-Derived Products - Food Allergy Anaphylaxis     Clams and mussels, oysters  Lobster and crab ok    Diphenhydramine      Lightheadedness, nauseous, rapid heartbeat    Other Palpitations and Other (See Comments)     Clams     Current Medications: Reviewed  PMH/FH/SH:  Reviewed      Physical Exam:    2 54 meters squared    Ht Readings from Last 3 Encounters:   08/18/21 5' 11" (1 803 m)   05/19/21 5' 11" (1 803 m)   05/06/21 5' 11" (1 803 m)        Wt Readings from Last 3 Encounters:   08/23/21 (!) 141 kg (311 lb 8 oz)   08/18/21 (!) 141 kg (311 lb)   05/19/21 (!) 142 kg (312 lb 9 6 oz)        Temp Readings from Last 3 Encounters:   08/18/21 98 1 °F (36 7 °C) (Temporal)   05/19/21 98 4 °F (36 9 °C) (Temporal) 21 98 8 °F (37 1 °C) (Temporal)        BP Readings from Last 3 Encounters:   21 130/84   21 136/70   21 148/82           Physical Exam  Constitutional:       Appearance: He is well-developed  HENT:      Head: Normocephalic and atraumatic  Cardiovascular:      Rate and Rhythm: Normal rate and regular rhythm  Pulmonary:      Effort: Pulmonary effort is normal  No respiratory distress  Abdominal:      Palpations: Abdomen is soft  Skin:     General: Skin is warm and dry  Neurological:      Mental Status: He is alert and oriented to person, place, and time     Psychiatric:         Behavior: Behavior normal          ECO      Emergency Contacts:    Extended Emergency Contact Information  Primary Emergency Contact: Merry Lee  Address: [address] [place] [id number] [place] [employer] Phone: [phone]  Relation: Spouse

## 2021-08-24 ENCOUNTER — TELEPHONE (OUTPATIENT)
Dept: HEMATOLOGY ONCOLOGY | Facility: CLINIC | Age: 61
End: 2021-08-24

## 2021-08-24 NOTE — TELEPHONE ENCOUNTER
Spoke with pt's wife and explained insurance prefers Venofer which is four infusions instead of two  She verbalized understanding and stated she would relay the message

## 2021-08-24 NOTE — TELEPHONE ENCOUNTER
----- Message from Camille Velázquez sent at 8/24/2021 10:45 AM EDT -----  Scheduled  Thank you   ----- Message -----  From: All Jay RN  Sent: 8/24/2021   9:08 AM EDT  To: Waldemar Garnett RN, [facility]    Switched pt's Feraheme plan to Venofer x 4 doses  Would you please schedule the additional two infusions? I will call pt to explain once done  Thanks!

## 2021-09-01 ENCOUNTER — HOSPITAL ENCOUNTER (OUTPATIENT)
Dept: INFUSION CENTER | Facility: CLINIC | Age: 61
Discharge: HOME | End: 2021-09-01
Payer: COMMERCIAL

## 2021-09-01 VITALS
RESPIRATION RATE: 18 BRPM | DIASTOLIC BLOOD PRESSURE: 61 MMHG | OXYGEN SATURATION: 97 % | SYSTOLIC BLOOD PRESSURE: 117 MMHG | TEMPERATURE: 97.1 F | HEART RATE: 84 BPM

## 2021-09-01 DIAGNOSIS — D50.9 IRON DEFICIENCY ANEMIA, UNSPECIFIED IRON DEFICIENCY ANEMIA TYPE: Primary | ICD-10-CM

## 2021-09-01 DIAGNOSIS — E08.21 DIABETES MELLITUS DUE TO UNDERLYING CONDITION WITH DIABETIC NEPHROPATHY, WITH LONG-TERM CURRENT USE OF INSULIN (HCC): ICD-10-CM

## 2021-09-01 DIAGNOSIS — Z79.4 DIABETES MELLITUS DUE TO UNDERLYING CONDITION WITH DIABETIC NEPHROPATHY, WITH LONG-TERM CURRENT USE OF INSULIN (HCC): ICD-10-CM

## 2021-09-01 PROCEDURE — 96365 THER/PROPH/DIAG IV INF INIT: CPT

## 2021-09-01 RX ORDER — LANCETS 33 GAUGE
EACH MISCELLANEOUS
Qty: 100 EACH | Refills: 5 | Status: SHIPPED | OUTPATIENT
Start: 2021-09-01 | End: 2021-09-07 | Stop reason: SDUPTHER

## 2021-09-01 RX ORDER — SODIUM CHLORIDE 9 MG/ML
20 INJECTION, SOLUTION INTRAVENOUS ONCE
Status: CANCELLED | OUTPATIENT
Start: 2021-09-07

## 2021-09-01 RX ORDER — SODIUM CHLORIDE 9 MG/ML
20 INJECTION, SOLUTION INTRAVENOUS ONCE
Status: COMPLETED | OUTPATIENT
Start: 2021-09-01 | End: 2021-09-01

## 2021-09-01 RX ADMIN — IRON SUCROSE 300 MG: 20 INJECTION, SOLUTION INTRAVENOUS at 14:22

## 2021-09-01 RX ADMIN — SODIUM CHLORIDE 20 ML/HR: 0.9 INJECTION, SOLUTION INTRAVENOUS at 14:18

## 2021-09-01 NOTE — PROGRESS NOTES
Pt arrived to infusion without complaints     vss    pt was given verbal education / instructions on the venofer treatment ordered as he expressed confusion that he states was only " to be here for a 5-10 minute infusion"      pt given clarifications that per notes from office the tx was changed    pt is agreeable to proceed  PIV initiated and call bell within reach

## 2021-09-07 ENCOUNTER — HOSPITAL ENCOUNTER (OUTPATIENT)
Dept: INFUSION CENTER | Facility: CLINIC | Age: 61
Discharge: HOME | End: 2021-09-07
Payer: COMMERCIAL

## 2021-09-07 ENCOUNTER — APPOINTMENT (OUTPATIENT)
Dept: LAB | Facility: AMBULARY SURGERY CENTER | Age: 61
End: 2021-09-07
Payer: COMMERCIAL

## 2021-09-07 VITALS
SYSTOLIC BLOOD PRESSURE: 132 MMHG | DIASTOLIC BLOOD PRESSURE: 78 MMHG | TEMPERATURE: 98 F | RESPIRATION RATE: 16 BRPM | OXYGEN SATURATION: 96 % | HEART RATE: 78 BPM

## 2021-09-07 DIAGNOSIS — D50.9 IRON DEFICIENCY ANEMIA, UNSPECIFIED IRON DEFICIENCY ANEMIA TYPE: Primary | ICD-10-CM

## 2021-09-07 DIAGNOSIS — D50.9 IRON DEFICIENCY ANEMIA, UNSPECIFIED IRON DEFICIENCY ANEMIA TYPE: ICD-10-CM

## 2021-09-07 DIAGNOSIS — K76.0 FATTY LIVER: ICD-10-CM

## 2021-09-07 DIAGNOSIS — Z79.4 DIABETES MELLITUS DUE TO UNDERLYING CONDITION WITH DIABETIC NEPHROPATHY, WITH LONG-TERM CURRENT USE OF INSULIN (HCC): ICD-10-CM

## 2021-09-07 DIAGNOSIS — E08.21 DIABETES MELLITUS DUE TO UNDERLYING CONDITION WITH DIABETIC NEPHROPATHY, WITH LONG-TERM CURRENT USE OF INSULIN (HCC): ICD-10-CM

## 2021-09-07 LAB — HEMOCCULT STL QL IA: POSITIVE

## 2021-09-07 PROCEDURE — G0328 FECAL BLOOD SCRN IMMUNOASSAY: HCPCS

## 2021-09-07 PROCEDURE — 96365 THER/PROPH/DIAG IV INF INIT: CPT

## 2021-09-07 RX ORDER — LANCETS 33 GAUGE
EACH MISCELLANEOUS
Qty: 100 EACH | Refills: 5 | Status: SHIPPED | OUTPATIENT
Start: 2021-09-07

## 2021-09-07 RX ORDER — SODIUM CHLORIDE 9 MG/ML
20 INJECTION, SOLUTION INTRAVENOUS ONCE
Status: CANCELLED | OUTPATIENT
Start: 2021-09-08

## 2021-09-07 RX ORDER — SODIUM CHLORIDE 9 MG/ML
20 INJECTION, SOLUTION INTRAVENOUS ONCE
Status: COMPLETED | OUTPATIENT
Start: 2021-09-07 | End: 2021-09-07

## 2021-09-07 RX ADMIN — SODIUM CHLORIDE 20 ML/HR: 0.9 INJECTION, SOLUTION INTRAVENOUS at 09:00

## 2021-09-07 RX ADMIN — IRON SUCROSE 300 MG: 20 INJECTION, SOLUTION INTRAVENOUS at 09:12

## 2021-09-07 NOTE — PROGRESS NOTES
Patient here for venofer infusion  Patient resting with no complaints  Vitals stable  Patient tolerated infusion without issue  Patient aware of next appointments, AVS provided

## 2021-09-14 DIAGNOSIS — D50.9 IRON DEFICIENCY ANEMIA, UNSPECIFIED IRON DEFICIENCY ANEMIA TYPE: Primary | ICD-10-CM

## 2021-09-14 RX ORDER — SODIUM CHLORIDE 9 MG/ML
20 INJECTION, SOLUTION INTRAVENOUS ONCE
Status: CANCELLED | OUTPATIENT
Start: 2021-09-16

## 2021-09-16 ENCOUNTER — HOSPITAL ENCOUNTER (OUTPATIENT)
Dept: INFUSION CENTER | Facility: CLINIC | Age: 61
Discharge: HOME | End: 2021-09-16
Payer: COMMERCIAL

## 2021-09-16 VITALS
SYSTOLIC BLOOD PRESSURE: 136 MMHG | DIASTOLIC BLOOD PRESSURE: 74 MMHG | RESPIRATION RATE: 20 BRPM | TEMPERATURE: 98.3 F | OXYGEN SATURATION: 97 % | HEART RATE: 90 BPM

## 2021-09-16 DIAGNOSIS — D50.9 IRON DEFICIENCY ANEMIA, UNSPECIFIED IRON DEFICIENCY ANEMIA TYPE: Primary | ICD-10-CM

## 2021-09-16 PROCEDURE — 96365 THER/PROPH/DIAG IV INF INIT: CPT

## 2021-09-16 RX ORDER — SODIUM CHLORIDE 9 MG/ML
20 INJECTION, SOLUTION INTRAVENOUS ONCE
Status: CANCELLED | OUTPATIENT
Start: 2021-09-23

## 2021-09-16 RX ORDER — SODIUM CHLORIDE 9 MG/ML
20 INJECTION, SOLUTION INTRAVENOUS ONCE
Status: COMPLETED | OUTPATIENT
Start: 2021-09-16 | End: 2021-09-16

## 2021-09-16 RX ADMIN — IRON SUCROSE 300 MG: 20 INJECTION, SOLUTION INTRAVENOUS at 13:45

## 2021-09-16 RX ADMIN — SODIUM CHLORIDE 20 ML/HR: 0.9 INJECTION, SOLUTION INTRAVENOUS at 13:45

## 2021-09-16 NOTE — PROGRESS NOTES
Patient here for venofer, tolerated infusion without incident   He is aware of his next appointment, declined AVS

## 2021-09-23 ENCOUNTER — HOSPITAL ENCOUNTER (OUTPATIENT)
Dept: INFUSION CENTER | Facility: CLINIC | Age: 61
Discharge: HOME | End: 2021-09-23
Payer: COMMERCIAL

## 2021-09-23 VITALS
TEMPERATURE: 97.8 F | SYSTOLIC BLOOD PRESSURE: 125 MMHG | OXYGEN SATURATION: 98 % | DIASTOLIC BLOOD PRESSURE: 77 MMHG | HEART RATE: 98 BPM | RESPIRATION RATE: 20 BRPM

## 2021-09-23 DIAGNOSIS — D50.9 IRON DEFICIENCY ANEMIA, UNSPECIFIED IRON DEFICIENCY ANEMIA TYPE: Primary | ICD-10-CM

## 2021-09-23 PROCEDURE — 96365 THER/PROPH/DIAG IV INF INIT: CPT

## 2021-09-23 RX ORDER — SODIUM CHLORIDE 9 MG/ML
20 INJECTION, SOLUTION INTRAVENOUS ONCE
Status: CANCELLED | OUTPATIENT
Start: 2021-09-30

## 2021-09-23 RX ORDER — SODIUM CHLORIDE 9 MG/ML
20 INJECTION, SOLUTION INTRAVENOUS ONCE
Status: COMPLETED | OUTPATIENT
Start: 2021-09-23 | End: 2021-09-23

## 2021-09-23 RX ADMIN — SODIUM CHLORIDE 20 ML/HR: 0.9 INJECTION, SOLUTION INTRAVENOUS at 13:45

## 2021-09-23 RX ADMIN — IRON SUCROSE 300 MG: 20 INJECTION, SOLUTION INTRAVENOUS at 13:48

## 2021-09-23 NOTE — PROGRESS NOTES
Patient completed treatment today without issue  PIV removed intact for discharge, coband wrap in place  Patient aware of upcoming appts, declines AVS  Patient AAox4 and ambulatory upon DC without gait disturbance

## 2021-09-23 NOTE — PROGRESS NOTES
Patient arrives to infusion center for Venofer infusion  Patient offers no complaints at this time  VSS, PIV obtained without issue  Patient aware this is his final venofer, patient aware to obtain labs prior to his appointment with Debo Rodriguez  Patient resting on recliner chair, call bell within reach

## 2021-09-24 ENCOUNTER — TELEPHONE (OUTPATIENT)
Dept: GASTROENTEROLOGY | Facility: HOSPITAL | Age: 61
End: 2021-09-24

## 2021-09-30 ENCOUNTER — HOSPITAL ENCOUNTER (OUTPATIENT)
Dept: GASTROENTEROLOGY | Facility: HOSPITAL | Age: 61
Discharge: HOME | End: 2021-09-30
Attending: INTERNAL MEDICINE
Payer: COMMERCIAL

## 2021-09-30 DIAGNOSIS — D50.9 IRON DEFICIENCY ANEMIA, UNSPECIFIED IRON DEFICIENCY ANEMIA TYPE: ICD-10-CM

## 2021-09-30 DIAGNOSIS — R19.5 ABNORMAL FECES: ICD-10-CM

## 2021-09-30 PROCEDURE — 91110 GI TRC IMG INTRAL ESOPH-ILE: CPT

## 2021-10-01 ENCOUNTER — HOSPITAL ENCOUNTER (OUTPATIENT)
Dept: ULTRASOUND IMAGING | Facility: HOSPITAL | Age: 61
Discharge: HOME | End: 2021-10-01
Payer: COMMERCIAL

## 2021-10-01 DIAGNOSIS — K76.0 FATTY LIVER: ICD-10-CM

## 2021-10-01 PROCEDURE — 76981 USE PARENCHYMA: CPT

## 2021-10-10 DIAGNOSIS — E08.41 DIABETIC MONONEUROPATHY ASSOCIATED WITH DIABETES MELLITUS DUE TO UNDERLYING CONDITION (HCC): ICD-10-CM

## 2021-10-10 DIAGNOSIS — E11.11 TYPE 2 DIABETES MELLITUS WITH KETOACIDOTIC COMA, WITHOUT LONG-TERM CURRENT USE OF INSULIN (HCC): ICD-10-CM

## 2021-10-11 RX ORDER — GABAPENTIN 100 MG/1
CAPSULE ORAL
Qty: 30 CAPSULE | Refills: 1 | Status: SHIPPED | OUTPATIENT
Start: 2021-10-11 | End: 2021-12-20

## 2021-10-11 RX ORDER — GLIPIZIDE 10 MG/1
TABLET, FILM COATED, EXTENDED RELEASE ORAL
Qty: 90 TABLET | Refills: 1 | Status: SHIPPED | OUTPATIENT
Start: 2021-10-11 | End: 2022-04-25

## 2021-10-19 ENCOUNTER — APPOINTMENT (OUTPATIENT)
Dept: LAB | Facility: CLINIC | Age: 61
End: 2021-10-19
Payer: COMMERCIAL

## 2021-10-19 DIAGNOSIS — D50.9 IRON DEFICIENCY ANEMIA, UNSPECIFIED IRON DEFICIENCY ANEMIA TYPE: ICD-10-CM

## 2021-10-19 DIAGNOSIS — D47.2 SMOLDERING MULTIPLE MYELOMA: ICD-10-CM

## 2021-10-19 LAB
ALBUMIN SERPL BCP-MCNC: 3.6 G/DL (ref 3.5–5)
ALP SERPL-CCNC: 103 U/L (ref 46–116)
ALT SERPL W P-5'-P-CCNC: 45 U/L (ref 12–78)
ANION GAP SERPL CALCULATED.3IONS-SCNC: 11 MMOL/L (ref 4–13)
AST SERPL W P-5'-P-CCNC: 26 U/L (ref 5–45)
BASOPHILS # BLD AUTO: 0.07 THOUSANDS/ΜL (ref 0–0.1)
BASOPHILS NFR BLD AUTO: 1 % (ref 0–1)
BILIRUB SERPL-MCNC: 0.48 MG/DL (ref 0.2–1)
BUN SERPL-MCNC: 13 MG/DL (ref 5–25)
CALCIUM SERPL-MCNC: 8.9 MG/DL (ref 8.3–10.1)
CHLORIDE SERPL-SCNC: 102 MMOL/L (ref 100–108)
CO2 SERPL-SCNC: 28 MMOL/L (ref 21–32)
CREAT SERPL-MCNC: 0.9 MG/DL (ref 0.6–1.3)
EOSINOPHIL # BLD AUTO: 0.4 THOUSAND/ΜL (ref 0–0.61)
EOSINOPHIL NFR BLD AUTO: 6 % (ref 0–6)
ERYTHROCYTE [DISTWIDTH] IN BLOOD BY AUTOMATED COUNT: 18.3 % (ref 11.6–15.1)
FERRITIN SERPL-MCNC: 100 NG/ML (ref 8–388)
GFR SERPL CREATININE-BSD FRML MDRD: 91 ML/MIN/1.73SQ M
GLUCOSE P FAST SERPL-MCNC: 141 MG/DL (ref 65–99)
HCT VFR BLD AUTO: 42 % (ref 36.5–49.3)
HGB BLD-MCNC: 12.6 G/DL (ref 12–17)
IGA SERPL-MCNC: 72 MG/DL (ref 70–400)
IGG SERPL-MCNC: 1920 MG/DL (ref 700–1600)
IGM SERPL-MCNC: 103 MG/DL (ref 40–230)
IMM GRANULOCYTES # BLD AUTO: 0.02 THOUSAND/UL (ref 0–0.2)
IMM GRANULOCYTES NFR BLD AUTO: 0 % (ref 0–2)
IRON SATN MFR SERPL: 19 % (ref 20–50)
IRON SERPL-MCNC: 53 UG/DL (ref 65–175)
LYMPHOCYTES # BLD AUTO: 1.73 THOUSANDS/ΜL (ref 0.6–4.47)
LYMPHOCYTES NFR BLD AUTO: 24 % (ref 14–44)
MCH RBC QN AUTO: 27.8 PG (ref 26.8–34.3)
MCHC RBC AUTO-ENTMCNC: 30 G/DL (ref 31.4–37.4)
MCV RBC AUTO: 93 FL (ref 82–98)
MONOCYTES # BLD AUTO: 0.39 THOUSAND/ΜL (ref 0.17–1.22)
MONOCYTES NFR BLD AUTO: 5 % (ref 4–12)
NEUTROPHILS # BLD AUTO: 4.72 THOUSANDS/ΜL (ref 1.85–7.62)
NEUTS SEG NFR BLD AUTO: 64 % (ref 43–75)
NRBC BLD AUTO-RTO: 0 /100 WBCS
PLATELET # BLD AUTO: 216 THOUSANDS/UL (ref 149–390)
PMV BLD AUTO: 9.7 FL (ref 8.9–12.7)
POTASSIUM SERPL-SCNC: 4.3 MMOL/L (ref 3.5–5.3)
PROT SERPL-MCNC: 8 G/DL (ref 6.4–8.2)
RBC # BLD AUTO: 4.53 MILLION/UL (ref 3.88–5.62)
SODIUM SERPL-SCNC: 141 MMOL/L (ref 136–145)
TIBC SERPL-MCNC: 279 UG/DL (ref 250–450)
WBC # BLD AUTO: 7.33 THOUSAND/UL (ref 4.31–10.16)

## 2021-10-19 PROCEDURE — 80053 COMPREHEN METABOLIC PANEL: CPT

## 2021-10-19 PROCEDURE — 85025 COMPLETE CBC W/AUTO DIFF WBC: CPT

## 2021-10-19 PROCEDURE — 82784 ASSAY IGA/IGD/IGG/IGM EACH: CPT

## 2021-10-19 PROCEDURE — 84165 PROTEIN E-PHORESIS SERUM: CPT

## 2021-10-19 PROCEDURE — 83550 IRON BINDING TEST: CPT

## 2021-10-19 PROCEDURE — 84165 PROTEIN E-PHORESIS SERUM: CPT | Performed by: SPECIALIST

## 2021-10-19 PROCEDURE — 83883 ASSAY NEPHELOMETRY NOT SPEC: CPT

## 2021-10-19 PROCEDURE — 82728 ASSAY OF FERRITIN: CPT

## 2021-10-19 PROCEDURE — 36415 COLL VENOUS BLD VENIPUNCTURE: CPT

## 2021-10-19 PROCEDURE — 83540 ASSAY OF IRON: CPT

## 2021-10-20 LAB
KAPPA LC FREE SER-MCNC: 29.2 MG/L (ref 3.3–19.4)
KAPPA LC FREE/LAMBDA FREE SER: 2.92 {RATIO} (ref 0.26–1.65)
LAMBDA LC FREE SERPL-MCNC: 10 MG/L (ref 5.7–26.3)

## 2021-10-21 LAB
ALBUMIN SERPL ELPH-MCNC: 4.41 G/DL (ref 3.5–5)
ALBUMIN SERPL ELPH-MCNC: 54.4 % (ref 52–65)
ALPHA1 GLOB SERPL ELPH-MCNC: 0.33 G/DL (ref 0.1–0.4)
ALPHA1 GLOB SERPL ELPH-MCNC: 4.1 % (ref 2.5–5)
ALPHA2 GLOB SERPL ELPH-MCNC: 0.98 G/DL (ref 0.4–1.2)
ALPHA2 GLOB SERPL ELPH-MCNC: 12.1 % (ref 7–13)
BETA GLOB ABNORMAL SERPL ELPH-MCNC: 0.4 G/DL (ref 0.4–0.8)
BETA1 GLOB SERPL ELPH-MCNC: 4.9 % (ref 5–13)
BETA2 GLOB SERPL ELPH-MCNC: 3 % (ref 2–8)
BETA2+GAMMA GLOB SERPL ELPH-MCNC: 0.24 G/DL (ref 0.2–0.5)
GAMMA GLOB ABNORMAL SERPL ELPH-MCNC: 1.74 G/DL (ref 0.5–1.6)
GAMMA GLOB SERPL ELPH-MCNC: 21.5 % (ref 12–22)
IGG/ALB SER: 1.19 {RATIO} (ref 1.1–1.8)
M PEAK ID 2: 0.8 %
M PROTEIN 1 MFR SERPL ELPH: 14.3 %
M PROTEIN 1 SERPL ELPH-MCNC: 1.16 G/DL
M PROTEIN 2 SERPL ELPH-MCNC: 0.06 G/DL
PROT PATTERN SERPL ELPH-IMP: ABNORMAL
PROT SERPL-MCNC: 8.1 G/DL (ref 6.4–8.2)

## 2021-10-25 ENCOUNTER — OFFICE VISIT (OUTPATIENT)
Dept: HEMATOLOGY ONCOLOGY | Facility: CLINIC | Age: 61
End: 2021-10-25
Payer: COMMERCIAL

## 2021-10-25 VITALS
OXYGEN SATURATION: 98 % | SYSTOLIC BLOOD PRESSURE: 146 MMHG | RESPIRATION RATE: 16 BRPM | BODY MASS INDEX: 42.26 KG/M2 | WEIGHT: 312 LBS | HEART RATE: 87 BPM | DIASTOLIC BLOOD PRESSURE: 90 MMHG | TEMPERATURE: 97.9 F | HEIGHT: 72 IN

## 2021-10-25 DIAGNOSIS — G62.9 PERIPHERAL POLYNEUROPATHY: ICD-10-CM

## 2021-10-25 DIAGNOSIS — D50.9 IRON DEFICIENCY ANEMIA, UNSPECIFIED IRON DEFICIENCY ANEMIA TYPE: ICD-10-CM

## 2021-10-25 DIAGNOSIS — K76.0 FATTY LIVER: ICD-10-CM

## 2021-10-25 DIAGNOSIS — D47.2 SMOLDERING MULTIPLE MYELOMA: Primary | ICD-10-CM

## 2021-10-25 PROCEDURE — 99215 OFFICE O/P EST HI 40 MIN: CPT | Performed by: PHYSICIAN ASSISTANT

## 2021-10-25 PROCEDURE — 3077F SYST BP >= 140 MM HG: CPT | Performed by: PHYSICIAN ASSISTANT

## 2021-10-25 PROCEDURE — 3080F DIAST BP >= 90 MM HG: CPT | Performed by: PHYSICIAN ASSISTANT

## 2021-11-05 ENCOUNTER — OFFICE VISIT (OUTPATIENT)
Dept: FAMILY MEDICINE CLINIC | Facility: CLINIC | Age: 61
End: 2021-11-05

## 2021-11-05 VITALS
WEIGHT: 313.2 LBS | HEART RATE: 74 BPM | TEMPERATURE: 97.4 F | SYSTOLIC BLOOD PRESSURE: 132 MMHG | RESPIRATION RATE: 16 BRPM | DIASTOLIC BLOOD PRESSURE: 72 MMHG | BODY MASS INDEX: 42.42 KG/M2 | HEIGHT: 72 IN

## 2021-11-05 DIAGNOSIS — J30.9 ALLERGIC RHINITIS, UNSPECIFIED SEASONALITY, UNSPECIFIED TRIGGER: ICD-10-CM

## 2021-11-05 DIAGNOSIS — E08.21 DIABETES MELLITUS DUE TO UNDERLYING CONDITION WITH DIABETIC NEPHROPATHY, WITH LONG-TERM CURRENT USE OF INSULIN (HCC): Primary | ICD-10-CM

## 2021-11-05 DIAGNOSIS — J45.909 UNCOMPLICATED ASTHMA, UNSPECIFIED ASTHMA SEVERITY, UNSPECIFIED WHETHER PERSISTENT: ICD-10-CM

## 2021-11-05 DIAGNOSIS — Z79.4 DIABETES MELLITUS DUE TO UNDERLYING CONDITION WITH DIABETIC NEPHROPATHY, WITH LONG-TERM CURRENT USE OF INSULIN (HCC): Primary | ICD-10-CM

## 2021-11-05 DIAGNOSIS — Z23 ENCOUNTER FOR IMMUNIZATION: ICD-10-CM

## 2021-11-05 DIAGNOSIS — J45.20 MILD INTERMITTENT ASTHMA WITHOUT COMPLICATION: ICD-10-CM

## 2021-11-05 PROCEDURE — 90682 RIV4 VACC RECOMBINANT DNA IM: CPT

## 2021-11-05 PROCEDURE — 90471 IMMUNIZATION ADMIN: CPT

## 2021-11-05 PROCEDURE — 99213 OFFICE O/P EST LOW 20 MIN: CPT | Performed by: FAMILY MEDICINE

## 2021-11-05 RX ORDER — ALBUTEROL SULFATE 90 UG/1
AEROSOL, METERED RESPIRATORY (INHALATION)
Qty: 17 G | Refills: 2 | Status: SHIPPED | OUTPATIENT
Start: 2021-11-05

## 2021-11-05 RX ORDER — FLUTICASONE PROPIONATE 50 MCG
2 SPRAY, SUSPENSION (ML) NASAL DAILY
Qty: 11.1 ML | Refills: 1 | Status: SHIPPED | OUTPATIENT
Start: 2021-11-05 | End: 2021-11-05

## 2021-11-05 RX ORDER — ALBUTEROL SULFATE 90 UG/1
AEROSOL, METERED RESPIRATORY (INHALATION)
Qty: 8 G | Refills: 1 | Status: SHIPPED | OUTPATIENT
Start: 2021-11-05 | End: 2021-11-05

## 2021-11-05 RX ORDER — FLUTICASONE PROPIONATE 50 MCG
SPRAY, SUSPENSION (ML) NASAL
Qty: 48 G | Refills: 2 | Status: SHIPPED | OUTPATIENT
Start: 2021-11-05

## 2021-11-18 DIAGNOSIS — E11.42 TYPE 2 DIABETES MELLITUS WITH DIABETIC POLYNEUROPATHY, WITHOUT LONG-TERM CURRENT USE OF INSULIN (HCC): ICD-10-CM

## 2021-11-24 ENCOUNTER — OFFICE VISIT (OUTPATIENT)
Dept: PODIATRY | Facility: CLINIC | Age: 61
End: 2021-11-24
Payer: COMMERCIAL

## 2021-11-24 VITALS
SYSTOLIC BLOOD PRESSURE: 147 MMHG | WEIGHT: 315 LBS | HEART RATE: 88 BPM | HEIGHT: 72 IN | BODY MASS INDEX: 42.66 KG/M2 | DIASTOLIC BLOOD PRESSURE: 83 MMHG

## 2021-11-24 DIAGNOSIS — E11.42 DIABETIC PERIPHERAL NEUROPATHY (HCC): Primary | ICD-10-CM

## 2021-11-24 DIAGNOSIS — M20.42 HAMMERTOE, BILATERAL: ICD-10-CM

## 2021-11-24 DIAGNOSIS — M20.41 HAMMERTOE, BILATERAL: ICD-10-CM

## 2021-11-24 PROCEDURE — 99213 OFFICE O/P EST LOW 20 MIN: CPT | Performed by: PODIATRIST

## 2021-12-19 DIAGNOSIS — E08.41 DIABETIC MONONEUROPATHY ASSOCIATED WITH DIABETES MELLITUS DUE TO UNDERLYING CONDITION (HCC): ICD-10-CM

## 2021-12-20 RX ORDER — GABAPENTIN 100 MG/1
CAPSULE ORAL
Qty: 30 CAPSULE | Refills: 1 | Status: SHIPPED | OUTPATIENT
Start: 2021-12-20 | End: 2022-02-23

## 2021-12-27 ENCOUNTER — OFFICE VISIT (OUTPATIENT)
Dept: FAMILY MEDICINE CLINIC | Facility: CLINIC | Age: 61
End: 2021-12-27

## 2021-12-27 VITALS
SYSTOLIC BLOOD PRESSURE: 126 MMHG | WEIGHT: 315 LBS | HEART RATE: 81 BPM | BODY MASS INDEX: 42.66 KG/M2 | DIASTOLIC BLOOD PRESSURE: 72 MMHG | HEIGHT: 72 IN | OXYGEN SATURATION: 98 % | RESPIRATION RATE: 18 BRPM | TEMPERATURE: 98.1 F

## 2021-12-27 DIAGNOSIS — Z00.00 ANNUAL PHYSICAL EXAM: Primary | ICD-10-CM

## 2021-12-27 DIAGNOSIS — Z79.4 DIABETES MELLITUS DUE TO UNDERLYING CONDITION WITH DIABETIC NEPHROPATHY, WITH LONG-TERM CURRENT USE OF INSULIN (HCC): ICD-10-CM

## 2021-12-27 DIAGNOSIS — E08.21 DIABETES MELLITUS DUE TO UNDERLYING CONDITION WITH DIABETIC NEPHROPATHY, WITH LONG-TERM CURRENT USE OF INSULIN (HCC): ICD-10-CM

## 2021-12-27 DIAGNOSIS — E11.42 TYPE 2 DIABETES MELLITUS WITH DIABETIC POLYNEUROPATHY, WITHOUT LONG-TERM CURRENT USE OF INSULIN (HCC): ICD-10-CM

## 2021-12-27 LAB — SL AMB POCT HEMOGLOBIN AIC: 7 (ref ?–6.5)

## 2021-12-27 PROCEDURE — 99396 PREV VISIT EST AGE 40-64: CPT | Performed by: FAMILY MEDICINE

## 2021-12-27 PROCEDURE — 99213 OFFICE O/P EST LOW 20 MIN: CPT | Performed by: FAMILY MEDICINE

## 2021-12-27 PROCEDURE — 83036 HEMOGLOBIN GLYCOSYLATED A1C: CPT | Performed by: FAMILY MEDICINE

## 2021-12-27 PROCEDURE — 3051F HG A1C>EQUAL 7.0%<8.0%: CPT | Performed by: PODIATRIST

## 2022-01-12 DIAGNOSIS — K22.70 BARRETT'S ESOPHAGUS WITHOUT DYSPLASIA: ICD-10-CM

## 2022-01-12 RX ORDER — OMEPRAZOLE 40 MG/1
CAPSULE, DELAYED RELEASE ORAL
Qty: 90 CAPSULE | Refills: 3 | Status: SHIPPED | OUTPATIENT
Start: 2022-01-12

## 2022-01-13 DIAGNOSIS — J45.40 MODERATE PERSISTENT ASTHMA WITHOUT COMPLICATION: ICD-10-CM

## 2022-01-13 RX ORDER — LORATADINE 10 MG/1
10 TABLET ORAL DAILY
Qty: 90 TABLET | Refills: 2 | Status: SHIPPED | OUTPATIENT
Start: 2022-01-13

## 2022-01-14 ENCOUNTER — TELEPHONE (OUTPATIENT)
Dept: FAMILY MEDICINE CLINIC | Facility: CLINIC | Age: 62
End: 2022-01-14

## 2022-01-14 NOTE — TELEPHONE ENCOUNTER
Called patient on the number provided in the chart  The wife answered the call  Requested wife to get patient on the line to get more information about his query regarding vitamin B intake  Wife said she had a question about it, stating that they got a message on his my chart stating to stop taking B complex  Advised patient that he can continue taking B complex

## 2022-01-14 NOTE — TELEPHONE ENCOUNTER
Patient called to report he received an email stating he should stop taking B complex  He is confused as to why Dr Sommer Julio didn't mention this in his vs on 12/27/21    Please call him

## 2022-01-28 ENCOUNTER — OFFICE VISIT (OUTPATIENT)
Dept: FAMILY MEDICINE CLINIC | Facility: CLINIC | Age: 62
End: 2022-01-28

## 2022-01-28 VITALS
SYSTOLIC BLOOD PRESSURE: 152 MMHG | RESPIRATION RATE: 18 BRPM | HEART RATE: 87 BPM | BODY MASS INDEX: 42.66 KG/M2 | OXYGEN SATURATION: 98 % | WEIGHT: 315 LBS | HEIGHT: 72 IN | DIASTOLIC BLOOD PRESSURE: 92 MMHG | TEMPERATURE: 97.6 F

## 2022-01-28 DIAGNOSIS — I10 ESSENTIAL HYPERTENSION: Primary | ICD-10-CM

## 2022-01-28 PROCEDURE — 99213 OFFICE O/P EST LOW 20 MIN: CPT | Performed by: FAMILY MEDICINE

## 2022-01-28 NOTE — PROGRESS NOTES
Assessment/Plan:    Essential hypertension  BP today 152/92  Average blood pressure 130/80  Asymptomatic,  patient states he has not taken his morning med  Will continue the same regimen of lisinopril 40 mg daily  Will follow-up in the next visit    Diabetes mellitus due to underlying condition with diabetic nephropathy, with long-term current use of insulin ([address] )    Lab Results   Component Value Date    HGBA1C 7 0 (A) 12/27/2021     Well controlled, blood sugar as reported per the patient, FBS average 110,   Continue current regimen with Lantus 20 units q h s , glipizide 10 mg daily, metformin 1 g b i d     Will follow-up with the patient during the next visit repeat A1c  Encouraged healthy diet and physical activity  Diagnoses and all orders for this visit:    Essential hypertension        Subjective:      Patient ID: Son Skinner is a 59 y o  male  57-year-old male is here for follow-up for diabetes  Patient reports no recent hypoglycemic events  Reports medication compliance with Lantus aziza Meds  Denies any acute concerns today  No numbness or tingling of the extremities  The following portions of the patient's history were reviewed and updated as appropriate:   He  has a past medical history of Asthma, Benign positional vertigo, Diabetes mellitus ([address] ), Diabetes mellitus due to underlying condition with diabetic nephropathy, with long-term current use of insulin ([address] ) (5/19/2021), Dyslipidemia, Gastroenteritis, Hyperlipidemia, Hypertension, Lipoma of neck, Multiple myeloma ([address] ), Sleep apnea, and Wheezing    He   Patient Active Problem List    Diagnosis Date Noted    Fatty liver 10/25/2021    Other male erectile dysfunction 08/18/2021    Diabetes mellitus due to underlying condition with diabetic nephropathy, with long-term current use of insulin ([address] ) 05/19/2021    Mixed hyperlipidemia 05/06/2021    HAMEED (dyspnea on exertion) 05/06/2021    Family history of early CAD 05/06/2021    Anemia, unspecified 05/03/2021    Dyspnea 03/16/2021    Urinary hesitancy 11/02/2020    Screening for HIV (human immunodeficiency virus) 08/12/2020    Acute cystitis with hematuria 04/27/2020    Rash 08/14/2019    Bilateral lower extremity edema 04/23/2019    Mild intermittent asthma without complication 97/98/1070    Peripheral polyneuropathy 07/25/2018    Smoldering multiple myeloma ([facility] ) 05/29/2018    Diabetic peripheral neuropathy ([facility] ) 03/06/2018    Paresthesia 03/06/2018    Meralgia paresthetica of left side 03/06/2018    Obstructive sleep apnea 02/26/2018    Insufficient sleep syndrome 02/26/2018    Essential hypertension 02/12/2018    Encounter for immunization 02/12/2018    Eknt's esophagus 11/22/2016    Adrenal incidentaloma ([facility] ) 06/18/2015    Benign paroxysmal positional vertigo 06/18/2015    Liver lesion 63/33/6253    Umbilical hernia 50/74/8428    Lipoma of back 04/13/2015    Allergic rhinitis 04/15/2013     He  has a past surgical history that includes Throat surgery; Mass excision (Right, 8/21/2017); and Esophagogastroduodenoscopy (2010)  His family history includes Breast cancer in his maternal aunt; Cancer in his father and mother; Dementia in his father; Diabetes in his mother; Diabetes type II in his mother; Heart attack in his father; Heart disease in his father; Hyperlipidemia in his father and mother; Hypertension in his father and mother; Stomach cancer in his maternal grandmother; Thyroid disease in his sister  He  reports that he has never smoked  He has never used smokeless tobacco  He reports previous alcohol use  He reports that he does not use drugs    Current Outpatient Medications   Medication Sig Dispense Refill    albuterol (2 5 mg/3 mL) 0 083 % nebulizer solution Take 3 mL (2 5 mg total) by nebulization every 6 (six) hours as needed for wheezing 30 vial 2    albuterol (PROVENTIL HFA,VENTOLIN HFA) 90 mcg/act inhaler INHALE 1 PUFF EVERY 4-6 HOURS AS NEEDED 17 g 2    ascorbic acid (VITAMIN C) 500 mg tablet Take 500 mg by mouth daily      atorvastatin (LIPITOR) 40 mg tablet Take 1 tablet (40 mg total) by mouth daily 90 tablet 4    B Complex-Biotin-FA (VITAMIN B50 COMPLEX PO)       Blood Glucose Monitoring Suppl (ONETOUCH VERIO) w/Device KIT by Does not apply route 3 (three) times a day 1 kit 0    Flovent  MCG/ACT inhaler INHALE 2 PUFFS BY MOUTH TWICE DAILY   RINSE MOUTH AFTER USE 12 g 3    fluticasone (FLONASE) 50 mcg/act nasal spray SHAKE LIQUID AND USE 2 SPRAYS IN EACH NOSTRIL DAILY 48 g 2    gabapentin (NEURONTIN) 100 mg capsule TAKE 1 CAPSULE(100 MG) BY MOUTH DAILY AT BEDTIME 30 capsule 1    Ginkgo Biloba (GINKOBA PO) Take 60 mg by mouth      glipiZIDE (GLUCOTROL XL) 10 mg 24 hr tablet TAKE 1 TABLET(10 MG) BY MOUTH DAILY 90 tablet 1    glucose blood (OneTouch Verio) test strip Use 1 each 3 (three) times a day Use as instructed 100 each 5    insulin glargine (Lantus SoloStar) 100 units/mL injection pen Inject 20 Units under the skin daily at bedtime And 5 units in the AM 45 mL 3    Insulin Pen Needle 31G X 5 MM MISC Use daily 100 each 5    Lancets (OneTouch Delica Plus [id number]) MISC Test 100 each 5    lisinopril (ZESTRIL) 40 mg tablet Take 1 tablet (40 mg total) by mouth daily 90 tablet 4    loratadine (CLARITIN) 10 mg tablet Take 1 tablet (10 mg total) by mouth daily 90 tablet 2    MAGNESIUM CITRATE PO Take 1 tablet by mouth daily        metFORMIN (GLUCOPHAGE) 1000 MG tablet TAKE 1 TABLET(1000 MG) BY MOUTH TWICE DAILY WITH MEALS 180 tablet 2    Multiple Vitamins-Minerals (MULTIVITAMIN ADULTS 50+ PO) Take by mouth daily      omeprazole (PriLOSEC) 40 MG capsule TAKE 1 CAPSULE(40 MG) BY MOUTH DAILY 90 capsule 3    simvastatin (ZOCOR) 20 mg tablet TAKE 1 TABLET(20 MG) BY MOUTH EVERY EVENING 90 tablet 1    tadalafil (CIALIS) 10 MG tablet Take 1 tablet (10 mg total) by mouth daily as needed for erectile dysfunction 10 tablet 1 No current facility-administered medications for this visit  Current Outpatient Medications on File Prior to Visit   Medication Sig    albuterol (2 5 mg/3 mL) 0 083 % nebulizer solution Take 3 mL (2 5 mg total) by nebulization every 6 (six) hours as needed for wheezing    albuterol (PROVENTIL HFA,VENTOLIN HFA) 90 mcg/act inhaler INHALE 1 PUFF EVERY 4-6 HOURS AS NEEDED    ascorbic acid (VITAMIN C) 500 mg tablet Take 500 mg by mouth daily    atorvastatin (LIPITOR) 40 mg tablet Take 1 tablet (40 mg total) by mouth daily    B Complex-Biotin-FA (VITAMIN B50 COMPLEX PO)     Blood Glucose Monitoring Suppl (ONETOUCH VERIO) w/Device KIT by Does not apply route 3 (three) times a day    Flovent  MCG/ACT inhaler INHALE 2 PUFFS BY MOUTH TWICE DAILY   RINSE MOUTH AFTER USE    fluticasone (FLONASE) 50 mcg/act nasal spray SHAKE LIQUID AND USE 2 SPRAYS IN EACH NOSTRIL DAILY    gabapentin (NEURONTIN) 100 mg capsule TAKE 1 CAPSULE(100 MG) BY MOUTH DAILY AT BEDTIME    Ginkgo Biloba (GINKOBA PO) Take 60 mg by mouth    glipiZIDE (GLUCOTROL XL) 10 mg 24 hr tablet TAKE 1 TABLET(10 MG) BY MOUTH DAILY    glucose blood (OneTouch Verio) test strip Use 1 each 3 (three) times a day Use as instructed    insulin glargine (Lantus SoloStar) 100 units/mL injection pen Inject 20 Units under the skin daily at bedtime And 5 units in the AM    Insulin Pen Needle 31G X 5 MM MISC Use daily    Lancets (OneTouch Delica Plus [id number]) MISC Test    lisinopril (ZESTRIL) 40 mg tablet Take 1 tablet (40 mg total) by mouth daily    loratadine (CLARITIN) 10 mg tablet Take 1 tablet (10 mg total) by mouth daily    MAGNESIUM CITRATE PO Take 1 tablet by mouth daily      metFORMIN (GLUCOPHAGE) 1000 MG tablet TAKE 1 TABLET(1000 MG) BY MOUTH TWICE DAILY WITH MEALS    Multiple Vitamins-Minerals (MULTIVITAMIN ADULTS 50+ PO) Take by mouth daily    omeprazole (PriLOSEC) 40 MG capsule TAKE 1 CAPSULE(40 MG) BY MOUTH DAILY    simvastatin (ZOCOR) 20 mg tablet TAKE 1 TABLET(20 MG) BY MOUTH EVERY EVENING    tadalafil (CIALIS) 10 MG tablet Take 1 tablet (10 mg total) by mouth daily as needed for erectile dysfunction     No current facility-administered medications on file prior to visit  He is allergic to shellfish-derived products - food allergy, diphenhydramine, and other       Review of Systems   Constitutional: Negative for chills and fever  HENT: Negative for congestion and sore throat  Respiratory: Negative for cough and shortness of breath  Cardiovascular: Negative for chest pain  Gastrointestinal: Negative for abdominal pain  Musculoskeletal: Negative for back pain and neck pain  Skin: Negative for rash  Neurological: Negative for dizziness, weakness and headaches  Psychiatric/Behavioral: Negative for agitation and behavioral problems  Objective:      /92 (BP Location: Left arm, Patient Position: Sitting, Cuff Size: Large)   Pulse 87   Temp 97 6 °F (36 4 °C) (Temporal)   Resp 18   Ht 6' (1 829 m)   Wt (!) 145 kg (318 lb 9 6 oz)   SpO2 98%   BMI 43 21 kg/m²          Physical Exam  Vitals reviewed  Constitutional:       Appearance: Normal appearance  HENT:      Head: Normocephalic and atraumatic  Mouth/Throat:      Mouth: Mucous membranes are moist    Cardiovascular:      Rate and Rhythm: Normal rate and regular rhythm  Pulses: Normal pulses  Heart sounds: Normal heart sounds  Pulmonary:      Effort: Pulmonary effort is normal  No respiratory distress  Breath sounds: Normal breath sounds  No wheezing  Abdominal:      General: Abdomen is flat  Palpations: Abdomen is soft  Musculoskeletal:         General: Normal range of motion  Right lower leg: Edema (trace) present  Left lower leg: Edema (trace) present  Skin:     General: Skin is warm and dry  Capillary Refill: Capillary refill takes less than 2 seconds     Neurological:      Mental Status: He is alert and oriented to person, place, and time     Psychiatric:         Mood and Affect: Mood normal          Behavior: Behavior normal

## 2022-01-28 NOTE — ASSESSMENT & PLAN NOTE
Lab Results   Component Value Date    HGBA1C 7 0 (A) 12/27/2021     Well controlled, blood sugar as reported per the patient, FBS average 110,   Continue current regimen with Lantus 20 units q h s , glipizide 10 mg daily, metformin 1 g b i d     Will follow-up with the patient during the next visit repeat A1c  Encouraged healthy diet and physical activity

## 2022-01-28 NOTE — ASSESSMENT & PLAN NOTE
BP today 152/92  Average blood pressure 130/80  Asymptomatic,  patient states he has not taken his morning med  Will continue the same regimen of lisinopril 40 mg daily    Will follow-up in the next visit

## 2022-02-01 ENCOUNTER — TELEPHONE (OUTPATIENT)
Dept: HEMATOLOGY ONCOLOGY | Facility: CLINIC | Age: 62
End: 2022-02-01

## 2022-02-01 NOTE — TELEPHONE ENCOUNTER
Patient called to see if a iron panel was ordered for him  I did confirm a iron panel was ordered   Patient understood

## 2022-02-03 ENCOUNTER — APPOINTMENT (OUTPATIENT)
Dept: LAB | Facility: CLINIC | Age: 62
End: 2022-02-03
Payer: COMMERCIAL

## 2022-02-03 DIAGNOSIS — D50.9 IRON DEFICIENCY ANEMIA, UNSPECIFIED IRON DEFICIENCY ANEMIA TYPE: ICD-10-CM

## 2022-02-03 DIAGNOSIS — D47.2 SMOLDERING MULTIPLE MYELOMA: ICD-10-CM

## 2022-02-03 LAB
ALBUMIN SERPL BCP-MCNC: 3.5 G/DL (ref 3.5–5)
ALP SERPL-CCNC: 101 U/L (ref 46–116)
ALT SERPL W P-5'-P-CCNC: 37 U/L (ref 12–78)
ANION GAP SERPL CALCULATED.3IONS-SCNC: 5 MMOL/L (ref 4–13)
AST SERPL W P-5'-P-CCNC: 16 U/L (ref 5–45)
BASOPHILS # BLD AUTO: 0.06 THOUSANDS/ΜL (ref 0–0.1)
BASOPHILS NFR BLD AUTO: 1 % (ref 0–1)
BILIRUB SERPL-MCNC: 0.52 MG/DL (ref 0.2–1)
BUN SERPL-MCNC: 13 MG/DL (ref 5–25)
CALCIUM SERPL-MCNC: 9.3 MG/DL (ref 8.3–10.1)
CHLORIDE SERPL-SCNC: 100 MMOL/L (ref 100–108)
CO2 SERPL-SCNC: 31 MMOL/L (ref 21–32)
CREAT SERPL-MCNC: 0.89 MG/DL (ref 0.6–1.3)
EOSINOPHIL # BLD AUTO: 0.25 THOUSAND/ΜL (ref 0–0.61)
EOSINOPHIL NFR BLD AUTO: 3 % (ref 0–6)
ERYTHROCYTE [DISTWIDTH] IN BLOOD BY AUTOMATED COUNT: 14.9 % (ref 11.6–15.1)
FERRITIN SERPL-MCNC: 37 NG/ML (ref 8–388)
GFR SERPL CREATININE-BSD FRML MDRD: 90 ML/MIN/1.73SQ M
GLUCOSE P FAST SERPL-MCNC: 165 MG/DL (ref 65–99)
HCT VFR BLD AUTO: 43.1 % (ref 36.5–49.3)
HGB BLD-MCNC: 13.4 G/DL (ref 12–17)
IGA SERPL-MCNC: 74 MG/DL (ref 70–400)
IGG SERPL-MCNC: 1770 MG/DL (ref 700–1600)
IGM SERPL-MCNC: 95 MG/DL (ref 40–230)
IMM GRANULOCYTES # BLD AUTO: 0.04 THOUSAND/UL (ref 0–0.2)
IMM GRANULOCYTES NFR BLD AUTO: 1 % (ref 0–2)
IRON SATN MFR SERPL: 18 % (ref 20–50)
IRON SERPL-MCNC: 53 UG/DL (ref 65–175)
LYMPHOCYTES # BLD AUTO: 1.41 THOUSANDS/ΜL (ref 0.6–4.47)
LYMPHOCYTES NFR BLD AUTO: 19 % (ref 14–44)
MCH RBC QN AUTO: 29.6 PG (ref 26.8–34.3)
MCHC RBC AUTO-ENTMCNC: 31.1 G/DL (ref 31.4–37.4)
MCV RBC AUTO: 95 FL (ref 82–98)
MONOCYTES # BLD AUTO: 0.37 THOUSAND/ΜL (ref 0.17–1.22)
MONOCYTES NFR BLD AUTO: 5 % (ref 4–12)
NEUTROPHILS # BLD AUTO: 5.27 THOUSANDS/ΜL (ref 1.85–7.62)
NEUTS SEG NFR BLD AUTO: 71 % (ref 43–75)
NRBC BLD AUTO-RTO: 0 /100 WBCS
PLATELET # BLD AUTO: 227 THOUSANDS/UL (ref 149–390)
PMV BLD AUTO: 9.7 FL (ref 8.9–12.7)
POTASSIUM SERPL-SCNC: 4.4 MMOL/L (ref 3.5–5.3)
PROT SERPL-MCNC: 8.2 G/DL (ref 6.4–8.2)
RBC # BLD AUTO: 4.52 MILLION/UL (ref 3.88–5.62)
SODIUM SERPL-SCNC: 136 MMOL/L (ref 136–145)
TIBC SERPL-MCNC: 295 UG/DL (ref 250–450)
WBC # BLD AUTO: 7.4 THOUSAND/UL (ref 4.31–10.16)

## 2022-02-03 PROCEDURE — 83540 ASSAY OF IRON: CPT

## 2022-02-03 PROCEDURE — 82784 ASSAY IGA/IGD/IGG/IGM EACH: CPT

## 2022-02-03 PROCEDURE — 83521 IG LIGHT CHAINS FREE EACH: CPT

## 2022-02-03 PROCEDURE — 85025 COMPLETE CBC W/AUTO DIFF WBC: CPT

## 2022-02-03 PROCEDURE — 84165 PROTEIN E-PHORESIS SERUM: CPT

## 2022-02-03 PROCEDURE — 84165 PROTEIN E-PHORESIS SERUM: CPT | Performed by: PATHOLOGY

## 2022-02-03 PROCEDURE — 80053 COMPREHEN METABOLIC PANEL: CPT

## 2022-02-03 PROCEDURE — 36415 COLL VENOUS BLD VENIPUNCTURE: CPT

## 2022-02-03 PROCEDURE — 82728 ASSAY OF FERRITIN: CPT

## 2022-02-03 PROCEDURE — 83550 IRON BINDING TEST: CPT

## 2022-02-04 LAB
KAPPA LC FREE SER-MCNC: 22.6 MG/L (ref 3.3–19.4)
KAPPA LC FREE/LAMBDA FREE SER: 2.6 {RATIO} (ref 0.26–1.65)
LAMBDA LC FREE SERPL-MCNC: 8.7 MG/L (ref 5.7–26.3)

## 2022-02-05 LAB
ALBUMIN SERPL ELPH-MCNC: 4.22 G/DL (ref 3.5–5)
ALBUMIN SERPL ELPH-MCNC: 52.1 % (ref 52–65)
ALPHA1 GLOB SERPL ELPH-MCNC: 0.37 G/DL (ref 0.1–0.4)
ALPHA1 GLOB SERPL ELPH-MCNC: 4.6 % (ref 2.5–5)
ALPHA2 GLOB SERPL ELPH-MCNC: 0.96 G/DL (ref 0.4–1.2)
ALPHA2 GLOB SERPL ELPH-MCNC: 11.9 % (ref 7–13)
BETA GLOB ABNORMAL SERPL ELPH-MCNC: 0.41 G/DL (ref 0.4–0.8)
BETA1 GLOB SERPL ELPH-MCNC: 5.1 % (ref 5–13)
BETA2 GLOB SERPL ELPH-MCNC: 3.8 % (ref 2–8)
BETA2+GAMMA GLOB SERPL ELPH-MCNC: 0.31 G/DL (ref 0.2–0.5)
GAMMA GLOB ABNORMAL SERPL ELPH-MCNC: 1.82 G/DL (ref 0.5–1.6)
GAMMA GLOB SERPL ELPH-MCNC: 22.5 % (ref 12–22)
IGG/ALB SER: 1.09 {RATIO} (ref 1.1–1.8)
M PEAK ID 2: 1.3 %
M PROTEIN 1 MFR SERPL ELPH: 16.3 %
M PROTEIN 1 SERPL ELPH-MCNC: 1.32 G/DL
M PROTEIN 2 SERPL ELPH-MCNC: 0.11 G/DL
PROT PATTERN SERPL ELPH-IMP: ABNORMAL
PROT SERPL-MCNC: 8.1 G/DL (ref 6.4–8.2)

## 2022-02-11 ENCOUNTER — OFFICE VISIT (OUTPATIENT)
Dept: HEMATOLOGY ONCOLOGY | Facility: CLINIC | Age: 62
End: 2022-02-11
Payer: COMMERCIAL

## 2022-02-11 VITALS
WEIGHT: 315 LBS | TEMPERATURE: 98.3 F | OXYGEN SATURATION: 97 % | DIASTOLIC BLOOD PRESSURE: 90 MMHG | HEART RATE: 88 BPM | BODY MASS INDEX: 42.66 KG/M2 | HEIGHT: 72 IN | RESPIRATION RATE: 17 BRPM | SYSTOLIC BLOOD PRESSURE: 162 MMHG

## 2022-02-11 DIAGNOSIS — D47.2 SMOLDERING MULTIPLE MYELOMA: Primary | ICD-10-CM

## 2022-02-11 PROCEDURE — 3077F SYST BP >= 140 MM HG: CPT | Performed by: INTERNAL MEDICINE

## 2022-02-11 PROCEDURE — 99214 OFFICE O/P EST MOD 30 MIN: CPT | Performed by: INTERNAL MEDICINE

## 2022-02-11 PROCEDURE — 3080F DIAST BP >= 90 MM HG: CPT | Performed by: INTERNAL MEDICINE

## 2022-02-11 NOTE — PROGRESS NOTES
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 dx 5/2018   M protein 1 4 g/dL, serum kappa free light chain 17, ratio of 2 09 at time of diagnosis 5/2018   Bone marrow biopsy May 2018 showed plasma cell 12-15%, normal cytogenetics and fish panel for multiple myeloma  M protein 1 4, kappa and lambda light chain ratio is stable    Anemia improved on IV iron     IgG in the 1700 range    Polyneuropathy with diabetes mellitus    Follow-up in 4 months with CBC, CMP, SPEP, free light chain, quantitative immunoglobulin        Labs and imaging studies are reviewed by ordering provider once results are available  If there are findings that need immediate attention, you will be contacted when results available  Discussing results and the implication on your healthcare is best discussed in person at your follow-up visit         HPI:    24-year-old  male with history of obesity, asthma, hypertension, dyslipidemia, diabetes mellitus type 2, had been complaining of tingling and numbness in his feet and hand for the past year, most recently a feeling of hypersensitivity involving the anterior aspect of the left thigh area, evaluation by Neurology on March 2018 showed monoclonal gammopathy with M protein of 1 40 grams/deciliters, IgG kappa      He has normal vitamin B12 level, TSH, heavy metal screen, Lyme disease  CBC performed August 2017 showed hemoglobin of 12 8, WBC 6 6, platelets [id number], 42% neutrophils, 19% lymphocytes     A bone marrow biopsy in May 2018 showed 10-12% plasma cells, fish panel for multiple myeloma was negative, cytogenetics showed normal male chromosomes and deletion of Y chromosome in some of the cells consistent with normal finding in advanced age patient      Bone skeletal survey 5/2018 showed no evidence of lytic lesions     Received IV Venofer on 09/2021, upper and lower endoscopy and capsule endoscopy was unremarkable, liver ultrasound showed significant fibrosis with steatosis    He is on supplements  Interval History:        Previous Treatment:         Test Results:    Imaging: No results found  Labs:   Lab Results   Component Value Date    WBC 7 40 02/03/2022    HGB 13 4 02/03/2022    HCT 43 1 02/03/2022    MCV 95 02/03/2022     02/03/2022     Lab Results   Component Value Date     05/17/2017    K 4 4 02/03/2022     02/03/2022    CO2 31 02/03/2022    ANIONGAP 9 01/13/2015    BUN 13 02/03/2022    CREATININE 0 89 02/03/2022    GLUCOSE 206 (H) 01/13/2015    GLUF 165 (H) 02/03/2022    CALCIUM 9 3 02/03/2022    CORRECTEDCA 9 6 10/29/2020    AST 16 02/03/2022    ALT 37 02/03/2022    ALKPHOS 101 02/03/2022    PROT 7 5 05/17/2017    BILITOT 0 5 05/17/2017    EGFR 90 02/03/2022       Lab Results   Component Value Date    IRON 53 (L) 02/03/2022    TIBC 295 02/03/2022    FERRITIN 37 02/03/2022       Lab Results   Component Value Date    [id number] 428 04/30/2018         ROS: Review of Systems   Constitutional: Negative  Negative for appetite change, chills, diaphoresis, fatigue, fever and unexpected weight change  HENT:   Negative for hearing loss, lump/mass, mouth sores, nosebleeds, sore throat, trouble swallowing and voice change  Eyes: Negative  Negative for eye problems and icterus  Respiratory: Negative  Negative for chest tightness, cough, hemoptysis and shortness of breath  Cardiovascular: Negative for chest pain and leg swelling  Gastrointestinal: Negative for abdominal distention, abdominal pain, blood in stool, constipation, diarrhea and nausea  Endocrine: Negative  Genitourinary: Negative for dysuria, frequency, hematuria and pelvic pain  Musculoskeletal: Negative  Negative for arthralgias, back pain, flank pain, gait problem, myalgias and neck stiffness  Skin: Negative for itching and rash  Neurological: Negative for dizziness, gait problem, headaches, light-headedness, numbness and speech difficulty  Hematological: Negative for adenopathy  Does not bruise/bleed easily  Psychiatric/Behavioral: Negative for confusion, decreased concentration, depression and sleep disturbance  The patient is not nervous/anxious  Current Medications: Reviewed  Allergies: Reviewed  PMH/FH/SH:  Reviewed      Physical Exam:    Body surface area is 2 58 meters squared  Wt Readings from Last 3 Encounters:   02/11/22 (!) 143 kg (316 lb)   01/28/22 (!) 145 kg (318 lb 9 6 oz)   12/27/21 (!) 143 kg (315 lb 12 8 oz)        Temp Readings from Last 3 Encounters:   02/11/22 98 3 °F (36 8 °C)   01/28/22 97 6 °F (36 4 °C) (Temporal)   12/27/21 98 1 °F (36 7 °C) (Temporal)        BP Readings from Last 3 Encounters:   02/11/22 162/90   01/28/22 152/92   12/27/21 126/72         Pulse Readings from Last 3 Encounters:   02/11/22 88   01/28/22 87   12/27/21 81        Physical Exam  Vitals reviewed  Constitutional:       General: He is not in acute distress  Appearance: He is well-developed  He is obese  He is not diaphoretic  HENT:      Head: Normocephalic and atraumatic  Eyes:      Conjunctiva/sclera: Conjunctivae normal    Neck:      Trachea: No tracheal deviation  Cardiovascular:      Rate and Rhythm: Normal rate and regular rhythm  Heart sounds: No murmur heard  No friction rub  No gallop  Pulmonary:      Effort: Pulmonary effort is normal  No respiratory distress  Breath sounds: Normal breath sounds  No wheezing or rales  Chest:      Chest wall: No tenderness  Abdominal:      General: There is no distension  Palpations: Abdomen is soft  Tenderness: There is no abdominal tenderness  Musculoskeletal:      Cervical back: Normal range of motion and neck supple  Lymphadenopathy:      Cervical: No cervical adenopathy  Skin:     General: Skin is warm and dry        Coloration: Skin is not pale  Findings: No erythema  Neurological:      Mental Status: He is alert and oriented to person, place, and time  Psychiatric:         Behavior: Behavior normal          Thought Content: Thought content normal          Judgment: Judgment normal          ECO    Goals and Barriers:  Current Goal: Minimize effects of disease  Barriers: None  Patient's Capacity to Self Care:  Patient is able to self care      Code Status: @[facility]@

## 2022-02-23 DIAGNOSIS — E08.41 DIABETIC MONONEUROPATHY ASSOCIATED WITH DIABETES MELLITUS DUE TO UNDERLYING CONDITION (HCC): ICD-10-CM

## 2022-02-23 RX ORDER — GABAPENTIN 100 MG/1
CAPSULE ORAL
Qty: 30 CAPSULE | Refills: 1 | Status: SHIPPED | OUTPATIENT
Start: 2022-02-23 | End: 2022-04-25

## 2022-03-07 ENCOUNTER — TELEPHONE (OUTPATIENT)
Dept: FAMILY MEDICINE CLINIC | Facility: CLINIC | Age: 62
End: 2022-03-07

## 2022-03-07 NOTE — TELEPHONE ENCOUNTER
Marcel Hurtado called requesting an Ambulatory referral to Sleep Medicine  Physician to Physician not a studt  Diagnoses G47 33, F58 12

## 2022-03-14 ENCOUNTER — OFFICE VISIT (OUTPATIENT)
Dept: SLEEP CENTER | Facility: CLINIC | Age: 62
End: 2022-03-14
Payer: COMMERCIAL

## 2022-03-14 VITALS
SYSTOLIC BLOOD PRESSURE: 126 MMHG | HEART RATE: 93 BPM | DIASTOLIC BLOOD PRESSURE: 78 MMHG | OXYGEN SATURATION: 95 % | WEIGHT: 315 LBS | HEIGHT: 72 IN | BODY MASS INDEX: 42.66 KG/M2

## 2022-03-14 DIAGNOSIS — J31.0 CHRONIC RHINITIS: ICD-10-CM

## 2022-03-14 DIAGNOSIS — E66.01 MORBID OBESITY (HCC): ICD-10-CM

## 2022-03-14 DIAGNOSIS — R68.2 DRY MOUTH: ICD-10-CM

## 2022-03-14 DIAGNOSIS — F51.12 INSUFFICIENT SLEEP SYNDROME: ICD-10-CM

## 2022-03-14 DIAGNOSIS — J45.21 MILD INTERMITTENT ASTHMA WITH EXACERBATION: ICD-10-CM

## 2022-03-14 DIAGNOSIS — G47.33 OBSTRUCTIVE SLEEP APNEA: Primary | ICD-10-CM

## 2022-03-14 DIAGNOSIS — I10 ESSENTIAL HYPERTENSION: ICD-10-CM

## 2022-03-14 DIAGNOSIS — E11.42 DIABETIC PERIPHERAL NEUROPATHY (HCC): ICD-10-CM

## 2022-03-14 PROCEDURE — 3074F SYST BP LT 130 MM HG: CPT | Performed by: INTERNAL MEDICINE

## 2022-03-14 PROCEDURE — 99214 OFFICE O/P EST MOD 30 MIN: CPT | Performed by: INTERNAL MEDICINE

## 2022-03-14 PROCEDURE — 3078F DIAST BP <80 MM HG: CPT | Performed by: INTERNAL MEDICINE

## 2022-03-14 NOTE — PATIENT INSTRUCTIONS

## 2022-03-14 NOTE — PROGRESS NOTES
Follow-Up Note - Sleep Center   Vic Wills  59 y o  male  FZR:[birth year]  EYE:[id number]  DOS:3/14/2022    CC: I saw this patient for follow-up in clinic today for Sleep disordered breathing, Coexisting Sleep and Medical Problems  He is using a dream Station 1 machine and has yet to register with [facility]    A diagnostic study in September of 2017 demonstrated an AHI of 38 7 per hour, considerably higher during REM at 63 per hour  Minimum oxygen saturation was 75% and 18 9% of the study was spent with saturations less than 90%  During the subsequent therapeutic study, the AHI was reduced to 6 3 per hour at 15 cm H2O  Auto titrating Pap 14-17 cm H2O was initiated  PFSH, Problem List, Medications & Allergies were reviewed in EMR  Interval changes: none reported  He  has a past medical history of Asthma, Benign positional vertigo, Diabetes mellitus ([facility] ), Diabetes mellitus due to underlying condition with diabetic nephropathy, with long-term current use of insulin ([facility] ) (5/19/2021), Dyslipidemia, Gastroenteritis, Hyperlipidemia, Hypertension, Lipoma of neck, Multiple myeloma ([facility] ), Sleep apnea, and Wheezing  He has a current medication list which includes the following prescription(s): albuterol, albuterol, ascorbic acid, atorvastatin, b complex-biotin-fa, onetouch verio, flovent hfa, fluticasone, gabapentin, ginkgo biloba, glipizide, onetouch verio, lantus solostar, insulin pen needle, onetouch delica plus [id number], lisinopril, loratadine, magnesium citrate, metformin, multiple vitamins-minerals, omeprazole, simvastatin, and tadalafil  PHYSIOLOGICAL DATA REVIEW AND INTERPRETATION:    using PAP > 4 hours/night 90%  Estimated KATHRYN 1/hour with pressure of 17cm H2O @90th percentile]; Patient has not been using ozone based devices to sanitize the machine      SUBJECTIVE: Regarding use of PAP, Brittany reports:   · He is experiencing significant adverse effects: dry mouth/throat for which he uses Biotene mouth 1; has not noticed any fibres or foreign material in air line  · He is benefiting from use: sleeping better   Sleep Routine: Delma Poole reports getting 5 hrs sleep  ; he has no difficulty initiating or maintaining sleep   He arises spontaneously and feels refreshed  Delma Poole reports Excessive Daytime Sleepiness, [ for 15-20 minutes ] He rated[himself] at Total score: 8 /24 on the [place] Sleepness Scal      Habits:[], [ reports previous alcohol use ], [ reports no history of drug use ], Caffeine use:limited[ ]; Exercise routine: none[ ]  ROS: reviewed & as attached  [Significant for weight has been stable  Allergies and asthma are controlled  He is on gabapnin for diabetic peripheral neuropathy ]     EXAM: /78   Pulse 93   Ht 6' (1 829 m)   Wt (!) 144 kg (316 lb 12 8 oz)   SpO2 95%   BMI 42 97 kg/m²     Patiet is well groomed; well appearing  CNS: Alert, orientated, [clear & coherent] speech  Psych: cooperative[and in no distress]  Mental state:[appears normal]  H&N: EOMI; NC/AT:[no] facial ressure marks, [no] rashes  Skin/Extrem: cl & hydration [noral]; [no] edema  Resp: Respiratry effort [is normal  Physical findings otherwseessentially unchanged from previous ]    JORGE LUIS Joel List Items & Comorbidities addressed this Visit    1  Obstructive sleep apnea  Ambulatory Referral to Sleep Medicine    PAP DME Resupply/Reorder   2  Insufficient sleep syndrome     3  Dry mouth     4  Chronic rhinitis     5  Mild intermittent asthma with exacerbation     6  Essential hypertension     7  Morbid obesity ([facility] )     8  Diabetic peripheral neuropathy ([facility] )         PLAN:  I reviewed results of [prior studies and] physiologic data with the patient  Notified of [employer] devices recall and their recommendation to discontinue use  Risks of discontinuing PAP vs continuing while awaiting resolution were explained and patient indicates understanding  I discussed treatment options with risks and benefits  Patient elected to [ ]continue using Pap while awaiting remediation  Instructed  to [employer] with [employer] &] track progress on [employer]  Care of equpment, methods to improve comfort using PAP and importance of compliance with therapy were discussed  Instructed not to use ozone based or other unapproved  cleaning devices  Pressure setting: 15-17 cmH2O  Rx provided to replace[ ] supplies and Care coordinated with DME provider  [Discussed] strategies for eght [reduction]  Follow-up is advised in [1 year or sooner if eeded] to monitor progress, comliance and to adjust therapy  Thank you for allowing me to participate in the care of this patient  Sincerely,    Authenticated electronically by Loida Montgomery MD on 02/16/00   Board Certified Specialist     Portions of the record may have been created with voice recognition otware  Occasional wrong word or "sound a like" substtutions my have occurred due to he inheret limitations of voice recogntion software  There may aso be notations and random deletions of words or characters from malfunctioning software  Read the chart carefully and recognize, using context, where substitutions/deletions have occurred

## 2022-03-14 NOTE — PROGRESS NOTES
Review of Systems      Genitourinary difficulty with erection   Cardiology ankle/leg swelling   Gastrointestinal none   Neurology numbness/tingling of an extremity, forgetfulness, poor concentration or confusion, , difficulty with memory and balance problems   Constitutional fatigue   Integumentary none   Psychiatry none   Musculoskeletal joint pain   Pulmonary shortness of breath with activity   ENT ringing in ears   Endocrine none   Hematological none

## 2022-03-15 ENCOUNTER — TELEPHONE (OUTPATIENT)
Dept: SLEEP CENTER | Facility: CLINIC | Age: 62
End: 2022-03-15

## 2022-03-15 DIAGNOSIS — J45.41 MODERATE PERSISTENT ASTHMA WITH ACUTE EXACERBATION: Primary | ICD-10-CM

## 2022-03-15 DIAGNOSIS — J45.909 UNCOMPLICATED ASTHMA, UNSPECIFIED ASTHMA SEVERITY, UNSPECIFIED WHETHER PERSISTENT: ICD-10-CM

## 2022-03-16 RX ORDER — ALBUTEROL SULFATE 2.5 MG/3ML
2.5 SOLUTION RESPIRATORY (INHALATION) EVERY 6 HOURS PRN
Qty: 30 ML | Refills: 3 | Status: SHIPPED | OUTPATIENT
Start: 2022-03-16 | End: 2022-06-14

## 2022-03-16 NOTE — TELEPHONE ENCOUNTER
Please review and refill as appropriate   Can you please send the medication to   [address] in [facility]     Thanks Dr Justen Parish

## 2022-04-11 ENCOUNTER — TELEPHONE (OUTPATIENT)
Dept: PODIATRY | Facility: CLINIC | Age: 62
End: 2022-04-11

## 2022-04-25 DIAGNOSIS — E11.11 TYPE 2 DIABETES MELLITUS WITH KETOACIDOTIC COMA, WITHOUT LONG-TERM CURRENT USE OF INSULIN (HCC): ICD-10-CM

## 2022-04-25 DIAGNOSIS — E08.41 DIABETIC MONONEUROPATHY ASSOCIATED WITH DIABETES MELLITUS DUE TO UNDERLYING CONDITION (HCC): ICD-10-CM

## 2022-04-25 RX ORDER — GLIPIZIDE 10 MG/1
TABLET, FILM COATED, EXTENDED RELEASE ORAL
Qty: 90 TABLET | Refills: 1 | Status: SHIPPED | OUTPATIENT
Start: 2022-04-25 | End: 2022-04-29 | Stop reason: ALTCHOICE

## 2022-04-25 RX ORDER — GABAPENTIN 100 MG/1
CAPSULE ORAL
Qty: 30 CAPSULE | Refills: 1 | Status: SHIPPED | OUTPATIENT
Start: 2022-04-25 | End: 2022-06-20

## 2022-04-29 ENCOUNTER — OFFICE VISIT (OUTPATIENT)
Dept: FAMILY MEDICINE CLINIC | Facility: CLINIC | Age: 62
End: 2022-04-29

## 2022-04-29 VITALS
RESPIRATION RATE: 18 BRPM | OXYGEN SATURATION: 98 % | DIASTOLIC BLOOD PRESSURE: 80 MMHG | HEART RATE: 84 BPM | TEMPERATURE: 98.6 F | WEIGHT: 314.2 LBS | BODY MASS INDEX: 42.56 KG/M2 | HEIGHT: 72 IN | SYSTOLIC BLOOD PRESSURE: 126 MMHG

## 2022-04-29 DIAGNOSIS — Z79.4 DIABETES MELLITUS DUE TO UNDERLYING CONDITION WITH DIABETIC NEPHROPATHY, WITH LONG-TERM CURRENT USE OF INSULIN (HCC): Primary | ICD-10-CM

## 2022-04-29 DIAGNOSIS — I10 ESSENTIAL HYPERTENSION: ICD-10-CM

## 2022-04-29 DIAGNOSIS — E08.21 DIABETES MELLITUS DUE TO UNDERLYING CONDITION WITH DIABETIC NEPHROPATHY, WITH LONG-TERM CURRENT USE OF INSULIN (HCC): Primary | ICD-10-CM

## 2022-04-29 DIAGNOSIS — H93.13 TINNITUS OF BOTH EARS: ICD-10-CM

## 2022-04-29 LAB — SL AMB POCT HEMOGLOBIN AIC: 7.3 (ref ?–6.5)

## 2022-04-29 PROCEDURE — 99213 OFFICE O/P EST LOW 20 MIN: CPT | Performed by: FAMILY MEDICINE

## 2022-04-29 PROCEDURE — 83036 HEMOGLOBIN GLYCOSYLATED A1C: CPT | Performed by: FAMILY MEDICINE

## 2022-04-29 PROCEDURE — 3051F HG A1C>EQUAL 7.0%<8.0%: CPT | Performed by: INTERNAL MEDICINE

## 2022-04-29 NOTE — ASSESSMENT & PLAN NOTE
Blood pressure controlled, 126/80 today  Patient asymptomatic  Will continue the same management with lisinopril 40 mg daily  Will follow-up the next visit  No refills needed

## 2022-04-29 NOTE — PROGRESS NOTES
Assessment/Plan:    Diabetes mellitus due to underlying condition with diabetic nephropathy, with long-term current use of insulin (HCC)    Lab Results   Component Value Date    HGBA1C 7 3 (A) 04/29/2022      well controlled, average , are   Currently on glipizide 10 mg, metformin 1 g b i d , are Lantus 20 units q h s     Given BMI of 42 6, discussed patient regarding G LP 1  Patient is very motivated to consider it as also discussed and recommended by his GI as well  Added Trulicity 4 34 weekly today, discontinued glipizide 10 mg given A1c of 7 3  Reviewed side effects of the medication  Encouraged to continue monitor blood sugar levels twice a day  Reviewed hypo and hyperglycemia precautions  Will follow-up with the patient in 2 weeks  Essential hypertension    Blood pressure controlled, 126/80 today  Patient asymptomatic  Will continue the same management with lisinopril 40 mg daily  Will follow-up the next visit  No refills needed  Tinnitus of both ears  Requesting referral to ENT in the setting of tinnitus in bilateral ears chronic  Reviewed recent HB 13, no wax impaction noted on exam  Chronic, was following ENT in past, reports slightly decreased hearing sensation in right >left  Diagnoses and all orders for this visit:    Diabetes mellitus due to underlying condition with diabetic nephropathy, with long-term current use of insulin (HCC)  -     POCT hemoglobin A1c  -     Dulaglutide 0 75 MG/0 5ML SOPN; Inject 0 5 mL (0 75 mg total) under the skin once a week    Tinnitus of both ears  -     Ambulatory Referral to Otolaryngology; Future    Essential hypertension        Subjective:      Patient ID: Juan Boyd is a 59 y o  male  17-year-old male presents today for follow-up of diabetes  Denies any recent hypoglycemic episodes  Has been following up with Podiatry and Ophthalmology  Patient brings a log of his blood sugars  Is interested in trying  G LP 1 this visit  Enquiring about insurance  No other acute concerns  The following portions of the patient's history were reviewed and updated as appropriate: He  has a past medical history of Asthma, Benign positional vertigo, Diabetes mellitus ([address] ), Diabetes mellitus due to underlying condition with diabetic nephropathy, with long-term current use of insulin ([facility] ) (5/19/2021), Dyslipidemia, Gastroenteritis, Hyperlipidemia, Hypertension, Lipoma of neck, Multiple myeloma ([address] ), Sleep apnea, and Wheezing  He   Patient Active Problem List    Diagnosis Date Noted    Tinnitus of both ears 04/29/2022    Fatty liver 10/25/2021    Other male erectile dysfunction 08/18/2021    Diabetes mellitus due to underlying condition with diabetic nephropathy, with long-term current use of insulin ([facility] ) 05/19/2021    Mixed hyperlipidemia 05/06/2021    HAMEED (dyspnea on exertion) 05/06/2021    Family history of early CAD 05/06/2021    Anemia, unspecified 05/03/2021    Dyspnea 03/16/2021    Urinary hesitancy 11/02/2020    Screening for HIV (human immunodeficiency virus) 08/12/2020    Acute cystitis with hematuria 04/27/2020    Rash 08/14/2019    Bilateral lower extremity edema 04/23/2019    Mild intermittent asthma without complication 91/52/5545    Peripheral polyneuropathy 07/25/2018    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    Diabetic peripheral neuropathy ([facility] ) 03/06/2018    Paresthesia 03/06/2018    Meralgia paresthetica of left side 03/06/2018    Obstructive sleep apnea 02/26/2018    Insufficient sleep syndrome 02/26/2018    Essential hypertension 02/12/2018    Encounter for immunization 02/12/2018    Kent's esophagus 11/22/2016    Adrenal incidentaloma ([facility] ) 06/18/2015    Benign paroxysmal positional vertigo 06/18/2015    Liver lesion 67/49/0456    Umbilical hernia 19/37/3000    Lipoma of back 04/13/2015    Allergic rhinitis 04/15/2013     He  has a past surgical history that includes Throat surgery;  Mass excision (Right, 8/21/2017); and Esophagogastroduodenoscopy (2010)  His family history includes Breast cancer in his maternal aunt; Cancer in his father and mother; Dementia in his father; Diabetes in his mother; Diabetes type II in his mother; Heart attack in his father; Heart disease in his father; Hyperlipidemia in his father and mother; Hypertension in his father and mother; Stomach cancer in his maternal grandmother; Thyroid disease in his sister  He  reports that he has never smoked  He has never used smokeless tobacco  He reports previous alcohol use  He reports that he does not use drugs  Current Outpatient Medications   Medication Sig Dispense Refill    albuterol (2 5 mg/3 mL) 0 083 % nebulizer solution Take 3 mL (2 5 mg total) by nebulization every 6 (six) hours as needed for wheezing 30 mL 3    albuterol (PROVENTIL HFA,VENTOLIN HFA) 90 mcg/act inhaler INHALE 1 PUFF EVERY 4-6 HOURS AS NEEDED 17 g 2    ascorbic acid (VITAMIN C) 500 mg tablet Take 500 mg by mouth daily      atorvastatin (LIPITOR) 40 mg tablet Take 1 tablet (40 mg total) by mouth daily 90 tablet 4    B Complex-Biotin-FA (VITAMIN B50 COMPLEX PO)       Blood Glucose Monitoring Suppl (ONETOUCH VERIO) w/Device KIT by Does not apply route 3 (three) times a day 1 kit 0    Flovent  MCG/ACT inhaler INHALE 2 PUFFS BY MOUTH TWICE DAILY   RINSE MOUTH AFTER USE 12 g 3    fluticasone (FLONASE) 50 mcg/act nasal spray SHAKE LIQUID AND USE 2 SPRAYS IN EACH NOSTRIL DAILY 48 g 2    gabapentin (NEURONTIN) 100 mg capsule TAKE 1 CAPSULE(100 MG) BY MOUTH DAILY AT BEDTIME 30 capsule 1    Ginkgo Biloba (GINKOBA PO) Take 60 mg by mouth      glucose blood (OneTouch Verio) test strip Use 1 each 3 (three) times a day Use as instructed 100 each 5    insulin glargine (Lantus SoloStar) 100 units/mL injection pen Inject 20 Units under the skin daily at bedtime And 5 units in the AM 45 mL 3    Insulin Pen Needle 31G X 5 MM MISC Use daily 100 each 5    lisinopril (ZESTRIL) 40 mg tablet Take 1 tablet (40 mg total) by mouth daily 90 tablet 4    loratadine (CLARITIN) 10 mg tablet Take 1 tablet (10 mg total) by mouth daily 90 tablet 2    MAGNESIUM CITRATE PO Take 1 tablet by mouth daily        metFORMIN (GLUCOPHAGE) 1000 MG tablet TAKE 1 TABLET(1000 MG) BY MOUTH TWICE DAILY WITH MEALS 180 tablet 2    Multiple Vitamins-Minerals (MULTIVITAMIN ADULTS 50+ PO) Take by mouth daily      omeprazole (PriLOSEC) 40 MG capsule TAKE 1 CAPSULE(40 MG) BY MOUTH DAILY 90 capsule 3    Dulaglutide 0 75 MG/0 5ML SOPN Inject 0 5 mL (0 75 mg total) under the skin once a week 2 mL 0    Lancets (OneTouch Delica Plus [id number]) MISC Test 100 each 5    simvastatin (ZOCOR) 20 mg tablet TAKE 1 TABLET(20 MG) BY MOUTH EVERY EVENING 90 tablet 1    tadalafil (CIALIS) 10 MG tablet Take 1 tablet (10 mg total) by mouth daily as needed for erectile dysfunction 10 tablet 1     No current facility-administered medications for this visit  Current Outpatient Medications on File Prior to Visit   Medication Sig    albuterol (2 5 mg/3 mL) 0 083 % nebulizer solution Take 3 mL (2 5 mg total) by nebulization every 6 (six) hours as needed for wheezing    albuterol (PROVENTIL HFA,VENTOLIN HFA) 90 mcg/act inhaler INHALE 1 PUFF EVERY 4-6 HOURS AS NEEDED    ascorbic acid (VITAMIN C) 500 mg tablet Take 500 mg by mouth daily    atorvastatin (LIPITOR) 40 mg tablet Take 1 tablet (40 mg total) by mouth daily    B Complex-Biotin-FA (VITAMIN B50 COMPLEX PO)     Blood Glucose Monitoring Suppl (ONETOUCH VERIO) w/Device KIT by Does not apply route 3 (three) times a day    Flovent  MCG/ACT inhaler INHALE 2 PUFFS BY MOUTH TWICE DAILY   RINSE MOUTH AFTER USE    fluticasone (FLONASE) 50 mcg/act nasal spray SHAKE LIQUID AND USE 2 SPRAYS IN EACH NOSTRIL DAILY    gabapentin (NEURONTIN) 100 mg capsule TAKE 1 CAPSULE(100 MG) BY MOUTH DAILY AT BEDTIME    Ginkgo Biloba (GINKOBA PO) Take 60 mg by mouth    glucose blood (OneTouch Verio) test strip Use 1 each 3 (three) times a day Use as instructed    insulin glargine (Lantus SoloStar) 100 units/mL injection pen Inject 20 Units under the skin daily at bedtime And 5 units in the AM    Insulin Pen Needle 31G X 5 MM MISC Use daily    lisinopril (ZESTRIL) 40 mg tablet Take 1 tablet (40 mg total) by mouth daily    loratadine (CLARITIN) 10 mg tablet Take 1 tablet (10 mg total) by mouth daily    MAGNESIUM CITRATE PO Take 1 tablet by mouth daily      metFORMIN (GLUCOPHAGE) 1000 MG tablet TAKE 1 TABLET(1000 MG) BY MOUTH TWICE DAILY WITH MEALS    Multiple Vitamins-Minerals (MULTIVITAMIN ADULTS 50+ PO) Take by mouth daily    omeprazole (PriLOSEC) 40 MG capsule TAKE 1 CAPSULE(40 MG) BY MOUTH DAILY    [DISCONTINUED] glipiZIDE (GLUCOTROL XL) 10 mg 24 hr tablet TAKE 1 TABLET(10 MG) BY MOUTH DAILY    Lancets (OneTouch Delica Plus [id number]) MISC Test    simvastatin (ZOCOR) 20 mg tablet TAKE 1 TABLET(20 MG) BY MOUTH EVERY EVENING    tadalafil (CIALIS) 10 MG tablet Take 1 tablet (10 mg total) by mouth daily as needed for erectile dysfunction     No current facility-administered medications on file prior to visit  Review of Systems   Constitutional: Negative for chills and fever  HENT: Negative for congestion and sore throat  Respiratory: Negative for cough and shortness of breath  Cardiovascular: Negative for chest pain  Gastrointestinal: Negative for abdominal pain  Musculoskeletal: Negative for back pain and neck pain  Skin: Negative for rash  Neurological: Negative for dizziness, weakness and headaches  Psychiatric/Behavioral: Negative for agitation and behavioral problems  Objective:      /80   Pulse 84   Temp 98 6 °F (37 °C) (Temporal)   Resp 18   Ht 6' (1 829 m)   Wt (!) 143 kg (314 lb 3 2 oz)   SpO2 98%   BMI 42 61 kg/m²          Physical Exam  Vitals reviewed  Constitutional:       Appearance: He is well-developed  HENT:      Head: Normocephalic and atraumatic  Right Ear: Tympanic membrane, ear canal and external ear normal  There is no impacted cerumen  Left Ear: Tympanic membrane, ear canal and external ear normal  There is no impacted cerumen  Eyes:      Conjunctiva/sclera: Conjunctivae normal    Cardiovascular:      Rate and Rhythm: Normal rate and regular rhythm  Heart sounds: Normal heart sounds  No murmur heard  Pulmonary:      Effort: Pulmonary effort is normal  No respiratory distress  Breath sounds: Normal breath sounds  Musculoskeletal:      Cervical back: Normal range of motion  Right lower leg: No edema  Left lower leg: No edema  Skin:     General: Skin is warm and dry  Neurological:      Mental Status: He is alert and oriented to person, place, and time     Psychiatric:         Behavior: Behavior normal

## 2022-04-29 NOTE — ASSESSMENT & PLAN NOTE
Requesting referral to ENT in the setting of tinnitus in bilateral ears chronic  Reviewed recent HB 13, no wax impaction noted on exam  Chronic, was following ENT in past, reports slightly decreased hearing sensation in right >left

## 2022-04-29 NOTE — ASSESSMENT & PLAN NOTE
Lab Results   Component Value Date    HGBA1C 7 3 (A) 04/29/2022      well controlled, average , are   Currently on glipizide 10 mg, metformin 1 g b i d , are Lantus 20 units q h s     Given BMI of 42 6, discussed patient regarding G LP 1  Patient is very motivated to consider it as also discussed and recommended by his GI as well  Added Trulicity 0 63 weekly today, discontinued glipizide 10 mg given A1c of 7 3  Reviewed side effects of the medication  Encouraged to continue monitor blood sugar levels twice a day  Reviewed hypo and hyperglycemia precautions  Will follow-up with the patient in 2 weeks

## 2022-05-06 ENCOUNTER — TELEMEDICINE (OUTPATIENT)
Dept: FAMILY MEDICINE CLINIC | Facility: CLINIC | Age: 62
End: 2022-05-06

## 2022-05-06 DIAGNOSIS — J06.9 VIRAL URI WITH COUGH: Primary | ICD-10-CM

## 2022-05-06 PROCEDURE — 99213 OFFICE O/P EST LOW 20 MIN: CPT | Performed by: FAMILY MEDICINE

## 2022-05-06 RX ORDER — BENZONATATE 200 MG/1
200 CAPSULE ORAL 3 TIMES DAILY PRN
Qty: 20 CAPSULE | Refills: 0 | Status: SHIPPED | OUTPATIENT
Start: 2022-05-06 | End: 2022-05-26

## 2022-05-06 RX ORDER — DEXTROMETHORPHAN HYDROBROMIDE AND PROMETHAZINE HYDROCHLORIDE 15; 6.25 MG/5ML; MG/5ML
5 SOLUTION ORAL 4 TIMES DAILY PRN
Qty: 180 ML | Refills: 0 | Status: SHIPPED | OUTPATIENT
Start: 2022-05-06 | End: 2022-06-17

## 2022-05-06 NOTE — ASSESSMENT & PLAN NOTE
2 day Hx of productive cough, congestion, rhinorrhea and overall fatigue  Denies fever and/or chill  No CP or SOB  - patient is vaccinated w/ primary series  - patient works at home, only travels for groceries and wears masks/keeps distance  - patient not agreeable to COVID testing at this time, made shared decision making to get tested if symptoms persist until beginning of next week and/or new onset of fever/chills    - prescribed tessalon pearles 200mg TID PRN for cough  - prescribed promethazine - DM 5 ml QID PRN for cough/exportorant

## 2022-05-06 NOTE — PROGRESS NOTES
Virtual Regular Visit    Verification of patient location:    Patient is located in the following state in which I hold an active license [place]      Assessment/Plan:    Problem List Items Addressed This Visit        Respiratory    Viral URI with cough - Primary     2 day Hx of productive cough, congestion, rhinorrhea and overall fatigue  Denies fever and/or chill  No CP or SOB  - patient is vaccinated w/ primary series  - patient works at home, only travels for groceries and wears masks/keeps distance  - patient not agreeable to COVID testing at this time, made shared decision making to get tested if symptoms persist until beginning of next week and/or new onset of fever/chills  - prescribed tessalon pearles 200mg TID PRN for cough  - prescribed promethazine - DM 5 ml QID PRN for cough/exportorant          Relevant Medications    benzonatate (TESSALON) 200 MG capsule    Promethazine-DM (PHENERGAN-DM) 6 25-15 mg/5 mL oral syrup          BMI Counseling: There is no height or weight on file to calculate BMI  The BMI is above normal  Nutrition recommendations include decreasing fast food intake, consuming healthier snacks, limiting drinks that contain sugar and reducing intake of cholesterol  Exercise recommendations include exercising 3-5 times per week and obtaining a gym membership  Rationale for BMI follow-up plan is due to patient being overweight or obese           Reason for visit is   Chief Complaint   Patient presents with    Virtual Regular Visit        Encounter provider Milton Ferro DO    Provider located at [address] [place] [id number] [phone]      Recent Visits  Date Type Provider Dept   04/29/22 Office Visit MD Padmini Sullivan recent visits within past 7 days and meeting all other requirements  Today's Visits  Date Type Provider Dept   05/06/22 Telemedicine DO Padmini Kumar today's visits and meeting all other requirements  Future Appointments  No visits were found meeting these conditions  Showing future appointments within next 150 days and meeting all other requirements       The patient was identified by name and date of birth  Bernabe Roman was informed that this is a telemedicine visit and that the visit is being conducted through Telephone  My office door was closed  No one else was in the room  He acknowledged consent and understanding of privacy and security of the video platform  The patient has agreed to participate and understands they can discontinue the visit at any time  Patient is aware this is a billable service  Subjective  Bernabe Roman is a 59 y o  male       URI   This is a new problem  Episode onset: 2 days ago  The problem has been unchanged  There has been no fever  Associated symptoms include congestion, rhinorrhea, sneezing and a sore throat  Pertinent negatives include no abdominal pain, chest pain, coughing, dysuria, ear pain, rash or vomiting  He has tried acetaminophen, sleep and decongestant for the symptoms  The treatment provided moderate relief  Patient is immunosuppressed from DM2 and age related       Past Medical History:   Diagnosis Date    Asthma     Benign positional vertigo     Diabetes mellitus ([facility] [address] )     borderline; diet controlled    Diabetes mellitus due to underlying condition with diabetic nephropathy, with long-term current use of insulin ([address] ) 5/19/2021    Dyslipidemia     Gastroenteritis     Hyperlipidemia     Hypertension     Lipoma of neck     last assessed - [id number]    Multiple myeloma ([facility] )     Sleep apnea     has symptoms but not been diagnosed    Wheezing     last assessed - [id number]       Past Surgical History:   Procedure Laterality Date    ESOPHAGOGASTRODUODENOSCOPY  2010    Diagnostic    MASS EXCISION Right 8/21/2017    Procedure: POSTERIOR SHOULDER MASS EXCISION; POSTERIOR NECK MASS EXCISION; CHEST WALL MASS EXCISION;  Surgeon: Marvis Seip, MD;  Location: [facility];  Service: General    THROAT SURGERY         Current Outpatient Medications   Medication Sig Dispense Refill    albuterol (2 5 mg/3 mL) 0 083 % nebulizer solution Take 3 mL (2 5 mg total) by nebulization every 6 (six) hours as needed for wheezing 30 mL 3    albuterol (PROVENTIL HFA,VENTOLIN HFA) 90 mcg/act inhaler INHALE 1 PUFF EVERY 4-6 HOURS AS NEEDED 17 g 2    ascorbic acid (VITAMIN C) 500 mg tablet Take 500 mg by mouth daily      atorvastatin (LIPITOR) 40 mg tablet Take 1 tablet (40 mg total) by mouth daily 90 tablet 4    B Complex-Biotin-FA (VITAMIN B50 COMPLEX PO)       benzonatate (TESSALON) 200 MG capsule Take 1 capsule (200 mg total) by mouth 3 (three) times a day as needed for cough 20 capsule 0    Blood Glucose Monitoring Suppl (ONETOUCH VERIO) w/Device KIT by Does not apply route 3 (three) times a day 1 kit 0    Dulaglutide 0 75 MG/0 5ML SOPN Inject 0 5 mL (0 75 mg total) under the skin once a week 2 mL 0    Flovent  MCG/ACT inhaler INHALE 2 PUFFS BY MOUTH TWICE DAILY   RINSE MOUTH AFTER USE 12 g 3    fluticasone (FLONASE) 50 mcg/act nasal spray SHAKE LIQUID AND USE 2 SPRAYS IN EACH NOSTRIL DAILY 48 g 2    gabapentin (NEURONTIN) 100 mg capsule TAKE 1 CAPSULE(100 MG) BY MOUTH DAILY AT BEDTIME 30 capsule 1    Ginkgo Biloba (GINKOBA PO) Take 60 mg by mouth      glucose blood (OneTouch Verio) test strip Use 1 each 3 (three) times a day Use as instructed 100 each 5    insulin glargine (Lantus SoloStar) 100 units/mL injection pen Inject 20 Units under the skin daily at bedtime And 5 units in the AM 45 mL 3    Insulin Pen Needle 31G X 5 MM MISC Use daily 100 each 5    Lancets (OneTouch Delica Plus [id number]) MISC Test 100 each 5    lisinopril (ZESTRIL) 40 mg tablet Take 1 tablet (40 mg total) by mouth daily 90 tablet 4    loratadine (CLARITIN) 10 mg tablet Take 1 tablet (10 mg total) by mouth daily 90 tablet 2    MAGNESIUM CITRATE PO Take 1 tablet by mouth daily        metFORMIN (GLUCOPHAGE) 1000 MG tablet TAKE 1 TABLET(1000 MG) BY MOUTH TWICE DAILY WITH MEALS 180 tablet 2    Multiple Vitamins-Minerals (MULTIVITAMIN ADULTS 50+ PO) Take by mouth daily      omeprazole (PriLOSEC) 40 MG capsule TAKE 1 CAPSULE(40 MG) BY MOUTH DAILY 90 capsule 3    Promethazine-DM (PHENERGAN-DM) 6 25-15 mg/5 mL oral syrup Take 5 mL by mouth 4 (four) times a day as needed for cough 180 mL 0    simvastatin (ZOCOR) 20 mg tablet TAKE 1 TABLET(20 MG) BY MOUTH EVERY EVENING 90 tablet 1    tadalafil (CIALIS) 10 MG tablet Take 1 tablet (10 mg total) by mouth daily as needed for erectile dysfunction 10 tablet 1     No current facility-administered medications for this visit  Allergies   Allergen Reactions    Shellfish-Derived Products - Food Allergy Anaphylaxis     Clams and mussels, oysters  Lobster and crab ok    Diphenhydramine      Lightheadedness, nauseous, rapid heartbeat    Other Palpitations and Other (See Comments)     Clams       Review of Systems   Constitutional: Positive for appetite change and fatigue  Negative for chills and fever  HENT: Positive for congestion, rhinorrhea, sneezing and sore throat  Negative for ear pain  Eyes: Negative for pain and visual disturbance  Respiratory: Negative for cough and shortness of breath  Cardiovascular: Negative for chest pain and palpitations  Gastrointestinal: Negative for abdominal pain and vomiting  Genitourinary: Negative for dysuria and hematuria  Musculoskeletal: Negative for arthralgias and back pain  Skin: Negative for color change and rash  Neurological: Negative for seizures and syncope  All other systems reviewed and are negative  Video Exam    There were no vitals filed for this visit  Physical Exam  Vitals reviewed  Pulmonary:      Effort: Pulmonary effort is normal  No respiratory distress        Comments: No conversational dyspnea         I spent 20 minutes directly with the patient during this visit    [address] verbally agrees to participate in [employer]  Pt is aware that [employer] could be limited without vital signs or the ability to perform a full hands-on physical exam  Brittany Gonzales understands he or the provider may request at any time to terminate the video visit and request the patient to seek care or treatment in person      Nehemias White DO  Family Medicine Resident, PGY2  10:49 AM

## 2022-05-09 ENCOUNTER — TELEMEDICINE (OUTPATIENT)
Dept: FAMILY MEDICINE CLINIC | Facility: CLINIC | Age: 62
End: 2022-05-09

## 2022-05-09 DIAGNOSIS — J06.9 VIRAL URI WITH COUGH: Primary | ICD-10-CM

## 2022-05-09 DIAGNOSIS — J40 BRONCHITIS: ICD-10-CM

## 2022-05-09 PROCEDURE — 99213 OFFICE O/P EST LOW 20 MIN: CPT | Performed by: FAMILY MEDICINE

## 2022-05-09 RX ORDER — AZITHROMYCIN 250 MG/1
TABLET, FILM COATED ORAL
Qty: 6 TABLET | Refills: 0 | Status: SHIPPED | OUTPATIENT
Start: 2022-05-09 | End: 2022-05-14

## 2022-05-09 NOTE — ASSESSMENT & PLAN NOTE
Present for > 7 days - given history of lung disease - most likely COPD components - given course of azithromycin

## 2022-05-09 NOTE — PROGRESS NOTES
Virtual Regular Visit    Verification of patient location:    Patient is located in the following state in which I hold an active license [place]      Assessment/Plan:    Problem List Items Addressed This Visit        Respiratory    Viral URI with cough - Primary     Present for > 7 days - given history of lung disease - most likely COPD components - given course of azithromycin           Other Visit Diagnoses     Bronchitis        Relevant Medications    azithromycin (Zithromax) 250 mg tablet               Reason for visit is   Chief Complaint   Patient presents with    Virtual Regular Visit        Encounter provider Anand Kamara    Provider located at [address] [place] [id number]  [phone]      Recent Visits  Date Type Provider Dept   05/06/22 [facility], [address] recent visits within past 7 days and meeting all other requirements  Today's Visits  Date Type Provider Dept   05/09/22 [facility], [facility] today's visits and meeting all other requirements  Future Appointments  No visits were found meeting these conditions  Showing future appointments within next 150 days and meeting all other requirements       The patient was identified by name and date of birth  Judy Booth was informed that this is a telemedicine visit and that the visit is being conducted through [facility] and patient was informed this is a secure, HIPAA-complaint platform  He agrees to proceed     My office door was closed  No one else was in the room  He acknowledged consent and understanding of privacy and security of the video platform  The patient has agreed to participate and understands they can discontinue the visit at any time  Patient is aware this is a billable service       Subjective  Judy Booth is a 59 y o  male who complains of cough, shortness of breath, nasal and chest congestion  He took a home covid test that was neg  His cough has not improved since previous visit  [place] RADER     Past Medical History:   Diagnosis Date    Asthma     Benign positional vertigo     Diabetes mellitus ([facility] )     borderline; diet controlled    Diabetes mellitus due to underlying condition with diabetic nephropathy, with long-term current use of insulin ([facility] ) 5/19/2021    Dyslipidemia     Gastroenteritis     Hyperlipidemia     Hypertension     Lipoma of neck     last assessed - [id number]    Multiple myeloma ([facility] )     Sleep apnea     has symptoms but not been diagnosed    Wheezing     last assessed - [id number]       Past Surgical History:   Procedure Laterality Date    ESOPHAGOGASTRODUODENOSCOPY  2010    Diagnostic    MASS EXCISION Right 8/21/2017    Procedure: POSTERIOR SHOULDER MASS EXCISION; POSTERIOR NECK MASS EXCISION; CHEST WALL MASS EXCISION;  Surgeon: Ana Ferris MD;  [facility]: [facility];  Service: General    THROAT SURGERY         Current Outpatient Medications   Medication Sig Dispense Refill    albuterol (2 5 mg/3 mL) 0 083 % nebulizer solution Take 3 mL (2 5 mg total) by nebulization every 6 (six) hours as needed for wheezing 30 mL 3    albuterol (PROVENTIL HFA,VENTOLIN HFA) 90 mcg/act inhaler INHALE 1 PUFF EVERY 4-6 HOURS AS NEEDED 17 g 2    ascorbic acid (VITAMIN C) 500 mg tablet Take 500 mg by mouth daily      atorvastatin (LIPITOR) 40 mg tablet Take 1 tablet (40 mg total) by mouth daily 90 tablet 4    azithromycin (Zithromax) 250 mg tablet Take 2 tablets (500 mg total) by mouth daily for 1 day, THEN 1 tablet (250 mg total) daily for 4 days   6 tablet 0    B Complex-Biotin-FA (VITAMIN B50 COMPLEX PO)       benzonatate (TESSALON) 200 MG capsule Take 1 capsule (200 mg total) by mouth 3 (three) times a day as needed for cough 20 capsule 0    Blood Glucose Monitoring Suppl (ONETOUCH VERIO) w/Device KIT by Does not apply route 3 (three) times a day 1 kit 0    Dulaglutide 0 75 MG/0 5ML SOPN Inject 0 5 mL (0 75 mg total) under the skin once a week 2 mL 0    Flovent  MCG/ACT inhaler INHALE 2 PUFFS BY MOUTH TWICE DAILY  RINSE MOUTH AFTER USE 12 g 3    fluticasone (FLONASE) 50 mcg/act nasal spray SHAKE LIQUID AND USE 2 SPRAYS IN EACH NOSTRIL DAILY 48 g 2    gabapentin (NEURONTIN) 100 mg capsule TAKE 1 CAPSULE(100 MG) BY MOUTH DAILY AT BEDTIME 30 capsule 1    Ginkgo Biloba (GINKOBA PO) Take 60 mg by mouth      glucose blood (OneTouch Verio) test strip Use 1 each 3 (three) times a day Use as instructed 100 each 5    insulin glargine (Lantus SoloStar) 100 units/mL injection pen Inject 20 Units under the skin daily at bedtime And 5 units in the AM 45 mL 3    Insulin Pen Needle 31G X 5 MM MISC Use daily 100 each 5    Lancets (OneTouch Delica Plus [id number]) MISC Test 100 each 5    lisinopril (ZESTRIL) 40 mg tablet Take 1 tablet (40 mg total) by mouth daily 90 tablet 4    loratadine (CLARITIN) 10 mg tablet Take 1 tablet (10 mg total) by mouth daily 90 tablet 2    MAGNESIUM CITRATE PO Take 1 tablet by mouth daily        metFORMIN (GLUCOPHAGE) 1000 MG tablet TAKE 1 TABLET(1000 MG) BY MOUTH TWICE DAILY WITH MEALS 180 tablet 2    Multiple Vitamins-Minerals (MULTIVITAMIN ADULTS 50+ PO) Take by mouth daily      omeprazole (PriLOSEC) 40 MG capsule TAKE 1 CAPSULE(40 MG) BY MOUTH DAILY 90 capsule 3    Promethazine-DM (PHENERGAN-DM) 6 25-15 mg/5 mL oral syrup Take 5 mL by mouth 4 (four) times a day as needed for cough 180 mL 0    simvastatin (ZOCOR) 20 mg tablet TAKE 1 TABLET(20 MG) BY MOUTH EVERY EVENING 90 tablet 1    tadalafil (CIALIS) 10 MG tablet Take 1 tablet (10 mg total) by mouth daily as needed for erectile dysfunction 10 tablet 1     No current facility-administered medications for this visit          Allergies   Allergen Reactions    Shellfish-Derived Products - Food Allergy Anaphylaxis     Clams and mussels, oysters  Lobster and crab ok    Diphenhydramine      Lightheadedness, nauseous, rapid heartbeat    Other Palpitations and Other (See Comments)     Martha       Review of Systems   Constitutional: Positive for fatigue  Negative for activity change, fever and unexpected weight change  HENT: Positive for congestion  Eyes: Negative for visual disturbance  Respiratory: Positive for cough, shortness of breath and wheezing  Cardiovascular: Negative for chest pain, palpitations and leg swelling  Gastrointestinal: Negative for abdominal pain, constipation and diarrhea  Endocrine: Negative for cold intolerance, polydipsia and polyuria  Genitourinary: Negative for difficulty urinating and dysuria  Musculoskeletal: Negative for arthralgias  Neurological: Negative for dizziness  Psychiatric/Behavioral: Negative for dysphoric mood and suicidal ideas  Video Exam    There were no vitals filed for this visit  Physical Exam  Vitals reviewed  Constitutional:       Appearance: Normal appearance  Pulmonary:      Effort: Pulmonary effort is normal    Neurological:      Mental Status: He is alert  I spent 10 minutes directly with the patient during this visit    [address] verbally agrees to participate in [employer]  Pt is aware that [employer] could be limited without vital signs or the ability to perform a full hands-on physical exam  Brittany Barnes understands he or the provider may request at any time to terminate the video visit and request the patient to seek care or treatment in person

## 2022-05-12 ENCOUNTER — TELEMEDICINE (OUTPATIENT)
Dept: FAMILY MEDICINE CLINIC | Facility: CLINIC | Age: 62
End: 2022-05-12

## 2022-05-12 DIAGNOSIS — Z79.4 DIABETES MELLITUS DUE TO UNDERLYING CONDITION WITH DIABETIC NEPHROPATHY, WITH LONG-TERM CURRENT USE OF INSULIN (HCC): Primary | ICD-10-CM

## 2022-05-12 DIAGNOSIS — E08.21 DIABETES MELLITUS DUE TO UNDERLYING CONDITION WITH DIABETIC NEPHROPATHY, WITH LONG-TERM CURRENT USE OF INSULIN (HCC): Primary | ICD-10-CM

## 2022-05-12 PROCEDURE — G2012 BRIEF CHECK IN BY MD/QHP: HCPCS | Performed by: FAMILY MEDICINE

## 2022-05-12 NOTE — ASSESSMENT & PLAN NOTE
Lab Results   Component Value Date    HGBA1C 7 3 (A) 04/29/2022     Controlled   patient was recently started on Trulicity 6 81 weekly-  2 weeks ago after discontinuing glipizide 10 mg  Patient reports FBS average 150,   Patient has 2 more weeks of Trulicity supply  Encouraged to continue documenting blood sugar levels and will follow-up in 2 weeks  Based on blood sugar levels will adjust the medications  Reviewed hypoglycemic precautions as well

## 2022-05-12 NOTE — PROGRESS NOTES
Virtual Brief Visit    Patient is located in the following state in which I hold an active license [place]      Assessment/Plan:    Problem List Items Addressed This Visit        Endocrine    Diabetes mellitus due to underlying condition with diabetic nephropathy, with long-term current use of insulin ([facility] ) - Primary       Lab Results   Component Value Date    HGBA1C 7 3 (A) 04/29/2022     Controlled   patient was recently started on Trulicity 9 91 weekly-  2 weeks ago after discontinuing glipizide 10 mg  Patient reports FBS average 150,   Patient has 2 more weeks of Trulicity supply  Encouraged to continue documenting blood sugar levels and will follow-up in 2 weeks  Based on blood sugar levels will adjust the medications  Reviewed hypoglycemic precautions as well  59-year-old male presents today for diabetes follow-up via virtual being symptomatic with cough and congestion  Patient is currently on Z-Ar which was ordered previously  Patient reports his blood sugars have been slightly higher as expected since patient was discontinued glipizide and was started on a new medication Trulicity 7 59 weekly  Reports FBS in the range of 150s, RBS 180s  Denies hypoglycemic episodes  No other acute concerns  Recent Visits  Date Type Provider Dept   05/09/22 Telemedicine Josefina Kamara [facility]   05/06/22 Telemedicine Phil Figueroa DO [facility]   Showing recent visits within past 7 days and meeting all other requirements  Today's Visits  Date Type Provider Dept   05/12/22 Telemedicine Rod Tatum MD [facility]   Showing today's visits and meeting all other requirements  Future Appointments  No visits were found meeting these conditions  Showing future appointments within next 150 days and meeting all other requirements     Review of Systems   Constitutional: Negative for chills and fever  HENT: Positive for congestion  Negative for sore throat      Respiratory: Positive for cough  Negative for shortness of breath  Cardiovascular: Negative for chest pain  Gastrointestinal: Negative for abdominal pain  Musculoskeletal: Negative for back pain and neck pain  Skin: Negative for rash  Neurological: Negative for dizziness, weakness and headaches  Psychiatric/Behavioral: Negative for agitation and behavioral problems  I spent 15 minutes directly with the patient during this visit      The following portions of the patient's history were reviewed and updated as appropriate: He  has a past medical history of Asthma, Benign positional vertigo, Diabetes mellitus ([facility] ), Diabetes mellitus due to underlying condition with diabetic nephropathy, with long-term current use of insulin ([facility] ) (5/19/2021), Dyslipidemia, Gastroenteritis, Hyperlipidemia, Hypertension, Lipoma of neck, Multiple myeloma ([facility] ), Sleep apnea, and Wheezing    He   Patient Active Problem List    Diagnosis Date Noted    Viral URI with cough 05/06/2022    Tinnitus of both ears 04/29/2022    Fatty liver 10/25/2021    Other male erectile dysfunction 08/18/2021    Diabetes mellitus due to underlying condition with diabetic nephropathy, with long-term current use of insulin ([facility] ) 05/19/2021    Mixed hyperlipidemia 05/06/2021    HAMEED (dyspnea on exertion) 05/06/2021    Family history of early CAD 05/06/2021    Anemia, unspecified 05/03/2021    Dyspnea 03/16/2021    Urinary hesitancy 11/02/2020    Screening for HIV (human immunodeficiency virus) 08/12/2020    Acute cystitis with hematuria 04/27/2020    Rash 08/14/2019    Bilateral lower extremity edema 04/23/2019    Mild intermittent asthma without complication 63/85/2973    Peripheral polyneuropathy 07/25/2018    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 ) 06/18/2015    Benign paroxysmal positional vertigo 06/18/2015    Liver lesion 07/20/3828    Umbilical hernia 24/07/0947    Lipoma of back 04/13/2015    Allergic rhinitis 04/15/2013     He  has a past surgical history that includes Throat surgery; Mass excision (Right, 8/21/2017); and Esophagogastroduodenoscopy (2010)  His family history includes Breast cancer in his maternal aunt; Cancer in his father and mother; Dementia in his father; Diabetes in his mother; Diabetes type II in his mother; Heart attack in his father; Heart disease in his father; Hyperlipidemia in his father and mother; Hypertension in his father and mother; Stomach cancer in his maternal grandmother; Thyroid disease in his sister  He  reports that he has never smoked  He has never used smokeless tobacco  He reports previous alcohol use  He reports that he does not use drugs  Current Outpatient Medications   Medication Sig Dispense Refill    albuterol (2 5 mg/3 mL) 0 083 % nebulizer solution Take 3 mL (2 5 mg total) by nebulization every 6 (six) hours as needed for wheezing 30 mL 3    albuterol (PROVENTIL HFA,VENTOLIN HFA) 90 mcg/act inhaler INHALE 1 PUFF EVERY 4-6 HOURS AS NEEDED 17 g 2    ascorbic acid (VITAMIN C) 500 mg tablet Take 500 mg by mouth daily      atorvastatin (LIPITOR) 40 mg tablet Take 1 tablet (40 mg total) by mouth daily 90 tablet 4    azithromycin (Zithromax) 250 mg tablet Take 2 tablets (500 mg total) by mouth daily for 1 day, THEN 1 tablet (250 mg total) daily for 4 days   6 tablet 0    B Complex-Biotin-FA (VITAMIN B50 COMPLEX PO)       benzonatate (TESSALON) 200 MG capsule Take 1 capsule (200 mg total) by mouth 3 (three) times a day as needed for cough 20 capsule 0    Blood Glucose Monitoring Suppl (ONETOUCH VERIO) w/Device KIT by Does not apply route 3 (three) times a day 1 kit 0    Dulaglutide 0 75 MG/0 5ML SOPN Inject 0 5 mL (0 75 mg total) under the skin once a week 2 mL 0    Flovent  MCG/ACT inhaler INHALE 2 PUFFS BY MOUTH TWICE DAILY  RINSE MOUTH AFTER USE 12 g 3    fluticasone (FLONASE) 50 mcg/act nasal spray SHAKE LIQUID AND USE 2 SPRAYS IN EACH NOSTRIL DAILY 48 g 2    gabapentin (NEURONTIN) 100 mg capsule TAKE 1 CAPSULE(100 MG) BY MOUTH DAILY AT BEDTIME 30 capsule 1    Ginkgo Biloba (GINKOBA PO) Take 60 mg by mouth      glucose blood (OneTouch Verio) test strip Use 1 each 3 (three) times a day Use as instructed 100 each 5    insulin glargine (Lantus SoloStar) 100 units/mL injection pen Inject 20 Units under the skin daily at bedtime And 5 units in the AM 45 mL 3    Insulin Pen Needle 31G X 5 MM MISC Use daily 100 each 5    Lancets (OneTouch Delica Plus [id number]) MISC Test 100 each 5    lisinopril (ZESTRIL) 40 mg tablet Take 1 tablet (40 mg total) by mouth daily 90 tablet 4    loratadine (CLARITIN) 10 mg tablet Take 1 tablet (10 mg total) by mouth daily 90 tablet 2    MAGNESIUM CITRATE PO Take 1 tablet by mouth daily        metFORMIN (GLUCOPHAGE) 1000 MG tablet TAKE 1 TABLET(1000 MG) BY MOUTH TWICE DAILY WITH MEALS 180 tablet 2    Multiple Vitamins-Minerals (MULTIVITAMIN ADULTS 50+ PO) Take by mouth daily      omeprazole (PriLOSEC) 40 MG capsule TAKE 1 CAPSULE(40 MG) BY MOUTH DAILY 90 capsule 3    Promethazine-DM (PHENERGAN-DM) 6 25-15 mg/5 mL oral syrup Take 5 mL by mouth 4 (four) times a day as needed for cough 180 mL 0    simvastatin (ZOCOR) 20 mg tablet TAKE 1 TABLET(20 MG) BY MOUTH EVERY EVENING 90 tablet 1    tadalafil (CIALIS) 10 MG tablet Take 1 tablet (10 mg total) by mouth daily as needed for erectile dysfunction 10 tablet 1     No current facility-administered medications for this visit       Current Outpatient Medications on File Prior to Visit   Medication Sig    albuterol (2 5 mg/3 mL) 0 083 % nebulizer solution Take 3 mL (2 5 mg total) by nebulization every 6 (six) hours as needed for wheezing    albuterol (PROVENTIL HFA,VENTOLIN HFA) 90 mcg/act inhaler INHALE 1 PUFF EVERY 4-6 HOURS AS NEEDED    ascorbic acid (VITAMIN C) 500 mg tablet Take 500 mg by mouth daily    atorvastatin (LIPITOR) 40 mg tablet Take 1 tablet (40 mg total) by mouth daily    azithromycin (Zithromax) 250 mg tablet Take 2 tablets (500 mg total) by mouth daily for 1 day, THEN 1 tablet (250 mg total) daily for 4 days   B Complex-Biotin-FA (VITAMIN B50 COMPLEX PO)     benzonatate (TESSALON) 200 MG capsule Take 1 capsule (200 mg total) by mouth 3 (three) times a day as needed for cough    Blood Glucose Monitoring Suppl (ONETOUCH VERIO) w/Device KIT by Does not apply route 3 (three) times a day    Dulaglutide 0 75 MG/0 5ML SOPN Inject 0 5 mL (0 75 mg total) under the skin once a week    Flovent  MCG/ACT inhaler INHALE 2 PUFFS BY MOUTH TWICE DAILY   RINSE MOUTH AFTER USE    fluticasone (FLONASE) 50 mcg/act nasal spray SHAKE LIQUID AND USE 2 SPRAYS IN EACH NOSTRIL DAILY    gabapentin (NEURONTIN) 100 mg capsule TAKE 1 CAPSULE(100 MG) BY MOUTH DAILY AT BEDTIME    Ginkgo Biloba (GINKOBA PO) Take 60 mg by mouth    glucose blood (OneTouch Verio) test strip Use 1 each 3 (three) times a day Use as instructed    insulin glargine (Lantus SoloStar) 100 units/mL injection pen Inject 20 Units under the skin daily at bedtime And 5 units in the AM    Insulin Pen Needle 31G X 5 MM MISC Use daily    Lancets (OneTouch Delica Plus [id number]) MISC Test    lisinopril (ZESTRIL) 40 mg tablet Take 1 tablet (40 mg total) by mouth daily    loratadine (CLARITIN) 10 mg tablet Take 1 tablet (10 mg total) by mouth daily    MAGNESIUM CITRATE PO Take 1 tablet by mouth daily      metFORMIN (GLUCOPHAGE) 1000 MG tablet TAKE 1 TABLET(1000 MG) BY MOUTH TWICE DAILY WITH MEALS    Multiple Vitamins-Minerals (MULTIVITAMIN ADULTS 50+ PO) Take by mouth daily    omeprazole (PriLOSEC) 40 MG capsule TAKE 1 CAPSULE(40 MG) BY MOUTH DAILY    Promethazine-DM (PHENERGAN-DM) 6 25-15 mg/5 mL oral syrup Take 5 mL by mouth 4 (four) times a day as needed for cough    simvastatin (ZOCOR) 20 mg tablet TAKE 1 TABLET(20 MG) BY MOUTH EVERY EVENING    tadalafil (CIALIS) 10 MG tablet Take 1 tablet (10 mg total) by mouth daily as needed for erectile dysfunction     No current facility-administered medications on file prior to visit  He is allergic to shellfish-derived products - food allergy, diphenhydramine, and other  [facility] [place]

## 2022-05-24 DIAGNOSIS — E78.2 MIXED HYPERLIPIDEMIA: ICD-10-CM

## 2022-05-25 ENCOUNTER — OFFICE VISIT (OUTPATIENT)
Dept: PODIATRY | Facility: CLINIC | Age: 62
End: 2022-05-25
Payer: COMMERCIAL

## 2022-05-25 VITALS — WEIGHT: 304.8 LBS | BODY MASS INDEX: 41.28 KG/M2 | HEIGHT: 72 IN

## 2022-05-25 DIAGNOSIS — M20.41 HAMMERTOE, BILATERAL: ICD-10-CM

## 2022-05-25 DIAGNOSIS — E11.42 DIABETIC PERIPHERAL NEUROPATHY (HCC): Primary | ICD-10-CM

## 2022-05-25 DIAGNOSIS — M20.42 HAMMERTOE, BILATERAL: ICD-10-CM

## 2022-05-25 PROCEDURE — 99213 OFFICE O/P EST LOW 20 MIN: CPT | Performed by: PODIATRIST

## 2022-05-25 RX ORDER — ATORVASTATIN CALCIUM 40 MG/1
TABLET, FILM COATED ORAL
Qty: 90 TABLET | Refills: 3 | Status: SHIPPED | OUTPATIENT
Start: 2022-05-25

## 2022-05-25 NOTE — PATIENT INSTRUCTIONS
Foot Care for People with Diabetes   WHAT YOU NEED TO KNOW:   Foot care helps protect your feet and prevent foot ulcers or sores  Long-term high blood sugar levels can damage the blood vessels and nerves in your legs and feet  This damage makes it hard to feel pressure, pain, temperature, and touch  You may not be able to feel a cut or sore, or shoes that are too tight  Foot care is needed to prevent serious problems, such as an infection or amputation  Diabetes may cause your toes to become crooked or curved under  These changes may affect the way you walk and can lead to increased pressure on your foot  The pressure can decrease blood flow to your feet  Lack of blood flow increases your risk for a foot ulcer  Do not ignore small problems, such as dry skin or small wounds  These can become life-threatening over time without proper care  DISCHARGE INSTRUCTIONS:   Call your care team provider if:   Your feet become numb, weak, or hard to move  You have pus draining from a sore on your foot  You have a wound on your foot that gets bigger, deeper, or does not heal      You see blisters, cuts, scratches, calluses, or sores on your foot  You have a fever, and your feet become red, warm, and swollen  Your toenails become thick, curled, or yellow  You find it hard to check your feet because your vision is poor  You have questions or concerns about your condition or care  Foot care:   Check your feet each day  Look at your whole foot, including the bottom, and between and under your toes  Check for wounds, corns, and calluses  Use a mirror to see the bottom of your feet  The skin on your feet may be shiny, tight, or darker than normal  Your feet may also be cold and pale  Feel your feet by running your hands along the tops, bottoms, sides, and between your toes  Redness, swelling, and warmth are signs of blood flow problems that can lead to a foot ulcer   Do not try to remove corns or calluses yourself  Wash your feet each day with soap and warm water  Do not use hot water, because this can injure your foot  Dry your feet gently with a towel after you wash them  Dry between and under your toes  Apply lotion or a moisturizer on your dry feet  Ask your care team provider what lotions are best to use  Do not put lotion or moisturizer between your toes  Moisture between your toes could lead to skin breakdown  Cut your toenails correctly  File or cut your toenails straight across  Use a soft brush to clean around your toenails  If your toenails are very thick, you may need to have a care team provider or specialist cut them  Protect your feet  Do not walk barefoot or wear your shoes without socks  Check your shoes for rocks or other objects that can hurt your feet  Wear cotton socks to help keep your feet dry  Wear socks without toe seams, or wear them with the seams inside out  Change your socks each day  Do not wear socks that are dirty or damp  Wear shoes that fit well  Wear shoes that do not rub against any area of your feet  Your shoes should be ½ to ¾ inch (1 to 2 centimeters) longer than your feet  Your shoes should also have extra space around the widest part of your feet  Walking or athletic shoes with laces or straps that adjust are best  Ask your care team provider for help to choose shoes that fit you best  Ask him or her if you need to wear an insert, orthotic, or bandage on your feet  Do not smoke  Smoking can damage your blood vessels and put you at increased risk for foot ulcers  Ask your care team provider for information if you currently smoke and need help to quit  E-cigarettes or smokeless tobacco still contain nicotine  Talk to your care team provider before you use these products  Follow up with your diabetes care team provider or foot specialist as directed: You will need to have your feet checked at least once a year   You may need a foot exam more often if you have nerve damage, foot deformities, or ulcers  Write down your questions so you remember to ask them during your visits  © Copyright [employer] 2022 Information is for End User's use only and may not be sold, redistributed or otherwise used for commercial purposes  All illustrations and images included in CareNotes® are the copyrighted property of [employer]  or [facility]   The above information is an  only  It is not intended as medical advice for individual conditions or treatments  Talk to your doctor, nurse or pharmacist before following any medical regimen to see if it is safe and effective for you

## 2022-05-25 NOTE — PROGRESS NOTES
Assessment/Plan:         Diagnoses and all orders for this visit:    Diabetic peripheral neuropathy ([facility] [address] )  -     Diabetic Shoe    Hammertoe, bilateral  -     Diabetic Shoe      Diagnosis and options discussed with patient  Patient agreeable to the plan as stated below    -Educated on DM risk to lower extremities, proper shoe gear, and daily monitoring of feet    -Educated on A1C and the risks of poorly controlled Diabetes  Reviewed recent A1C, 7 3  -Discussed weight loss and suitable exercise regiment  Reviewed most recent PCP visit on 5 12/22  -no significant progression of patient's neuropathy  He gets some mild paresthesia and tingling in the toes  His overall sensation pinprick sensation are still intact  He has been losing weight through diet and exercise  His A1c remains well controlled  Recommend annual diabetic foot exam unless new concerns arise  Patient has hammertoes are chronic and not causing any pain callus or preulcerative lesions  I do recommend diabetic shoes for the toe deformity and the paresthesia neuropathy in his toes  Subjective:      Patient ID: Lori Guadarrama is a 59 y o  male  Patient gets seen every 6 months for DM foot exam  His A1C has gotten better since he began trulicity  HE has also lost over 30 pounds  He has some neuropathy but it hasn't gotten any worse  His toes feel like they're "tight" but this is normal        The following portions of the patient's history were reviewed and updated as appropriate:   He  has a past medical history of Asthma, Benign positional vertigo, Diabetes mellitus ([address] ), Diabetes mellitus due to underlying condition with diabetic nephropathy, with long-term current use of insulin ([facility] [address] ) (5/19/2021), Dyslipidemia, Gastroenteritis, Hyperlipidemia, Hypertension, Lipoma of neck, Multiple myeloma ([address] ), Sleep apnea, and Wheezing    He   Patient Active Problem List    Diagnosis Date Noted    Viral URI with cough 05/06/2022    Tinnitus of both ears 04/29/2022    Fatty liver 10/25/2021    Other male erectile dysfunction 08/18/2021    Diabetes mellitus due to underlying condition with diabetic nephropathy, with long-term current use of insulin ([facility] ) 05/19/2021    Mixed hyperlipidemia 05/06/2021    HAMEED (dyspnea on exertion) 05/06/2021    Family history of early CAD 05/06/2021    Anemia, unspecified 05/03/2021    Dyspnea 03/16/2021    Urinary hesitancy 11/02/2020    Screening for HIV (human immunodeficiency virus) 08/12/2020    Acute cystitis with hematuria 04/27/2020    Rash 08/14/2019    Bilateral lower extremity edema 04/23/2019    Mild intermittent asthma without complication 46/68/5217    Peripheral polyneuropathy 07/25/2018    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    Diabetic peripheral neuropathy ([facility] ) 03/06/2018    Paresthesia 03/06/2018    Meralgia paresthetica of left side 03/06/2018    Obstructive sleep apnea 02/26/2018    Insufficient sleep syndrome 02/26/2018    Essential hypertension 02/12/2018    Encounter for immunization 02/12/2018    Kent's esophagus 11/22/2016    Adrenal incidentaloma ([facility] ) 06/18/2015    Benign paroxysmal positional vertigo 06/18/2015    Liver lesion 67/97/1785    Umbilical hernia 08/35/5912    Lipoma of back 04/13/2015    Allergic rhinitis 04/15/2013     He  has a past surgical history that includes Throat surgery; Mass excision (Right, 8/21/2017); and Esophagogastroduodenoscopy (2010)  His family history includes Breast cancer in his maternal aunt; Cancer in his father and mother; Dementia in his father; Diabetes in his mother; Diabetes type II in his mother; Heart attack in his father; Heart disease in his father; Hyperlipidemia in his father and mother; Hypertension in his father and mother; Stomach cancer in his maternal grandmother; Thyroid disease in his sister  He  reports that he has never smoked  He has never used smokeless tobacco  He reports previous alcohol use   He reports that he does not use drugs  Current Outpatient Medications   Medication Sig Dispense Refill    albuterol (2 5 mg/3 mL) 0 083 % nebulizer solution Take 3 mL (2 5 mg total) by nebulization every 6 (six) hours as needed for wheezing 30 mL 3    albuterol (PROVENTIL HFA,VENTOLIN HFA) 90 mcg/act inhaler INHALE 1 PUFF EVERY 4-6 HOURS AS NEEDED 17 g 2    ascorbic acid (VITAMIN C) 500 mg tablet Take 500 mg by mouth daily      atorvastatin (LIPITOR) 40 mg tablet Take 1 tablet (40 mg total) by mouth daily 90 tablet 4    B Complex-Biotin-FA (VITAMIN B50 COMPLEX PO)       benzonatate (TESSALON) 200 MG capsule Take 1 capsule (200 mg total) by mouth 3 (three) times a day as needed for cough 20 capsule 0    Blood Glucose Monitoring Suppl (ONETOUCH VERIO) w/Device KIT by Does not apply route 3 (three) times a day 1 kit 0    Dulaglutide 0 75 MG/0 5ML SOPN Inject 0 5 mL (0 75 mg total) under the skin once a week 2 mL 0    Flovent  MCG/ACT inhaler INHALE 2 PUFFS BY MOUTH TWICE DAILY   RINSE MOUTH AFTER USE 12 g 3    fluticasone (FLONASE) 50 mcg/act nasal spray SHAKE LIQUID AND USE 2 SPRAYS IN EACH NOSTRIL DAILY 48 g 2    gabapentin (NEURONTIN) 100 mg capsule TAKE 1 CAPSULE(100 MG) BY MOUTH DAILY AT BEDTIME 30 capsule 1    Ginkgo Biloba (GINKOBA PO) Take 60 mg by mouth      glucose blood (OneTouch Verio) test strip Use 1 each 3 (three) times a day Use as instructed 100 each 5    insulin glargine (Lantus SoloStar) 100 units/mL injection pen Inject 20 Units under the skin daily at bedtime And 5 units in the AM 45 mL 3    Insulin Pen Needle 31G X 5 MM MISC Use daily 100 each 5    Lancets (OneTouch Delica Plus [id number]) MISC Test 100 each 5    lisinopril (ZESTRIL) 40 mg tablet Take 1 tablet (40 mg total) by mouth daily 90 tablet 4    loratadine (CLARITIN) 10 mg tablet Take 1 tablet (10 mg total) by mouth daily 90 tablet 2    MAGNESIUM CITRATE PO Take 1 tablet by mouth daily        metFORMIN (GLUCOPHAGE) 1000 MG tablet TAKE 1 TABLET(1000 MG) BY MOUTH TWICE DAILY WITH MEALS 180 tablet 2    Multiple Vitamins-Minerals (MULTIVITAMIN ADULTS 50+ PO) Take by mouth daily      omeprazole (PriLOSEC) 40 MG capsule TAKE 1 CAPSULE(40 MG) BY MOUTH DAILY 90 capsule 3    simvastatin (ZOCOR) 20 mg tablet TAKE 1 TABLET(20 MG) BY MOUTH EVERY EVENING 90 tablet 1    Promethazine-DM (PHENERGAN-DM) 6 25-15 mg/5 mL oral syrup Take 5 mL by mouth 4 (four) times a day as needed for cough (Patient not taking: Reported on 5/25/2022) 180 mL 0    tadalafil (CIALIS) 10 MG tablet Take 1 tablet (10 mg total) by mouth daily as needed for erectile dysfunction 10 tablet 1     No current facility-administered medications for this visit  Current Outpatient Medications on File Prior to Visit   Medication Sig    albuterol (2 5 mg/3 mL) 0 083 % nebulizer solution Take 3 mL (2 5 mg total) by nebulization every 6 (six) hours as needed for wheezing    albuterol (PROVENTIL HFA,VENTOLIN HFA) 90 mcg/act inhaler INHALE 1 PUFF EVERY 4-6 HOURS AS NEEDED    ascorbic acid (VITAMIN C) 500 mg tablet Take 500 mg by mouth daily    atorvastatin (LIPITOR) 40 mg tablet Take 1 tablet (40 mg total) by mouth daily    B Complex-Biotin-FA (VITAMIN B50 COMPLEX PO)     benzonatate (TESSALON) 200 MG capsule Take 1 capsule (200 mg total) by mouth 3 (three) times a day as needed for cough    Blood Glucose Monitoring Suppl (ONETOUCH VERIO) w/Device KIT by Does not apply route 3 (three) times a day    Dulaglutide 0 75 MG/0 5ML SOPN Inject 0 5 mL (0 75 mg total) under the skin once a week    Flovent  MCG/ACT inhaler INHALE 2 PUFFS BY MOUTH TWICE DAILY   RINSE MOUTH AFTER USE    fluticasone (FLONASE) 50 mcg/act nasal spray SHAKE LIQUID AND USE 2 SPRAYS IN EACH NOSTRIL DAILY    gabapentin (NEURONTIN) 100 mg capsule TAKE 1 CAPSULE(100 MG) BY MOUTH DAILY AT BEDTIME    Ginkgo Biloba (GINKOBA PO) Take 60 mg by mouth    glucose blood (OneTouch Verio) test strip Use 1 each 3 (three) times a day Use as instructed    insulin glargine (Lantus SoloStar) 100 units/mL injection pen Inject 20 Units under the skin daily at bedtime And 5 units in the AM    Insulin Pen Needle 31G X 5 MM MISC Use daily    Lancets (OneTouch Delica Plus [id number]) MISC Test    lisinopril (ZESTRIL) 40 mg tablet Take 1 tablet (40 mg total) by mouth daily    loratadine (CLARITIN) 10 mg tablet Take 1 tablet (10 mg total) by mouth daily    MAGNESIUM CITRATE PO Take 1 tablet by mouth daily      metFORMIN (GLUCOPHAGE) 1000 MG tablet TAKE 1 TABLET(1000 MG) BY MOUTH TWICE DAILY WITH MEALS    Multiple Vitamins-Minerals (MULTIVITAMIN ADULTS 50+ PO) Take by mouth daily    omeprazole (PriLOSEC) 40 MG capsule TAKE 1 CAPSULE(40 MG) BY MOUTH DAILY    simvastatin (ZOCOR) 20 mg tablet TAKE 1 TABLET(20 MG) BY MOUTH EVERY EVENING    Promethazine-DM (PHENERGAN-DM) 6 25-15 mg/5 mL oral syrup Take 5 mL by mouth 4 (four) times a day as needed for cough (Patient not taking: Reported on 5/25/2022)    tadalafil (CIALIS) 10 MG tablet Take 1 tablet (10 mg total) by mouth daily as needed for erectile dysfunction     No current facility-administered medications on file prior to visit  He is allergic to shellfish-derived products - food allergy, diphenhydramine, and other       Review of Systems    Constitutional: Negative  HENT: Negative for sinus pressure and sinus pain  Respiratory: Negative for cough and shortness of breath  Cardiovascular: Negative for chest pain and leg swelling  Gastrointestinal: Negative for diarrhea, nausea and vomiting  Musculoskeletal: Negative for arthralgias, gait problem, joint swelling and myalgias  Skin: Positive for thick toenails  Negative for rash and wound  Neurological: Tingling in toes  Psychiatric/Behavioral: The patient is not nervous/anxious          Objective:      Ht 6' (1 829 m) Comment: verbal  Wt (!) 138 kg (304 lb 12 8 oz)   BMI 41 34 kg/m²     POCT hemoglobin A1c  Order: [id number]   Status: Final result     Visible to patient: Yes (seen)     Next appt: 05/26/2022 at 01:00 PM in Family Medicine (Jessica Preciado MD)     Dx: Diabetes mellitus due to underlying c      0 Result Notes     1  Topic    Component Ref Range & Units 4/29/22  9:34 AM 12/27/21  1:54 PM 8/18/21  9:16 AM 4/22/21  8:27 AM 3/3/21  1:03 PM 11/13/20  9:48 AM 8/12/20  9:24 AM   Hemoglobin A1C 6 5 7 3 Abnormal   7 0 Abnormal   7 4 Abnormal   8 8 High  R  8 6 Abnormal   8 5 High  R  8 6 Abnormal                Specimen Collected: 04/29/22  9:34 AM Last Resulted: 04/29/22  9:34 AM                Physical Exam  Constitutional:       Appearance: He is obese  He is not ill-appearing or diaphoretic  HENT:      Nose: No congestion or rhinorrhea  Cardiovascular:      Rate and Rhythm: Normal rate  Pulses: no weak pulses          Dorsalis pedis pulses are 2+ on the right side and 2+ on the left side  Posterior tibial pulses are 1+ on the right side and 1+ on the left side  Pulmonary:      Effort: Pulmonary effort is normal  No respiratory distress  Musculoskeletal:      Right lower leg: Edema present  Left lower leg: Edema present  Right foot: Normal range of motion  Deformity present  No Charcot foot, foot drop or prominent metatarsal heads  Left foot: Normal range of motion  Deformity present  No Charcot foot, foot drop or prominent metatarsal heads  Feet:      Right foot:      Protective Sensation: 10 sites tested  10 sites sensed  Skin integrity: No ulcer, skin breakdown, erythema, warmth, callus or dry skin  Toenail Condition: Right toenails are abnormally thick  Left foot:      Protective Sensation: 10 sites tested  10 sites sensed  Skin integrity: No ulcer, skin breakdown, erythema, warmth, callus or dry skin  Toenail Condition: Left toenails are abnormally thick  Skin:     Capillary Refill: Capillary refill takes less than 2 seconds  Findings: No erythema or rash  Neurological:      Mental Status: He is alert and oriented to person, place, and time  Sensory: Sensory deficit present  Motor: No weakness  Gait: Gait normal    Psychiatric:         Mood and Affect: Mood normal              Diabetic Foot Exam    Patient's shoes and socks removed  Right Foot/Ankle   Right Foot Inspection  Skin Exam: skin normal and skin intact  No dry skin, no warmth, no callus, no erythema, no maceration, no abnormal color, no pre-ulcer, no ulcer and no callus  Toe Exam: right toe deformity  No swelling, no tenderness and erythema    Sensory   Vibration: diminished  Proprioception: intact  Monofilament testing: intact    Vascular  Capillary refills: < 3 seconds  The right DP pulse is 2+  The right PT pulse is 1+  Right Toe  - Comprehensive Exam  Arch: pes planus  Hammertoes: fifth toe  Swelling: dorsum         Left Foot/Ankle  Left Foot Inspection  Skin Exam: skin normal and skin intact  No dry skin, no warmth, no erythema, no maceration, normal color, no pre-ulcer, no ulcer and no callus  Toe Exam: left toe deformity  No swelling, no tenderness and no erythema  Sensory   Vibration: diminished  Proprioception: intact  Monofilament testing: intact    Vascular  Capillary refills: < 3 seconds  The left DP pulse is 2+  The left PT pulse is 1+       Left Toe  - Comprehensive Exam  Arch: pes planus  Hammertoes: fifth toe and second toe  Swelling: dorsum           Assign Risk Category  Deformity present  No loss of protective sensation  No weak pulses  Risk: 0

## 2022-05-26 ENCOUNTER — OFFICE VISIT (OUTPATIENT)
Dept: FAMILY MEDICINE CLINIC | Facility: CLINIC | Age: 62
End: 2022-05-26

## 2022-05-26 VITALS
RESPIRATION RATE: 18 BRPM | SYSTOLIC BLOOD PRESSURE: 125 MMHG | OXYGEN SATURATION: 96 % | BODY MASS INDEX: 41.55 KG/M2 | TEMPERATURE: 98.1 F | HEIGHT: 72 IN | WEIGHT: 306.8 LBS | HEART RATE: 100 BPM | DIASTOLIC BLOOD PRESSURE: 75 MMHG

## 2022-05-26 DIAGNOSIS — E08.21 DIABETES MELLITUS DUE TO UNDERLYING CONDITION WITH DIABETIC NEPHROPATHY, WITH LONG-TERM CURRENT USE OF INSULIN (HCC): Primary | ICD-10-CM

## 2022-05-26 DIAGNOSIS — J42 CHRONIC BRONCHITIS WITH WHEEZING (HCC): ICD-10-CM

## 2022-05-26 DIAGNOSIS — Z79.4 DIABETES MELLITUS DUE TO UNDERLYING CONDITION WITH DIABETIC NEPHROPATHY, WITH LONG-TERM CURRENT USE OF INSULIN (HCC): Primary | ICD-10-CM

## 2022-05-26 PROCEDURE — 99213 OFFICE O/P EST LOW 20 MIN: CPT | Performed by: FAMILY MEDICINE

## 2022-05-26 RX ORDER — BENZONATATE 100 MG/1
100 CAPSULE ORAL 3 TIMES DAILY PRN
Qty: 20 CAPSULE | Refills: 0 | Status: SHIPPED | OUTPATIENT
Start: 2022-05-26

## 2022-05-26 NOTE — ASSESSMENT & PLAN NOTE
Lab Results   Component Value Date    HGBA1C 7 3 (A) 04/29/2022     Controlled, patient currently on metformin 1 g b i d , Lantus 20 units q h s , recently started on Trulicity 9 23 weekly  Patient reports FBS average 135, RBS average 180  Will continue the same management for now  Will recheck A1c in 3 months    Reviewed hypoglycemia precuations

## 2022-05-26 NOTE — PROGRESS NOTES
Assessment/Plan:    Diabetes mellitus due to underlying condition with diabetic nephropathy, with long-term current use of insulin ([facility])    Lab Results   Component Value Date    HGBA1C 7 3 (A) 04/29/2022     Controlled, patient currently on metformin 1 g b i d , Lantus 20 units q h s , recently started on Trulicity 3 33 weekly  Patient reports FBS average 135, RBS average 180  Will continue the same management for now  Will recheck A1c in 3 months  Reviewed hypoglycemia precuations    Chronic bronchitis with wheezing ([facility] [address] )  Had an episode of acute viral bronchitis earlier this month  Patient was prescribed Z-Ar  Reports continued intermittent symptoms of intermittent wheezing and cough  Started on Lester [employer], encouraged to continue albuterol nebs as needed  Ordered PFTs, no prior PFTs on file  Tessalon Perles ordered for intermittent dry cough  Will follow-up with the patient in a month  Diagnoses and all orders for this visit:    Chronic bronchitis with wheezing ([facility] [address] )  -     Complete PFT with post bronchodilator; Future  -     fluticasone-vilanterol (Breo Ellipta) 200-25 MCG/INH inhaler; Inhale 1 puff daily Rinse mouth after use  -     benzonatate (TESSALON PERLES) 100 mg capsule; Take 1 capsule (100 mg total) by mouth 3 (three) times a day as needed for cough    Diabetes mellitus due to underlying condition with diabetic nephropathy, with long-term current use of insulin ([facility])  -     Dulaglutide 0 75 MG/0 5ML SOPN; Inject 0 5 mL (0 75 mg total) under the skin once a week        Subjective:      Patient ID: Leanna Burrows is a 59 y o  male  27-year-old male presents today for follow-up of diabetes and cough  Patient was recently started on Trulicity a month ago, brings in the blood sugar logs, reports controlled blood sugars, has recently lost some weight as well    Patient had an episode of acute bronchitis earlier this month, reports symptoms have been improved but still with persistent intermittent dry cough and intermittent wheezing  No other acute concerns today including nausea, vomitings, no recent hypoglycemic events  The following portions of the patient's history were reviewed and updated as appropriate: He  has a past medical history of Asthma, Benign positional vertigo, Diabetes mellitus ([address] ), Diabetes mellitus due to underlying condition with diabetic nephropathy, with long-term current use of insulin ([address] ) (5/19/2021), Dyslipidemia, Gastroenteritis, Hyperlipidemia, Hypertension, Lipoma of neck, Multiple myeloma ([address] ), Sleep apnea, and Wheezing    He   Patient Active Problem List    Diagnosis Date Noted    Chronic bronchitis with wheezing ([address] ) 05/26/2022    Viral URI with cough 05/06/2022    Tinnitus of both ears 04/29/2022    Fatty liver 10/25/2021    Other male erectile dysfunction 08/18/2021    Diabetes mellitus due to underlying condition with diabetic nephropathy, with long-term current use of insulin ([facility] [address] ) 05/19/2021    Mixed hyperlipidemia 05/06/2021    HAMEED (dyspnea on exertion) 05/06/2021    Family history of early CAD 05/06/2021    Anemia, unspecified 05/03/2021    Dyspnea 03/16/2021    Urinary hesitancy 11/02/2020    Screening for HIV (human immunodeficiency virus) 08/12/2020    Acute cystitis with hematuria 04/27/2020    Rash 08/14/2019    Bilateral lower extremity edema 04/23/2019    Mild intermittent asthma without complication 53/23/9006    Peripheral polyneuropathy 07/25/2018    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    Diabetic peripheral neuropathy ([address] ) 03/06/2018    Paresthesia 03/06/2018    Meralgia paresthetica of left side 03/06/2018    Obstructive sleep apnea 02/26/2018    Insufficient sleep syndrome 02/26/2018    Essential hypertension 02/12/2018    Encounter for immunization 02/12/2018    Kent's esophagus 11/22/2016    Adrenal incidentaloma ([address] ) 06/18/2015    Benign paroxysmal positional vertigo 06/18/2015    Liver lesion 13/30/9091    Umbilical hernia 40/14/5544    Lipoma of back 04/13/2015    Allergic rhinitis 04/15/2013     He  has a past surgical history that includes Throat surgery; Mass excision (Right, 8/21/2017); and Esophagogastroduodenoscopy (2010)  His family history includes Breast cancer in his maternal aunt; Cancer in his father and mother; Dementia in his father; Diabetes in his mother; Diabetes type II in his mother; Heart attack in his father; Heart disease in his father; Hyperlipidemia in his father and mother; Hypertension in his father and mother; Stomach cancer in his maternal grandmother; Thyroid disease in his sister  He  reports that he has never smoked  He has never used smokeless tobacco  He reports previous alcohol use  He reports that he does not use drugs  Current Outpatient Medications   Medication Sig Dispense Refill    albuterol (2 5 mg/3 mL) 0 083 % nebulizer solution Take 3 mL (2 5 mg total) by nebulization every 6 (six) hours as needed for wheezing 30 mL 3    albuterol (PROVENTIL HFA,VENTOLIN HFA) 90 mcg/act inhaler INHALE 1 PUFF EVERY 4-6 HOURS AS NEEDED 17 g 2    ascorbic acid (VITAMIN C) 500 mg tablet Take 500 mg by mouth daily      atorvastatin (LIPITOR) 40 mg tablet TAKE 1 TABLET(40 MG) BY MOUTH DAILY 90 tablet 3    B Complex-Biotin-FA (VITAMIN B50 COMPLEX PO)       benzonatate (TESSALON PERLES) 100 mg capsule Take 1 capsule (100 mg total) by mouth 3 (three) times a day as needed for cough 20 capsule 0    Blood Glucose Monitoring Suppl (ONETOUCH VERIO) w/Device KIT by Does not apply route 3 (three) times a day 1 kit 0    Dulaglutide 0 75 MG/0 5ML SOPN Inject 0 5 mL (0 75 mg total) under the skin once a week 6 mL 0    fluticasone (FLONASE) 50 mcg/act nasal spray SHAKE LIQUID AND USE 2 SPRAYS IN EACH NOSTRIL DAILY 48 g 2    fluticasone-vilanterol (Breo Ellipta) 200-25 MCG/INH inhaler Inhale 1 puff daily Rinse mouth after use   60 blister 0    gabapentin (NEURONTIN) 100 mg capsule TAKE 1 CAPSULE(100 MG) BY MOUTH DAILY AT BEDTIME 30 capsule 1    Ginkgo Biloba (GINKOBA PO) Take 60 mg by mouth      glucose blood (OneTouch Verio) test strip Use 1 each 3 (three) times a day Use as instructed 100 each 5    insulin glargine (Lantus SoloStar) 100 units/mL injection pen Inject 20 Units under the skin daily at bedtime And 5 units in the AM 45 mL 3    Insulin Pen Needle 31G X 5 MM MISC Use daily 100 each 5    Lancets (OneTouch Delica Plus [id number]) MISC Test 100 each 5    lisinopril (ZESTRIL) 40 mg tablet Take 1 tablet (40 mg total) by mouth daily 90 tablet 4    loratadine (CLARITIN) 10 mg tablet Take 1 tablet (10 mg total) by mouth daily 90 tablet 2    MAGNESIUM CITRATE PO Take 1 tablet by mouth daily        metFORMIN (GLUCOPHAGE) 1000 MG tablet TAKE 1 TABLET(1000 MG) BY MOUTH TWICE DAILY WITH MEALS 180 tablet 2    Multiple Vitamins-Minerals (MULTIVITAMIN ADULTS 50+ PO) Take by mouth daily      omeprazole (PriLOSEC) 40 MG capsule TAKE 1 CAPSULE(40 MG) BY MOUTH DAILY 90 capsule 3    simvastatin (ZOCOR) 20 mg tablet TAKE 1 TABLET(20 MG) BY MOUTH EVERY EVENING 90 tablet 1    Promethazine-DM (PHENERGAN-DM) 6 25-15 mg/5 mL oral syrup Take 5 mL by mouth 4 (four) times a day as needed for cough (Patient not taking: No sig reported) 180 mL 0    tadalafil (CIALIS) 10 MG tablet Take 1 tablet (10 mg total) by mouth daily as needed for erectile dysfunction 10 tablet 1     No current facility-administered medications for this visit       Current Outpatient Medications on File Prior to Visit   Medication Sig    albuterol (2 5 mg/3 mL) 0 083 % nebulizer solution Take 3 mL (2 5 mg total) by nebulization every 6 (six) hours as needed for wheezing    albuterol (PROVENTIL HFA,VENTOLIN HFA) 90 mcg/act inhaler INHALE 1 PUFF EVERY 4-6 HOURS AS NEEDED    ascorbic acid (VITAMIN C) 500 mg tablet Take 500 mg by mouth daily    atorvastatin (LIPITOR) 40 mg tablet TAKE 1 TABLET(40 MG) BY MOUTH DAILY    B Complex-Biotin-FA (VITAMIN B50 COMPLEX PO)     Blood Glucose Monitoring Suppl (Oscar Brewer) w/Device KIT by Does not apply route 3 (three) times a day    fluticasone (FLONASE) 50 mcg/act nasal spray SHAKE LIQUID AND USE 2 SPRAYS IN EACH NOSTRIL DAILY    gabapentin (NEURONTIN) 100 mg capsule TAKE 1 CAPSULE(100 MG) BY MOUTH DAILY AT BEDTIME    Ginkgo Biloba (GINKOBA PO) Take 60 mg by mouth    glucose blood (OneTouch Verio) test strip Use 1 each 3 (three) times a day Use as instructed    insulin glargine (Lantus SoloStar) 100 units/mL injection pen Inject 20 Units under the skin daily at bedtime And 5 units in the AM    Insulin Pen Needle 31G X 5 MM MISC Use daily    Lancets (OneTouch Delica Plus [id number]) MISC Test    lisinopril (ZESTRIL) 40 mg tablet Take 1 tablet (40 mg total) by mouth daily    loratadine (CLARITIN) 10 mg tablet Take 1 tablet (10 mg total) by mouth daily    MAGNESIUM CITRATE PO Take 1 tablet by mouth daily      metFORMIN (GLUCOPHAGE) 1000 MG tablet TAKE 1 TABLET(1000 MG) BY MOUTH TWICE DAILY WITH MEALS    Multiple Vitamins-Minerals (MULTIVITAMIN ADULTS 50+ PO) Take by mouth daily    omeprazole (PriLOSEC) 40 MG capsule TAKE 1 CAPSULE(40 MG) BY MOUTH DAILY    simvastatin (ZOCOR) 20 mg tablet TAKE 1 TABLET(20 MG) BY MOUTH EVERY EVENING    [DISCONTINUED] benzonatate (TESSALON) 200 MG capsule Take 1 capsule (200 mg total) by mouth 3 (three) times a day as needed for cough    [DISCONTINUED] Dulaglutide 0 75 MG/0 5ML SOPN Inject 0 5 mL (0 75 mg total) under the skin once a week    [DISCONTINUED] Flovent  MCG/ACT inhaler INHALE 2 PUFFS BY MOUTH TWICE DAILY   RINSE MOUTH AFTER USE    Promethazine-DM (PHENERGAN-DM) 6 25-15 mg/5 mL oral syrup Take 5 mL by mouth 4 (four) times a day as needed for cough (Patient not taking: No sig reported)    tadalafil (CIALIS) 10 MG tablet Take 1 tablet (10 mg total) by mouth daily as needed for erectile dysfunction     No current facility-administered medications on file prior to visit  He is allergic to shellfish-derived products - food allergy, diphenhydramine, and other       Review of Systems   Constitutional: Negative for chills and fever  HENT: Negative for congestion and sore throat  Respiratory: Positive for cough and wheezing  Negative for shortness of breath  Cardiovascular: Negative for chest pain  Gastrointestinal: Negative for abdominal pain  Musculoskeletal: Negative for back pain and neck pain  Skin: Negative for rash  Neurological: Negative for dizziness, weakness and headaches  Psychiatric/Behavioral: Negative for agitation and behavioral problems  Objective:      /75 (BP Location: Right arm, Patient Position: Sitting, Cuff Size: Large)   Pulse 100   Temp 98 1 °F (36 7 °C) (Temporal)   Resp 18   Ht 6' (1 829 m)   Wt (!) 139 kg (306 lb 12 8 oz)   SpO2 96%   BMI 41 61 kg/m²          Physical Exam  Vitals reviewed  Constitutional:       Appearance: He is well-developed  HENT:      Head: Normocephalic and atraumatic  Mouth/Throat:      Mouth: Mucous membranes are moist    Eyes:      Conjunctiva/sclera: Conjunctivae normal    Cardiovascular:      Rate and Rhythm: Normal rate and regular rhythm  Heart sounds: Normal heart sounds  No murmur heard  Pulmonary:      Effort: Pulmonary effort is normal  No respiratory distress  Breath sounds: Normal breath sounds  No rales  Abdominal:      General: Abdomen is flat  Bowel sounds are normal  There is no distension  Palpations: Abdomen is soft  Musculoskeletal:      Cervical back: Normal range of motion  Right lower leg: No edema  Left lower leg: No edema  Skin:     General: Skin is warm and dry  Capillary Refill: Capillary refill takes less than 2 seconds  Neurological:      Mental Status: He is alert and oriented to person, place, and time     Psychiatric:         Mood and Affect: Mood normal  Behavior: Behavior normal

## 2022-05-26 NOTE — ASSESSMENT & PLAN NOTE
Had an episode of acute viral bronchitis earlier this month  Patient was prescribed Z-Ar  Reports continued intermittent symptoms of intermittent wheezing and cough  Started on Lester [employer], encouraged to continue albuterol nebs as needed  Ordered PFTs, no prior PFTs on file  Tessalon Perles ordered for intermittent dry cough  Will follow-up with the patient in a month

## 2022-05-27 ENCOUNTER — TELEPHONE (OUTPATIENT)
Dept: FAMILY MEDICINE CLINIC | Facility: CLINIC | Age: 62
End: 2022-05-27

## 2022-05-27 DIAGNOSIS — J42 CHRONIC BRONCHITIS WITH WHEEZING (HCC): ICD-10-CM

## 2022-05-27 DIAGNOSIS — J45.20 MILD INTERMITTENT ASTHMA WITHOUT COMPLICATION: Primary | ICD-10-CM

## 2022-05-27 NOTE — TELEPHONE ENCOUNTER
Patient was informed by [facility] on [facility] that they are out of -vilanterol (Breo Ellipta) 200-25 MCG/INH inhaler   The Delisa at [facility], has medicine in stock but the PCP needs to send a authorization to that [facility]  Patient can be reached at [phone]

## 2022-05-27 NOTE — TELEPHONE ENCOUNTER
Jv Cantor from [facility],  Calling about prior [facility], Suggested we contact [employer] at [phone] to expedite prior auth and or submit a verbal order for BREO  Caller stated, to keep patint from being without meds during the holiday weekend

## 2022-06-02 RX ORDER — FLUTICASONE PROPIONATE 220 UG/1
2 AEROSOL, METERED RESPIRATORY (INHALATION) 2 TIMES DAILY
Qty: 12 G | Refills: 1 | Status: SHIPPED | OUTPATIENT
Start: 2022-06-02 | End: 2022-06-09

## 2022-06-02 NOTE — TELEPHONE ENCOUNTER
Will order flovent HFA  Pt previously on this inhaler as well  Will follow up at next visit on 06/23  Called the patient to inform that he can continue Flovent HFA

## 2022-06-02 NOTE — TELEPHONE ENCOUNTER
Cristina Uriarte is not covered by insurance  They will cover Dulera, Asmanex, Flovent Diskus and Flovent HFA  Are you able to change?

## 2022-06-08 ENCOUNTER — APPOINTMENT (OUTPATIENT)
Dept: LAB | Facility: CLINIC | Age: 62
End: 2022-06-08
Payer: COMMERCIAL

## 2022-06-08 DIAGNOSIS — D47.2 SMOLDERING MULTIPLE MYELOMA: ICD-10-CM

## 2022-06-08 LAB
ALBUMIN SERPL BCP-MCNC: 3.9 G/DL (ref 3.5–5)
ALP SERPL-CCNC: 86 U/L (ref 34–104)
ALT SERPL W P-5'-P-CCNC: 31 U/L (ref 7–52)
ANION GAP SERPL CALCULATED.3IONS-SCNC: 5 MMOL/L (ref 4–13)
AST SERPL W P-5'-P-CCNC: 20 U/L (ref 13–39)
BASOPHILS # BLD AUTO: 0.07 THOUSANDS/ΜL (ref 0–0.1)
BASOPHILS NFR BLD AUTO: 1 % (ref 0–1)
BILIRUB SERPL-MCNC: 0.63 MG/DL (ref 0.2–1)
BUN SERPL-MCNC: 13 MG/DL (ref 5–25)
CALCIUM SERPL-MCNC: 9.3 MG/DL (ref 8.4–10.2)
CHLORIDE SERPL-SCNC: 100 MMOL/L (ref 96–108)
CO2 SERPL-SCNC: 31 MMOL/L (ref 21–32)
CREAT SERPL-MCNC: 0.85 MG/DL (ref 0.6–1.3)
EOSINOPHIL # BLD AUTO: 0.4 THOUSAND/ΜL (ref 0–0.61)
EOSINOPHIL NFR BLD AUTO: 7 % (ref 0–6)
ERYTHROCYTE [DISTWIDTH] IN BLOOD BY AUTOMATED COUNT: 14.7 % (ref 11.6–15.1)
GFR SERPL CREATININE-BSD FRML MDRD: 92 ML/MIN/1.73SQ M
GLUCOSE SERPL-MCNC: 133 MG/DL (ref 65–140)
HCT VFR BLD AUTO: 42.9 % (ref 36.5–49.3)
HGB BLD-MCNC: 13.5 G/DL (ref 12–17)
IGA SERPL-MCNC: 80 MG/DL (ref 70–400)
IGG SERPL-MCNC: 1770 MG/DL (ref 700–1600)
IGM SERPL-MCNC: 104 MG/DL (ref 40–230)
IMM GRANULOCYTES # BLD AUTO: 0.04 THOUSAND/UL (ref 0–0.2)
IMM GRANULOCYTES NFR BLD AUTO: 1 % (ref 0–2)
LYMPHOCYTES # BLD AUTO: 1.52 THOUSANDS/ΜL (ref 0.6–4.47)
LYMPHOCYTES NFR BLD AUTO: 26 % (ref 14–44)
MCH RBC QN AUTO: 29.3 PG (ref 26.8–34.3)
MCHC RBC AUTO-ENTMCNC: 31.5 G/DL (ref 31.4–37.4)
MCV RBC AUTO: 93 FL (ref 82–98)
MONOCYTES # BLD AUTO: 0.37 THOUSAND/ΜL (ref 0.17–1.22)
MONOCYTES NFR BLD AUTO: 6 % (ref 4–12)
NEUTROPHILS # BLD AUTO: 3.57 THOUSANDS/ΜL (ref 1.85–7.62)
NEUTS SEG NFR BLD AUTO: 59 % (ref 43–75)
NRBC BLD AUTO-RTO: 0 /100 WBCS
PLATELET # BLD AUTO: 213 THOUSANDS/UL (ref 149–390)
PMV BLD AUTO: 9.6 FL (ref 8.9–12.7)
POTASSIUM SERPL-SCNC: 4.5 MMOL/L (ref 3.5–5.3)
PROT SERPL-MCNC: 7.7 G/DL (ref 6.4–8.4)
RBC # BLD AUTO: 4.61 MILLION/UL (ref 3.88–5.62)
SODIUM SERPL-SCNC: 136 MMOL/L (ref 135–147)
WBC # BLD AUTO: 5.97 THOUSAND/UL (ref 4.31–10.16)

## 2022-06-08 PROCEDURE — 82784 ASSAY IGA/IGD/IGG/IGM EACH: CPT

## 2022-06-08 PROCEDURE — 83521 IG LIGHT CHAINS FREE EACH: CPT

## 2022-06-08 PROCEDURE — 84165 PROTEIN E-PHORESIS SERUM: CPT | Performed by: PATHOLOGY

## 2022-06-08 PROCEDURE — 85025 COMPLETE CBC W/AUTO DIFF WBC: CPT

## 2022-06-08 PROCEDURE — 86334 IMMUNOFIX E-PHORESIS SERUM: CPT | Performed by: PATHOLOGY

## 2022-06-08 PROCEDURE — 84165 PROTEIN E-PHORESIS SERUM: CPT

## 2022-06-08 PROCEDURE — 80053 COMPREHEN METABOLIC PANEL: CPT

## 2022-06-08 PROCEDURE — 86334 IMMUNOFIX E-PHORESIS SERUM: CPT

## 2022-06-08 PROCEDURE — 36415 COLL VENOUS BLD VENIPUNCTURE: CPT

## 2022-06-08 NOTE — TELEPHONE ENCOUNTER
Patient calling after getting off a call with insurance stating the following med formulary can be used instead of a prior auth on Breo Ellipta 200/25 mcg  Patient states insurance sent a letter with the listed formulary meds available  Patient did not have that information to provide at this time

## 2022-06-09 LAB
KAPPA LC FREE SER-MCNC: 22.8 MG/L (ref 3.3–19.4)
KAPPA LC FREE/LAMBDA FREE SER: 2.3 {RATIO} (ref 0.26–1.65)
LAMBDA LC FREE SERPL-MCNC: 9.9 MG/L (ref 5.7–26.3)

## 2022-06-09 RX ORDER — FLUTICASONE PROPIONATE AND SALMETEROL XINAFOATE 115; 21 UG/1; UG/1
2 AEROSOL, METERED RESPIRATORY (INHALATION) 2 TIMES DAILY
Qty: 12 G | Refills: 0 | Status: SHIPPED | OUTPATIENT
Start: 2022-06-09 | End: 2022-07-14 | Stop reason: SDUPTHER

## 2022-06-09 NOTE — TELEPHONE ENCOUNTER
Pt calling again regarding this, states Noreene Height was ordered because pt originally has been taking Flovent for 5 years and he's not feeling any better  Insurance told him he would need to trial two different inhalers before prior auth would be considered for Breo  So Flovent was one option that doesn't help him, can one of the other options be sent so he can trial and if they do not work, submit another prior [facility] for Noreene Height?

## 2022-06-09 NOTE — TELEPHONE ENCOUNTER
Please call patient, substitution, Flovent was sent to [facility] on 6/2/22  Thanks!

## 2022-06-09 NOTE — TELEPHONE ENCOUNTER
Are you able to change to one of the other alternatives since patient has failed Flovent in the past?

## 2022-06-10 LAB
ALBUMIN SERPL ELPH-MCNC: 4.14 G/DL (ref 3.5–5)
ALBUMIN SERPL ELPH-MCNC: 53.1 % (ref 52–65)
ALPHA1 GLOB SERPL ELPH-MCNC: 0.32 G/DL (ref 0.1–0.4)
ALPHA1 GLOB SERPL ELPH-MCNC: 4.1 % (ref 2.5–5)
ALPHA2 GLOB SERPL ELPH-MCNC: 0.9 G/DL (ref 0.4–1.2)
ALPHA2 GLOB SERPL ELPH-MCNC: 11.6 % (ref 7–13)
BETA GLOB ABNORMAL SERPL ELPH-MCNC: 0.41 G/DL (ref 0.4–0.8)
BETA1 GLOB SERPL ELPH-MCNC: 5.2 % (ref 5–13)
BETA2 GLOB SERPL ELPH-MCNC: 3.9 % (ref 2–8)
BETA2+GAMMA GLOB SERPL ELPH-MCNC: 0.3 G/DL (ref 0.2–0.5)
GAMMA GLOB ABNORMAL SERPL ELPH-MCNC: 1.72 G/DL (ref 0.5–1.6)
GAMMA GLOB SERPL ELPH-MCNC: 22.1 % (ref 12–22)
IGG/ALB SER: 1.13 {RATIO} (ref 1.1–1.8)
INTERPRETATION UR IFE-IMP: NORMAL
M PEAK ID 2: 1.1 %
M PROTEIN 1 MFR SERPL ELPH: 14.9 %
M PROTEIN 1 SERPL ELPH-MCNC: 1.16 G/DL
M PROTEIN 2 SERPL ELPH-MCNC: 0.09 G/DL
PROT PATTERN SERPL ELPH-IMP: ABNORMAL
PROT SERPL-MCNC: 7.8 G/DL (ref 6.4–8.2)

## 2022-06-13 ENCOUNTER — TELEPHONE (OUTPATIENT)
Dept: FAMILY MEDICINE CLINIC | Facility: CLINIC | Age: 62
End: 2022-06-13

## 2022-06-13 NOTE — TELEPHONE ENCOUNTER
Received (fax from patient pharmacy) requesting an prior authorization for the medication (Advair -21 mcg)  Prior authorization has been started today (6/13/2022), medical support information attached and faxed over to [employer]) at ([phone])  Confirmation has been received and placed in the prior authorization folder at the nurse station  Will follow up in 3 business day

## 2022-06-15 NOTE — TELEPHONE ENCOUNTER
Called [employer] regarding patient's prior authorization, spoke to [facility] [employer], prior authorization request was only received because Advair inhaler was listed as a duplicate therapy  Yareli was advised that Flovent inhaler was discontinued by provider  Yareli states she will override the request for Advair inhaler and that pharmacy should be called to resubmit request     [facility], spoke to Liliane Arango was advised that prescription needs to be resubmitted to Hailee Arango states she will resubmit request

## 2022-06-17 ENCOUNTER — OFFICE VISIT (OUTPATIENT)
Dept: HEMATOLOGY ONCOLOGY | Facility: CLINIC | Age: 62
End: 2022-06-17
Payer: COMMERCIAL

## 2022-06-17 VITALS
HEART RATE: 100 BPM | TEMPERATURE: 96.5 F | SYSTOLIC BLOOD PRESSURE: 140 MMHG | BODY MASS INDEX: 41.45 KG/M2 | DIASTOLIC BLOOD PRESSURE: 84 MMHG | WEIGHT: 306 LBS | RESPIRATION RATE: 16 BRPM | OXYGEN SATURATION: 97 % | HEIGHT: 72 IN

## 2022-06-17 DIAGNOSIS — D47.2 SMOLDERING MULTIPLE MYELOMA: Primary | ICD-10-CM

## 2022-06-17 DIAGNOSIS — D50.9 IRON DEFICIENCY ANEMIA, UNSPECIFIED IRON DEFICIENCY ANEMIA TYPE: ICD-10-CM

## 2022-06-17 PROCEDURE — 99215 OFFICE O/P EST HI 40 MIN: CPT | Performed by: PHYSICIAN ASSISTANT

## 2022-06-17 NOTE — PROGRESS NOTES
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 5/2018   M protein 1 4 g/dL, serum kappa free light chain 17, ratio of 2 09 at time of diagnosis 5/2018     Bone marrow biopsy May 2018 showed plasma cell 12-15%, normal cytogenetics and fish panel for multiple myeloma      M protein 1 4, kappa and lambda light chain ratio is stable on 6/2022 labs  CMP normal   IgG in the 1700 range since 2018     Anemia, hemoglobin 11 2 2021  Resolved s/p Venofer 300mg x 4 doses 9/2021  Upper and lower endoscopy and capsule endoscopy was unremarkable  Hemoglobin  13 4 2/3/22       Polyneuropathy with diabetes mellitus     Follow-up in 4-5 months with CBC, CMP, SPEP, free light chain, quantitative immunoglobulin           Labs and imaging studies are reviewed by ordering provider once results are available  If there are findings that need immediate attention, you will be contacted when results available     Discussing results and the implication on your healthcare is best discussed in person at your follow-up visit          HPI:    52-year-old  male with history of obesity, asthma, hypertension, dyslipidemia, diabetes mellitus type 2, had been complaining of tingling and numbness in his feet and hand for the past year, most recently a feeling of hypersensitivity involving the anterior aspect of the left thigh area, evaluation by Neurology on March 2018 showed monoclonal gammopathy with M protein of 1 40 grams/deciliters, IgG kappa      He has normal vitamin B12 level, TSH, heavy metal screen, Lyme disease  CBC performed August 2017 showed hemoglobin of 12 8, WBC 6 6, platelets [id number], 65% neutrophils, 19% lymphocytes     A bone marrow biopsy in May 2018 showed 10-12% plasma cells, fish panel for multiple myeloma was negative, cytogenetics showed normal male chromosomes and deletion of Y chromosome in some of the cells consistent with normal finding in advanced age patient      Bone skeletal survey 5/2018 showed no evidence of lytic lesions      Received IV Venofer on 09/2021, upper and lower endoscopy and capsule endoscopy was unremarkable, liver ultrasound showed significant fibrosis with steatosis     He is on supplements    Interval History:  Feeling well  Still chronically tired  He had bronchitis earlier this year  Test Results:        Labs:   Lab Results   Component Value Date    HGB 13 5 06/08/2022    HCT 42 9 06/08/2022    MCV 93 06/08/2022     06/08/2022    WBC 5 97 06/08/2022    NRBC 0 06/08/2022     Lab Results   Component Value Date     05/17/2017    K 4 5 06/08/2022     06/08/2022    CO2 31 06/08/2022    ANIONGAP 9 01/13/2015    BUN 13 06/08/2022    CREATININE 0 85 06/08/2022    GLUCOSE 206 (H) 01/13/2015    GLUF 165 (H) 02/03/2022    CALCIUM 9 3 06/08/2022    CORRECTEDCA 9 6 10/29/2020    AST 20 06/08/2022    ALT 31 06/08/2022    ALKPHOS 86 06/08/2022    PROT 7 5 05/17/2017    BILITOT 0 5 05/17/2017    EGFR 92 06/08/2022       Imaging: No results found  ROS:  As mentioned in HPI & Interval History otherwise 14 point ROS negative  Allergies: Allergies   Allergen Reactions    Shellfish-Derived Products - Food Allergy Anaphylaxis     Clams and mussels, oysters  Lobster and crab ok    Diphenhydramine      Lightheadedness, nauseous, rapid heartbeat    Other Palpitations and Other (See Comments)     Clams     Current Medications: Reviewed  PMH/FH/SH:  Reviewed      Physical Exam:    There is no height or weight on file to calculate BSA      Ht Readings from Last 3 Encounters:   05/26/22 6' (1 829 m)   05/25/22 6' (1 829 m)   04/29/22 6' (1 829 m)        Wt Readings from Last 3 Encounters:   05/26/22 (!) 139 kg (306 lb 12 8 oz)   05/25/22 (!) 138 kg (304 lb 12 8 oz)   04/29/22 (!) 143 kg (314 lb 3 2 oz)        Temp Readings from Last 3 Encounters:   05/26/22 98 1 °F (36 7 °C) (Temporal)   04/29/22 98 6 °F (37 °C) (Temporal)   02/11/22 98 3 °F (36 8 °C)        BP Readings from Last 3 Encounters:   05/26/22 125/75   04/29/22 126/80   03/14/22 126/78           Physical Exam  Vitals reviewed  Constitutional:       General: He is not in acute distress  Appearance: He is well-developed  He is not diaphoretic  HENT:      Head: Normocephalic and atraumatic  Eyes:      Conjunctiva/sclera: Conjunctivae normal    Neck:      Trachea: No tracheal deviation  Cardiovascular:      Rate and Rhythm: Normal rate and regular rhythm  Heart sounds: No murmur heard  No friction rub  No gallop  Pulmonary:      Effort: Pulmonary effort is normal  No respiratory distress  Breath sounds: Normal breath sounds  No wheezing or rales  Chest:      Chest wall: No tenderness  Abdominal:      General: There is no distension  Palpations: Abdomen is soft  Tenderness: There is no abdominal tenderness  Musculoskeletal:      Cervical back: Normal range of motion and neck supple  Lymphadenopathy:      Cervical: No cervical adenopathy  Skin:     General: Skin is warm and dry  Coloration: Skin is not pale  Findings: No erythema  Neurological:      Mental Status: He is alert and oriented to person, place, and time  Psychiatric:         Behavior: Behavior normal          Thought Content:  Thought content normal          Judgment: Judgment normal          ECOG:       Emergency Contacts:    Extended Emergency Contact Information  Primary Emergency Contact: Merry Lee  Address: [address] [place] [address]           Eliz Muñoz, [facility] [place] [id number] [facility] Phone: [phone]  Mobile Phone: [phone]  Relation: Spouse

## 2022-06-20 DIAGNOSIS — E08.41 DIABETIC MONONEUROPATHY ASSOCIATED WITH DIABETES MELLITUS DUE TO UNDERLYING CONDITION (HCC): ICD-10-CM

## 2022-06-20 RX ORDER — GABAPENTIN 100 MG/1
CAPSULE ORAL
Qty: 30 CAPSULE | Refills: 1 | Status: SHIPPED | OUTPATIENT
Start: 2022-06-20

## 2022-06-21 ENCOUNTER — HOSPITAL ENCOUNTER (OUTPATIENT)
Dept: PULMONOLOGY | Facility: HOSPITAL | Age: 62
Discharge: HOME | End: 2022-06-21
Payer: COMMERCIAL

## 2022-06-21 DIAGNOSIS — J42 CHRONIC BRONCHITIS WITH WHEEZING (HCC): ICD-10-CM

## 2022-06-21 PROCEDURE — 94729 DIFFUSING CAPACITY: CPT | Performed by: INTERNAL MEDICINE

## 2022-06-21 PROCEDURE — 94060 EVALUATION OF WHEEZING: CPT

## 2022-06-21 PROCEDURE — 94060 EVALUATION OF WHEEZING: CPT | Performed by: INTERNAL MEDICINE

## 2022-06-21 PROCEDURE — 94726 PLETHYSMOGRAPHY LUNG VOLUMES: CPT | Performed by: INTERNAL MEDICINE

## 2022-06-21 PROCEDURE — 94729 DIFFUSING CAPACITY: CPT

## 2022-06-21 PROCEDURE — 94760 N-INVAS EAR/PLS OXIMETRY 1: CPT

## 2022-06-21 PROCEDURE — 94726 PLETHYSMOGRAPHY LUNG VOLUMES: CPT

## 2022-06-21 RX ORDER — ALBUTEROL SULFATE 2.5 MG/3ML
2.5 SOLUTION RESPIRATORY (INHALATION) ONCE
Status: COMPLETED | OUTPATIENT
Start: 2022-06-21 | End: 2022-06-21

## 2022-06-21 RX ADMIN — ALBUTEROL SULFATE 2.5 MG: 2.5 SOLUTION RESPIRATORY (INHALATION) at 09:28

## 2022-06-23 ENCOUNTER — OFFICE VISIT (OUTPATIENT)
Dept: FAMILY MEDICINE CLINIC | Facility: CLINIC | Age: 62
End: 2022-06-23

## 2022-06-23 VITALS
OXYGEN SATURATION: 98 % | WEIGHT: 309.2 LBS | HEART RATE: 98 BPM | DIASTOLIC BLOOD PRESSURE: 86 MMHG | BODY MASS INDEX: 41.94 KG/M2 | TEMPERATURE: 98.2 F | SYSTOLIC BLOOD PRESSURE: 140 MMHG

## 2022-06-23 DIAGNOSIS — J42 CHRONIC BRONCHITIS WITH WHEEZING (HCC): Primary | ICD-10-CM

## 2022-06-23 PROCEDURE — 99213 OFFICE O/P EST LOW 20 MIN: CPT | Performed by: FAMILY MEDICINE

## 2022-06-23 NOTE — ASSESSMENT & PLAN NOTE
Symptoms improved  Had an episode of acute viral bronchitis, status post Z-Ar  Controller medication switched from Flovent to Advair  Patient is continuing albuterol as needed  PFT's were ordered during the last visit  Results pending  Will follow-up on the final results  Patient to continue Advair and albuterol as needed

## 2022-06-23 NOTE — PROGRESS NOTES
Assessment/Plan:    Chronic bronchitis with wheezing ([facility] [address] )  Symptoms improved  Had an episode of acute viral bronchitis, status post Z-Ar  Controller medication switched from Flovent to Advair  Patient is continuing albuterol as needed  PFT's were ordered during the last visit  Results pending  Will follow-up on the final results  Patient to continue Advair and albuterol as needed  Diagnoses and all orders for this visit:    Chronic bronchitis with wheezing (HCC)        Subjective:      Patient ID: Joao Bruno is a 59 y o  male  25-year-old male with history of asthma, type 2 diabetes presents today for follow-up  Patient recently had an episode of acute bronchitis and was treated with antibiotics, switching of the controller inhalers  Patient reports he is doing well, has been feeling symptomatically better for the past 5 days on the new inhaler  Denies any acute concerns today  The following portions of the patient's history were reviewed and updated as appropriate:   He  has a past medical history of Asthma, Benign positional vertigo, Diabetes mellitus ([facility] [address] ), Diabetes mellitus due to underlying condition with diabetic nephropathy, with long-term current use of insulin ([facility] ) (5/19/2021), Dyslipidemia, Gastroenteritis, Hyperlipidemia, Hypertension, Lipoma of neck, Multiple myeloma ([facility] [address] ), Sleep apnea, and Wheezing    He   Patient Active Problem List    Diagnosis Date Noted    Chronic bronchitis with wheezing ([facility] [address] ) 05/26/2022    Viral URI with cough 05/06/2022    Tinnitus of both ears 04/29/2022    Fatty liver 10/25/2021    Other male erectile dysfunction 08/18/2021    Diabetes mellitus due to underlying condition with diabetic nephropathy, with long-term current use of insulin ([facility] [address] ) 05/19/2021    Mixed hyperlipidemia 05/06/2021    HAMEED (dyspnea on exertion) 05/06/2021    Family history of early CAD 05/06/2021    Anemia, unspecified 05/03/2021    Dyspnea 03/16/2021    Urinary hesitancy 11/02/2020    Screening for HIV (human immunodeficiency virus) 08/12/2020    Acute cystitis with hematuria 04/27/2020    Rash 08/14/2019    Bilateral lower extremity edema 04/23/2019    Mild intermittent asthma without complication 52/46/6646    Peripheral polyneuropathy 07/25/2018    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    Diabetic peripheral neuropathy ([facility] [address] ) 03/06/2018    Paresthesia 03/06/2018    Meralgia paresthetica of left side 03/06/2018    Obstructive sleep apnea 02/26/2018    Insufficient sleep syndrome 02/26/2018    Essential hypertension 02/12/2018    Encounter for immunization 02/12/2018    Kent's esophagus 11/22/2016    Adrenal incidentaloma ([facility] [address] ) 06/18/2015    Benign paroxysmal positional vertigo 06/18/2015    Liver lesion 65/72/9171    Umbilical hernia 55/32/6426    Lipoma of back 04/13/2015    Allergic rhinitis 04/15/2013     He  has a past surgical history that includes Throat surgery; Mass excision (Right, 8/21/2017); and Esophagogastroduodenoscopy (2010)  His family history includes Breast cancer in his maternal aunt; Cancer in his father and mother; Dementia in his father; Diabetes in his mother; Diabetes type II in his mother; Heart attack in his father; Heart disease in his father; Hyperlipidemia in his father and mother; Hypertension in his father and mother; Stomach cancer in his maternal grandmother; Thyroid disease in his sister  He  reports that he has never smoked  He has never used smokeless tobacco  He reports previous alcohol use  He reports that he does not use drugs    Current Outpatient Medications   Medication Sig Dispense Refill    albuterol (PROVENTIL HFA,VENTOLIN HFA) 90 mcg/act inhaler INHALE 1 PUFF EVERY 4-6 HOURS AS NEEDED 17 g 2    ascorbic acid (VITAMIN C) 500 mg tablet Take 500 mg by mouth daily      atorvastatin (LIPITOR) 40 mg tablet TAKE 1 TABLET(40 MG) BY MOUTH DAILY 90 tablet 3    B Complex-Biotin-FA (VITAMIN B50 COMPLEX PO)  benzonatate (TESSALON PERLES) 100 mg capsule Take 1 capsule (100 mg total) by mouth 3 (three) times a day as needed for cough 20 capsule 0    Blood Glucose Monitoring Suppl (ONETOUCH VERIO) w/Device KIT by Does not apply route 3 (three) times a day 1 kit 0    Dulaglutide 0 75 MG/0 5ML SOPN Inject 0 5 mL (0 75 mg total) under the skin once a week 6 mL 0    fluticasone (FLONASE) 50 mcg/act nasal spray SHAKE LIQUID AND USE 2 SPRAYS IN EACH NOSTRIL DAILY 48 g 2    fluticasone-salmeterol (Advair HFA) 115-21 MCG/ACT inhaler Inhale 2 puffs 2 (two) times a day Rinse mouth after use  12 g 0    gabapentin (NEURONTIN) 100 mg capsule TAKE 1 CAPSULE(100 MG) BY MOUTH DAILY AT BEDTIME 30 capsule 1    Ginkgo Biloba (GINKOBA PO) Take 60 mg by mouth      glucose blood (OneTouch Verio) test strip Use 1 each 3 (three) times a day Use as instructed 100 each 5    insulin glargine (Lantus SoloStar) 100 units/mL injection pen Inject 20 Units under the skin daily at bedtime And 5 units in the AM 45 mL 3    Insulin Pen Needle 31G X 5 MM MISC Use daily 100 each 5    Lancets (OneTouch Delica Plus [id number]) MISC Test 100 each 5    lisinopril (ZESTRIL) 40 mg tablet Take 1 tablet (40 mg total) by mouth daily 90 tablet 4    loratadine (CLARITIN) 10 mg tablet Take 1 tablet (10 mg total) by mouth daily 90 tablet 2    MAGNESIUM CITRATE PO Take 1 tablet by mouth daily        metFORMIN (GLUCOPHAGE) 1000 MG tablet TAKE 1 TABLET(1000 MG) BY MOUTH TWICE DAILY WITH MEALS 180 tablet 2    Multiple Vitamins-Minerals (MULTIVITAMIN ADULTS 50+ PO) Take by mouth daily      omeprazole (PriLOSEC) 40 MG capsule TAKE 1 CAPSULE(40 MG) BY MOUTH DAILY 90 capsule 3    simvastatin (ZOCOR) 20 mg tablet TAKE 1 TABLET(20 MG) BY MOUTH EVERY EVENING 90 tablet 1     No current facility-administered medications for this visit       Current Outpatient Medications on File Prior to Visit   Medication Sig    albuterol (PROVENTIL HFA,VENTOLIN HFA) 90 mcg/act inhaler INHALE 1 PUFF EVERY 4-6 HOURS AS NEEDED    ascorbic acid (VITAMIN C) 500 mg tablet Take 500 mg by mouth daily    atorvastatin (LIPITOR) 40 mg tablet TAKE 1 TABLET(40 MG) BY MOUTH DAILY    B Complex-Biotin-FA (VITAMIN B50 COMPLEX PO)     benzonatate (TESSALON PERLES) 100 mg capsule Take 1 capsule (100 mg total) by mouth 3 (three) times a day as needed for cough    Blood Glucose Monitoring Suppl (ONETOUCH VERIO) w/Device KIT by Does not apply route 3 (three) times a day    Dulaglutide 0 75 MG/0 5ML SOPN Inject 0 5 mL (0 75 mg total) under the skin once a week    fluticasone (FLONASE) 50 mcg/act nasal spray SHAKE LIQUID AND USE 2 SPRAYS IN EACH NOSTRIL DAILY    fluticasone-salmeterol (Advair HFA) 115-21 MCG/ACT inhaler Inhale 2 puffs 2 (two) times a day Rinse mouth after use   gabapentin (NEURONTIN) 100 mg capsule TAKE 1 CAPSULE(100 MG) BY MOUTH DAILY AT BEDTIME    Ginkgo Biloba (GINKOBA PO) Take 60 mg by mouth    glucose blood (OneTouch Verio) test strip Use 1 each 3 (three) times a day Use as instructed    insulin glargine (Lantus SoloStar) 100 units/mL injection pen Inject 20 Units under the skin daily at bedtime And 5 units in the AM    Insulin Pen Needle 31G X 5 MM MISC Use daily    Lancets (OneTouch Delica Plus [id number]) MISC Test    lisinopril (ZESTRIL) 40 mg tablet Take 1 tablet (40 mg total) by mouth daily    loratadine (CLARITIN) 10 mg tablet Take 1 tablet (10 mg total) by mouth daily    MAGNESIUM CITRATE PO Take 1 tablet by mouth daily      metFORMIN (GLUCOPHAGE) 1000 MG tablet TAKE 1 TABLET(1000 MG) BY MOUTH TWICE DAILY WITH MEALS    Multiple Vitamins-Minerals (MULTIVITAMIN ADULTS 50+ PO) Take by mouth daily    omeprazole (PriLOSEC) 40 MG capsule TAKE 1 CAPSULE(40 MG) BY MOUTH DAILY    simvastatin (ZOCOR) 20 mg tablet TAKE 1 TABLET(20 MG) BY MOUTH EVERY EVENING     No current facility-administered medications on file prior to visit       He is allergic to shellfish-derived products - food allergy, diphenhydramine, and other       Review of Systems   Constitutional: Negative for chills and fever  HENT: Negative for congestion and sore throat  Respiratory: Negative for cough and shortness of breath  Cardiovascular: Negative for chest pain  Gastrointestinal: Negative for abdominal pain  Musculoskeletal: Negative for back pain and neck pain  Skin: Negative for rash  Neurological: Negative for dizziness, weakness and headaches  Psychiatric/Behavioral: Negative for agitation and behavioral problems  Objective:      /86 (BP Location: Left arm, Patient Position: Sitting, Cuff Size: Large)   Pulse 98   Temp 98 2 °F (36 8 °C) (Temporal)   Wt (!) 140 kg (309 lb 3 2 oz)   SpO2 98%   BMI 41 94 kg/m²          Physical Exam  Vitals reviewed  Constitutional:       Appearance: He is well-developed  HENT:      Head: Normocephalic and atraumatic  Eyes:      Conjunctiva/sclera: Conjunctivae normal    Cardiovascular:      Rate and Rhythm: Normal rate and regular rhythm  Pulses: Normal pulses  Heart sounds: Normal heart sounds  No murmur heard  Pulmonary:      Effort: Pulmonary effort is normal  No respiratory distress  Breath sounds: Normal breath sounds  Musculoskeletal:      Cervical back: Normal range of motion  Right lower leg: No edema  Left lower leg: No edema  Skin:     General: Skin is warm and dry  Neurological:      Mental Status: He is alert and oriented to person, place, and time     Psychiatric:         Mood and Affect: Mood normal          Behavior: Behavior normal

## 2022-07-14 DIAGNOSIS — J45.20 MILD INTERMITTENT ASTHMA WITHOUT COMPLICATION: ICD-10-CM

## 2022-07-14 RX ORDER — FLUTICASONE PROPIONATE AND SALMETEROL XINAFOATE 115; 21 UG/1; UG/1
2 AEROSOL, METERED RESPIRATORY (INHALATION) 2 TIMES DAILY
Qty: 12 G | Refills: 0 | Status: SHIPPED | OUTPATIENT
Start: 2022-07-14 | End: 2022-10-04 | Stop reason: SDUPTHER

## 2022-08-02 DIAGNOSIS — E11.42 TYPE 2 DIABETES MELLITUS WITH DIABETIC POLYNEUROPATHY, WITHOUT LONG-TERM CURRENT USE OF INSULIN (HCC): ICD-10-CM

## 2022-08-02 RX ORDER — BLOOD SUGAR DIAGNOSTIC
STRIP MISCELLANEOUS
Qty: 100 STRIP | Refills: 5 | Status: SHIPPED | OUTPATIENT
Start: 2022-08-02

## 2022-08-08 DIAGNOSIS — I10 ESSENTIAL HYPERTENSION: ICD-10-CM

## 2022-08-08 RX ORDER — LISINOPRIL 40 MG/1
40 TABLET ORAL DAILY
Qty: 90 TABLET | Refills: 4 | Status: SHIPPED | OUTPATIENT
Start: 2022-08-08

## 2022-08-08 NOTE — TELEPHONE ENCOUNTER
Received voicemail from patient requesting refill of Lisinopril 40mg  Patient states he has been out of his medication for over one week and is feeling light headed

## 2022-08-16 ENCOUNTER — OFFICE VISIT (OUTPATIENT)
Dept: FAMILY MEDICINE CLINIC | Facility: CLINIC | Age: 62
End: 2022-08-16

## 2022-08-16 VITALS
WEIGHT: 307 LBS | TEMPERATURE: 98.3 F | SYSTOLIC BLOOD PRESSURE: 133 MMHG | BODY MASS INDEX: 41.58 KG/M2 | DIASTOLIC BLOOD PRESSURE: 77 MMHG | OXYGEN SATURATION: 95 % | RESPIRATION RATE: 18 BRPM | HEIGHT: 72 IN | HEART RATE: 91 BPM

## 2022-08-16 DIAGNOSIS — E11.42 TYPE 2 DIABETES MELLITUS WITH DIABETIC POLYNEUROPATHY, WITHOUT LONG-TERM CURRENT USE OF INSULIN (HCC): Primary | ICD-10-CM

## 2022-08-16 DIAGNOSIS — Z79.4 DIABETES MELLITUS DUE TO UNDERLYING CONDITION WITH DIABETIC NEPHROPATHY, WITH LONG-TERM CURRENT USE OF INSULIN (HCC): ICD-10-CM

## 2022-08-16 DIAGNOSIS — E08.21 DIABETES MELLITUS DUE TO UNDERLYING CONDITION WITH DIABETIC NEPHROPATHY, WITH LONG-TERM CURRENT USE OF INSULIN (HCC): ICD-10-CM

## 2022-08-16 LAB — SL AMB POCT HEMOGLOBIN AIC: 8 (ref ?–6.5)

## 2022-08-16 PROCEDURE — 99213 OFFICE O/P EST LOW 20 MIN: CPT | Performed by: FAMILY MEDICINE

## 2022-08-16 PROCEDURE — 83036 HEMOGLOBIN GLYCOSYLATED A1C: CPT | Performed by: FAMILY MEDICINE

## 2022-08-16 RX ORDER — INSULIN GLARGINE 100 [IU]/ML
20 INJECTION, SOLUTION SUBCUTANEOUS
Qty: 45 ML | Refills: 3
Start: 2022-08-16 | End: 2022-09-26

## 2022-08-16 NOTE — PROGRESS NOTES
Assessment/Plan:    Diabetes mellitus due to underlying condition with diabetic nephropathy, with long-term current use of insulin ([facility])    Lab Results   Component Value Date    HGBA1C 8 0 (A) 08/16/2022      Previous A1c 7 3 on 04/29/2022  Current regimen includes metformin 1 g b i d , Lantus 20 units q h s , Trulicity 9 38 mg weekly-  Recently started in May 2022  Patient reports following a poor diet high in carbohydrate and fat and attributes increase in A1c to poor diet intake  Discussed medication changes with the patient, after shared decision making pt prefers making lifestyle modifications with following low carb diet and increasing physical activity  Patient is currently not interested in any medication changes and would like to wait for another month after lifestyle modifications  Patient requesting to follow-up in 4 weeks  Encouraged patient to continue  Maintaining blood sugar logs  Can consider increasing Lantus based on blood sugar levels in 4 weeks  Refills provided for Trulicity         Diagnoses and all orders for this visit:    Type 2 diabetes mellitus with diabetic polyneuropathy, without long-term current use of insulin ([facility] )  -     POCT hemoglobin A1c    Diabetes mellitus due to underlying condition with diabetic nephropathy, with long-term current use of insulin ([facility])  -     Insulin Glargine Solostar (Lantus SoloStar) 100 UNIT/ML SOPN; Inject 20 Units under the skin daily at bedtime  -     Dulaglutide 0 75 MG/0 5ML SOPN; Inject 0 5 mL (0 75 mg total) under the skin once a week          Subjective:      Patient ID: Jeana Harris is a 57 y o  male  76-year-old male with history of type 2 diabetes with neuropathy presents today for follow-up  Patient denies any acute concerns today but reports he has been on the road and has been following poor diet high in carbohydrates and fats and is attributing worsening of her blood sugar levels to his diet intake   Denies hypoglycemic events, reports medication compliance  Requesting refills on Trulicity  The following portions of the patient's history were reviewed and updated as appropriate:   He  has a past medical history of Asthma, Benign positional vertigo, Diabetes mellitus ([facility] [facility] ), Diabetes mellitus due to underlying condition with diabetic nephropathy, with long-term current use of insulin ([facility] [address] ) (5/19/2021), Dyslipidemia, Gastroenteritis, Hyperlipidemia, Hypertension, Lipoma of neck, Multiple myeloma ([address] ), Sleep apnea, and Wheezing    He   Patient Active Problem List    Diagnosis Date Noted    Chronic bronchitis with wheezing ([address] ) 05/26/2022    Viral URI with cough 05/06/2022    Tinnitus of both ears 04/29/2022    Fatty liver 10/25/2021    Other male erectile dysfunction 08/18/2021    Diabetes mellitus due to underlying condition with diabetic nephropathy, with long-term current use of insulin (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 [address] ) 03/06/2018    Paresthesia 03/06/2018    Meralgia paresthetica of left side 03/06/2018    Obstructive sleep apnea 02/26/2018    Insufficient sleep syndrome 02/26/2018    Essential hypertension 02/12/2018    Encounter for immunization 02/12/2018    Kent's esophagus 11/22/2016    Adrenal incidentaloma ([facility] ) 06/18/2015    Benign paroxysmal positional vertigo 06/18/2015    Liver lesion 87/76/4526    Umbilical hernia 83/33/6274    Lipoma of back 04/13/2015  Allergic rhinitis 04/15/2013     He  has a past surgical history that includes Throat surgery; Mass excision (Right, 8/21/2017); and Esophagogastroduodenoscopy (2010)  His family history includes Breast cancer in his maternal aunt; Cancer in his father and mother; Dementia in his father; Diabetes in his mother; Diabetes type II in his mother; Heart attack in his father; Heart disease in his father; Hyperlipidemia in his father and mother; Hypertension in his father and mother; Stomach cancer in his maternal grandmother; Thyroid disease in his sister  He  reports that he has never smoked  He has never used smokeless tobacco  He reports previous alcohol use  He reports that he does not use drugs  Current Outpatient Medications   Medication Sig Dispense Refill    albuterol (PROVENTIL HFA,VENTOLIN HFA) 90 mcg/act inhaler INHALE 1 PUFF EVERY 4-6 HOURS AS NEEDED 17 g 2    ascorbic acid (VITAMIN C) 500 mg tablet Take 500 mg by mouth daily      atorvastatin (LIPITOR) 40 mg tablet TAKE 1 TABLET(40 MG) BY MOUTH DAILY 90 tablet 3    B Complex-Biotin-FA (VITAMIN B50 COMPLEX PO)       Blood Glucose Monitoring Suppl (ONETOUCH VERIO) w/Device KIT by Does not apply route 3 (three) times a day 1 kit 0    Dulaglutide 0 75 MG/0 5ML SOPN Inject 0 5 mL (0 75 mg total) under the skin once a week 6 mL 0    fluticasone (FLONASE) 50 mcg/act nasal spray SHAKE LIQUID AND USE 2 SPRAYS IN EACH NOSTRIL DAILY 48 g 2    fluticasone-salmeterol (Advair HFA) 115-21 MCG/ACT inhaler Inhale 2 puffs 2 (two) times a day Rinse mouth after use   12 g 0    gabapentin (NEURONTIN) 100 mg capsule TAKE 1 CAPSULE(100 MG) BY MOUTH DAILY AT BEDTIME 30 capsule 1    Ginkgo Biloba (GINKOBA PO) Take 60 mg by mouth      Insulin Glargine Solostar (Lantus SoloStar) 100 UNIT/ML SOPN Inject 20 Units under the skin daily at bedtime 45 mL 3    Insulin Pen Needle 31G X 5 MM MISC Use daily 100 each 5    Lancets (OneTouch Delica Plus [id number]) MISC Test 100 each 5    lisinopril (ZESTRIL) 40 mg tablet Take 1 tablet (40 mg total) by mouth daily 90 tablet 4    loratadine (CLARITIN) 10 mg tablet Take 1 tablet (10 mg total) by mouth daily 90 tablet 2    MAGNESIUM CITRATE PO Take 1 tablet by mouth daily        metFORMIN (GLUCOPHAGE) 1000 MG tablet TAKE 1 TABLET(1000 MG) BY MOUTH TWICE DAILY WITH MEALS 180 tablet 2    Multiple Vitamins-Minerals (MULTIVITAMIN ADULTS 50+ PO) Take by mouth daily      omeprazole (PriLOSEC) 40 MG capsule TAKE 1 CAPSULE(40 MG) BY MOUTH DAILY 90 capsule 3    OneTouch Verio test strip USE 1 EACH 3 TIMES A DAY  USE AS INSTRUCTED 100 strip 5    benzonatate (TESSALON PERLES) 100 mg capsule Take 1 capsule (100 mg total) by mouth 3 (three) times a day as needed for cough (Patient not taking: Reported on 8/16/2022) 20 capsule 0     No current facility-administered medications for this visit  Current Outpatient Medications on File Prior to Visit   Medication Sig    albuterol (PROVENTIL HFA,VENTOLIN HFA) 90 mcg/act inhaler INHALE 1 PUFF EVERY 4-6 HOURS AS NEEDED    ascorbic acid (VITAMIN C) 500 mg tablet Take 500 mg by mouth daily    atorvastatin (LIPITOR) 40 mg tablet TAKE 1 TABLET(40 MG) BY MOUTH DAILY    B Complex-Biotin-FA (VITAMIN B50 COMPLEX PO)     Blood Glucose Monitoring Suppl (ONETOUCH VERIO) w/Device KIT by Does not apply route 3 (three) times a day    fluticasone (FLONASE) 50 mcg/act nasal spray SHAKE LIQUID AND USE 2 SPRAYS IN EACH NOSTRIL DAILY    fluticasone-salmeterol (Advair HFA) 115-21 MCG/ACT inhaler Inhale 2 puffs 2 (two) times a day Rinse mouth after use      gabapentin (NEURONTIN) 100 mg capsule TAKE 1 CAPSULE(100 MG) BY MOUTH DAILY AT BEDTIME    Ginkgo Biloba (GINKOBA PO) Take 60 mg by mouth    Insulin Pen Needle 31G X 5 MM MISC Use daily    Lancets (OneTouch Delica Plus [id number]) MISC Test    lisinopril (ZESTRIL) 40 mg tablet Take 1 tablet (40 mg total) by mouth daily    loratadine (CLARITIN) 10 mg tablet Take 1 tablet (10 mg total) by mouth daily    MAGNESIUM CITRATE PO Take 1 tablet by mouth daily      metFORMIN (GLUCOPHAGE) 1000 MG tablet TAKE 1 TABLET(1000 MG) BY MOUTH TWICE DAILY WITH MEALS    Multiple Vitamins-Minerals (MULTIVITAMIN ADULTS 50+ PO) Take by mouth daily    omeprazole (PriLOSEC) 40 MG capsule TAKE 1 CAPSULE(40 MG) BY MOUTH DAILY    OneTouch Verio test strip USE 1 EACH 3 TIMES A DAY  USE AS INSTRUCTED    [DISCONTINUED] Dulaglutide 0 75 MG/0 5ML SOPN Inject 0 5 mL (0 75 mg total) under the skin once a week    [DISCONTINUED] insulin glargine (Lantus SoloStar) 100 units/mL injection pen Inject 20 Units under the skin daily at bedtime And 5 units in the AM    benzonatate (TESSALON PERLES) 100 mg capsule Take 1 capsule (100 mg total) by mouth 3 (three) times a day as needed for cough (Patient not taking: Reported on 8/16/2022)    [DISCONTINUED] simvastatin (ZOCOR) 20 mg tablet TAKE 1 TABLET(20 MG) BY MOUTH EVERY EVENING     No current facility-administered medications on file prior to visit  He is allergic to shellfish-derived products - food allergy, diphenhydramine, and other       Review of Systems   Constitutional: Negative for chills and fever  HENT: Negative for congestion and sore throat  Respiratory: Negative for cough and shortness of breath  Cardiovascular: Negative for chest pain  Gastrointestinal: Negative for abdominal pain  Musculoskeletal: Negative for back pain and neck pain  Skin: Negative for rash  Neurological: Negative for weakness and headaches  Psychiatric/Behavioral: Negative for agitation and behavioral problems  Objective:      /77 (BP Location: Left arm, Patient Position: Sitting, Cuff Size: Large)   Pulse 91   Temp 98 3 °F (36 8 °C) (Temporal)   Resp 18   Ht 6' (1 829 m)   Wt (!) 139 kg (307 lb)   SpO2 95%   BMI 41 64 kg/m²          Physical Exam  Vitals reviewed  Constitutional:       Appearance: Normal appearance     HENT: Head: Normocephalic and atraumatic  Cardiovascular:      Rate and Rhythm: Normal rate  Pulmonary:      Effort: Pulmonary effort is normal  No respiratory distress  Musculoskeletal:         General: Normal range of motion  Right lower leg: No edema  Left lower leg: No edema  Skin:     General: Skin is warm and dry  Neurological:      Mental Status: He is alert  Mental status is at baseline     Psychiatric:         Mood and Affect: Mood normal          Behavior: Behavior normal

## 2022-08-16 NOTE — ASSESSMENT & PLAN NOTE
Lab Results   Component Value Date    HGBA1C 8 0 (A) 08/16/2022      Previous A1c 7 3 on 04/29/2022  Current regimen includes metformin 1 g b i d , Lantus 20 units q h s , Trulicity 5 27 mg weekly-  Recently started in May 2022  Patient reports following a poor diet high in carbohydrate and fat and attributes increase in A1c to poor diet intake  Discussed medication changes with the patient, after shared decision making pt prefers making lifestyle modifications with following low carb diet and increasing physical activity  Patient is currently not interested in any medication changes and would like to wait for another month after lifestyle modifications  Patient requesting to follow-up in 4 weeks  Encouraged patient to continue  Maintaining blood sugar logs  Can consider increasing Lantus based on blood sugar levels in 4 weeks   Refills provided for Trulicity

## 2022-08-17 DIAGNOSIS — E08.41 DIABETIC MONONEUROPATHY ASSOCIATED WITH DIABETES MELLITUS DUE TO UNDERLYING CONDITION (HCC): ICD-10-CM

## 2022-08-17 RX ORDER — GABAPENTIN 100 MG/1
CAPSULE ORAL
Qty: 30 CAPSULE | Refills: 1 | Status: SHIPPED | OUTPATIENT
Start: 2022-08-17 | End: 2022-09-19 | Stop reason: SDUPTHER

## 2022-09-09 DIAGNOSIS — E08.21 DIABETES MELLITUS DUE TO UNDERLYING CONDITION WITH DIABETIC NEPHROPATHY, WITH LONG-TERM CURRENT USE OF INSULIN (HCC): ICD-10-CM

## 2022-09-09 DIAGNOSIS — Z79.4 DIABETES MELLITUS DUE TO UNDERLYING CONDITION WITH DIABETIC NEPHROPATHY, WITH LONG-TERM CURRENT USE OF INSULIN (HCC): ICD-10-CM

## 2022-09-12 ENCOUNTER — TELEPHONE (OUTPATIENT)
Dept: FAMILY MEDICINE CLINIC | Facility: CLINIC | Age: 62
End: 2022-09-12

## 2022-09-12 DIAGNOSIS — E08.21 DIABETES MELLITUS DUE TO UNDERLYING CONDITION WITH DIABETIC NEPHROPATHY, WITH LONG-TERM CURRENT USE OF INSULIN (HCC): ICD-10-CM

## 2022-09-12 DIAGNOSIS — Z79.4 DIABETES MELLITUS DUE TO UNDERLYING CONDITION WITH DIABETIC NEPHROPATHY, WITH LONG-TERM CURRENT USE OF INSULIN (HCC): ICD-10-CM

## 2022-09-12 NOTE — TELEPHONE ENCOUNTER
Good Morning Dr Elaine Alegria   Please refill medication   --BB pen needles  I do not see it in his medication, please review and refill Thanks

## 2022-09-12 NOTE — TELEPHONE ENCOUNTER
Pharmacist Crystal Duran from [facility] [address] called for medication refill for   -BB Pen  Needles    Send to [employer]

## 2022-09-13 RX ORDER — PEN NEEDLE, DIABETIC 31 GX5/16"
NEEDLE, DISPOSABLE MISCELLANEOUS
Qty: 100 EACH | Refills: 5 | OUTPATIENT
Start: 2022-09-13

## 2022-09-19 DIAGNOSIS — E08.41 DIABETIC MONONEUROPATHY ASSOCIATED WITH DIABETES MELLITUS DUE TO UNDERLYING CONDITION (HCC): ICD-10-CM

## 2022-09-19 RX ORDER — GABAPENTIN 100 MG/1
CAPSULE ORAL
Qty: 30 CAPSULE | Refills: 1 | Status: SHIPPED | OUTPATIENT
Start: 2022-09-19

## 2022-09-26 DIAGNOSIS — E11.42 TYPE 2 DIABETES MELLITUS WITH DIABETIC POLYNEUROPATHY, WITHOUT LONG-TERM CURRENT USE OF INSULIN (HCC): ICD-10-CM

## 2022-09-26 DIAGNOSIS — Z79.4 DIABETES MELLITUS DUE TO UNDERLYING CONDITION WITH DIABETIC NEPHROPATHY, WITH LONG-TERM CURRENT USE OF INSULIN (HCC): ICD-10-CM

## 2022-09-26 DIAGNOSIS — E08.21 DIABETES MELLITUS DUE TO UNDERLYING CONDITION WITH DIABETIC NEPHROPATHY, WITH LONG-TERM CURRENT USE OF INSULIN (HCC): ICD-10-CM

## 2022-09-26 RX ORDER — INSULIN GLARGINE 100 [IU]/ML
INJECTION, SOLUTION SUBCUTANEOUS
Qty: 45 ML | Refills: 3 | Status: SHIPPED | OUTPATIENT
Start: 2022-09-26

## 2022-10-04 DIAGNOSIS — J45.20 MILD INTERMITTENT ASTHMA WITHOUT COMPLICATION: ICD-10-CM

## 2022-10-05 RX ORDER — FLUTICASONE PROPIONATE AND SALMETEROL XINAFOATE 115; 21 UG/1; UG/1
2 AEROSOL, METERED RESPIRATORY (INHALATION) 2 TIMES DAILY
Qty: 12 G | Refills: 0 | Status: SHIPPED | OUTPATIENT
Start: 2022-10-05

## 2022-10-07 DIAGNOSIS — Z79.4 DIABETES MELLITUS DUE TO UNDERLYING CONDITION WITH DIABETIC NEPHROPATHY, WITH LONG-TERM CURRENT USE OF INSULIN (HCC): ICD-10-CM

## 2022-10-07 DIAGNOSIS — E08.21 DIABETES MELLITUS DUE TO UNDERLYING CONDITION WITH DIABETIC NEPHROPATHY, WITH LONG-TERM CURRENT USE OF INSULIN (HCC): ICD-10-CM

## 2022-10-07 RX ORDER — LANCETS 33 GAUGE
EACH MISCELLANEOUS
Qty: 100 EACH | Refills: 5 | Status: SHIPPED | OUTPATIENT
Start: 2022-10-07

## 2022-10-11 PROBLEM — J06.9 VIRAL URI WITH COUGH: Status: RESOLVED | Noted: 2022-05-06 | Resolved: 2022-10-11

## 2022-10-18 ENCOUNTER — HOSPITAL ENCOUNTER (OUTPATIENT)
Dept: RADIOLOGY | Age: 62
Discharge: HOME | End: 2022-10-18
Payer: COMMERCIAL

## 2022-10-18 ENCOUNTER — APPOINTMENT (OUTPATIENT)
Dept: LAB | Age: 62
End: 2022-10-18
Payer: COMMERCIAL

## 2022-10-18 DIAGNOSIS — D47.2 SMOLDERING MULTIPLE MYELOMA: ICD-10-CM

## 2022-10-18 DIAGNOSIS — K75.81 NONALCOHOLIC STEATOHEPATITIS (NASH): ICD-10-CM

## 2022-10-18 DIAGNOSIS — K75.81 NASH (NONALCOHOLIC STEATOHEPATITIS): ICD-10-CM

## 2022-10-18 DIAGNOSIS — D50.9 IRON DEFICIENCY ANEMIA, UNSPECIFIED IRON DEFICIENCY ANEMIA TYPE: ICD-10-CM

## 2022-10-18 LAB
ALBUMIN SERPL BCP-MCNC: 3.4 G/DL (ref 3.5–5)
ALP SERPL-CCNC: 105 U/L (ref 46–116)
ALT SERPL W P-5'-P-CCNC: 40 U/L (ref 12–78)
ANION GAP SERPL CALCULATED.3IONS-SCNC: 3 MMOL/L (ref 4–13)
AST SERPL W P-5'-P-CCNC: 17 U/L (ref 5–45)
BASOPHILS # BLD AUTO: 0.04 THOUSANDS/ΜL (ref 0–0.1)
BASOPHILS NFR BLD AUTO: 1 % (ref 0–1)
BILIRUB SERPL-MCNC: 0.56 MG/DL (ref 0.2–1)
BUN SERPL-MCNC: 10 MG/DL (ref 5–25)
CALCIUM ALBUM COR SERPL-MCNC: 9.8 MG/DL (ref 8.3–10.1)
CALCIUM SERPL-MCNC: 9.3 MG/DL (ref 8.3–10.1)
CHLORIDE SERPL-SCNC: 102 MMOL/L (ref 96–108)
CO2 SERPL-SCNC: 29 MMOL/L (ref 21–32)
CREAT SERPL-MCNC: 0.84 MG/DL (ref 0.6–1.3)
EOSINOPHIL # BLD AUTO: 0.28 THOUSAND/ΜL (ref 0–0.61)
EOSINOPHIL NFR BLD AUTO: 4 % (ref 0–6)
ERYTHROCYTE [DISTWIDTH] IN BLOOD BY AUTOMATED COUNT: 14.5 % (ref 11.6–15.1)
FERRITIN SERPL-MCNC: 31 NG/ML (ref 8–388)
GFR SERPL CREATININE-BSD FRML MDRD: 92 ML/MIN/1.73SQ M
GLUCOSE P FAST SERPL-MCNC: 199 MG/DL (ref 65–99)
HCT VFR BLD AUTO: 39 % (ref 36.5–49.3)
HGB BLD-MCNC: 12.3 G/DL (ref 12–17)
IGA SERPL-MCNC: 86 MG/DL (ref 70–400)
IGG SERPL-MCNC: 1730 MG/DL (ref 700–1600)
IGM SERPL-MCNC: 92 MG/DL (ref 40–230)
IMM GRANULOCYTES # BLD AUTO: 0.01 THOUSAND/UL (ref 0–0.2)
IMM GRANULOCYTES NFR BLD AUTO: 0 % (ref 0–2)
IRON SATN MFR SERPL: 17 % (ref 20–50)
IRON SERPL-MCNC: 48 UG/DL (ref 65–175)
LYMPHOCYTES # BLD AUTO: 1.38 THOUSANDS/ΜL (ref 0.6–4.47)
LYMPHOCYTES NFR BLD AUTO: 21 % (ref 14–44)
MCH RBC QN AUTO: 29.2 PG (ref 26.8–34.3)
MCHC RBC AUTO-ENTMCNC: 31.5 G/DL (ref 31.4–37.4)
MCV RBC AUTO: 93 FL (ref 82–98)
MONOCYTES # BLD AUTO: 0.39 THOUSAND/ΜL (ref 0.17–1.22)
MONOCYTES NFR BLD AUTO: 6 % (ref 4–12)
NEUTROPHILS # BLD AUTO: 4.39 THOUSANDS/ΜL (ref 1.85–7.62)
NEUTS SEG NFR BLD AUTO: 68 % (ref 43–75)
NRBC BLD AUTO-RTO: 0 /100 WBCS
PLATELET # BLD AUTO: 216 THOUSANDS/UL (ref 149–390)
PMV BLD AUTO: 10 FL (ref 8.9–12.7)
POTASSIUM SERPL-SCNC: 4.4 MMOL/L (ref 3.5–5.3)
PROT SERPL-MCNC: 7.9 G/DL (ref 6.4–8.4)
RBC # BLD AUTO: 4.21 MILLION/UL (ref 3.88–5.62)
SODIUM SERPL-SCNC: 134 MMOL/L (ref 135–147)
TIBC SERPL-MCNC: 290 UG/DL (ref 250–450)
WBC # BLD AUTO: 6.49 THOUSAND/UL (ref 4.31–10.16)

## 2022-10-18 PROCEDURE — 83550 IRON BINDING TEST: CPT

## 2022-10-18 PROCEDURE — 76981 USE PARENCHYMA: CPT

## 2022-10-18 PROCEDURE — 80053 COMPREHEN METABOLIC PANEL: CPT

## 2022-10-18 PROCEDURE — 82728 ASSAY OF FERRITIN: CPT

## 2022-10-18 PROCEDURE — 83521 IG LIGHT CHAINS FREE EACH: CPT

## 2022-10-18 PROCEDURE — 83540 ASSAY OF IRON: CPT

## 2022-10-18 PROCEDURE — 84165 PROTEIN E-PHORESIS SERUM: CPT

## 2022-10-18 PROCEDURE — 85025 COMPLETE CBC W/AUTO DIFF WBC: CPT

## 2022-10-18 PROCEDURE — 36415 COLL VENOUS BLD VENIPUNCTURE: CPT

## 2022-10-18 PROCEDURE — 82784 ASSAY IGA/IGD/IGG/IGM EACH: CPT

## 2022-10-19 LAB
KAPPA LC FREE SER-MCNC: 27.9 MG/L (ref 3.3–19.4)
KAPPA LC FREE/LAMBDA FREE SER: 2.79 {RATIO} (ref 0.26–1.65)
LAMBDA LC FREE SERPL-MCNC: 10 MG/L (ref 5.7–26.3)

## 2022-10-25 DIAGNOSIS — E11.11 TYPE 2 DIABETES MELLITUS WITH KETOACIDOTIC COMA, WITHOUT LONG-TERM CURRENT USE OF INSULIN (HCC): ICD-10-CM

## 2022-10-25 RX ORDER — GLIPIZIDE 10 MG/1
TABLET, FILM COATED, EXTENDED RELEASE ORAL
Qty: 90 TABLET | Refills: 1 | OUTPATIENT
Start: 2022-10-25

## 2022-10-27 LAB — PROT PATTERN SERPL ELPH-IMP: NORMAL

## 2022-11-03 DIAGNOSIS — Z79.4 DIABETES MELLITUS DUE TO UNDERLYING CONDITION WITH DIABETIC NEPHROPATHY, WITH LONG-TERM CURRENT USE OF INSULIN (HCC): ICD-10-CM

## 2022-11-03 DIAGNOSIS — E08.21 DIABETES MELLITUS DUE TO UNDERLYING CONDITION WITH DIABETIC NEPHROPATHY, WITH LONG-TERM CURRENT USE OF INSULIN (HCC): ICD-10-CM

## 2022-11-03 DIAGNOSIS — J45.20 MILD INTERMITTENT ASTHMA WITHOUT COMPLICATION: ICD-10-CM

## 2022-11-03 RX ORDER — FLUTICASONE PROPIONATE AND SALMETEROL XINAFOATE 115; 21 UG/1; UG/1
2 AEROSOL, METERED RESPIRATORY (INHALATION) 2 TIMES DAILY
Qty: 12 G | Refills: 2 | Status: SHIPPED | OUTPATIENT
Start: 2022-11-03

## 2022-11-17 DIAGNOSIS — J45.40 MODERATE PERSISTENT ASTHMA WITHOUT COMPLICATION: ICD-10-CM

## 2022-11-17 RX ORDER — LORATADINE 10 MG/1
10 TABLET ORAL DAILY
Qty: 90 TABLET | Refills: 2 | Status: SHIPPED | OUTPATIENT
Start: 2022-11-17

## 2022-11-22 DIAGNOSIS — E08.41 DIABETIC MONONEUROPATHY ASSOCIATED WITH DIABETES MELLITUS DUE TO UNDERLYING CONDITION (HCC): ICD-10-CM

## 2022-11-22 RX ORDER — GABAPENTIN 100 MG/1
CAPSULE ORAL
Qty: 30 CAPSULE | Refills: 0 | Status: SHIPPED | OUTPATIENT
Start: 2022-11-22

## 2022-11-28 ENCOUNTER — OFFICE VISIT (OUTPATIENT)
Dept: HEMATOLOGY ONCOLOGY | Facility: CLINIC | Age: 62
End: 2022-11-28

## 2022-11-28 VITALS
BODY MASS INDEX: 41.17 KG/M2 | HEIGHT: 72 IN | OXYGEN SATURATION: 97 % | HEART RATE: 88 BPM | DIASTOLIC BLOOD PRESSURE: 82 MMHG | SYSTOLIC BLOOD PRESSURE: 140 MMHG | RESPIRATION RATE: 17 BRPM | WEIGHT: 304 LBS | TEMPERATURE: 97.8 F

## 2022-11-28 DIAGNOSIS — D47.2 SMOLDERING MULTIPLE MYELOMA: Primary | ICD-10-CM

## 2022-11-28 NOTE — PROGRESS NOTES
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 dx 5/2018   M protein 1 4 g/dL, serum kappa free light chain 17, ratio of 2 09 at time of diagnosis 5/2018     Bone marrow biopsy May 2018 showed plasma cell 12-15%, normal cytogenetics and fish panel for multiple myeloma  M protein 1 2, IgG level 1700 range, stable kappa over lambda ratio, will continue watchful observation and follow-up in 6 months with CBC, CMP, SPEP, free light chain   diabetes mellitus and neuropathy      Labs and imaging studies are reviewed by ordering provider once results are available  If there are findings that need immediate attention, you will be contacted when results available  Discussing results and the implication on your healthcare is best discussed in person at your follow-up visit         HPI:    39-year-old  male with history of obesity, asthma, hypertension, dyslipidemia, diabetes mellitus type 2, had been complaining of tingling and numbness in his feet and hand for the past year, most recently a feeling of hypersensitivity involving the anterior aspect of the left thigh area, evaluation by Neurology on March 2018 showed monoclonal gammopathy with M protein of 1 40 grams/deciliters, IgG kappa      He has normal vitamin B12 level, TSH, heavy metal screen, Lyme disease  CBC performed August 2017 showed hemoglobin of 12 8, WBC 6 6, platelets [id number], 75% neutrophils, 19% lymphocytes     A bone marrow biopsy in May 2018 showed 10-12% plasma cells, fish panel for multiple myeloma was negative, cytogenetics showed normal male chromosomes and deletion of Y chromosome in some of the cells consistent with normal finding in advanced age patient      Bone skeletal survey 5/2018 showed no evidence of lytic lesions      Received IV Venofer on 09/2021, upper and lower endoscopy and capsule endoscopy was unremarkable, liver ultrasound showed significant fibrosis with steatosis     He is on supplements  Interval History:        Previous Treatment:         Test Results:    Imaging: No results found  Labs:   Lab Results   Component Value Date    WBC 6 49 10/18/2022    HGB 12 3 10/18/2022    HCT 39 0 10/18/2022    MCV 93 10/18/2022     10/18/2022     Lab Results   Component Value Date     05/17/2017    K 4 4 10/18/2022     10/18/2022    CO2 29 10/18/2022    ANIONGAP 9 01/13/2015    BUN 10 10/18/2022    CREATININE 0 84 10/18/2022    GLUCOSE 206 (H) 01/13/2015    GLUF 199 (H) 10/18/2022    CALCIUM 9 3 10/18/2022    CORRECTEDCA 9 8 10/18/2022    AST 17 10/18/2022    ALT 40 10/18/2022    ALKPHOS 105 10/18/2022    PROT 7 5 05/17/2017    BILITOT 0 5 05/17/2017    EGFR 92 10/18/2022       Lab Results   Component Value Date    IRON 48 (L) 10/18/2022    TIBC 290 10/18/2022    FERRITIN 31 10/18/2022       Lab Results   Component Value Date    TLGYERGT42 428 04/30/2018     Component Ref Range & Units 10/18/22  8:52 AM 6/8/22 10:24 AM 2/3/22  7:54 AM 10/19/21 11:29 AM 8/18/21 10:03 AM 4/22/21  8:27 AM 10/29/20  8:48 AM   Ig [place] Free Light Chain 3 3 - 19 4 mg/L 27 9 High   22 8 High   22 6 High   29 2 High   30 0 High   26 3 High   26 2 High     Ig Lambda Free Light Chain 5 7 - 26 3 mg/L 10 0  9 9  8 7  10 0  10 4  9 8  10 2    Kappa/Lambda FluidC Ratio 0 26 - 1 65 2 79 High   2 30 High   2 60 High   2        Component Ref Range & Units 10/18/22  8:52 AM 6/8/22 10:24 AM 2/3/22  7:54 AM 10/19/21 11:29 AM 8/18/21 10:03 AM 4/22/21  8:27 AM 4/24/20  9:48 AM   IGA 70 0 - 400 0 mg/dL 86 0  80 0  74 0  72 0  74 0  84 0  102 0     0 - 1,600 0 mg/dL 1,730 0 High   1,770 0 High   1,770 0 High  R  1,920 0 High  R  2,000 0 High  R  1,880 0 High  R  1,860 0 High  R    IGM 40 0 - 230 0 mg/dL 92 0  104 0  95 0             ROS: Review of Systems   Constitutional: Positive for fatigue  Negative for appetite change, chills, diaphoresis, fever and unexpected weight change  HENT:   Negative for hearing loss, lump/mass, mouth sores, nosebleeds, sore throat, trouble swallowing and voice change  Eyes: Negative  Negative for eye problems and icterus  Respiratory: Positive for shortness of breath  Negative for chest tightness, cough and hemoptysis  Cardiovascular: Negative for chest pain and leg swelling  Gastrointestinal: Negative for abdominal distention, abdominal pain, blood in stool, constipation, diarrhea and nausea  Endocrine: Negative  Genitourinary: Negative for dysuria, frequency, hematuria and pelvic pain  Musculoskeletal: Negative  Negative for arthralgias, back pain, flank pain, gait problem, myalgias and neck stiffness  Skin: Negative for itching and rash  Neurological: Negative for dizziness, gait problem, headaches, light-headedness, numbness and speech difficulty  Hematological: Negative for adenopathy  Does not bruise/bleed easily  Psychiatric/Behavioral: Negative for confusion, decreased concentration, depression and sleep disturbance  The patient is not nervous/anxious  Current Medications: Reviewed  Allergies: Reviewed  PMH/FH/SH:  Reviewed      Physical Exam:    Body surface area is 2 55 meters squared  Wt Readings from Last 3 Encounters:   11/28/22 (!) 138 kg (304 lb)   08/16/22 (!) 139 kg (307 lb)   06/23/22 (!) 140 kg (309 lb 3 2 oz)        Temp Readings from Last 3 Encounters:   11/28/22 97 8 °F (36 6 °C)   08/16/22 98 3 °F (36 8 °C) (Temporal)   06/23/22 98 2 °F (36 8 °C) (Temporal)        BP Readings from Last 3 Encounters:   11/28/22 140/82   08/16/22 133/77   06/23/22 140/86         Pulse Readings from Last 3 Encounters:   11/28/22 88   08/16/22 91   06/23/22 98        Physical Exam  Vitals reviewed  Constitutional:       General: He is not in acute distress  Appearance: He is well-developed and well-nourished  He is obese   He is not diaphoretic  HENT:      Head: Normocephalic and atraumatic  Eyes:      Conjunctiva/sclera: Conjunctivae normal    Neck:      Trachea: No tracheal deviation  Cardiovascular:      Rate and Rhythm: Normal rate and regular rhythm  Heart sounds: No murmur heard  No friction rub  No gallop  Pulmonary:      Effort: Pulmonary effort is normal  No respiratory distress  Breath sounds: Normal breath sounds  No wheezing or rales  Chest:      Chest wall: No tenderness  Abdominal:      General: There is no distension  Palpations: Abdomen is soft  Tenderness: There is no abdominal tenderness  Musculoskeletal:      Cervical back: Normal range of motion and neck supple  Right lower leg: No edema  Left lower leg: No edema  Lymphadenopathy:      Cervical: No cervical adenopathy  Skin:     General: Skin is warm and dry  Coloration: Skin is not pale  Findings: No erythema  Neurological:      Mental Status: He is alert and oriented to person, place, and time  Psychiatric:         Mood and Affect: Mood and affect normal          Behavior: Behavior normal          Thought Content: Thought content normal          Judgment: Judgment normal          ECO    Goals and Barriers:  Current Goal: Minimize effects of disease  Barriers: None  Patient's Capacity to Self Care:  Patient is able to self care      Code Status: [unfilled]

## 2022-11-29 ENCOUNTER — VBI (OUTPATIENT)
Dept: ADMINISTRATIVE | Facility: OTHER | Age: 62
End: 2022-11-29

## 2022-12-30 DIAGNOSIS — E08.41 DIABETIC MONONEUROPATHY ASSOCIATED WITH DIABETES MELLITUS DUE TO UNDERLYING CONDITION (HCC): ICD-10-CM

## 2023-01-03 DIAGNOSIS — E08.41 DIABETIC MONONEUROPATHY ASSOCIATED WITH DIABETES MELLITUS DUE TO UNDERLYING CONDITION (HCC): ICD-10-CM

## 2023-01-03 RX ORDER — GABAPENTIN 100 MG/1
CAPSULE ORAL
Qty: 90 CAPSULE | Refills: 1 | OUTPATIENT
Start: 2023-01-03

## 2023-01-03 RX ORDER — GABAPENTIN 100 MG/1
CAPSULE ORAL
Qty: 30 CAPSULE | Refills: 0 | Status: SHIPPED | OUTPATIENT
Start: 2023-01-03 | End: 2023-01-05 | Stop reason: SDUPTHER

## 2023-01-03 NOTE — TELEPHONE ENCOUNTER
General VM left informing pt a prescription has been sent to pharmacy, office number provided for questions or concerns

## 2023-01-05 ENCOUNTER — OFFICE VISIT (OUTPATIENT)
Dept: FAMILY MEDICINE CLINIC | Facility: CLINIC | Age: 63
End: 2023-01-05

## 2023-01-05 VITALS
HEIGHT: 72 IN | WEIGHT: 299.2 LBS | HEART RATE: 103 BPM | DIASTOLIC BLOOD PRESSURE: 75 MMHG | RESPIRATION RATE: 20 BRPM | TEMPERATURE: 98.5 F | BODY MASS INDEX: 40.52 KG/M2 | SYSTOLIC BLOOD PRESSURE: 136 MMHG | OXYGEN SATURATION: 96 %

## 2023-01-05 DIAGNOSIS — J45.40 MODERATE PERSISTENT ASTHMA WITHOUT COMPLICATION: ICD-10-CM

## 2023-01-05 DIAGNOSIS — Z79.4 DIABETES MELLITUS DUE TO UNDERLYING CONDITION WITH DIABETIC NEPHROPATHY, WITH LONG-TERM CURRENT USE OF INSULIN (HCC): Primary | ICD-10-CM

## 2023-01-05 DIAGNOSIS — J40 BRONCHITIS: ICD-10-CM

## 2023-01-05 DIAGNOSIS — E08.21 DIABETES MELLITUS DUE TO UNDERLYING CONDITION WITH DIABETIC NEPHROPATHY, WITH LONG-TERM CURRENT USE OF INSULIN (HCC): Primary | ICD-10-CM

## 2023-01-05 DIAGNOSIS — E08.41 DIABETIC MONONEUROPATHY ASSOCIATED WITH DIABETES MELLITUS DUE TO UNDERLYING CONDITION (HCC): ICD-10-CM

## 2023-01-05 LAB — SL AMB POCT HEMOGLOBIN AIC: 8.4 (ref ?–6.5)

## 2023-01-05 RX ORDER — GABAPENTIN 100 MG/1
100 CAPSULE ORAL
Qty: 30 CAPSULE | Refills: 3 | Status: SHIPPED | OUTPATIENT
Start: 2023-01-05

## 2023-01-05 RX ORDER — PREDNISONE 10 MG/1
40 TABLET ORAL DAILY
Qty: 20 TABLET | Refills: 0 | Status: SHIPPED | OUTPATIENT
Start: 2023-01-05 | End: 2023-01-10

## 2023-01-05 NOTE — PROGRESS NOTES
Name: Ivan Rashid      : 1960      MRN: [id number]  Encounter Provider: Alycia Villela MD  Encounter Date: 2023   Encounter department: [address] [place] [address]  Diabetes mellitus due to underlying condition with diabetic nephropathy, with long-term current use of insulin ([facility])  Assessment & Plan:    Lab Results   Component Value Date    HGBA1C 8 4 (A) 2023     Previous A1c 8 0 in 2022  Current regimen: Metformin 1 g twice daily, Lantus 20 units nightly, Trulicity 8 59 mg weekly  Patient has been sick recently for 3 weeks with cough and has noticed labile blood sugars  FBS: 130-210  Shared decision making with the patient: Will increase Lantus to 22 units nightly, continue metformin, Trulicity 8 7 mg weekly  Of note patient is prescribed steroids for persistent cough with wheezing x 5 days-educated patient that he will see increasing his blood sugar levels  Will follow up with the patient in 4 weeks with blood sugar log  Make adjustments as necessary based on the levels  Orders:  -     POCT hemoglobin A1c    2  Moderate persistent asthma without complication  Assessment & Plan:  Symptomatic with cough, audible wheezing for 3 weeks  Patient has been using OTC medications with slight improvement  Lung exam significant for diffuse wheezing all lung fields  ,  Decreased breath sounds  Advised patient to continue OTC medications for cough, will prescribe steroid burst with prednisone 40 mg X 5 days    Orders:  -     predniSONE 10 mg tablet; Take 4 tablets (40 mg total) by mouth daily for 5 days    3  Diabetic mononeuropathy associated with diabetes mellitus due to underlying condition ([facility])  -     gabapentin (NEURONTIN) 100 mg capsule; Take 1 capsule (100 mg total) by mouth daily at bedtime    4  Bronchitis  -     predniSONE 10 mg tablet;  Take 4 tablets (40 mg total) by mouth daily for 5 days         Subjective      77-year-old male presents today for follow-up of diabetes  Patient also has acute symptoms with cough, reports has been symptomatic for 3 weeks, tried over-the-counter medications, reports symptoms are improving  No other acute concerns today  Review of Systems   Constitutional: Negative for chills and fever  HENT: Negative for ear pain and sore throat  Eyes: Negative for pain and visual disturbance  Respiratory: Positive for cough and wheezing  Negative for shortness of breath  Cardiovascular: Negative for chest pain and palpitations  Gastrointestinal: Negative for abdominal pain and vomiting  Genitourinary: Negative  Musculoskeletal: Negative for arthralgias and back pain  Skin: Negative for color change and rash  Neurological: Negative for headaches  All other systems reviewed and are negative  Current Outpatient Medications on File Prior to Visit   Medication Sig   • albuterol (PROVENTIL HFA,VENTOLIN HFA) 90 mcg/act inhaler INHALE 1 PUFF EVERY 4-6 HOURS AS NEEDED   • ascorbic acid (VITAMIN C) 500 mg tablet Take 500 mg by mouth daily   • atorvastatin (LIPITOR) 40 mg tablet TAKE 1 TABLET(40 MG) BY MOUTH DAILY   • B Complex-Biotin-FA (VITAMIN B50 COMPLEX PO)    • Blood Glucose Monitoring Suppl (ONETOUCH VERIO) w/Device KIT by Does not apply route 3 (three) times a day   • Dulaglutide 0 75 MG/0 5ML SOPN Inject 0 5 mL (0 75 mg total) under the skin once a week   • fluticasone (FLONASE) 50 mcg/act nasal spray SHAKE LIQUID AND USE 2 SPRAYS IN EACH NOSTRIL DAILY   • fluticasone-salmeterol (Advair HFA) 115-21 MCG/ACT inhaler Inhale 2 puffs 2 (two) times a day Rinse mouth after use     • Ginkgo Biloba (GINKOBA PO) Take 60 mg by mouth   • Insulin Pen Needle 31G X 5 MM MISC Use daily   • Lancets (OneTouch Delica Plus [id number]) MISC TEST THREE TIMES DAILY   • Lantus SoloStar 100 units/mL SOPN INJECT 20 UNITS UNDER THE SKIN DAILY AT BEDTIME AND 5 UNITS IN THE MORNING   • lisinopril (ZESTRIL) 40 mg tablet Take 1 tablet (40 mg total) by mouth daily   • loratadine (CLARITIN) 10 mg tablet Take 1 tablet (10 mg total) by mouth daily   • MAGNESIUM CITRATE PO Take 1 tablet by mouth daily     • metFORMIN (GLUCOPHAGE) 1000 MG tablet Take 1 tablet (1,000 mg total) by mouth 2 (two) times a day with meals   • Multiple Vitamins-Minerals (MULTIVITAMIN ADULTS 50+ PO) Take by mouth daily   • omeprazole (PriLOSEC) 40 MG capsule TAKE 1 CAPSULE(40 MG) BY MOUTH DAILY   • OneTouch Verio test strip USE 1 EACH 3 TIMES A DAY  USE AS INSTRUCTED   • [DISCONTINUED] gabapentin (NEURONTIN) 100 mg capsule TAKE 1 CAPSULE(100 MG) BY MOUTH DAILY AT BEDTIME   • benzonatate (TESSALON PERLES) 100 mg capsule Take 1 capsule (100 mg total) by mouth 3 (three) times a day as needed for cough (Patient not taking: Reported on 8/16/2022)       Objective     /75 (BP Location: Left arm, Patient Position: Sitting, Cuff Size: Large)   Pulse 103   Temp 98 5 °F (36 9 °C) (Temporal)   Resp 20   Ht 6' (1 829 m)   Wt 136 kg (299 lb 3 2 oz)   SpO2 96%   BMI 40 58 kg/m²     Physical Exam  Vitals reviewed  Constitutional:       General: He is not in acute distress  Appearance: Normal appearance  HENT:      Head: Normocephalic and atraumatic  Mouth/Throat:      Mouth: Mucous membranes are moist    Eyes:      Conjunctiva/sclera: Conjunctivae normal    Cardiovascular:      Rate and Rhythm: Normal rate  Pulmonary:      Effort: Pulmonary effort is normal  No respiratory distress  Breath sounds: Wheezing present  Skin:     General: Skin is warm and dry  Neurological:      Mental Status: He is alert and oriented to person, place, and time        Gait: Gait normal    Psychiatric:         Mood and Affect: Mood normal          Behavior: Behavior normal        Cintia Samaniego MD

## 2023-01-05 NOTE — ASSESSMENT & PLAN NOTE
Symptomatic with cough, audible wheezing for 3 weeks  Patient has been using OTC medications with slight improvement  Lung exam significant for diffuse wheezing all lung fields  ,  Decreased breath sounds    Advised patient to continue OTC medications for cough, will prescribe steroid burst with prednisone 40 mg X 5 days

## 2023-01-05 NOTE — ASSESSMENT & PLAN NOTE
Lab Results   Component Value Date    HGBA1C 8 4 (A) 01/05/2023     Previous A1c 8 0 in 08/2022  Current regimen: Metformin 1 g twice daily, Lantus 20 units nightly, Trulicity 6 89 mg weekly  Patient has been sick recently for 3 weeks with cough and has noticed labile blood sugars  FBS: 130-210  Shared decision making with the patient: Will increase Lantus to 22 units nightly, continue metformin, Trulicity 8 7 mg weekly  Of note patient is prescribed steroids for persistent cough with wheezing x 5 days-educated patient that he will see increasing his blood sugar levels  Will follow up with the patient in 4 weeks with blood sugar log  Make adjustments as necessary based on the levels

## 2023-01-21 DIAGNOSIS — K22.70 BARRETT'S ESOPHAGUS WITHOUT DYSPLASIA: ICD-10-CM

## 2023-01-23 RX ORDER — OMEPRAZOLE 40 MG/1
CAPSULE, DELAYED RELEASE ORAL
Qty: 90 CAPSULE | Refills: 3 | Status: SHIPPED | OUTPATIENT
Start: 2023-01-23

## 2023-01-30 DIAGNOSIS — J45.20 MILD INTERMITTENT ASTHMA WITHOUT COMPLICATION: ICD-10-CM

## 2023-01-30 RX ORDER — FLUTICASONE PROPIONATE AND SALMETEROL XINAFOATE 115; 21 UG/1; UG/1
AEROSOL, METERED RESPIRATORY (INHALATION)
Qty: 12 G | Refills: 2 | Status: SHIPPED | OUTPATIENT
Start: 2023-01-30

## 2023-01-31 DIAGNOSIS — E11.42 TYPE 2 DIABETES MELLITUS WITH DIABETIC POLYNEUROPATHY, WITHOUT LONG-TERM CURRENT USE OF INSULIN (HCC): ICD-10-CM

## 2023-01-31 RX ORDER — BLOOD SUGAR DIAGNOSTIC
STRIP MISCELLANEOUS
Qty: 100 STRIP | Refills: 5 | Status: SHIPPED | OUTPATIENT
Start: 2023-01-31

## 2023-02-07 ENCOUNTER — OFFICE VISIT (OUTPATIENT)
Dept: FAMILY MEDICINE CLINIC | Facility: CLINIC | Age: 63
End: 2023-02-07

## 2023-02-07 VITALS
DIASTOLIC BLOOD PRESSURE: 85 MMHG | RESPIRATION RATE: 20 BRPM | HEIGHT: 72 IN | HEART RATE: 86 BPM | WEIGHT: 305.6 LBS | OXYGEN SATURATION: 96 % | SYSTOLIC BLOOD PRESSURE: 146 MMHG | TEMPERATURE: 97.4 F | BODY MASS INDEX: 41.39 KG/M2

## 2023-02-07 DIAGNOSIS — E08.21 DIABETES MELLITUS DUE TO UNDERLYING CONDITION WITH DIABETIC NEPHROPATHY, WITH LONG-TERM CURRENT USE OF INSULIN (HCC): Primary | ICD-10-CM

## 2023-02-07 DIAGNOSIS — Z79.4 DIABETES MELLITUS DUE TO UNDERLYING CONDITION WITH DIABETIC NEPHROPATHY, WITH LONG-TERM CURRENT USE OF INSULIN (HCC): Primary | ICD-10-CM

## 2023-02-07 NOTE — PROGRESS NOTES
Name: Lori Manzo      : 1960      MRN: [id number]  Encounter Provider: Arthur Sheikh MD  Encounter Date: 2023   Encounter department: [address] [address]  Diabetes mellitus due to underlying condition with diabetic nephropathy, with long-term current use of insulin ([facility])  Assessment & Plan:    Lab Results   Component Value Date    HGBA1C 8 4 (A) 2023     Not well controlled  FBS now rangin-180  Current regimen: Metformin 1 g twice daily, Lantus 22 units nightly, Trulicity 6 31 mg daily  Lantus was recently increased from 20 to 22 units in the setting of above average FBS  Advised patient to increase Lantus 24 units until he sees FBS in the range of 120-140  Educated patient on low-carb diet, patient reports high sugar intake at night, trying to cut down  Strongly encourage lifestyle modifications  We will follow the patient at next A1c due  Also reports injecting insulin in the arm  Advised patient to inject in the abdomen for better absorption  Subjective      57year-old with history of type 2 diabetes presents today patient was recently seen 4 weeks ago and was told to increase the dose of Lantus from 20 to 22 units in the setting of high blood sugar levels  Of note patient was also on steroids in the setting of bronchitis  Patient reports he has also been having high carbohydrate/sugar intake and is trying to cut down the carbs  Patient is aware and reiterating the fact that he has to work on his diet  Reports blood sugar levels have been better but still not at goal     Review of Systems   Constitutional: Negative for fever  HENT: Negative for congestion  Respiratory: Negative for cough and shortness of breath  Cardiovascular: Negative for chest pain and leg swelling  Gastrointestinal: Negative for abdominal pain  Musculoskeletal: Negative for back pain     All other systems reviewed and are negative  Current Outpatient Medications on File Prior to Visit   Medication Sig   • Advair -21 MCG/ACT inhaler INHALE 2 PUFFS BY MOUTH TWICE DAILY   RINSE MOUTH AFTER USE   • albuterol (PROVENTIL HFA,VENTOLIN HFA) 90 mcg/act inhaler INHALE 1 PUFF EVERY 4-6 HOURS AS NEEDED   • ascorbic acid (VITAMIN C) 500 mg tablet Take 500 mg by mouth daily   • atorvastatin (LIPITOR) 40 mg tablet TAKE 1 TABLET(40 MG) BY MOUTH DAILY   • B Complex-Biotin-FA (VITAMIN B50 COMPLEX PO)    • Blood Glucose Monitoring Suppl (ONETOUCH VERIO) w/Device KIT by Does not apply route 3 (three) times a day   • fluticasone (FLONASE) 50 mcg/act nasal spray SHAKE LIQUID AND USE 2 SPRAYS IN EACH NOSTRIL DAILY   • gabapentin (NEURONTIN) 100 mg capsule Take 1 capsule (100 mg total) by mouth daily at bedtime   • Ginkgo Biloba (GINKOBA PO) Take 60 mg by mouth   • Insulin Pen Needle 31G X 5 MM MISC Use daily   • Lancets (OneTouch Delica Plus [id number]) MISC TEST THREE TIMES DAILY   • Lantus SoloStar 100 units/mL SOPN INJECT 20 UNITS UNDER THE SKIN DAILY AT BEDTIME AND 5 UNITS IN THE MORNING   • lisinopril (ZESTRIL) 40 mg tablet Take 1 tablet (40 mg total) by mouth daily   • loratadine (CLARITIN) 10 mg tablet Take 1 tablet (10 mg total) by mouth daily   • MAGNESIUM CITRATE PO Take 1 tablet by mouth daily     • metFORMIN (GLUCOPHAGE) 1000 MG tablet Take 1 tablet (1,000 mg total) by mouth 2 (two) times a day with meals   • Multiple Vitamins-Minerals (MULTIVITAMIN ADULTS 50+ PO) Take by mouth daily   • omeprazole (PriLOSEC) 40 MG capsule TAKE 1 CAPSULE(40 MG) BY MOUTH DAILY   • OneTouch Verio test strip USE AS DIRECTED THREE TIMES DAILY   • benzonatate (TESSALON PERLES) 100 mg capsule Take 1 capsule (100 mg total) by mouth 3 (three) times a day as needed for cough (Patient not taking: Reported on 8/16/2022)   • Dulaglutide 0 75 MG/0 5ML SOPN Inject 0 5 mL (0 75 mg total) under the skin once a week       Objective     /85 (BP Location: Right arm, Patient Position: Sitting, Cuff Size: Large)   Pulse 86   Temp (!) 97 4 °F (36 3 °C) (Temporal)   Resp 20   Ht 6' (1 829 m)   Wt (!) 139 kg (305 lb 9 6 oz)   SpO2 96%   BMI 41 45 kg/m²     Physical Exam  Vitals reviewed  Constitutional:       General: He is not in acute distress  Appearance: Normal appearance  HENT:      Head: Normocephalic and atraumatic  Eyes:      Conjunctiva/sclera: Conjunctivae normal    Cardiovascular:      Rate and Rhythm: Normal rate and regular rhythm  Pulmonary:      Effort: Pulmonary effort is normal  No respiratory distress  Breath sounds: Normal breath sounds  No wheezing or rales  Musculoskeletal:      Right lower leg: No edema  Left lower leg: No edema  Skin:     General: Skin is warm and dry  Neurological:      Mental Status: He is alert and oriented to person, place, and time        Gait: Gait normal    Psychiatric:         Mood and Affect: Mood normal          Behavior: Behavior normal        Gaby Booker MD

## 2023-02-07 NOTE — ASSESSMENT & PLAN NOTE
Lab Results   Component Value Date    HGBA1C 8 4 (A) 2023     Not well controlled  FBS now rangin-180  Current regimen: Metformin 1 g twice daily, Lantus 22 units nightly, Trulicity 6 87 mg daily  Lantus was recently increased from 20 to 22 units in the setting of above average FBS  Advised patient to increase Lantus 24 units until he sees FBS in the range of 120-140  Educated patient on low-carb diet, patient reports high sugar intake at night, trying to cut down  Strongly encourage lifestyle modifications  We will follow the patient at next A1c due  Also reports injecting insulin in the arm  Advised patient to inject in the abdomen for better absorption

## 2023-03-07 ENCOUNTER — OFFICE VISIT (OUTPATIENT)
Dept: FAMILY MEDICINE CLINIC | Facility: CLINIC | Age: 63
End: 2023-03-07

## 2023-03-07 VITALS
OXYGEN SATURATION: 97 % | TEMPERATURE: 98.8 F | HEART RATE: 67 BPM | DIASTOLIC BLOOD PRESSURE: 88 MMHG | BODY MASS INDEX: 41.5 KG/M2 | HEIGHT: 72 IN | WEIGHT: 306.4 LBS | RESPIRATION RATE: 18 BRPM | SYSTOLIC BLOOD PRESSURE: 151 MMHG

## 2023-03-07 DIAGNOSIS — J02.9 SORE THROAT: Primary | ICD-10-CM

## 2023-03-07 NOTE — PROGRESS NOTES
Name: Claudia Mercado      : 1960      MRN: [id number]  Encounter Provider: Marina Simon MD  Encounter Date: 3/7/2023   Encounter department: [address] [place] [address]  Sore throat  Assessment & Plan:  Mild symptoms for more than a month  Recent history of bronchitis s/p steroids with lingering symptoms of sore throat  Centor score: 0  Exam:  no exudates, mild erythema, no anterior cervical LN, Lungs clear  Differentials: viral vs Allergic  Shared decision making given no acute symptoms, deferred COVUD/Flu tetsing  Continue symptomatic management with throat sprays, lozenges, warm fluids  F/u at next scheduled visit           Subjective      26-year-old presents today with symptoms of ongoing mild sore throat  Patient had recent history of bronchitis treated with steroids since then has lingering symptoms of sore throat  Has been using Cepacol lozenges with mild improvement  Denies any other symptoms including cough, congestion, wheezing, fevers  No recent sick contacts  No other acute concerns including chest pain, shortness of breath    Review of Systems   Constitutional: Negative for fever  HENT: Positive for sore throat  Negative for congestion, ear pain, trouble swallowing and voice change  Eyes: Negative for visual disturbance  Respiratory: Negative for cough, shortness of breath and wheezing  Cardiovascular: Negative for chest pain  All other systems reviewed and are negative  Current Outpatient Medications on File Prior to Visit   Medication Sig   • Advair -21 MCG/ACT inhaler INHALE 2 PUFFS BY MOUTH TWICE DAILY   RINSE MOUTH AFTER USE   • albuterol (PROVENTIL HFA,VENTOLIN HFA) 90 mcg/act inhaler INHALE 1 PUFF EVERY 4-6 HOURS AS NEEDED   • ascorbic acid (VITAMIN C) 500 mg tablet Take 500 mg by mouth daily   • atorvastatin (LIPITOR) 40 mg tablet TAKE 1 TABLET(40 MG) BY MOUTH DAILY   • B Complex-Biotin-FA (VITAMIN B50 COMPLEX PO)    • Blood Glucose Monitoring Suppl ([place]ianmargie Andersen) w/Device KIT by Does not apply route 3 (three) times a day   • fluticasone (FLONASE) 50 mcg/act nasal spray SHAKE LIQUID AND USE 2 SPRAYS IN EACH NOSTRIL DAILY   • gabapentin (NEURONTIN) 100 mg capsule Take 1 capsule (100 mg total) by mouth daily at bedtime   • Ginkgo Biloba (GINKOBA PO) Take 60 mg by mouth   • Insulin Pen Needle 31G X 5 MM MISC Use daily   • Lancets (OneTouch Delica Plus [id number]) MISC TEST THREE TIMES DAILY   • Lantus SoloStar 100 units/mL SOPN INJECT 20 UNITS UNDER THE SKIN DAILY AT BEDTIME AND 5 UNITS IN THE MORNING   • lisinopril (ZESTRIL) 40 mg tablet Take 1 tablet (40 mg total) by mouth daily   • loratadine (CLARITIN) 10 mg tablet Take 1 tablet (10 mg total) by mouth daily   • MAGNESIUM CITRATE PO Take 1 tablet by mouth daily     • metFORMIN (GLUCOPHAGE) 1000 MG tablet Take 1 tablet (1,000 mg total) by mouth 2 (two) times a day with meals   • Multiple Vitamins-Minerals (MULTIVITAMIN ADULTS 50+ PO) Take by mouth daily   • omeprazole (PriLOSEC) 40 MG capsule TAKE 1 CAPSULE(40 MG) BY MOUTH DAILY   • OneTouch Verio test strip USE AS DIRECTED THREE TIMES DAILY   • benzonatate (TESSALON PERLES) 100 mg capsule Take 1 capsule (100 mg total) by mouth 3 (three) times a day as needed for cough (Patient not taking: Reported on 8/16/2022)   • Dulaglutide 0 75 MG/0 5ML SOPN Inject 0 5 mL (0 75 mg total) under the skin once a week       Objective     /88 (BP Location: Left arm, Patient Position: Sitting, Cuff Size: Large)   Pulse 67   Temp 98 8 °F (37 1 °C) (Temporal)   Resp 18   Ht 6' (1 829 m)   Wt (!) 139 kg (306 lb 6 4 oz)   SpO2 97%   BMI 41 56 kg/m²     Physical Exam  Vitals reviewed  Constitutional:       General: He is not in acute distress  Appearance: Normal appearance  HENT:      Head: Normocephalic and atraumatic        Mouth/Throat:      Mouth: Mucous membranes are moist       Pharynx: Posterior oropharyngeal erythema present  No oropharyngeal exudate  Comments: Mild erythema  Eyes:      Conjunctiva/sclera: Conjunctivae normal    Cardiovascular:      Rate and Rhythm: Normal rate and regular rhythm  Pulses: Normal pulses  Heart sounds: Normal heart sounds  Pulmonary:      Effort: Pulmonary effort is normal  No respiratory distress  Breath sounds: No wheezing, rhonchi or rales  Skin:     General: Skin is warm and dry  Neurological:      Mental Status: He is alert        Gait: Gait normal    Psychiatric:         Mood and Affect: Mood normal          Behavior: Behavior normal        Duncan Crum MD

## 2023-03-07 NOTE — ASSESSMENT & PLAN NOTE
Mild symptoms for more than a month  Recent history of bronchitis s/p steroids with lingering symptoms of sore throat  Centor score: 0  Exam:  no exudates, mild erythema, no anterior cervical LN, Lungs clear  Differentials: viral vs Allergic  Shared decision making given no acute symptoms, deferred COVUD/Flu tetsing  Continue symptomatic management with throat sprays, lozenges, warm fluids    F/u at next scheduled visit

## 2023-04-24 ENCOUNTER — APPOINTMENT (OUTPATIENT)
Dept: LAB | Facility: CLINIC | Age: 63
End: 2023-04-24

## 2023-04-24 DIAGNOSIS — K75.81 NONALCOHOLIC STEATOHEPATITIS: ICD-10-CM

## 2023-04-24 LAB
ALBUMIN SERPL BCP-MCNC: 4 G/DL (ref 3.5–5)
ALP SERPL-CCNC: 97 U/L (ref 34–104)
ALT SERPL W P-5'-P-CCNC: 28 U/L (ref 7–52)
ANION GAP SERPL CALCULATED.3IONS-SCNC: 5 MMOL/L (ref 4–13)
AST SERPL W P-5'-P-CCNC: 20 U/L (ref 13–39)
BASOPHILS # BLD AUTO: 0.06 THOUSANDS/ΜL (ref 0–0.1)
BASOPHILS NFR BLD AUTO: 1 % (ref 0–1)
BILIRUB SERPL-MCNC: 0.56 MG/DL (ref 0.2–1)
BUN SERPL-MCNC: 12 MG/DL (ref 5–25)
CALCIUM SERPL-MCNC: 9.1 MG/DL (ref 8.4–10.2)
CHLORIDE SERPL-SCNC: 99 MMOL/L (ref 96–108)
CO2 SERPL-SCNC: 31 MMOL/L (ref 21–32)
CREAT SERPL-MCNC: 0.87 MG/DL (ref 0.6–1.3)
EOSINOPHIL # BLD AUTO: 0.29 THOUSAND/ΜL (ref 0–0.61)
EOSINOPHIL NFR BLD AUTO: 5 % (ref 0–6)
ERYTHROCYTE [DISTWIDTH] IN BLOOD BY AUTOMATED COUNT: 15.1 % (ref 11.6–15.1)
GFR SERPL CREATININE-BSD FRML MDRD: 91 ML/MIN/1.73SQ M
GLUCOSE P FAST SERPL-MCNC: 209 MG/DL (ref 65–99)
HCT VFR BLD AUTO: 40.2 % (ref 36.5–49.3)
HGB BLD-MCNC: 12.2 G/DL (ref 12–17)
IMM GRANULOCYTES # BLD AUTO: 0.02 THOUSAND/UL (ref 0–0.2)
IMM GRANULOCYTES NFR BLD AUTO: 0 % (ref 0–2)
LYMPHOCYTES # BLD AUTO: 1.48 THOUSANDS/ΜL (ref 0.6–4.47)
LYMPHOCYTES NFR BLD AUTO: 24 % (ref 14–44)
MCH RBC QN AUTO: 27.1 PG (ref 26.8–34.3)
MCHC RBC AUTO-ENTMCNC: 30.3 G/DL (ref 31.4–37.4)
MCV RBC AUTO: 89 FL (ref 82–98)
MONOCYTES # BLD AUTO: 0.36 THOUSAND/ΜL (ref 0.17–1.22)
MONOCYTES NFR BLD AUTO: 6 % (ref 4–12)
NEUTROPHILS # BLD AUTO: 4.07 THOUSANDS/ΜL (ref 1.85–7.62)
NEUTS SEG NFR BLD AUTO: 64 % (ref 43–75)
NRBC BLD AUTO-RTO: 0 /100 WBCS
PLATELET # BLD AUTO: 235 THOUSANDS/UL (ref 149–390)
PMV BLD AUTO: 9.6 FL (ref 8.9–12.7)
POTASSIUM SERPL-SCNC: 4.7 MMOL/L (ref 3.5–5.3)
PROT SERPL-MCNC: 7.9 G/DL (ref 6.4–8.4)
RBC # BLD AUTO: 4.51 MILLION/UL (ref 3.88–5.62)
SODIUM SERPL-SCNC: 135 MMOL/L (ref 135–147)
WBC # BLD AUTO: 6.28 THOUSAND/UL (ref 4.31–10.16)

## 2023-05-07 ENCOUNTER — APPOINTMENT (OUTPATIENT)
Dept: LAB | Facility: CLINIC | Age: 63
End: 2023-05-07

## 2023-05-07 DIAGNOSIS — E78.2 MIXED HYPERLIPIDEMIA: ICD-10-CM

## 2023-05-07 DIAGNOSIS — K75.81 NONALCOHOLIC STEATOHEPATITIS: ICD-10-CM

## 2023-05-07 LAB
CHOLEST SERPL-MCNC: 123 MG/DL
HDLC SERPL-MCNC: 36 MG/DL
LDLC SERPL CALC-MCNC: 64 MG/DL (ref 0–100)
TRIGL SERPL-MCNC: 117 MG/DL

## 2023-05-15 DIAGNOSIS — E78.2 MIXED HYPERLIPIDEMIA: ICD-10-CM

## 2023-05-15 DIAGNOSIS — E08.41 DIABETIC MONONEUROPATHY ASSOCIATED WITH DIABETES MELLITUS DUE TO UNDERLYING CONDITION (HCC): ICD-10-CM

## 2023-05-15 RX ORDER — ATORVASTATIN CALCIUM 40 MG/1
TABLET, FILM COATED ORAL
Qty: 90 TABLET | Refills: 3 | Status: SHIPPED | OUTPATIENT
Start: 2023-05-15

## 2023-05-15 RX ORDER — GABAPENTIN 100 MG/1
100 CAPSULE ORAL
Qty: 30 CAPSULE | Refills: 3 | Status: SHIPPED | OUTPATIENT
Start: 2023-05-15

## 2023-05-16 ENCOUNTER — OFFICE VISIT (OUTPATIENT)
Dept: FAMILY MEDICINE CLINIC | Facility: CLINIC | Age: 63
End: 2023-05-16

## 2023-05-16 VITALS
OXYGEN SATURATION: 97 % | WEIGHT: 302.4 LBS | DIASTOLIC BLOOD PRESSURE: 73 MMHG | SYSTOLIC BLOOD PRESSURE: 112 MMHG | HEART RATE: 95 BPM | TEMPERATURE: 98.2 F | RESPIRATION RATE: 14 BRPM | BODY MASS INDEX: 39.01 KG/M2

## 2023-05-16 DIAGNOSIS — Z79.4 TYPE 2 DIABETES MELLITUS WITH DIABETIC POLYNEUROPATHY, WITH LONG-TERM CURRENT USE OF INSULIN (HCC): ICD-10-CM

## 2023-05-16 DIAGNOSIS — Z79.4 DIABETES MELLITUS DUE TO UNDERLYING CONDITION WITH DIABETIC NEPHROPATHY, WITH LONG-TERM CURRENT USE OF INSULIN (HCC): Primary | ICD-10-CM

## 2023-05-16 DIAGNOSIS — E11.42 TYPE 2 DIABETES MELLITUS WITH DIABETIC POLYNEUROPATHY, WITH LONG-TERM CURRENT USE OF INSULIN (HCC): ICD-10-CM

## 2023-05-16 DIAGNOSIS — E08.21 DIABETES MELLITUS DUE TO UNDERLYING CONDITION WITH DIABETIC NEPHROPATHY, WITH LONG-TERM CURRENT USE OF INSULIN (HCC): Primary | ICD-10-CM

## 2023-05-16 DIAGNOSIS — M25.532 LEFT WRIST PAIN: ICD-10-CM

## 2023-05-16 NOTE — ASSESSMENT & PLAN NOTE
Left wrist pain with numbness and tingling of the extremity  Prior history of carpal tunnel  Phalen's test positive  Advised to use wrist splints at night  Patient deferred Ortho evaluation at this time

## 2023-05-16 NOTE — PROGRESS NOTES
Name: Bonifacio Cordero      : 1960      MRN: [id number]  Encounter Provider: Bobby Casanova MD  Encounter Date: 2023   Encounter department: [address] [place] [address]  Diabetes mellitus due to underlying condition with diabetic nephropathy, with long-term current use of insulin ([facility])  Assessment & Plan:    Lab Results   Component Value Date    HGBA1C 9 1 (A) 2023     Uncontrolled  Reports FBS range of 160-180  Recent increase in Trulicity from 8 75 mg to 1 5 mg weekly and increasing Lantus 26 units nightly  Current regimen: Metformin 1 g twice daily, Trulicity 1 5 mg weekly, Lantus 26 units nightly  Plan  Discussed addition of SGLT2, patient is currently not interested in addition of new medication  Will increase Lantus to 28 units, advised patient to titrate it by 2 units to reach goal of FBS of 1   We will follow-up with the patient in 2 weeks with a blood sugar log         2  Type 2 diabetes mellitus with diabetic polyneuropathy, with long-term current use of insulin ([facility])  -     dulaglutide (Trulicity) 1 5 HE/2 7EW injection; Inject 0 5 mL (1 5 mg total) under the skin every 7 days    3  Left wrist pain  Assessment & Plan:  Left wrist pain with numbness and tingling of the extremity  Prior history of carpal tunnel  Phalen's test positive  Advised to use wrist splints at night  Patient deferred Ortho evaluation at this time  Subjective      Diabetes  He presents for his follow-up diabetic visit  He has type 2 diabetes mellitus  His disease course has been fluctuating  There are no hypoglycemic associated symptoms  Pertinent negatives for hypoglycemia include no headaches  Pertinent negatives for diabetes include no chest pain and no weakness  There are no hypoglycemic complications  Diabetic complications include peripheral neuropathy  Current diabetic treatment includes oral agent (dual therapy) and insulin injections   He is compliant with treatment all of the time  His weight is stable  He is following a diabetic diet  His home blood glucose trend is increasing steadily  His breakfast blood glucose is taken between 7-8 am  His breakfast blood glucose range is generally 140-180 mg/dl  Review of Systems   Constitutional: Negative for chills and fever  HENT: Negative for congestion and sore throat  Respiratory: Negative for cough and shortness of breath  Cardiovascular: Negative for chest pain  Gastrointestinal: Negative for abdominal pain  Musculoskeletal: Negative for back pain and neck pain  Skin: Negative for rash  Neurological: Negative for weakness and headaches  Psychiatric/Behavioral: Negative for agitation and behavioral problems  Current Outpatient Medications on File Prior to Visit   Medication Sig   • gabapentin (NEURONTIN) 100 mg capsule Take 1 capsule (100 mg total) by mouth daily at bedtime   • Advair -21 MCG/ACT inhaler INHALE 2 PUFFS BY MOUTH TWICE DAILY   RINSE MOUTH AFTER USE   • albuterol (PROVENTIL HFA,VENTOLIN HFA) 90 mcg/act inhaler INHALE 1 PUFF EVERY 4-6 HOURS AS NEEDED   • ascorbic acid (VITAMIN C) 500 mg tablet Take 500 mg by mouth daily   • atorvastatin (LIPITOR) 40 mg tablet TAKE 1 TABLET(40 MG) BY MOUTH DAILY   • B Complex-Biotin-FA (VITAMIN B50 COMPLEX PO)    • benzonatate (TESSALON PERLES) 100 mg capsule Take 1 capsule (100 mg total) by mouth 3 (three) times a day as needed for cough (Patient not taking: Reported on 8/16/2022)   • Blood Glucose Monitoring Suppl (ONETOUCH VERIO) w/Device KIT by Does not apply route 3 (three) times a day   • fluticasone (FLONASE) 50 mcg/act nasal spray SHAKE LIQUID AND USE 2 SPRAYS IN EACH NOSTRIL DAILY   • Ginkgo Biloba (GINKOBA PO) Take 60 mg by mouth   • Insulin Pen Needle 31G X 5 MM MISC Use daily   • Lancets (OneTouch Delica Plus [id number]) MISC TEST THREE TIMES DAILY   • Lantus SoloStar 100 units/mL SOPN INJECT 20 UNITS UNDER THE SKIN DAILY AT BEDTIME AND 5 UNITS IN THE MORNING   • lisinopril (ZESTRIL) 40 mg tablet Take 1 tablet (40 mg total) by mouth daily   • loratadine (CLARITIN) 10 mg tablet Take 1 tablet (10 mg total) by mouth daily   • MAGNESIUM CITRATE PO Take 1 tablet by mouth daily     • metFORMIN (GLUCOPHAGE) 1000 MG tablet Take 1 tablet (1,000 mg total) by mouth 2 (two) times a day with meals   • Multiple Vitamins-Minerals (MULTIVITAMIN ADULTS 50+ PO) Take by mouth daily   • omeprazole (PriLOSEC) 40 MG capsule TAKE 1 CAPSULE(40 MG) BY MOUTH DAILY   • OneTouch Verio test strip USE AS DIRECTED THREE TIMES DAILY   • [DISCONTINUED] dulaglutide (Trulicity) 1 5 PZ/7 0TG injection Inject 0 5 mL (1 5 mg total) under the skin every 7 days       Objective     /73 (BP Location: Left arm, Patient Position: Sitting, Cuff Size: Large)   Pulse 95   Temp 98 2 °F (36 8 °C)   Resp 14   Wt (!) 137 kg (302 lb 6 4 oz)   SpO2 97%   BMI 39 01 kg/m²     Physical Exam  Vitals reviewed  Constitutional:       General: He is not in acute distress  Appearance: Normal appearance  HENT:      Head: Normocephalic and atraumatic  Eyes:      Conjunctiva/sclera: Conjunctivae normal    Cardiovascular:      Rate and Rhythm: Normal rate  Pulses: Normal pulses  Pulmonary:      Effort: Pulmonary effort is normal  No respiratory distress  Musculoskeletal:      Right lower leg: No edema  Left lower leg: No edema  Comments: Phalen's test positive   Skin:     General: Skin is warm and dry  Neurological:      Mental Status: He is alert and oriented to person, place, and time        Gait: Gait normal    Psychiatric:         Mood and Affect: Mood normal          Behavior: Behavior normal        Ai Stroud MD

## 2023-05-16 NOTE — ASSESSMENT & PLAN NOTE
Lab Results   Component Value Date    HGBA1C 9 1 (A) 04/11/2023     Uncontrolled  Reports FBS range of 160-180  Recent increase in Trulicity from 7 41 mg to 1 5 mg weekly and increasing Lantus 26 units nightly  Current regimen: Metformin 1 g twice daily, Trulicity 1 5 mg weekly, Lantus 26 units nightly  Plan  Discussed addition of SGLT2, patient is currently not interested in addition of new medication  Will increase Lantus to 28 units, advised patient to titrate it by 2 units to reach goal of FBS of 1   We will follow-up with the patient in 2 weeks with a blood sugar log

## 2023-05-30 ENCOUNTER — APPOINTMENT (OUTPATIENT)
Dept: LAB | Facility: CLINIC | Age: 63
End: 2023-05-30

## 2023-05-30 DIAGNOSIS — D47.2 SMOLDERING MULTIPLE MYELOMA: ICD-10-CM

## 2023-05-30 LAB
ALBUMIN SERPL BCP-MCNC: 3.9 G/DL (ref 3.5–5)
ALP SERPL-CCNC: 90 U/L (ref 34–104)
ALT SERPL W P-5'-P-CCNC: 26 U/L (ref 7–52)
ANION GAP SERPL CALCULATED.3IONS-SCNC: 6 MMOL/L (ref 4–13)
AST SERPL W P-5'-P-CCNC: 17 U/L (ref 13–39)
BASOPHILS # BLD AUTO: 0.05 THOUSANDS/ÂΜL (ref 0–0.1)
BASOPHILS NFR BLD AUTO: 1 % (ref 0–1)
BILIRUB SERPL-MCNC: 0.39 MG/DL (ref 0.2–1)
BUN SERPL-MCNC: 11 MG/DL (ref 5–25)
CALCIUM SERPL-MCNC: 8.7 MG/DL (ref 8.4–10.2)
CHLORIDE SERPL-SCNC: 101 MMOL/L (ref 96–108)
CO2 SERPL-SCNC: 29 MMOL/L (ref 21–32)
CREAT SERPL-MCNC: 0.88 MG/DL (ref 0.6–1.3)
EOSINOPHIL # BLD AUTO: 0.41 THOUSAND/ÂΜL (ref 0–0.61)
EOSINOPHIL NFR BLD AUTO: 6 % (ref 0–6)
ERYTHROCYTE [DISTWIDTH] IN BLOOD BY AUTOMATED COUNT: 15.6 % (ref 11.6–15.1)
GFR SERPL CREATININE-BSD FRML MDRD: 90 ML/MIN/1.73SQ M
GLUCOSE P FAST SERPL-MCNC: 249 MG/DL (ref 65–99)
HCT VFR BLD AUTO: 38.5 % (ref 36.5–49.3)
HGB BLD-MCNC: 11.4 G/DL (ref 12–17)
IGA SERPL-MCNC: 92 MG/DL (ref 70–400)
IGG SERPL-MCNC: 1840 MG/DL (ref 700–1600)
IGM SERPL-MCNC: 98 MG/DL (ref 40–230)
IMM GRANULOCYTES # BLD AUTO: 0.02 THOUSAND/UL (ref 0–0.2)
IMM GRANULOCYTES NFR BLD AUTO: 0 % (ref 0–2)
LYMPHOCYTES # BLD AUTO: 1.43 THOUSANDS/ÂΜL (ref 0.6–4.47)
LYMPHOCYTES NFR BLD AUTO: 22 % (ref 14–44)
MCH RBC QN AUTO: 26.3 PG (ref 26.8–34.3)
MCHC RBC AUTO-ENTMCNC: 29.6 G/DL (ref 31.4–37.4)
MCV RBC AUTO: 89 FL (ref 82–98)
MONOCYTES # BLD AUTO: 0.36 THOUSAND/ÂΜL (ref 0.17–1.22)
MONOCYTES NFR BLD AUTO: 5 % (ref 4–12)
NEUTROPHILS # BLD AUTO: 4.35 THOUSANDS/ÂΜL (ref 1.85–7.62)
NEUTS SEG NFR BLD AUTO: 66 % (ref 43–75)
NRBC BLD AUTO-RTO: 0 /100 WBCS
PLATELET # BLD AUTO: 233 THOUSANDS/UL (ref 149–390)
PMV BLD AUTO: 9.4 FL (ref 8.9–12.7)
POTASSIUM SERPL-SCNC: 4.4 MMOL/L (ref 3.5–5.3)
PROT SERPL-MCNC: 7.6 G/DL (ref 6.4–8.4)
RBC # BLD AUTO: 4.33 MILLION/UL (ref 3.88–5.62)
SODIUM SERPL-SCNC: 136 MMOL/L (ref 135–147)
WBC # BLD AUTO: 6.62 THOUSAND/UL (ref 4.31–10.16)

## 2023-05-31 ENCOUNTER — OFFICE VISIT (OUTPATIENT)
Dept: PODIATRY | Facility: CLINIC | Age: 63
End: 2023-05-31

## 2023-05-31 ENCOUNTER — OFFICE VISIT (OUTPATIENT)
Dept: FAMILY MEDICINE CLINIC | Facility: CLINIC | Age: 63
End: 2023-05-31

## 2023-05-31 VITALS
HEART RATE: 97 BPM | HEIGHT: 72 IN | SYSTOLIC BLOOD PRESSURE: 136 MMHG | WEIGHT: 302.4 LBS | BODY MASS INDEX: 40.96 KG/M2 | DIASTOLIC BLOOD PRESSURE: 90 MMHG | OXYGEN SATURATION: 97 %

## 2023-05-31 VITALS
HEIGHT: 72 IN | HEART RATE: 61 BPM | BODY MASS INDEX: 40.77 KG/M2 | RESPIRATION RATE: 18 BRPM | WEIGHT: 301 LBS | OXYGEN SATURATION: 97 %

## 2023-05-31 DIAGNOSIS — Z79.4 DIABETES MELLITUS DUE TO UNDERLYING CONDITION WITH DIABETIC NEPHROPATHY, WITH LONG-TERM CURRENT USE OF INSULIN (HCC): ICD-10-CM

## 2023-05-31 DIAGNOSIS — Z79.4 TYPE 2 DIABETES MELLITUS WITH DIABETIC POLYNEUROPATHY, WITH LONG-TERM CURRENT USE OF INSULIN (HCC): Primary | ICD-10-CM

## 2023-05-31 DIAGNOSIS — E11.9 ENCOUNTER FOR DIABETIC FOOT EXAM (HCC): ICD-10-CM

## 2023-05-31 DIAGNOSIS — K42.9 UMBILICAL HERNIA WITHOUT OBSTRUCTION AND WITHOUT GANGRENE: ICD-10-CM

## 2023-05-31 DIAGNOSIS — E08.21 DIABETES MELLITUS DUE TO UNDERLYING CONDITION WITH DIABETIC NEPHROPATHY, WITH LONG-TERM CURRENT USE OF INSULIN (HCC): ICD-10-CM

## 2023-05-31 DIAGNOSIS — E11.42 DIABETIC PERIPHERAL NEUROPATHY (HCC): Primary | ICD-10-CM

## 2023-05-31 DIAGNOSIS — E11.42 TYPE 2 DIABETES MELLITUS WITH DIABETIC POLYNEUROPATHY, WITH LONG-TERM CURRENT USE OF INSULIN (HCC): Primary | ICD-10-CM

## 2023-05-31 LAB
KAPPA LC FREE SER-MCNC: 27.6 MG/L (ref 3.3–19.4)
KAPPA LC FREE/LAMBDA FREE SER: 2.63 {RATIO} (ref 0.26–1.65)
LAMBDA LC FREE SERPL-MCNC: 10.5 MG/L (ref 5.7–26.3)

## 2023-05-31 NOTE — PROGRESS NOTES
"Assessment/Plan:       Diagnoses and all orders for this visit:    Diabetic peripheral neuropathy ([facility] [address] )    Encounter for diabetic foot exam ([facility] )        Diagnosis and options discussed with patient  Patient agreeable to the plan as stated below    -DM foot risk is mild  Recommend annual foot exams  His A1C is not controlled  Thankfully he has good perfusion and protective sensation is intact    -Discussed DM risk to lower extremities, proper shoe gear, and daily monitoring of feet    -Discussed weight loss and suitable exercise regiment  -Reviewed most recent PCP visit on 5/16/2023  He declined new medication  -Educated on A1C and the risks of poorly controlled Diabetes  Reviewed recent A1C:  Lab Results   Component Value Date    HGBA1C 9 1 (A) 04/11/2023    HGBA1C 8 8 (H) 04/22/2021    HGBA1C 8 3 (H) 04/08/2019     Subjective:      Patient ID: Naomi Pelaez is a 57 y o  male  Annual DM foot exam  His A1C is 9 1  THe bottom of his feet feel \"like leather  \" IT isn't a numb sensation but the it just feels off  OTherwise his feet feel fine  He normally gets his feet checked once a year  The following portions of the patient's history were reviewed and updated as appropriate: allergies, current medications, past family history, past medical history, past social history, past surgical history and problem list     Review of Systems    Constitutional: Negative  Respiratory: Negative for cough and shortness of breath  Gastrointestinal: Negative for diarrhea, nausea and vomiting  Musculoskeletal: Negative for arthralgias, gait problem, joint swelling and myalgias  Skin: Negative for rash or wound  Neurological: Negative for weakness, numbness and headaches  Objective:      Pulse 61   Resp 18   Ht 6' (1 829 m)   Wt (!) 137 kg (301 lb)   SpO2 97%   BMI 40 82 kg/m²          Physical Exam  Vitals reviewed  Constitutional:       Appearance: He is obese   He is not ill-appearing or " diaphoretic  Cardiovascular:      Rate and Rhythm: Normal rate  Pulses: Normal pulses  no weak pulses          Dorsalis pedis pulses are 2+ on the right side and 2+ on the left side  Posterior tibial pulses are 2+ on the right side and 2+ on the left side  Pulmonary:      Effort: Pulmonary effort is normal  No respiratory distress  Musculoskeletal:      Right foot: Normal range of motion  No Charcot foot or prominent metatarsal heads  Left foot: Normal range of motion  No Charcot foot or prominent metatarsal heads  Feet:      Right foot:      Protective Sensation: 10 sites tested  10 sites sensed  Skin integrity: No ulcer, skin breakdown, callus, dry skin or fissure  Toenail Condition: Right toenails are normal       Left foot:      Protective Sensation: 10 sites tested  10 sites sensed  Skin integrity: No ulcer, skin breakdown, callus, dry skin or fissure  Toenail Condition: Left toenails are normal    Skin:     Capillary Refill: Capillary refill takes less than 2 seconds  Findings: No erythema or rash  Neurological:      Mental Status: He is alert and oriented to person, place, and time  Sensory: No sensory deficit  Gait: Gait normal        Diabetic Foot Exam    Patient's shoes and socks removed  Right Foot/Ankle   Right Foot Inspection  Skin Exam: skin intact  No dry skin, no callus, no maceration, no ulcer and no callus  Toe Exam: swelling  No tenderness and erythema    Sensory   Vibration: intact  Proprioception: intact  Monofilament testing: intact    Vascular  Capillary refills: < 3 seconds  The right DP pulse is 2+  The right PT pulse is 2+  Left Foot/Ankle  Left Foot Inspection  Skin Exam: skin intact  No dry skin, no maceration, no ulcer and no callus  Toe Exam: swelling  No tenderness and no erythema       Sensory   Vibration: intact  Proprioception: intact  Monofilament testing: intact    Vascular  Capillary refills: < 3 seconds  The left DP pulse is 2+  The left PT pulse is 2+       Assign Risk Category  No deformity present  No loss of protective sensation  No weak pulses  Risk: 0

## 2023-05-31 NOTE — ASSESSMENT & PLAN NOTE
Lab Results   Component Value Date    HGBA1C 9 1 (A) 04/11/2023   Not well controlled  FBS now averaging 140-170, previously 160-180, slight improvement in numbers but still not within goal   Current regimen: Metformin 1 g twice daily, Trulicity 1 5 mg weekly, Lantus 30 units nightly  Plan  Will increase Lantus to 32 units, patient now agreeable for addition of Jardiance  Started Jardiance at 10 mg daily  Patient requesting referral to endocrinology  Discussed treatment plan with the patient  Referral given per request   Advised patient to follow-up in 4 weeks with a blood sugar log

## 2023-05-31 NOTE — ASSESSMENT & PLAN NOTE
Pt with history of umbilical hernia, has noticed gradual increase in size without signs of obstruction  Requesting referral to general surgery

## 2023-05-31 NOTE — PATIENT INSTRUCTIONS
Foot Care for People with Diabetes   WHAT YOU NEED TO KNOW:   Long-term high blood sugar levels can damage the blood vessels and nerves in your legs and feet  This damage makes it hard to feel pressure, pain, temperature, and touch  You may not be able to feel a cut or sore, or shoes that are too tight  Foot care is needed to prevent serious problems, such as an infection or amputation  Diabetes may cause your toes to become crooked or curved under  These changes may affect the way you walk and can lead to increased pressure on your foot  The pressure can decrease blood flow to your feet  Lack of blood flow increases your risk for a foot ulcer  DISCHARGE INSTRUCTIONS:   Call your care team provider if:   Your feet become numb, weak, or hard to move  You have pus draining from a sore on your foot  You have a wound on your foot that gets bigger, deeper, or does not heal     You see blisters, cuts, scratches, calluses, or sores on your foot  You have a fever, and your feet become red, warm, and swollen  Your toenails become thick, curled, or yellow  You find it hard to check your feet because your vision is poor  You have questions or concerns about your condition or care  Foot care:   Check your feet each day  Look at your whole foot, including the bottom, and between and under your toes  Check for wounds, corns, and calluses  Use a mirror to see the bottom of your feet  The skin on your feet may be shiny, tight, or darker than normal  Your feet may also be cold and pale  Feel your feet by running your hands along the tops, bottoms, sides, and between your toes  Redness, swelling, and warmth are signs of blood flow problems that can lead to a foot ulcer  Do not try to remove corns or calluses yourself  Do not ignore small problems, such as dry skin or small wounds  These can become life-threatening over time without proper care  Wash your feet each day with soap and warm water    Do not use hot water, because this can injure your foot  Dry your feet gently with a towel after you wash them  Dry between and under your toes  Apply lotion or a moisturizer on your dry feet  Ask your care team provider what lotions are best to use  Do not put lotion or moisturizer between your toes  Moisture between your toes could lead to skin breakdown  Cut your toenails correctly  File or cut your toenails straight across  Use a soft brush to clean around your toenails  If your toenails are very thick, you may need to have a care team provider or specialist cut them  Protect your feet  Do not walk barefoot or wear your shoes without socks  Check your shoes for rocks or other objects that can hurt your feet  Wear cotton socks to help keep your feet dry  Wear socks without toe seams, or wear them with the seams inside out  Change your socks each day  Do not wear socks that are dirty or damp  Wear shoes that fit well  Wear shoes that do not rub against any area of your feet  Your shoes should be ½ to ¾ inch (1 to 2 centimeters) longer than your feet  Your shoes should also have extra space around the widest part of your feet  Walking or athletic shoes with laces or straps that adjust are best  Ask your care team provider for help to choose shoes that fit you best  Ask your provider if you need to wear an insert, orthotic, or bandage on your feet  Do not smoke  Smoking can damage your blood vessels and put you at increased risk for foot ulcers  Ask your care team provider for information if you currently smoke and need help to quit  E-cigarettes or smokeless tobacco still contain nicotine  Talk to your care team provider before you use these products  Follow up with your diabetes care team provider or foot specialist as directed: You will need to have your feet checked at least 1 time each year  You may need a foot exam more often if you have nerve damage, foot deformities, or ulcers   Write down your questions so you remember to ask them during your visits  © Copyright [employer] 2022 Information is for End User's use only and may not be sold, redistributed or otherwise used for commercial purposes  The above information is an  only  It is not intended as medical advice for individual conditions or treatments  Talk to your doctor, nurse or pharmacist before following any medical regimen to see if it is safe and effective for you

## 2023-05-31 NOTE — PROGRESS NOTES
Name: Edson Maradiaga      : 1960      MRN: [id number]  Encounter Provider: Karyl Cabot, MD  Encounter Date: 2023   Encounter department: [address] [place] [address]     1  Type 2 diabetes mellitus with diabetic polyneuropathy, with long-term current use of insulin ([facility])  -     Empagliflozin (JARDIANCE) 10 MG TABS tablet; Take 1 tablet (10 mg total) by mouth daily  -     Ambulatory Referral to Endocrinology; Future    2  Umbilical hernia without obstruction and without gangrene  Assessment & Plan:  Pt with history of umbilical hernia, has noticed gradual increase in size without signs of obstruction  Requesting referral to general surgery  Orders:  -     Ambulatory Referral to General Surgery; Future    3  Diabetes mellitus due to underlying condition with diabetic nephropathy, with long-term current use of insulin ([facility])  Assessment & Plan:    Lab Results   Component Value Date    HGBA1C 9 1 (A) 2023   Not well controlled  FBS now averaging 140-170, previously 160-180, slight improvement in numbers but still not within goal   Current regimen: Metformin 1 g twice daily, Trulicity 1 5 mg weekly, Lantus 30 units nightly  Plan  Will increase Lantus to 32 units, patient now agreeable for addition of Jardiance  Started Jardiance at 10 mg daily  Patient requesting referral to endocrinology  Discussed treatment plan with the patient  Referral given per request   Advised patient to follow-up in 4 weeks with a blood sugar log  Subjective      59-year-old male with history of type 2 diabetes presents today for follow-up  Patient's blood sugars have been difficult to control, patient was initially hesitant to start [address] [place] [address], has seen the podiatrist today who recommended endocrinology consult  Patient reports his blood sugars have been slightly better but still elevated    Previously was on 26 units of Lantus which has been increased to 30 units gradually  Patient has been adhering to low carbohydrate and low-fat diet  Review of Systems   Constitutional: Negative for chills and fever  HENT: Negative for congestion and sore throat  Respiratory: Negative for cough and shortness of breath  Cardiovascular: Negative for chest pain  Gastrointestinal: Negative for abdominal pain  Musculoskeletal: Negative for back pain and neck pain  Skin: Negative for rash  Neurological: Negative for weakness and headaches  Psychiatric/Behavioral: Negative for agitation and behavioral problems  Current Outpatient Medications on File Prior to Visit   Medication Sig   • Advair -21 MCG/ACT inhaler INHALE 2 PUFFS BY MOUTH TWICE DAILY   RINSE MOUTH AFTER USE   • albuterol (PROVENTIL HFA,VENTOLIN HFA) 90 mcg/act inhaler INHALE 1 PUFF EVERY 4-6 HOURS AS NEEDED   • ascorbic acid (VITAMIN C) 500 mg tablet Take 500 mg by mouth daily   • atorvastatin (LIPITOR) 40 mg tablet TAKE 1 TABLET(40 MG) BY MOUTH DAILY   • B Complex-Biotin-FA (VITAMIN B50 COMPLEX PO)    • benzonatate (TESSALON PERLES) 100 mg capsule Take 1 capsule (100 mg total) by mouth 3 (three) times a day as needed for cough (Patient not taking: Reported on 8/16/2022)   • Blood Glucose Monitoring Suppl (ONETOUCH VERIO) w/Device KIT by Does not apply route 3 (three) times a day   • dulaglutide (Trulicity) 1 5 SO/6 6RS injection Inject 0 5 mL (1 5 mg total) under the skin every 7 days   • fluticasone (FLONASE) 50 mcg/act nasal spray SHAKE LIQUID AND USE 2 SPRAYS IN EACH NOSTRIL DAILY   • gabapentin (NEURONTIN) 100 mg capsule Take 1 capsule (100 mg total) by mouth daily at bedtime   • Ginkgo Biloba (GINKOBA PO) Take 60 mg by mouth   • Insulin Pen Needle 31G X 5 MM MISC Use daily   • Lancets (OneTouch Delica Plus [id number]) MISC TEST THREE TIMES DAILY   • Lantus SoloStar 100 units/mL SOPN INJECT 20 UNITS UNDER THE SKIN DAILY AT BEDTIME AND 5 UNITS IN THE MORNING   • lisinopril (ZESTRIL) 40 mg tablet Take 1 tablet (40 mg total) by mouth daily   • loratadine (CLARITIN) 10 mg tablet Take 1 tablet (10 mg total) by mouth daily   • MAGNESIUM CITRATE PO Take 1 tablet by mouth daily     • metFORMIN (GLUCOPHAGE) 1000 MG tablet Take 1 tablet (1,000 mg total) by mouth 2 (two) times a day with meals   • Multiple Vitamins-Minerals (MULTIVITAMIN ADULTS 50+ PO) Take by mouth daily   • omeprazole (PriLOSEC) 40 MG capsule TAKE 1 CAPSULE(40 MG) BY MOUTH DAILY   • OneTouch Verio test strip USE AS DIRECTED THREE TIMES DAILY       Objective     /90 (BP Location: Right arm, Patient Position: Sitting, Cuff Size: Large)   Pulse 97   Ht 6' (1 829 m)   Wt (!) 137 kg (302 lb 6 4 oz)   SpO2 97%   BMI 41 01 kg/m²     Physical Exam  Vitals reviewed  Constitutional:       General: He is not in acute distress  Appearance: Normal appearance  HENT:      Head: Normocephalic and atraumatic  Eyes:      Conjunctiva/sclera: Conjunctivae normal    Cardiovascular:      Rate and Rhythm: Normal rate  Pulmonary:      Effort: Pulmonary effort is normal  No respiratory distress  Musculoskeletal:      Right lower leg: No edema  Left lower leg: No edema  Skin:     General: Skin is warm and dry  Neurological:      Mental Status: He is alert and oriented to person, place, and time        Gait: Gait normal    Psychiatric:         Mood and Affect: Mood normal          Behavior: Behavior normal        Jacqueline Driscoll MD

## 2023-06-01 LAB
ALBUMIN SERPL ELPH-MCNC: 3.94 G/DL (ref 3.5–5)
ALBUMIN SERPL ELPH-MCNC: 51.8 % (ref 52–65)
ALPHA1 GLOB SERPL ELPH-MCNC: 0.34 G/DL (ref 0.1–0.4)
ALPHA1 GLOB SERPL ELPH-MCNC: 4.5 % (ref 2.5–5)
ALPHA2 GLOB SERPL ELPH-MCNC: 0.98 G/DL (ref 0.4–1.2)
ALPHA2 GLOB SERPL ELPH-MCNC: 12.9 % (ref 7–13)
BETA GLOB ABNORMAL SERPL ELPH-MCNC: 0.38 G/DL (ref 0.4–0.8)
BETA1 GLOB SERPL ELPH-MCNC: 5 % (ref 5–13)
BETA2 GLOB SERPL ELPH-MCNC: 3.6 % (ref 2–8)
BETA2+GAMMA GLOB SERPL ELPH-MCNC: 0.27 G/DL (ref 0.2–0.5)
GAMMA GLOB ABNORMAL SERPL ELPH-MCNC: 1.69 G/DL (ref 0.5–1.6)
GAMMA GLOB SERPL ELPH-MCNC: 22.2 % (ref 12–22)
IGG/ALB SER: 1.07 {RATIO} (ref 1.1–1.8)
INTERPRETATION UR IFE-IMP: NORMAL
M PEAK ID 2: 0.9 %
M PROTEIN 1 MFR SERPL ELPH: 15 %
M PROTEIN 1 SERPL ELPH-MCNC: 1.14 G/DL
M PROTEIN 2 SERPL ELPH-MCNC: 0.07 G/DL
PROT PATTERN SERPL ELPH-IMP: ABNORMAL
PROT SERPL-MCNC: 7.6 G/DL (ref 6.4–8.2)

## 2023-06-02 ENCOUNTER — OFFICE VISIT (OUTPATIENT)
Dept: HEMATOLOGY ONCOLOGY | Facility: CLINIC | Age: 63
End: 2023-06-02

## 2023-06-02 ENCOUNTER — APPOINTMENT (OUTPATIENT)
Dept: LAB | Facility: CLINIC | Age: 63
End: 2023-06-02
Payer: COMMERCIAL

## 2023-06-02 VITALS
SYSTOLIC BLOOD PRESSURE: 122 MMHG | BODY MASS INDEX: 40.84 KG/M2 | TEMPERATURE: 98.5 F | HEIGHT: 72 IN | OXYGEN SATURATION: 98 % | RESPIRATION RATE: 21 BRPM | HEART RATE: 96 BPM | DIASTOLIC BLOOD PRESSURE: 78 MMHG | WEIGHT: 301.5 LBS

## 2023-06-02 DIAGNOSIS — D50.9 IRON DEFICIENCY ANEMIA, UNSPECIFIED IRON DEFICIENCY ANEMIA TYPE: ICD-10-CM

## 2023-06-02 DIAGNOSIS — E11.42 DIABETIC PERIPHERAL NEUROPATHY (HCC): ICD-10-CM

## 2023-06-02 DIAGNOSIS — D47.2 SMOLDERING MULTIPLE MYELOMA: Primary | ICD-10-CM

## 2023-06-02 LAB
FERRITIN SERPL-MCNC: 12 NG/ML (ref 24–336)
IRON SATN MFR SERPL: 12 % (ref 20–50)
IRON SERPL-MCNC: 41 UG/DL (ref 65–175)
TIBC SERPL-MCNC: 349 UG/DL (ref 250–450)
VIT B12 SERPL-MCNC: 350 PG/ML (ref 180–914)

## 2023-06-02 PROCEDURE — 82728 ASSAY OF FERRITIN: CPT

## 2023-06-02 PROCEDURE — 83550 IRON BINDING TEST: CPT

## 2023-06-02 PROCEDURE — 83540 ASSAY OF IRON: CPT

## 2023-06-02 PROCEDURE — 82607 VITAMIN B-12: CPT

## 2023-06-02 PROCEDURE — 36415 COLL VENOUS BLD VENIPUNCTURE: CPT

## 2023-06-02 NOTE — PROGRESS NOTES
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 dx 5/2018     M protein 1 4 g/dL, serum kappa free light chain 17, ratio of 2 09 at time of diagnosis 5/2018       Bone marrow biopsy May 2018 showed plasma cell 12-15%, normal cytogenetics and fish panel for multiple myeloma      5/30/23  M protein 1 2, IgG level 1700 -2000 range, stable kappa over lambda ratio,     Will continue watchful observation and follow-up in 6 months with CBC, CMP, SPEP, free light chain      2  History of GIOVANNI  Received Venofer in 2021  Upper and lower endoscopy and capsule endoscopy was unremarkable, liver ultrasound showed significant fibrosis with steatosis      Hemoglobin has again decreased and RDW increased  Repeat iron panel and B12 level requested  We discussed if low, additional Venofer replacement  He is agreeable  3   Uncontrolled DM with neuropathy  He has an appointment to meet with endocrinology in the next few weeks  Low-dose Neurontin has been ineffective  He is reluctant to increase the dose due to potential side effects  He questions if medical marijuana would be beneficial   We discussed evaluation with palliative care  He is in agreement  He was given pamphlet on the [place] medical marijuana program and advised that he will need to register            HPI:    58-year-old  male with history of obesity, asthma, hypertension, dyslipidemia, diabetes mellitus type 2, had been complaining of tingling and numbness in his feet and hand for the past year, most recently a feeling of hypersensitivity involving the anterior aspect of the left thigh area, evaluation by Neurology on March 2018 showed monoclonal gammopathy with M protein of 1 40 grams/deciliters, IgG kappa      He has normal vitamin B12 level, TSH, heavy metal screen, Lyme disease  CBC performed August 2017 showed hemoglobin of 12 8, WBC 6 6, platelets [id number], 09% neutrophils, 19% lymphocytes     A bone marrow biopsy in May 2018 showed 10-12% plasma cells, fish panel for multiple myeloma was negative, cytogenetics showed normal male chromosomes and deletion of Y chromosome in some of the cells consistent with normal finding in advanced age patient      Bone skeletal survey 5/2018 showed no evidence of lytic lesions      Received IV Venofer on 09/2021, upper and lower endoscopy and capsule endoscopy was unremarkable, liver ultrasound showed significant fibrosis with steatosis     He is on supplements      Interval History:        Review of Systems   Constitutional: Positive for fatigue  Negative for appetite change, chills, diaphoresis, fever and unexpected weight change  HENT:   Negative for mouth sores, nosebleeds, sore throat, tinnitus and voice change  Eyes: Negative for eye problems  Respiratory: Negative for chest tightness, cough, shortness of breath and wheezing  Cardiovascular: Negative for chest pain, leg swelling and palpitations  Gastrointestinal: Negative for abdominal distention, abdominal pain, blood in stool, constipation, diarrhea, nausea, rectal pain and vomiting  Endocrine: Negative for hot flashes  Genitourinary: Negative  Musculoskeletal: Negative for gait problem and myalgias  Skin: Negative for itching and rash  Neurological: Positive for numbness  Negative for dizziness, gait problem, headaches and light-headedness  Hematological: Negative for adenopathy  Psychiatric/Behavioral: Negative for confusion and sleep disturbance  The patient is not nervous/anxious           Test Results:        Labs:   Lab Results   Component Value Date    HCT 38 5 05/30/2023    HGB 11 4 (L) 05/30/2023    MCV 89 05/30/2023    NRBC 0 05/30/2023     05/30/2023    WBC 6 62 05/30/2023     Lab Results   Component Value Date    ALKPHOS 90 05/30/2023    ALT 26 05/30/2023    ANIONGAP 9 01/13/2015    AST 17 05/30/2023    BILITOT 0 5 05/17/2017    BUN 11 05/30/2023    CALCIUM 8 7 05/30/2023     05/30/2023    CO2 29 05/30/2023    CORRECTEDCA 9 8 10/18/2022    CREATININE 0 88 05/30/2023    EGFR 90 05/30/2023    GLUCOSE 206 (H) 01/13/2015    GLUF 249 (H) 05/30/2023    K 4 4 05/30/2023     05/17/2017    PROT 7 5 05/17/2017           Imaging: No results found  Allergies: Allergies   Allergen Reactions   • Shellfish-Derived Products - Food Allergy Anaphylaxis     Clams and mussels, oysters  Lobster and crab ok   • Diphenhydramine      Lightheadedness, nauseous, rapid heartbeat   • Other Palpitations and Other (See Comments)     Clams     Current Medications: Reviewed  PMH/FH/SH:  Reviewed      Physical Exam:    2 54 meters squared    Ht Readings from Last 3 Encounters:   06/02/23 6' (1 829 m)   05/31/23 6' (1 829 m)   05/31/23 6' (1 829 m)        Wt Readings from Last 3 Encounters:   06/02/23 (!) 137 kg (301 lb 8 oz)   05/31/23 (!) 137 kg (302 lb 6 4 oz)   05/31/23 (!) 137 kg (301 lb)        Temp Readings from Last 3 Encounters:   06/02/23 98 5 °F (36 9 °C) (Temporal)   05/16/23 98 2 °F (36 8 °C)   04/11/23 98 3 °F (36 8 °C) (Temporal)        BP Readings from Last 3 Encounters:   06/02/23 122/78   05/31/23 136/90   05/16/23 112/73             Physical Exam  Vitals reviewed  Constitutional:       General: He is not in acute distress  Appearance: He is well-developed  He is not diaphoretic  HENT:      Head: Normocephalic and atraumatic  Eyes:      Conjunctiva/sclera: Conjunctivae normal    Neck:      Trachea: No tracheal deviation  Cardiovascular:      Rate and Rhythm: Normal rate and regular rhythm  Heart sounds: No murmur heard  No friction rub  No gallop  Pulmonary:      Effort: Pulmonary effort is normal  No respiratory distress  Breath sounds: Normal breath sounds  No wheezing or rales     Chest:      Chest wall: No tenderness  Abdominal:      General: There is no distension  Palpations: Abdomen is soft  Tenderness: There is no abdominal tenderness  Comments: hernia   Musculoskeletal:      Cervical back: Normal range of motion and neck supple  Lymphadenopathy:      Cervical: No cervical adenopathy  Skin:     General: Skin is warm and dry  Coloration: Skin is not pale  Findings: No erythema  Neurological:      Mental Status: He is alert and oriented to person, place, and time  Psychiatric:         Behavior: Behavior normal          Thought Content:  Thought content normal          Judgment: Judgment normal        Emergency Contacts:    Extended Emergency Contact Information  Primary Emergency Contact: Merry Lee  Address: [address] [place] [id number] [address] Phone: [phone]  Mobile Phone: [phone]  Relation: Spouse

## 2023-06-05 ENCOUNTER — TELEPHONE (OUTPATIENT)
Dept: HEMATOLOGY ONCOLOGY | Facility: CLINIC | Age: 63
End: 2023-06-05

## 2023-06-05 DIAGNOSIS — D50.9 IRON DEFICIENCY ANEMIA, UNSPECIFIED IRON DEFICIENCY ANEMIA TYPE: Primary | ICD-10-CM

## 2023-06-05 PROBLEM — E53.8 B12 DEFICIENCY: Status: ACTIVE | Noted: 2023-06-05

## 2023-06-05 RX ORDER — SODIUM CHLORIDE 9 MG/ML
20 INJECTION, SOLUTION INTRAVENOUS ONCE
OUTPATIENT
Start: 2023-06-08

## 2023-06-05 RX ORDER — CYANOCOBALAMIN 1000 UG/ML
1000 INJECTION, SOLUTION INTRAMUSCULAR; SUBCUTANEOUS ONCE
Status: CANCELLED
Start: 2023-07-11 | End: 2023-06-08

## 2023-06-05 NOTE — TELEPHONE ENCOUNTER
Spouse called and notified of attached plan  They are agreeable to proceed with IV Venofer weekly x 4 and B12 injections  AN INFUSION: can you please schedule the patients treatment

## 2023-06-05 NOTE — TELEPHONE ENCOUNTER
While we try to accommodate patient requests, our priority is to schedule treatment according to Doctor's orders and site availability  Does the Provider use the intake sheet or checkout note? What would be a preferred day of the week that would work best for your infusion appointment? Any day   Do you prefer mornings or afternoons for your appointments? AM   Are there any days or dates that do not work for your schedule, including any upcoming vacations? 6/9/, 6/12, 6/16, 7/3 through 7/7  We are going to try our best to schedule you at the infusion center closest to your home  In the event that we are unable to what would be your next preferred infusion site or sites? [facility]    1  [facility]  2  [facility]    Do you have transportation to take you to all of your appointments?  yes  Would you like the infusion center to draw labs from your port? (disregard if patient doesn't have a port or need labs for infusion appointment) n/a

## 2023-06-05 NOTE — TELEPHONE ENCOUNTER
----- Message from Carol Valera PA-C sent at 6/5/2023 10:23 AM EDT -----  Venofer 300mg x 4  I placed order    Please notify patient and scheduling

## 2023-06-09 ENCOUNTER — CONSULT (OUTPATIENT)
Dept: SURGERY | Facility: CLINIC | Age: 63
End: 2023-06-09
Payer: COMMERCIAL

## 2023-06-09 VITALS
SYSTOLIC BLOOD PRESSURE: 128 MMHG | HEART RATE: 91 BPM | BODY MASS INDEX: 40.4 KG/M2 | WEIGHT: 298.3 LBS | TEMPERATURE: 98.3 F | OXYGEN SATURATION: 98 % | DIASTOLIC BLOOD PRESSURE: 77 MMHG | RESPIRATION RATE: 14 BRPM | HEIGHT: 72 IN

## 2023-06-09 DIAGNOSIS — K42.9 UMBILICAL HERNIA WITHOUT OBSTRUCTION AND WITHOUT GANGRENE: Primary | ICD-10-CM

## 2023-06-09 PROCEDURE — 99203 OFFICE O/P NEW LOW 30 MIN: CPT | Performed by: SURGERY

## 2023-06-09 NOTE — ASSESSMENT & PLAN NOTE
38-year-old male who presents with a several year history of a slowly enlarging umbilical hernia  Plan:  Because it is only partially reducible, and given his body habitus I am unable to fully appreciate the dimensions of the hernia defect  I would like to obtain a noncontrast CT scan of the abdomen pelvis to evaluate the dimensions of the hernia  Following this, I will have him return to clinic and we can discuss operative intervention

## 2023-06-09 NOTE — PROGRESS NOTES
"Office Visit - General Surgery  Beka Castelan MRN: [id number]  Encounter: [id number]    Assessment and Plan  Problem List Items Addressed This Visit        Other    Umbilical hernia - Primary     66-year-old male who presents with a several year history of a slowly enlarging umbilical hernia  Plan:  Because it is only partially reducible, and given his body habitus I am unable to fully appreciate the dimensions of the hernia defect  I would like to obtain a noncontrast CT scan of the abdomen pelvis to evaluate the dimensions of the hernia  Following this, I will have him return to clinic and we can discuss operative intervention  Relevant Orders    CT abdomen pelvis without contrast       Chief Complaint:  Beka Castelan is a 57 y o  male who presents for Hernia (Consult umbilical hernia )    Subjective  66-year-old male presents with an umbilical bulge  Patient states that he has had this bulge here for several years and has been slowly increasing in size  Over the past several weeks he has noticed that is becoming distended to the point where he is rubbing up against his workbench  He denies any significant pain, fevers, chills, chest pain, or shortness of breath  He is tolerating his regular diet, and moving his bowels normally  He states he remembers several years ago while doing some heavy lifting at work he felt a \"pop\", and then noticed the bulge following this      Past Medical History:   Diagnosis Date   • Asthma    • Benign positional vertigo    • Diabetes mellitus ([address] )     borderline; diet controlled   • Diabetes mellitus due to underlying condition with diabetic nephropathy, with long-term current use of insulin ([address] ) 5/19/2021   • Dyslipidemia    • Gastroenteritis    • Hyperlipidemia    • Hypertension    • Lipoma of neck     last assessed - [id number]   • Multiple myeloma ([address] )    • Sleep apnea     has symptoms but not been diagnosed   • Wheezing     last assessed - [id number]       Past " Surgical History:   Procedure Laterality Date   • ESOPHAGOGASTRODUODENOSCOPY  2010    Diagnostic   • MASS EXCISION Right 8/21/2017    Procedure: POSTERIOR SHOULDER MASS EXCISION; POSTERIOR NECK MASS EXCISION; CHEST WALL MASS EXCISION;  Surgeon: Milagros Shepherd MD;  Location: [facility];  Service: General   • THROAT SURGERY         Family History   Problem Relation Age of Onset   • Hypertension Mother    • Diabetes Mother    • Hyperlipidemia Mother    • Cancer Mother         Malignant neoplasm of the gastrointestinal tract   • Diabetes type II Mother         Type 2 diabetes mellitus   • Hypertension Father         Essential hypercholesterolemia   • Hyperlipidemia Father    • Heart disease Father         Arteriosclerotic cardiovascular disease (ASCVD)   • Dementia Father    • Heart attack Father    • Cancer Father         prostate   • Thyroid disease Sister    • Breast cancer Maternal Aunt    • Stomach cancer Maternal Grandmother    • Stroke Neg Hx        Social History     Tobacco Use   • Smoking status: Never   • Smokeless tobacco: Never   Vaping Use   • Vaping Use: Never used   Substance Use Topics   • Alcohol use: Not Currently     Comment: Social drinker per [employer]   • Drug use: No        Medications  Current Outpatient Medications on File Prior to Visit   Medication Sig Dispense Refill   • Advair -21 MCG/ACT inhaler INHALE 2 PUFFS BY MOUTH TWICE DAILY   RINSE MOUTH AFTER USE 12 g 2   • albuterol (PROVENTIL HFA,VENTOLIN HFA) 90 mcg/act inhaler INHALE 1 PUFF EVERY 4-6 HOURS AS NEEDED 17 g 2   • ascorbic acid (VITAMIN C) 500 mg tablet Take 500 mg by mouth daily     • atorvastatin (LIPITOR) 40 mg tablet TAKE 1 TABLET(40 MG) BY MOUTH DAILY 90 tablet 3   • B Complex-Biotin-FA (VITAMIN B50 COMPLEX PO)      • Blood Glucose Monitoring Suppl (ONETOUCH VERIO) w/Device KIT by Does not apply route 3 (three) times a day 1 kit 0   • dulaglutide (Trulicity) 1 5 /8 0IR injection Inject 0 5 mL (1 5 mg total) under the skin every 7 days 6 mL 0   • Empagliflozin (JARDIANCE) 10 MG TABS tablet Take 1 tablet (10 mg total) by mouth daily 30 tablet 1   • fluticasone (FLONASE) 50 mcg/act nasal spray SHAKE LIQUID AND USE 2 SPRAYS IN EACH NOSTRIL DAILY 48 g 2   • gabapentin (NEURONTIN) 100 mg capsule Take 1 capsule (100 mg total) by mouth daily at bedtime 30 capsule 3   • Ginkgo Biloba (GINKOBA PO) Take 60 mg by mouth     • Insulin Pen Needle 31G X 5 MM MISC Use daily 100 each 5   • Lancets (OneTouch Delica Plus [id number]) MISC TEST THREE TIMES DAILY 100 each 5   • Lantus SoloStar 100 units/mL SOPN INJECT 20 UNITS UNDER THE SKIN DAILY AT BEDTIME AND 5 UNITS IN THE MORNING 45 mL 3   • lisinopril (ZESTRIL) 40 mg tablet Take 1 tablet (40 mg total) by mouth daily 90 tablet 4   • loratadine (CLARITIN) 10 mg tablet Take 1 tablet (10 mg total) by mouth daily 90 tablet 2   • MAGNESIUM CITRATE PO Take 1 tablet by mouth daily       • metFORMIN (GLUCOPHAGE) 1000 MG tablet Take 1 tablet (1,000 mg total) by mouth 2 (two) times a day with meals 180 tablet 2   • Multiple Vitamins-Minerals (MULTIVITAMIN ADULTS 50+ PO) Take by mouth daily     • omeprazole (PriLOSEC) 40 MG capsule TAKE 1 CAPSULE(40 MG) BY MOUTH DAILY 90 capsule 3   • OneTouch Verio test strip USE AS DIRECTED THREE TIMES DAILY 100 strip 5   • benzonatate (TESSALON PERLES) 100 mg capsule Take 1 capsule (100 mg total) by mouth 3 (three) times a day as needed for cough (Patient not taking: Reported on 8/16/2022) 20 capsule 0     No current facility-administered medications on file prior to visit  Allergies  Allergies   Allergen Reactions   • Shellfish-Derived Products - Food Allergy Anaphylaxis     Clams and mussels, oysters  Lobster and crab ok   • Diphenhydramine      Lightheadedness, nauseous, rapid heartbeat   • Other Palpitations and Other (See Comments)     Clams       Review of Systems   Constitutional: Negative for appetite change, chills, diaphoresis and fever     HENT: Negative for nosebleeds and trouble swallowing  Eyes: Negative  Respiratory: Negative for cough, shortness of breath and wheezing  Cardiovascular: Negative for chest pain, palpitations and leg swelling  Gastrointestinal: Negative for abdominal distention, abdominal pain, nausea and vomiting  Genitourinary: Negative for difficulty urinating, flank pain and frequency  Musculoskeletal: Negative for arthralgias, joint swelling and myalgias  Skin: Negative for pallor and rash  Neurological: Negative for dizziness, facial asymmetry and speech difficulty  Hematological: Does not bruise/bleed easily  Psychiatric/Behavioral: Negative for agitation and confusion  All other systems reviewed and are negative  Objective  Vitals:    06/09/23 1116   BP: 128/77   Pulse: 91   Resp: 14   Temp: 98 3 °F (36 8 °C)   SpO2: 98%       Physical Exam  Vitals and nursing note reviewed  Constitutional:       General: He is not in acute distress  Appearance: Normal appearance  He is obese  He is not toxic-appearing  HENT:      Head: Normocephalic and atraumatic  Mouth/Throat:      Mouth: Mucous membranes are moist    Eyes:      Extraocular Movements: Extraocular movements intact  Pupils: Pupils are equal, round, and reactive to light  Cardiovascular:      Rate and Rhythm: Normal rate and regular rhythm  Pulses: Normal pulses  Pulmonary:      Effort: Pulmonary effort is normal  No respiratory distress  Breath sounds: Normal breath sounds  No wheezing  Abdominal:      General: There is no distension  Palpations: Abdomen is soft  There is no mass  Tenderness: There is no abdominal tenderness  There is no guarding or rebound  Hernia: A hernia is present  Comments: Partially reducible, fat-containing umbilical hernia  Musculoskeletal:         General: No swelling or deformity  Normal range of motion  Cervical back: Normal range of motion and neck supple        Right lower leg: No edema  Left lower leg: No edema  Skin:     General: Skin is warm and dry  Coloration: Skin is not jaundiced  Neurological:      General: No focal deficit present  Mental Status: He is alert and oriented to person, place, and time     Psychiatric:         Mood and Affect: Mood normal          Behavior: Behavior normal

## 2023-06-14 DIAGNOSIS — E11.42 TYPE 2 DIABETES MELLITUS WITH DIABETIC POLYNEUROPATHY, WITHOUT LONG-TERM CURRENT USE OF INSULIN (HCC): ICD-10-CM

## 2023-06-15 ENCOUNTER — HOSPITAL ENCOUNTER (OUTPATIENT)
Dept: CT IMAGING | Facility: HOSPITAL | Age: 63
Discharge: HOME | End: 2023-06-15
Attending: SURGERY
Payer: COMMERCIAL

## 2023-06-15 DIAGNOSIS — K42.9 UMBILICAL HERNIA WITHOUT OBSTRUCTION AND WITHOUT GANGRENE: ICD-10-CM

## 2023-06-15 PROCEDURE — G1004 CDSM NDSC: HCPCS

## 2023-06-15 PROCEDURE — 74176 CT ABD & PELVIS W/O CONTRAST: CPT

## 2023-06-16 ENCOUNTER — OFFICE VISIT (OUTPATIENT)
Dept: INTERNAL MEDICINE CLINIC | Facility: CLINIC | Age: 63
End: 2023-06-16
Payer: COMMERCIAL

## 2023-06-16 VITALS
TEMPERATURE: 97.3 F | DIASTOLIC BLOOD PRESSURE: 90 MMHG | HEIGHT: 72 IN | SYSTOLIC BLOOD PRESSURE: 140 MMHG | BODY MASS INDEX: 40.74 KG/M2 | OXYGEN SATURATION: 96 % | WEIGHT: 300.8 LBS | HEART RATE: 94 BPM

## 2023-06-16 DIAGNOSIS — Z79.4 DIABETES MELLITUS DUE TO UNDERLYING CONDITION WITH DIABETIC NEPHROPATHY, WITH LONG-TERM CURRENT USE OF INSULIN (HCC): Primary | ICD-10-CM

## 2023-06-16 DIAGNOSIS — E08.21 DIABETES MELLITUS DUE TO UNDERLYING CONDITION WITH DIABETIC NEPHROPATHY, WITH LONG-TERM CURRENT USE OF INSULIN (HCC): Primary | ICD-10-CM

## 2023-06-16 DIAGNOSIS — Z79.4 TYPE 2 DIABETES MELLITUS WITH DIABETIC POLYNEUROPATHY, WITH LONG-TERM CURRENT USE OF INSULIN (HCC): ICD-10-CM

## 2023-06-16 DIAGNOSIS — E11.42 TYPE 2 DIABETES MELLITUS WITH DIABETIC POLYNEUROPATHY, WITHOUT LONG-TERM CURRENT USE OF INSULIN (HCC): ICD-10-CM

## 2023-06-16 DIAGNOSIS — E11.42 TYPE 2 DIABETES MELLITUS WITH DIABETIC POLYNEUROPATHY, WITH LONG-TERM CURRENT USE OF INSULIN (HCC): ICD-10-CM

## 2023-06-16 PROCEDURE — 99244 OFF/OP CNSLTJ NEW/EST MOD 40: CPT | Performed by: INTERNAL MEDICINE

## 2023-06-16 RX ORDER — BLOOD-GLUCOSE METER
EACH MISCELLANEOUS 3 TIMES DAILY
Qty: 1 KIT | Refills: 0 | Status: SHIPPED | OUTPATIENT
Start: 2023-06-16

## 2023-06-16 NOTE — ASSESSMENT & PLAN NOTE
Lab Results   Component Value Date    HGBA1C 9 1 (A) 04/11/2023   · Diagnosed 5 years ago  · Most recently glucose has been difficult to controlled despite adjustment in diet as well as regimen  · BGM at home BID with ranges btw 140-160 fasting glucose, 1-2 hrs after dinner time 180-200 however most recent fasting glucose in the morning over the past week around 90 which are consider ideal and at goal    · Current regimen: Metformin 1000 mg twice daily, Lantus 28 to 30 units at bedtime, Trulicity 1 5 mg recently increased about a month ago  · Most recently patient was prescribed on 05/31 Jardiance 10 mg once a day however patient hesitant to start new medication given side effects of dizziness  · Over the last year patient has lost 30 pounds currently 300 pounds  · Patient have modified diet over the past 6-12 months with good results despite this HbA1c have trended up  · Podiatry has been seen in the past month currently mild risk, continue with Gabapentin 100mg once a day with yearly follow up  · Seen by Ophthalmology in April 2023     · Plan:    · Given Hx of GIOVANNI will check HbA1c concurrently with Fructosamine since glycosylate hemoglobin can be affected although patients Hb has been stable  · Patients home glucose monitoring around 11years old  New one prescribed on this appointment  · At this juncture patient will like to hold off any modifications since home glucose at home have been stable and close to goal  Will like to try glucose checks with new device  · If glucose are not at goal can consider increase Trulicity to 3mg/week  · Holding of restart Jardiance as well until next appointment  · Patient was also referred to Diabetic Education for nutrition and insulin education     · Patient is due dentist appointment   · F/u 4 weeks with labs

## 2023-07-11 ENCOUNTER — HOSPITAL ENCOUNTER (OUTPATIENT)
Dept: INFUSION CENTER | Facility: CLINIC | Age: 63
Discharge: HOME | End: 2023-07-11
Payer: COMMERCIAL

## 2023-07-11 VITALS
DIASTOLIC BLOOD PRESSURE: 89 MMHG | HEART RATE: 82 BPM | TEMPERATURE: 98.2 F | RESPIRATION RATE: 18 BRPM | SYSTOLIC BLOOD PRESSURE: 142 MMHG | OXYGEN SATURATION: 97 %

## 2023-07-11 DIAGNOSIS — E53.8 B12 DEFICIENCY: Primary | ICD-10-CM

## 2023-07-11 DIAGNOSIS — D50.9 IRON DEFICIENCY ANEMIA, UNSPECIFIED IRON DEFICIENCY ANEMIA TYPE: ICD-10-CM

## 2023-07-11 PROCEDURE — 96365 THER/PROPH/DIAG IV INF INIT: CPT

## 2023-07-11 PROCEDURE — 96372 THER/PROPH/DIAG INJ SC/IM: CPT

## 2023-07-11 RX ORDER — CYANOCOBALAMIN 1000 UG/ML
1000 INJECTION, SOLUTION INTRAMUSCULAR; SUBCUTANEOUS ONCE
Status: COMPLETED | OUTPATIENT
Start: 2023-07-11 | End: 2023-07-11

## 2023-07-11 RX ORDER — SODIUM CHLORIDE 9 MG/ML
20 INJECTION, SOLUTION INTRAVENOUS ONCE
Status: CANCELLED | OUTPATIENT
Start: 2023-07-18

## 2023-07-11 RX ORDER — SODIUM CHLORIDE 9 MG/ML
20 INJECTION, SOLUTION INTRAVENOUS ONCE
Status: COMPLETED | OUTPATIENT
Start: 2023-07-11 | End: 2023-07-11

## 2023-07-11 RX ORDER — CYANOCOBALAMIN 1000 UG/ML
1000 INJECTION, SOLUTION INTRAMUSCULAR; SUBCUTANEOUS ONCE
Status: CANCELLED
Start: 2023-07-18 | End: 2023-07-18

## 2023-07-11 RX ADMIN — SODIUM CHLORIDE 300 MG: 0.9 INJECTION, SOLUTION INTRAVENOUS at 08:20

## 2023-07-11 RX ADMIN — SODIUM CHLORIDE 20 ML/HR: 0.9 INJECTION, SOLUTION INTRAVENOUS at 08:20

## 2023-07-11 RX ADMIN — CYANOCOBALAMIN 1000 MCG: 1000 INJECTION, SOLUTION INTRAMUSCULAR; SUBCUTANEOUS at 08:21

## 2023-07-11 NOTE — PROGRESS NOTES
Patient here for venofer and B12. He tolerated his infusion without incident and B12 was given in left arm.  Next appointments reviewed, has AVS.

## 2023-07-18 ENCOUNTER — HOSPITAL ENCOUNTER (OUTPATIENT)
Dept: INFUSION CENTER | Facility: CLINIC | Age: 63
Discharge: HOME | End: 2023-07-18
Payer: COMMERCIAL

## 2023-07-18 VITALS
HEART RATE: 85 BPM | OXYGEN SATURATION: 93 % | SYSTOLIC BLOOD PRESSURE: 107 MMHG | TEMPERATURE: 97.8 F | RESPIRATION RATE: 18 BRPM | DIASTOLIC BLOOD PRESSURE: 73 MMHG

## 2023-07-18 DIAGNOSIS — E53.8 B12 DEFICIENCY: Primary | ICD-10-CM

## 2023-07-18 DIAGNOSIS — Z79.4 TYPE 2 DIABETES MELLITUS WITH DIABETIC POLYNEUROPATHY, WITH LONG-TERM CURRENT USE OF INSULIN (HCC): ICD-10-CM

## 2023-07-18 DIAGNOSIS — E11.42 TYPE 2 DIABETES MELLITUS WITH DIABETIC POLYNEUROPATHY, WITH LONG-TERM CURRENT USE OF INSULIN (HCC): ICD-10-CM

## 2023-07-18 DIAGNOSIS — D50.9 IRON DEFICIENCY ANEMIA, UNSPECIFIED IRON DEFICIENCY ANEMIA TYPE: ICD-10-CM

## 2023-07-18 RX ORDER — SODIUM CHLORIDE 9 MG/ML
20 INJECTION, SOLUTION INTRAVENOUS ONCE
Status: COMPLETED | OUTPATIENT
Start: 2023-07-18 | End: 2023-07-18

## 2023-07-18 RX ORDER — CYANOCOBALAMIN 1000 UG/ML
1000 INJECTION, SOLUTION INTRAMUSCULAR; SUBCUTANEOUS ONCE
Status: CANCELLED
Start: 2023-07-25 | End: 2023-07-25

## 2023-07-18 RX ORDER — CYANOCOBALAMIN 1000 UG/ML
1000 INJECTION, SOLUTION INTRAMUSCULAR; SUBCUTANEOUS ONCE
Status: COMPLETED | OUTPATIENT
Start: 2023-07-18 | End: 2023-07-18

## 2023-07-18 RX ORDER — SODIUM CHLORIDE 9 MG/ML
20 INJECTION, SOLUTION INTRAVENOUS ONCE
Status: CANCELLED | OUTPATIENT
Start: 2023-07-25

## 2023-07-18 RX ADMIN — SODIUM CHLORIDE 20 ML/HR: 0.9 INJECTION, SOLUTION INTRAVENOUS at 08:06

## 2023-07-18 RX ADMIN — IRON SUCROSE 300 MG: 20 INJECTION, SOLUTION INTRAVENOUS at 08:06

## 2023-07-18 RX ADMIN — CYANOCOBALAMIN 1000 MCG: 1000 INJECTION, SOLUTION INTRAMUSCULAR; SUBCUTANEOUS at 08:06

## 2023-07-18 NOTE — PROGRESS NOTES
Patient here for second dose of venofer and B12, offers no complaints. He tolerated his infusion without incident. B12 given in left deltoid. List of next appointments provided.

## 2023-07-21 ENCOUNTER — OFFICE VISIT (OUTPATIENT)
Dept: INTERNAL MEDICINE CLINIC | Facility: CLINIC | Age: 63
End: 2023-07-21
Payer: COMMERCIAL

## 2023-07-21 ENCOUNTER — APPOINTMENT (OUTPATIENT)
Dept: LAB | Facility: CLINIC | Age: 63
End: 2023-07-21
Payer: COMMERCIAL

## 2023-07-21 VITALS
HEART RATE: 92 BPM | TEMPERATURE: 98.4 F | RESPIRATION RATE: 20 BRPM | WEIGHT: 300 LBS | OXYGEN SATURATION: 96 % | SYSTOLIC BLOOD PRESSURE: 124 MMHG | BODY MASS INDEX: 41.26 KG/M2 | DIASTOLIC BLOOD PRESSURE: 70 MMHG

## 2023-07-21 DIAGNOSIS — E08.21 DIABETES MELLITUS DUE TO UNDERLYING CONDITION WITH DIABETIC NEPHROPATHY, WITH LONG-TERM CURRENT USE OF INSULIN (HCC): Primary | ICD-10-CM

## 2023-07-21 DIAGNOSIS — Z79.4 DIABETES MELLITUS DUE TO UNDERLYING CONDITION WITH DIABETIC NEPHROPATHY, WITH LONG-TERM CURRENT USE OF INSULIN (HCC): Primary | ICD-10-CM

## 2023-07-21 DIAGNOSIS — E11.42 TYPE 2 DIABETES MELLITUS WITH DIABETIC POLYNEUROPATHY, WITHOUT LONG-TERM CURRENT USE OF INSULIN (HCC): ICD-10-CM

## 2023-07-21 LAB
EST. AVERAGE GLUCOSE BLD GHB EST-MCNC: 171 MG/DL
HBA1C MFR BLD: 7.6 %

## 2023-07-21 PROCEDURE — 83036 HEMOGLOBIN GLYCOSYLATED A1C: CPT

## 2023-07-21 PROCEDURE — 36415 COLL VENOUS BLD VENIPUNCTURE: CPT

## 2023-07-21 PROCEDURE — 82985 ASSAY OF GLYCATED PROTEIN: CPT

## 2023-07-21 PROCEDURE — 99214 OFFICE O/P EST MOD 30 MIN: CPT | Performed by: INTERNAL MEDICINE

## 2023-07-21 RX ORDER — SELENIUM 200 MCG
TABLET ORAL
COMMUNITY

## 2023-07-21 NOTE — ASSESSMENT & PLAN NOTE
Lab Results   Component Value Date    HGBA1C 9.1 (A) 04/11/2023   ·   · Present F/U visit for Type 2 DM  · BGM at home have improved fasting 120-130s with post prandial 160-180 however no outliers glucose 200 and no hypoglycemic episodes. · Denies polyphagia, polydipsia, polyuria. Appetite have been poor in the setting of BPPV exacerbation for which was also preventing patient follow up with blood work and diabetic education classes as instructed on prior visit. · Current diet log was reviewed main meals breakfast and dinner. Has been adding protein and granola bars during lunch time around 23-29g Carbohydrate  · Regimen at home metformin 1000 mg twice daily, has been administering Lantus on a variable manner 68-51C and Trulicity 9.7BU/ICNV    · Plan:  · At this juncture will like patient to be on a stable basal Lantus 28U  · Continue home glucose monitoring goal fasting glucose explained in detail close to 100s ideally between 90-130s and post prandial glucose 140-150 ideally 80% of the time. · Will await hemoglobin A1c and fructosamine in the setting of iron deficiency anemia for better data. · Pending results of blood work will consider increase Trulicity 3mg/week, possible side effects were reviewed with patient. · Agreed to not starting Jardiance at this time. · Encourage to follow up diabetic education for better nutrition and insulin management. · Follow up in 10 weeks.

## 2023-07-21 NOTE — PROGRESS NOTES
Assessment/Plan:    Diabetes mellitus due to underlying condition with diabetic nephropathy, with long-term current use of insulin ([facility])    Lab Results   Component Value Date    HGBA1C 9.1 (A) 04/11/2023   ·   · Present F/U visit for Type 2 DM  · BGM at home have improved fasting 120-130s with post prandial 160-180 however no outliers glucose 200 and no hypoglycemic episodes. · Denies polyphagia, polydipsia, polyuria. Appetite have been poor in the setting of BPPV exacerbation for which was also preventing patient follow up with blood work and diabetic education classes as instructed on prior visit. · Current diet log was reviewed main meals breakfast and dinner. Has been adding protein and granola bars during lunch time around 23-29g Carbohydrate  · Regimen at home metformin 1000 mg twice daily, has been administering Lantus on a variable manner 94-83L and Trulicity 5.4MA/MFOD    · Plan:  · At this juncture will like patient to be on a stable basal Lantus 28U  · Continue home glucose monitoring goal fasting glucose explained in detail close to 100s ideally between 90-130s and post prandial glucose 140-150 ideally 80% of the time. · Will await hemoglobin A1c and fructosamine in the setting of iron deficiency anemia for better data. · Pending results of blood work will consider increase Trulicity 3mg/week, possible side effects were reviewed with patient. · Agreed to not starting Jardiance at this time. · Encourage to follow up diabetic education for better nutrition and insulin management. · Follow up in 10 weeks. Diagnoses and all orders for this visit:    Diabetes mellitus due to underlying condition with diabetic nephropathy, with long-term current use of insulin ([address])    Other orders  -     Echinacea 380 MG CAPS; Take by mouth 2 daily am           Subjective:      Patient ID: Josue Loo is a 57 y.o. male.     Mr. Reza White is a 58-year-old male with past medical history of type 2 diabetes who presents follow up visit. Since last visit patient rebecca has not been feeling well given acute exacerbation of BPPV with worsening dizziness for which was unable to follow up on blood work and diabetic education. Endorse his appetite was poor over the past 2 weeks now improving. During the time his main meals were Breakfast and dinner, skipping lunch and/or mainly ingesting protein or granola bars. His physical activity has been limited due to dizziness, and acute on chronic knee pain. Denies any N/V, abdominal pain or fullness. As well as no polydipsia or polyuria. Has been compliant with current medications metformin 1000 mg twice a day, Trulicity 1.5 mg once a week on Wednesdays however has been changing his visit Lantus 20-28 U pending his glucose on previous day. Denies any changes in vision or hypoglycemic episodes although endorse some changes in concentration. .       The following portions of the patient's history were reviewed and updated as appropriate: allergies, current medications, past family history, past medical history, past social history, past surgical history and problem list.    Review of Systems   Constitutional: Positive for activity change and appetite change. Negative for chills, fever and unexpected weight change. HENT: Negative for ear pain and sore throat. Eyes: Negative for pain and visual disturbance. Respiratory: Negative for cough and shortness of breath. Cardiovascular: Negative for chest pain and palpitations. Gastrointestinal: Negative for abdominal distention, abdominal pain, constipation, diarrhea, nausea and vomiting. Endocrine: Negative for polydipsia, polyphagia and polyuria. Genitourinary: Negative for dysuria and hematuria. Musculoskeletal: Positive for arthralgias. Negative for back pain. Skin: Negative for color change and rash. Neurological: Positive for dizziness. Negative for seizures, syncope and light-headedness.    All other systems reviewed and are negative. Objective:      /70 (BP Location: Left arm, Patient Position: Sitting, Cuff Size: Large)   Pulse 92   Temp 98.4 °F (36.9 °C)   Resp 20   Wt 136 kg (300 lb)   SpO2 96%   BMI 41.26 kg/m²          Physical Exam  Vitals and nursing note reviewed. Constitutional:       Appearance: Normal appearance. He is obese. He is not ill-appearing. HENT:      Head: Normocephalic and atraumatic. Right Ear: External ear normal.      Left Ear: External ear normal.      Nose: Nose normal.      Mouth/Throat:      Mouth: Mucous membranes are moist.   Eyes:      Extraocular Movements: Extraocular movements intact. Conjunctiva/sclera: Conjunctivae normal.      Pupils: Pupils are equal, round, and reactive to light. Cardiovascular:      Rate and Rhythm: Normal rate and regular rhythm. Pulses: Normal pulses. Heart sounds: Normal heart sounds. Pulmonary:      Effort: Pulmonary effort is normal.      Breath sounds: Normal breath sounds. Abdominal:      General: Bowel sounds are normal. There is no distension. Hernia: A hernia is present. Comments: Ventral abdominal hernia not incarcerated. Musculoskeletal:         General: Normal range of motion. Cervical back: Normal range of motion and neck supple. Right lower leg: Edema present. Left lower leg: Edema present. Neurological:      General: No focal deficit present. Mental Status: He is alert and oriented to person, place, and time.    Psychiatric:         Mood and Affect: Mood normal.         Behavior: Behavior normal.

## 2023-07-22 LAB — FRUCTOSAMINE SERPL-SCNC: 262 UMOL/L (ref 0–285)

## 2023-07-24 ENCOUNTER — APPOINTMENT (OUTPATIENT)
Dept: RADIOLOGY | Age: 63
End: 2023-07-24
Payer: COMMERCIAL

## 2023-07-24 ENCOUNTER — OFFICE VISIT (OUTPATIENT)
Dept: URGENT CARE | Age: 63
End: 2023-07-24
Payer: COMMERCIAL

## 2023-07-24 VITALS
OXYGEN SATURATION: 97 % | HEART RATE: 84 BPM | DIASTOLIC BLOOD PRESSURE: 76 MMHG | SYSTOLIC BLOOD PRESSURE: 139 MMHG | TEMPERATURE: 98.3 F

## 2023-07-24 DIAGNOSIS — M25.561 ACUTE PAIN OF RIGHT KNEE: Primary | ICD-10-CM

## 2023-07-24 DIAGNOSIS — M25.561 ACUTE PAIN OF RIGHT KNEE: ICD-10-CM

## 2023-07-24 PROCEDURE — 99213 OFFICE O/P EST LOW 20 MIN: CPT | Performed by: NURSE PRACTITIONER

## 2023-07-24 PROCEDURE — 73562 X-RAY EXAM OF KNEE 3: CPT

## 2023-07-24 NOTE — PROGRESS NOTES
[facility]        NAME: Erin Jerome is a 57 y.o. male  : 1960    MRN: [id number]  DATE: 2023  TIME: 12:26 PM    Assessment and Plan   Acute pain of right knee [M25.561]  1. Acute pain of right knee  CANCELED: XR knee 4+ vw right injury            Patient Instructions     X-ray reading pending  Cont with NSAID OTC prn  Follow up with PCP in 3-5 days. Proceed to  ER if symptoms worsen. Chief Complaint     Chief Complaint   Patient presents with   • Knee Pain     Right knee pain x 1 month         History of Present Illness       HPI   Reports right knee pain x 1 month. Getting worse. No trauma or injury. Has a chronic Hx of intermittent pain on this knee. In the past it last for a little bit and then goes away. This time has persisted. Very achy pain. Sometimes on the sides of the knee it is a shooting pain that goes down the back of the leg, from the knee. Gas an appt with PCP in 2 days      Review of Systems   Review of Systems   Musculoskeletal: Positive for arthralgias (right knee) and gait problem (bc of the knee pain). Skin: Negative for rash. Neurological: Negative for weakness and numbness. Current Medications       Current Outpatient Medications:   •  Advair -21 MCG/ACT inhaler, INHALE 2 PUFFS BY MOUTH TWICE DAILY.  RINSE MOUTH AFTER USE, Disp: 12 g, Rfl: 2  •  albuterol (PROVENTIL HFA,VENTOLIN HFA) 90 mcg/act inhaler, INHALE 1 PUFF EVERY 4-6 HOURS AS NEEDED, Disp: 17 g, Rfl: 2  •  ascorbic acid (VITAMIN C) 500 mg tablet, Take 500 mg by mouth daily, Disp: , Rfl:   •  atorvastatin (LIPITOR) 40 mg tablet, TAKE 1 TABLET(40 MG) BY MOUTH DAILY, Disp: 90 tablet, Rfl: 3  •  B Complex-Biotin-FA (VITAMIN B50 COMPLEX PO), , Disp: , Rfl:   •  benzonatate (TESSALON PERLES) 100 mg capsule, Take 1 capsule (100 mg total) by mouth 3 (three) times a day as needed for cough, Disp: 20 capsule, Rfl: 0  •  Blood Glucose Monitoring Suppl (OneTouch Verio) w/Device KIT, Use 3 (three) times a day, Disp: 1 kit, Rfl: 0  •  dulaglutide (Trulicity) 1.5 MN/6.4UO injection, Inject 0.5 mL (1.5 mg total) under the skin every 7 days, Disp: 6 mL, Rfl: 0  •  Echinacea 380 MG CAPS, Take by mouth 2 daily am, Disp: , Rfl:   •  Empagliflozin (JARDIANCE) 10 MG TABS tablet, Take 1 tablet (10 mg total) by mouth daily, Disp: 30 tablet, Rfl: 1  •  fluticasone (FLONASE) 50 mcg/act nasal spray, SHAKE LIQUID AND USE 2 SPRAYS IN EACH NOSTRIL DAILY, Disp: 48 g, Rfl: 2  •  gabapentin (NEURONTIN) 100 mg capsule, Take 1 capsule (100 mg total) by mouth daily at bedtime, Disp: 30 capsule, Rfl: 3  •  Ginkgo Biloba (GINKOBA PO), Take 60 mg by mouth, Disp: , Rfl:   •  Insulin Pen Needle 31G X 5 MM MISC, Use daily, Disp: 100 each, Rfl: 5  •  Lancets (OneTouch Delica Plus [id number]) MISC, TEST THREE TIMES DAILY, Disp: 100 each, Rfl: 5  •  Lantus SoloStar 100 units/mL SOPN, INJECT 20 UNITS UNDER THE SKIN DAILY AT BEDTIME AND 5 UNITS IN THE MORNING (Patient taking differently: 20-28 units at night), Disp: 45 mL, Rfl: 3  •  lisinopril (ZESTRIL) 40 mg tablet, Take 1 tablet (40 mg total) by mouth daily, Disp: 90 tablet, Rfl: 4  •  loratadine (CLARITIN) 10 mg tablet, Take 1 tablet (10 mg total) by mouth daily, Disp: 90 tablet, Rfl: 2  •  MAGNESIUM CITRATE PO, Take 1 tablet by mouth daily  , Disp: , Rfl:   •  metFORMIN (GLUCOPHAGE) 1000 MG tablet, Take 1 tablet (1,000 mg total) by mouth 2 (two) times a day with meals, Disp: 180 tablet, Rfl: 2  •  Multiple Vitamins-Minerals (MULTIVITAMIN ADULTS 50+ PO), Take by mouth daily, Disp: , Rfl:   •  omeprazole (PriLOSEC) 40 MG capsule, TAKE 1 CAPSULE(40 MG) BY MOUTH DAILY, Disp: 90 capsule, Rfl: 3  •  OneTouch Verio test strip, USE AS DIRECTED THREE TIMES DAILY, Disp: 100 strip, Rfl: 5    Current Allergies     Allergies as of 07/24/2023 - Reviewed 07/21/2023   Allergen Reaction Noted   • Shellfish-derived products - food allergy Anaphylaxis 05/26/2017   • Diphenhydramine  05/26/2017   • Other Palpitations and Other (See Comments) 04/13/2015            The following portions of the patient's history were reviewed and updated as appropriate: allergies, current medications, past family history, past medical history, past social history, past surgical history and problem list.     Past Medical History:   Diagnosis Date   • Asthma    • Benign positional vertigo    • Diabetes mellitus ([address])     borderline; diet controlled   • Diabetes mellitus due to underlying condition with diabetic nephropathy, with long-term current use of insulin ([address]) 5/19/2021   • Dyslipidemia    • Gastroenteritis    • Hyperlipidemia    • Hypertension    • Lipoma of neck     last assessed - [id number]   • Multiple myeloma ([address])    • Sleep apnea     has symptoms but not been diagnosed   • Wheezing     last assessed - [id number]       Past Surgical History:   Procedure Laterality Date   • ESOPHAGOGASTRODUODENOSCOPY  2010    Diagnostic   • MASS EXCISION Right 8/21/2017    Procedure: POSTERIOR SHOULDER MASS EXCISION; POSTERIOR NECK MASS EXCISION; CHEST WALL MASS EXCISION;  Surgeon: Bill Faye MD;  Location: [facility];  Service: General   • THROAT SURGERY         Family History   Problem Relation Age of Onset   • Hypertension Mother    • Diabetes Mother    • Hyperlipidemia Mother    • Cancer Mother         Malignant neoplasm of the gastrointestinal tract   • Diabetes type II Mother         Type 2 diabetes mellitus   • Hypertension Father         Essential hypercholesterolemia   • Hyperlipidemia Father    • Heart disease Father         Arteriosclerotic cardiovascular disease (ASCVD)   • Dementia Father    • Heart attack Father    • Cancer Father         prostate   • Thyroid disease Sister    • Breast cancer Maternal Aunt    • Stomach cancer Maternal Grandmother    • Stroke Neg Hx          Medications have been verified. Objective   /76   Pulse 84   Temp 98.3 °F (36.8 °C)   SpO2 97%   No LMP for male patient.        Physical Exam     Physical Exam  Constitutional:       Appearance: He is not ill-appearing or diaphoretic. Musculoskeletal:         General: Tenderness (with palpation of the medial and lateral aspects of the right knee. Stable joint. No weakness of the RLE with flexion and extension against resistance. However report mild pain) present. No swelling. Comments: No foot drop   Skin:     Findings: No rash. Neurological:      Gait: Gait abnormal (limps on the right leg. uses a canme to help relief the pain on the R leg).

## 2023-07-25 ENCOUNTER — HOSPITAL ENCOUNTER (OUTPATIENT)
Dept: INFUSION CENTER | Facility: CLINIC | Age: 63
Discharge: HOME | End: 2023-07-25
Payer: COMMERCIAL

## 2023-07-25 VITALS
SYSTOLIC BLOOD PRESSURE: 125 MMHG | TEMPERATURE: 98.2 F | RESPIRATION RATE: 18 BRPM | OXYGEN SATURATION: 95 % | HEART RATE: 88 BPM | DIASTOLIC BLOOD PRESSURE: 78 MMHG

## 2023-07-25 DIAGNOSIS — E53.8 B12 DEFICIENCY: Primary | ICD-10-CM

## 2023-07-25 DIAGNOSIS — D50.9 IRON DEFICIENCY ANEMIA, UNSPECIFIED IRON DEFICIENCY ANEMIA TYPE: ICD-10-CM

## 2023-07-25 DIAGNOSIS — J45.20 MILD INTERMITTENT ASTHMA WITHOUT COMPLICATION: ICD-10-CM

## 2023-07-25 PROCEDURE — 96365 THER/PROPH/DIAG IV INF INIT: CPT

## 2023-07-25 PROCEDURE — 96366 THER/PROPH/DIAG IV INF ADDON: CPT

## 2023-07-25 PROCEDURE — 96372 THER/PROPH/DIAG INJ SC/IM: CPT

## 2023-07-25 RX ORDER — FLUTICASONE PROPIONATE AND SALMETEROL XINAFOATE 115; 21 UG/1; UG/1
2 AEROSOL, METERED RESPIRATORY (INHALATION) 2 TIMES DAILY
Qty: 12 G | Refills: 5 | Status: SHIPPED | OUTPATIENT
Start: 2023-07-25

## 2023-07-25 RX ORDER — CYANOCOBALAMIN 1000 UG/ML
1000 INJECTION, SOLUTION INTRAMUSCULAR; SUBCUTANEOUS ONCE
Status: CANCELLED
Start: 2023-07-31 | End: 2023-08-01

## 2023-07-25 RX ORDER — CYANOCOBALAMIN 1000 UG/ML
1000 INJECTION, SOLUTION INTRAMUSCULAR; SUBCUTANEOUS ONCE
Status: COMPLETED | OUTPATIENT
Start: 2023-07-25 | End: 2023-07-25

## 2023-07-25 RX ORDER — SODIUM CHLORIDE 9 MG/ML
20 INJECTION, SOLUTION INTRAVENOUS ONCE
Status: CANCELLED | OUTPATIENT
Start: 2023-07-31

## 2023-07-25 RX ORDER — SODIUM CHLORIDE 9 MG/ML
20 INJECTION, SOLUTION INTRAVENOUS ONCE
Status: COMPLETED | OUTPATIENT
Start: 2023-07-25 | End: 2023-07-25

## 2023-07-25 RX ADMIN — SODIUM CHLORIDE 300 MG: 0.9 INJECTION, SOLUTION INTRAVENOUS at 08:18

## 2023-07-25 RX ADMIN — SODIUM CHLORIDE 20 ML/HR: 0.9 INJECTION, SOLUTION INTRAVENOUS at 08:13

## 2023-07-25 RX ADMIN — CYANOCOBALAMIN 1000 MCG: 1000 INJECTION, SOLUTION INTRAMUSCULAR; SUBCUTANEOUS at 08:20

## 2023-07-25 NOTE — PROGRESS NOTES
Patient here for venofer and b12 dosing and is well, no c/o or changes to report, venofer infusing as ordered, patient tolerated b12 to left deltoid, bandaid applied and CDI.

## 2023-07-25 NOTE — PROGRESS NOTES
Patient tolerated treatment without incident and was discharged post, no c/o offered. Next dosing 7/31/23.

## 2023-07-25 NOTE — TELEPHONE ENCOUNTER
[facility] Prescription Refill for   FLUTICASONE/SALM   Generic for [facility] [address] [facility]    Fax  [phone]

## 2023-07-25 NOTE — PATIENT INSTRUCTIONS
July 2023 Sunday Monday Tuesday Wednesday Thursday Friday Saturday                                 1                2     3     4     5     6     7     8                9     10     11    INF THERAPY PLAN   8:00 AM   (150 min.)   AN INF BED [address] [place] [address] 12     13     14     15         Cycle 1, Treatment 1       16     17     18    INF THERAPY PLAN   8:00 AM   (150 min.)   AN INF CHAIR [address] [place] [address] 19     20     21    FOLLOW UP PG   9:15 AM   (30 min.)   Eugenio Paiz MD   [facility] SPECIALTY [facility] [place] [facility]    LAB WALK IN  11:15 AM   (5 min.)   AN MOB LAB PSC CHAIR   [facility] [address] [facility] MOB 22         Cycle 1, Treatment 2       23     24    [facility]  11:00 AM   (30 min.)   Sandra Mar, [address] [place] [facility]    XR GENERAL PROCEDURE  12:15 PM   (15 min.)   BE XR SLN 1   [address] 25    INF THERAPY PLAN   8:00 AM   (150 min.)   AN INF CHAIR [address] [place] [address] 26     27     28     29         Cycle 1, Treatment 3       30     31    INF THERAPY PLAN   8:30 AM   (150 min.)   AN INF CHAIR [address] [place] [address]                                            Treatment Details         7/11/2023 - Cycle 1, Treatment 1      Supportive Care: APPT 17, ONCBCN NURSE [id number], sodium chloride, iron sucrose (VENOFER), MONITOR JAZMINE, ONCBCN NURSE COMMUNICATION7, cyanocobalamin    7/18/2023 - Cycle 1, Treatment 2      Supportive Care: APPT 17, ONCBCN NURSE [id number], sodium chloride, iron sucrose (VENOFER), MONITOR JAZMINE, ONCBCN NURSE COMMUNICATION7, cyanocobalamin    7/25/2023 - Cycle 1, Treatment 3      Supportive Care: APPT 17, ONCBCN NURSE [id number], sodium chloride, iron sucrose (VENOFER), MONITOR JAZMINE, ONCBCN NURSE COMMUNICATION7, cyanocobalamin        August 2023 Sunday Monday Tuesday Wednesday Thursday Friday Saturday             1     2     3     4     5         Cycle 1, Treatment 4       6  Happy Birthday!     7     8     9     10     11     12                13     14     15     16     17     18     19                20     21     22     23     24     25     26                27     28     29     30     31                                 Treatment Details         8/1/2023 - Cycle 1, Treatment 4      Supportive Care: APPT 17

## 2023-07-26 ENCOUNTER — OFFICE VISIT (OUTPATIENT)
Dept: FAMILY MEDICINE CLINIC | Facility: CLINIC | Age: 63
End: 2023-07-26

## 2023-07-26 VITALS
RESPIRATION RATE: 19 BRPM | WEIGHT: 303 LBS | HEART RATE: 92 BPM | TEMPERATURE: 98.2 F | DIASTOLIC BLOOD PRESSURE: 82 MMHG | SYSTOLIC BLOOD PRESSURE: 124 MMHG | BODY MASS INDEX: 41.67 KG/M2

## 2023-07-26 DIAGNOSIS — E08.21 DIABETES MELLITUS DUE TO UNDERLYING CONDITION WITH DIABETIC NEPHROPATHY, WITH LONG-TERM CURRENT USE OF INSULIN (HCC): ICD-10-CM

## 2023-07-26 DIAGNOSIS — Z00.00 ANNUAL PHYSICAL EXAM: Primary | ICD-10-CM

## 2023-07-26 DIAGNOSIS — Z79.4 DIABETES MELLITUS DUE TO UNDERLYING CONDITION WITH DIABETIC NEPHROPATHY, WITH LONG-TERM CURRENT USE OF INSULIN (HCC): ICD-10-CM

## 2023-07-26 DIAGNOSIS — K42.9 UMBILICAL HERNIA WITHOUT OBSTRUCTION AND WITHOUT GANGRENE: ICD-10-CM

## 2023-07-26 DIAGNOSIS — E08.21 DIABETES MELLITUS DUE TO UNDERLYING CONDITION WITH DIABETIC NEPHROPATHY, WITH LONG-TERM CURRENT USE OF INSULIN (HCC): Primary | ICD-10-CM

## 2023-07-26 DIAGNOSIS — M25.561 ACUTE PAIN OF RIGHT KNEE: ICD-10-CM

## 2023-07-26 DIAGNOSIS — Z79.4 DIABETES MELLITUS DUE TO UNDERLYING CONDITION WITH DIABETIC NEPHROPATHY, WITH LONG-TERM CURRENT USE OF INSULIN (HCC): Primary | ICD-10-CM

## 2023-07-26 PROCEDURE — 99396 PREV VISIT EST AGE 40-64: CPT | Performed by: FAMILY MEDICINE

## 2023-07-26 RX ORDER — DULAGLUTIDE 3 MG/.5ML
3 INJECTION, SOLUTION SUBCUTANEOUS
Qty: 2 ML | Refills: 3 | Status: SHIPPED | OUTPATIENT
Start: 2023-07-26

## 2023-07-26 NOTE — ASSESSMENT & PLAN NOTE
Pt has an expanding umbilical hernia. Pt states that he follows with surgery and is going to schedule hernia repair. Pt denies pain.

## 2023-07-26 NOTE — PROGRESS NOTES
[facility]    NAME: Ayaan Carney  AGE: 57 y.o. SEX: male  : 1960     DATE: 2023     Assessment and Plan:     Problem List Items Addressed This Visit        Endocrine    Diabetes mellitus due to underlying condition with diabetic nephropathy, with long-term current use of insulin ([address])     Pt follows with Endocrinology for diabetes management. Recent bloodwork showed improved A1c, however, still above goal.  Pt denies polyphagia, polydipsia, polyuria, changes to appetite. Blood sugars at home are 120-130s fasting and 160-180 post prandial without hypoglycemic episodes. Lab Results   Component Value Date    HGBA1C 7.6 (H) 2023     -Continue Lantus 28 units and follow with Endo recommendations to increase Trulicity 3mg. Other    Umbilical hernia     Pt has an expanding umbilical hernia. Pt states that he follows with surgery and is going to schedule hernia repair. Pt denies pain. Acute pain of right knee     Pt reports a 1 month history of right knee pain/ache exacerbated by walking (particularly uphill/up stairs). Pt was seen at  two days ago where he had an Xray done, which was unremarkable and did not show degenerative changes. Pt was taking OTC ibuprofen intermittently for the past week and a half.    -Will trial Voltaren gel and use Tylenol for additional pain management  -If knee pain continues, consider Orthopedic referral  -Given stretching and strengthening exercises         Relevant Medications    Diclofenac Sodium (VOLTAREN) 1 %    Annual physical exam - Primary     Pt is a 54yo male with extensive PMHx presenting for annual physical exam.  Pt's only concern is right knee pain. UTD on screenings. Immunizations and preventive care screenings were discussed with patient today.  Appropriate education was printed on patient's after visit summary. Counseling:  · Exercise: the importance of regular exercise/physical activity was discussed. Recommend exercise 3-5 times per week for at least 30 minutes. · BMI Counseling: Body mass index is 41.67 kg/m². The BMI is above normal. Nutrition recommendations include reducing portion sizes, decreasing overall calorie intake, reducing fast food intake, decreasing soda and/or juice intake and moderation in carbohydrate intake. Exercise recommendations include moderate aerobic physical activity for 150 minutes/week. Return in 3 months (on 10/26/2023) for Next scheduled follow up. Chief Complaint:     Chief Complaint   Patient presents with   • Diabetes   • Knee Pain      History of Present Illness:     Adult Annual Physical   Patient here for a comprehensive physical exam. The patient reports right knee pain. Pt states that the pain began about a month ago, and has been slowly worsening. He denies known injury or inciting event, and has never had serious injury to the knee. Pt went to  two days ago, and had an Xray of the knee, which was unremarkable without degenerative changes. He has been ambulating with a cane for the past three days out of worry that his knee pain will be too severe to walk normally. He has been taking occasional ibuprofen for the past week and a half without much improvement. Denies redness, swelling, changes in sensation, numbness, tingling. Diet and Physical Activity  · Diet/Nutrition: well balanced diet and frequent junk food. · Exercise: no formal exercise. Depression Screening  PHQ-2/9 Depression Screening         General Health  · Sleep: sleeps well and gets 4-6 hours of sleep on average. · Hearing: normal - bilateral.  · Vision: goes for regular eye exams and wears glasses. · Dental: regular dental visits and brushes teeth twice daily.         Health  · Symptoms include: none     57 y.o. male here for annual physical exam. Immunization:  - COVID -- 05/08/2021, 05/29/2021,   - TDaP -- 10/30/2015, ,     Screening: I have discussed each screening test below (per USPSTF guideline) with patient, to which patient expresses understanding and is agreeable to plan  - Obesity -- Positive (all children >5yo)  - Hypertension -- Negative (all adults)  - Nicotine/EtOH/Drugs -- Negative (all adults >17yo)  - Depression -- Negative (all adults AND adolescents 12-19YO)  Depression: Not at risk (1/5/2023)    PHQ-2    • PHQ-2 Score: 0     - HIV -- Deferred by patient (all adults age 14-79)  - Hep C -- Negative (all adults age 18-79)  Lab Results   Component Value Date/Time    HEPCAB Non-reactive 10/29/2020 08:48 AM     - T2DM -- Positive.  (adults age 28-74 who has overweight/obesity)  Lab Results   Component Value Date    HGBA1C 7.6 (H) 07/21/2023    HGBA1C 9.1 (A) 04/11/2023    HGBA1C 8.4 (A) 01/05/2023     Lab Results   Component Value Date    GLUF 249 (H) 05/30/2023    LDLCALC 64 05/07/2023    CREATININE 0.88 05/30/2023         Health Maintenance   Topic Date Due   • HIV Screening  Never done   • Pneumococcal Vaccine: Pediatrics (0 to 5 Years) and At-Risk Patients (6 to 59 Years) (2 - PCV) 01/14/2016   • COVID-19 Vaccine (3 - Pfizer risk series) 06/26/2021   • Influenza Vaccine (1) 09/01/2023   • HEMOGLOBIN A1C  10/21/2023   • Depression Screening  01/05/2024   • DM Eye Exam  07/26/2024 (Originally 8/22/2018)   • Diabetic Foot Exam  05/31/2024   • BMI: Followup Plan  07/26/2024   • BMI: Adult  07/26/2024   • Annual Physical  07/26/2024   • DTaP,Tdap,and Td Vaccines (2 - Td or Tdap) 10/30/2025   • Colorectal Cancer Screening  12/19/2026   • Hepatitis C Screening  Completed   • HIB Vaccine  Aged Out   • IPV Vaccine  Aged Out   • Hepatitis A Vaccine  Aged Out   • Meningococcal ACWY Vaccine  Aged Out   • HPV Vaccine  Aged Out         Review of Systems:     Review of Systems   Constitutional: Negative for chills, fatigue, fever and unexpected weight change. Eyes: Negative for visual disturbance. Respiratory: Negative for cough, shortness of breath, wheezing and stridor. Cardiovascular: Negative for chest pain, palpitations and leg swelling. Gastrointestinal: Negative for abdominal distention, abdominal pain, blood in stool, constipation, diarrhea, nausea and vomiting. Genitourinary: Negative for decreased urine volume, difficulty urinating, frequency, hematuria and urgency. Musculoskeletal: Positive for gait problem. Right knee pain   Neurological: Negative for dizziness, syncope, weakness and light-headedness.       Past Medical History:     Past Medical History:   Diagnosis Date   • Asthma    • Benign positional vertigo    • Diabetes mellitus ([address])     borderline; diet controlled   • Diabetes mellitus due to underlying condition with diabetic nephropathy, with long-term current use of insulin ([address]) 5/19/2021   • Dyslipidemia    • Gastroenteritis    • Hyperlipidemia    • Hypertension    • Lipoma of neck     last assessed - [id number]   • Multiple myeloma ([address])    • Sleep apnea     has symptoms but not been diagnosed   • Wheezing     last assessed - [id number]      Past Surgical History:     Past Surgical History:   Procedure Laterality Date   • ESOPHAGOGASTRODUODENOSCOPY  2010    Diagnostic   • MASS EXCISION Right 8/21/2017    Procedure: POSTERIOR SHOULDER MASS EXCISION; POSTERIOR NECK MASS EXCISION; CHEST WALL MASS EXCISION;  Surgeon: Keith Cardona MD;  Location: [facility];  Service: General   • THROAT SURGERY        Family History:     Family History   Problem Relation Age of Onset   • Hypertension Mother    • Diabetes Mother    • Hyperlipidemia Mother    • Cancer Mother         Malignant neoplasm of the gastrointestinal tract   • Diabetes type II Mother         Type 2 diabetes mellitus   • Hypertension Father         Essential hypercholesterolemia   • Hyperlipidemia Father    • Heart disease Father         Arteriosclerotic cardiovascular disease (ASCVD)   • Dementia Father    • Heart attack Father    • Cancer Father         prostate   • Thyroid disease Sister    • Breast cancer Maternal Aunt    • Stomach cancer Maternal Grandmother    • Stroke Neg Hx       Social History:     Social History     Socioeconomic History   • Marital status: /Civil Union     Spouse name: None   • Number of children: None   • Years of education: None   • Highest education level: None   Occupational History   • Occupation: Self-employed   Tobacco Use   • Smoking status: Never   • Smokeless tobacco: Never   Vaping Use   • Vaping Use: Never used   Substance and Sexual Activity   • Alcohol use: Not Currently     Comment: Social drinker per [employer]   • Drug use: No   • Sexual activity: Not Currently     Partners: Female   Other Topics Concern   • None   Social History Narrative   • None     Social Determinants of Health     Financial Resource Strain: Low Risk  (1/5/2023)    Overall Financial Resource Strain (CARDIA)    • Difficulty of Paying Living Expenses: Not hard at all   Food Insecurity: No Food Insecurity (1/5/2023)    Hunger Vital Sign    • Worried About Running Out of Food in the Last Year: Never true    • Ran Out of Food in the Last Year: Never true   Transportation Needs: No Transportation Needs (1/5/2023)    PRAPARE - Transportation    • Lack of Transportation (Medical): No    • Lack of Transportation (Non-Medical):  No   Physical Activity: Not on file   Stress: No Stress Concern Present (4/29/2022)    [address] [place] [address]    • Feeling of Stress : Not at all   Social Connections: Not on file   Intimate Partner Violence: Not At Risk (8/16/2022)    Humiliation, Afraid, Rape, and Kick questionnaire    • Fear of Current or Ex-Partner: No    • Emotionally Abused: No    • Physically Abused: No    • Sexually Abused: No   Housing Stability: Low Risk  (1/5/2023)    Housing Stability Vital Sign    • Unable to Pay for Housing in the Last Year: No    • Number of Places Lived in the Last Year: 1    • Unstable Housing in the Last Year: No      Current Medications:     Current Outpatient Medications   Medication Sig Dispense Refill   • albuterol (PROVENTIL HFA,VENTOLIN HFA) 90 mcg/act inhaler INHALE 1 PUFF EVERY 4-6 HOURS AS NEEDED 17 g 2   • ascorbic acid (VITAMIN C) 500 mg tablet Take 500 mg by mouth daily     • atorvastatin (LIPITOR) 40 mg tablet TAKE 1 TABLET(40 MG) BY MOUTH DAILY 90 tablet 3   • B Complex-Biotin-FA (VITAMIN B50 COMPLEX PO)      • Blood Glucose Monitoring Suppl (OneTouch Verio) w/Device KIT Use 3 (three) times a day 1 kit 0   • Diclofenac Sodium (VOLTAREN) 1 % Apply 2 g topically 4 (four) times a day 100 g 2   • dulaglutide (Trulicity) 3 DY/3.5MZ injection Inject 0.5 mL (3 mg total) under the skin every 7 days 2 mL 3   • Echinacea 380 MG CAPS Take by mouth 2 daily am     • Empagliflozin (JARDIANCE) 10 MG TABS tablet Take 1 tablet (10 mg total) by mouth daily 30 tablet 1   • fluticasone (FLONASE) 50 mcg/act nasal spray SHAKE LIQUID AND USE 2 SPRAYS IN EACH NOSTRIL DAILY 48 g 2   • fluticasone-salmeterol (Advair HFA) 115-21 MCG/ACT inhaler Inhale 2 puffs 2 (two) times a day Rinse mouth after use 12 g 5   • gabapentin (NEURONTIN) 100 mg capsule Take 1 capsule (100 mg total) by mouth daily at bedtime 30 capsule 3   • Ginkgo Biloba (GINKOBA PO) Take 60 mg by mouth     • Insulin Pen Needle 31G X 5 MM MISC Use daily 100 each 5   • Lancets (OneTouch Delica Plus PSIACN10P) MISC TEST THREE TIMES DAILY 100 each 5   • lisinopril (ZESTRIL) 40 mg tablet Take 1 tablet (40 mg total) by mouth daily 90 tablet 4   • loratadine (CLARITIN) 10 mg tablet Take 1 tablet (10 mg total) by mouth daily 90 tablet 2   • MAGNESIUM CITRATE PO Take 1 tablet by mouth daily       • metFORMIN (GLUCOPHAGE) 1000 MG tablet Take 1 tablet (1,000 mg total) by mouth 2 (two) times a day with meals 180 tablet 2   • Multiple Vitamins-Minerals (MULTIVITAMIN ADULTS 50+ PO) Take by mouth daily     • omeprazole (PriLOSEC) 40 MG capsule TAKE 1 CAPSULE(40 MG) BY MOUTH DAILY 90 capsule 3   • OneTouch Verio test strip USE AS DIRECTED THREE TIMES DAILY 100 strip 5   • Lantus SoloStar 100 units/mL SOPN INJECT 20 UNITS UNDER THE SKIN DAILY AT BEDTIME AND 5 UNITS IN THE MORNING (Patient taking differently: 20-28 units at night) 45 mL 3     No current facility-administered medications for this visit. Allergies: Allergies   Allergen Reactions   • Shellfish-Derived Products - Food Allergy Anaphylaxis     Clams and mussels, oysters  Lobster and crab ok   • Diphenhydramine      Lightheadedness, nauseous, rapid heartbeat   • Other Palpitations and Other (See Comments)     Clams      Physical Exam:     /82   Pulse 92   Temp 98.2 °F (36.8 °C)   Resp 19   Wt (!) 137 kg (303 lb)   BMI 41.67 kg/m²     Physical Exam  Constitutional:       Appearance: Normal appearance. He is obese. HENT:      Head: Normocephalic and atraumatic. Right Ear: Tympanic membrane, ear canal and external ear normal.      Left Ear: Tympanic membrane, ear canal and external ear normal.      Nose: Nose normal.      Mouth/Throat:      Mouth: Mucous membranes are moist.      Pharynx: Oropharynx is clear. Eyes:      Extraocular Movements: Extraocular movements intact. Conjunctiva/sclera: Conjunctivae normal.      Pupils: Pupils are equal, round, and reactive to light. Cardiovascular:      Rate and Rhythm: Normal rate and regular rhythm. Pulses: Normal pulses. Heart sounds: No murmur heard. No friction rub. No gallop. Pulmonary:      Effort: Pulmonary effort is normal.      Breath sounds: Normal breath sounds. No stridor. No wheezing, rhonchi or rales. Abdominal:      General: Abdomen is flat. Bowel sounds are normal. There is no distension. Palpations: Abdomen is soft. Tenderness: There is no abdominal tenderness. There is no guarding. Hernia: A hernia is present. Hernia is present in the umbilical area. Musculoskeletal:      Cervical back: Normal range of motion and neck supple. No tenderness. Right knee: No bony tenderness. Decreased range of motion. Tenderness present over the medial joint line and lateral joint line. No LCL laxity, MCL laxity, ACL laxity or PCL laxity. Instability Tests: Anterior drawer test negative. Posterior drawer test negative. Anterior Lachman test negative. Medial Jossue test negative and lateral Jossue test negative. Left knee: Normal.   Lymphadenopathy:      Cervical: No cervical adenopathy. Skin:     General: Skin is warm and dry. Neurological:      Mental Status: He is alert. Psychiatric:         Mood and Affect: Mood normal.         Behavior: Behavior normal.         Thought Content:  Thought content normal.         Judgment: Judgment normal.          DO Star Johnson

## 2023-07-26 NOTE — ASSESSMENT & PLAN NOTE
Pt follows with Endocrinology for diabetes management. Recent bloodwork showed improved A1c, however, still above goal.  Pt denies polyphagia, polydipsia, polyuria, changes to appetite. Blood sugars at home are 120-130s fasting and 160-180 post prandial without hypoglycemic episodes. Lab Results   Component Value Date    HGBA1C 7.6 (H) 07/21/2023     -Continue Lantus 28 units and follow with Endo recommendations to increase Trulicity 3mg.

## 2023-07-26 NOTE — ASSESSMENT & PLAN NOTE
Pt is a 54yo male with extensive PMHx presenting for annual physical exam.  Pt's only concern is right knee pain. UTD on screenings.

## 2023-07-26 NOTE — ASSESSMENT & PLAN NOTE
Pt reports a 1 month history of right knee pain/ache exacerbated by walking (particularly uphill/up stairs). Pt was seen at  two days ago where he had an Xray done, which was unremarkable and did not show degenerative changes.   Pt was taking OTC ibuprofen intermittently for the past week and a half.    -Will trial Voltaren gel and use Tylenol for additional pain management  -If knee pain continues, consider Orthopedic referral  -Given stretching and strengthening exercises

## 2023-07-31 ENCOUNTER — HOSPITAL ENCOUNTER (OUTPATIENT)
Dept: INFUSION CENTER | Facility: CLINIC | Age: 63
Discharge: HOME | End: 2023-07-31
Payer: COMMERCIAL

## 2023-07-31 VITALS
HEART RATE: 90 BPM | RESPIRATION RATE: 20 BRPM | TEMPERATURE: 98.6 F | OXYGEN SATURATION: 95 % | SYSTOLIC BLOOD PRESSURE: 148 MMHG | DIASTOLIC BLOOD PRESSURE: 81 MMHG

## 2023-07-31 DIAGNOSIS — E53.8 B12 DEFICIENCY: Primary | ICD-10-CM

## 2023-07-31 DIAGNOSIS — D50.9 IRON DEFICIENCY ANEMIA, UNSPECIFIED IRON DEFICIENCY ANEMIA TYPE: ICD-10-CM

## 2023-07-31 PROCEDURE — 96365 THER/PROPH/DIAG IV INF INIT: CPT

## 2023-07-31 PROCEDURE — 96372 THER/PROPH/DIAG INJ SC/IM: CPT

## 2023-07-31 RX ORDER — CYANOCOBALAMIN 1000 UG/ML
1000 INJECTION, SOLUTION INTRAMUSCULAR; SUBCUTANEOUS ONCE
Status: COMPLETED | OUTPATIENT
Start: 2023-07-31 | End: 2023-07-31

## 2023-07-31 RX ORDER — SODIUM CHLORIDE 9 MG/ML
20 INJECTION, SOLUTION INTRAVENOUS ONCE
Status: COMPLETED | OUTPATIENT
Start: 2023-07-31 | End: 2023-07-31

## 2023-07-31 RX ORDER — CYANOCOBALAMIN 1000 UG/ML
1000 INJECTION, SOLUTION INTRAMUSCULAR; SUBCUTANEOUS ONCE
Status: CANCELLED
Start: 2023-08-01 | End: 2023-08-01

## 2023-07-31 RX ORDER — SODIUM CHLORIDE 9 MG/ML
20 INJECTION, SOLUTION INTRAVENOUS ONCE
Status: CANCELLED | OUTPATIENT
Start: 2023-08-01

## 2023-07-31 RX ADMIN — SODIUM CHLORIDE 20 ML/HR: 0.9 INJECTION, SOLUTION INTRAVENOUS at 09:00

## 2023-07-31 RX ADMIN — CYANOCOBALAMIN 1000 MCG: 1000 INJECTION, SOLUTION INTRAMUSCULAR; SUBCUTANEOUS at 09:03

## 2023-07-31 RX ADMIN — SODIUM CHLORIDE 300 MG: 0.9 INJECTION, SOLUTION INTRAVENOUS at 09:01

## 2023-07-31 NOTE — PROGRESS NOTES
Patient tolerated venofer infusion without incident. Vitamin b12 injection administered into left deltoid, tolerated without incident. Peripheral IV removed. No further appointments scheduled. AVS declined.

## 2023-08-15 DIAGNOSIS — E11.42 TYPE 2 DIABETES MELLITUS WITH DIABETIC POLYNEUROPATHY, WITHOUT LONG-TERM CURRENT USE OF INSULIN (HCC): ICD-10-CM

## 2023-08-15 DIAGNOSIS — E11.42 TYPE 2 DIABETES MELLITUS WITH DIABETIC POLYNEUROPATHY, WITH LONG-TERM CURRENT USE OF INSULIN (HCC): ICD-10-CM

## 2023-08-15 DIAGNOSIS — Z79.4 TYPE 2 DIABETES MELLITUS WITH DIABETIC POLYNEUROPATHY, WITH LONG-TERM CURRENT USE OF INSULIN (HCC): ICD-10-CM

## 2023-08-15 DIAGNOSIS — I10 ESSENTIAL HYPERTENSION: ICD-10-CM

## 2023-08-16 RX ORDER — BLOOD SUGAR DIAGNOSTIC
1 STRIP MISCELLANEOUS 3 TIMES DAILY
Qty: 100 STRIP | Refills: 5 | Status: SHIPPED | OUTPATIENT
Start: 2023-08-16

## 2023-08-16 RX ORDER — LISINOPRIL 40 MG/1
40 TABLET ORAL DAILY
Qty: 90 TABLET | Refills: 4 | Status: SHIPPED | OUTPATIENT
Start: 2023-08-16

## 2023-09-13 DIAGNOSIS — E08.41 DIABETIC MONONEUROPATHY ASSOCIATED WITH DIABETES MELLITUS DUE TO UNDERLYING CONDITION (HCC): ICD-10-CM

## 2023-09-13 RX ORDER — GABAPENTIN 100 MG/1
100 CAPSULE ORAL
Qty: 30 CAPSULE | Refills: 5 | Status: SHIPPED | OUTPATIENT
Start: 2023-09-13

## 2023-09-18 DIAGNOSIS — J45.40 MODERATE PERSISTENT ASTHMA WITHOUT COMPLICATION: ICD-10-CM

## 2023-09-18 RX ORDER — LORATADINE 10 MG/1
10 TABLET ORAL DAILY
Qty: 90 TABLET | Refills: 2 | Status: SHIPPED | OUTPATIENT
Start: 2023-09-18

## 2023-09-19 ENCOUNTER — TELEPHONE (OUTPATIENT)
Age: 63
End: 2023-09-19

## 2023-09-19 DIAGNOSIS — E11.42 DIABETIC PERIPHERAL NEUROPATHY (HCC): Primary | ICD-10-CM

## 2023-09-19 DIAGNOSIS — M20.42 HAMMERTOE, BILATERAL: ICD-10-CM

## 2023-09-19 DIAGNOSIS — M20.41 HAMMERTOE, BILATERAL: ICD-10-CM

## 2023-09-19 NOTE — TELEPHONE ENCOUNTER
Caller: Amanda Oliver    Doctor: Darya Wharton DPM    Reason for call: Cjronald Jerry is requesting an order for diabetic shoes and inserts. Once the orders are put in, they need to be faxed to [facility] @[phone]     Call back#:[phone] to let Kena Edwards know when this is done.

## 2023-09-28 ENCOUNTER — CONSULT (OUTPATIENT)
Dept: MULTI SPECIALTY CLINIC | Facility: CLINIC | Age: 63
End: 2023-09-28

## 2023-09-28 VITALS
HEART RATE: 98 BPM | OXYGEN SATURATION: 95 % | DIASTOLIC BLOOD PRESSURE: 88 MMHG | SYSTOLIC BLOOD PRESSURE: 159 MMHG | BODY MASS INDEX: 41.6 KG/M2 | TEMPERATURE: 98.6 F | WEIGHT: 302.5 LBS

## 2023-09-28 DIAGNOSIS — H93.13 TINNITUS, BILATERAL: ICD-10-CM

## 2023-09-28 DIAGNOSIS — H91.93 DECREASED HEARING, BILATERAL: Primary | ICD-10-CM

## 2023-09-28 PROCEDURE — 99204 OFFICE O/P NEW MOD 45 MIN: CPT | Performed by: OTOLARYNGOLOGY

## 2023-09-28 NOTE — PROGRESS NOTES
Assessment/Plan:  Decreased hearing. Audiogram ordered. F/u after audiogram.      Diagnosis ICD-10-CM Associated Orders   1. Decreased hearing, bilateral  H91.93 Ambulatory Referral to Audiology      2. Tinnitus, bilateral  H93.13              Subjective:      Patient ID: Manisha Major is a 70 y.o. male. Pt with c/o gradual hearing loss. , and ringing in the ears. The following portions of the patient's history were reviewed and updated as appropriate: allergies, current medications, past family history, past medical history, past social history, past surgical history and problem list.    Review of Systems      Objective:      /88 (BP Location: Right arm, Patient Position: Sitting, Cuff Size: Standard)   Pulse 98   Temp 98.6 °F (37 °C) (Temporal)   Wt (!) 137 kg (302 lb 8 oz)   SpO2 95%   BMI 41.60 kg/m²          Physical Exam  Constitutional:       Appearance: He is well-developed. HENT:      Head: Normocephalic and atraumatic. Right Ear: Tympanic membrane, ear canal and external ear normal. No drainage. No middle ear effusion. Left Ear: Tympanic membrane, ear canal and external ear normal. No drainage. No middle ear effusion. Nose: Nose normal.      Mouth/Throat:      Pharynx: Uvula midline. No oropharyngeal exudate. Tonsils: 2+ on the right. 2+ on the left. Neck:      Thyroid: No thyroid mass or thyromegaly. Trachea: Trachea normal. No tracheal deviation. Lymphadenopathy:      Cervical: No cervical adenopathy. Neurological:      Mental Status: He is alert.

## 2023-10-03 ENCOUNTER — OFFICE VISIT (OUTPATIENT)
Dept: AUDIOLOGY | Age: 63
End: 2023-10-03
Payer: COMMERCIAL

## 2023-10-03 DIAGNOSIS — H90.3 SENSORY HEARING LOSS, BILATERAL: Primary | ICD-10-CM

## 2023-10-03 DIAGNOSIS — H91.93 DECREASED HEARING, BILATERAL: ICD-10-CM

## 2023-10-03 PROCEDURE — 92567 TYMPANOMETRY: CPT | Performed by: AUDIOLOGIST

## 2023-10-03 PROCEDURE — 92557 COMPREHENSIVE HEARING TEST: CPT | Performed by: AUDIOLOGIST

## 2023-10-03 NOTE — PROGRESS NOTES
HEARING EVALUATION    Name:  Mayito Mcgraw  :  1960  Age:  70 y.o. Date of Evaluation: 10/03/23     History: Tinnitus  Reason for visit: Mayito Mcgraw is being seen today at the request of Dr. Fadia Waters for an evaluation of hearing. Patient reports longstanding history of constant bilateral high pitched tinnitus. He also reports that he was diagnosed with B.P.P.V. years ago, and that he performs the Epley maneuver at home, with varying levels of success. 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 He denies ear pain. EVALUATION:    Otoscopic Evaluation:   Right Ear: Clear and healthy ear canal and tympanic membrane   Left Ear: Clear and healthy ear canal and tympanic membrane    Tympanometry:   Right: Type A - normal middle ear pressure and compliance   Left: Type A - normal middle ear pressure and compliance    Audiogram Results:  Right Ear: Normal/borderline normal through 2k Hz dropping to moderate high frequency sensorineural hearing loss. Left Ear: Normal through 1500 Hz steeply sloping to moderate high frequency sensorineural hearing loss. *see attached audiogram      RECOMMENDATIONS:  Annual hearing eval, Return to [facility]. for F/U, Hearing Aid Evaluation, Copy to Patient/Caregiver and consider physical therapy evaluation to address diagnosed BPPV. PATIENT EDUCATION:   Discussed results and recommendations with patient. Questions were addressed and the patient was encouraged to contact our department should concerns arise.       Angel Cage.  Clinical Audiologist

## 2023-10-10 DIAGNOSIS — E08.21 DIABETES MELLITUS DUE TO UNDERLYING CONDITION WITH DIABETIC NEPHROPATHY, WITH LONG-TERM CURRENT USE OF INSULIN (HCC): ICD-10-CM

## 2023-10-10 DIAGNOSIS — Z79.4 DIABETES MELLITUS DUE TO UNDERLYING CONDITION WITH DIABETIC NEPHROPATHY, WITH LONG-TERM CURRENT USE OF INSULIN (HCC): ICD-10-CM

## 2023-10-10 RX ORDER — INSULIN GLARGINE 100 [IU]/ML
INJECTION, SOLUTION SUBCUTANEOUS
Qty: 45 ML | Refills: 3 | Status: SHIPPED | OUTPATIENT
Start: 2023-10-10 | End: 2023-10-13 | Stop reason: SDUPTHER

## 2023-10-11 ENCOUNTER — OFFICE VISIT (OUTPATIENT)
Dept: AUDIOLOGY | Age: 63
End: 2023-10-11

## 2023-10-11 DIAGNOSIS — H90.3 SENSORY HEARING LOSS, BILATERAL: Primary | ICD-10-CM

## 2023-10-11 NOTE — PROGRESS NOTES
Hearing Aid Evaluation  Name:  Alayna Hartman  :  1960  Age:  70 y.o. Date of Evaluation: 10/11/23     Audiologic Results: Audiologic testing was performed on 10/3/23, testing revealed Normal/borderline normal through 2k Hz dropping to moderate high frequency sensorineural hearing loss in the right ear and  normal through 1500 Hz steeply sloping to moderate high frequency sensorineural hearing loss in the left ear. Amplification is recommended binaurally    Hearing Aid Evaluation:  Hearing aid styles, technology, and price were discussed with the patient. Possible hearing aid options, programs, and accessories were discussed. Pricing options and technology levels were discussed to determine the appropriate device to recommend. Recommendation/Quote: The first hearing aid recommendation is  Oticon REAL 3 miniRITE R. The second hearing aid recommendation is  Oticon Zircon 1 miniRITE R. Color:92    size: Right 2, 85 / Left 2, 85   Dome size: 8mm      Insurance verification revealed benefit of up to $1200 per year. Patient wishes to discuss options with spouse, and will contact center and leave deposit should he decide to pursue hearing aids.         Angie Garduno  Clinical Audiologist

## 2023-10-13 ENCOUNTER — OFFICE VISIT (OUTPATIENT)
Dept: INTERNAL MEDICINE CLINIC | Facility: CLINIC | Age: 63
End: 2023-10-13
Payer: COMMERCIAL

## 2023-10-13 VITALS
DIASTOLIC BLOOD PRESSURE: 80 MMHG | SYSTOLIC BLOOD PRESSURE: 132 MMHG | OXYGEN SATURATION: 95 % | WEIGHT: 302 LBS | HEIGHT: 73 IN | BODY MASS INDEX: 40.02 KG/M2 | HEART RATE: 92 BPM | TEMPERATURE: 98.5 F | RESPIRATION RATE: 20 BRPM

## 2023-10-13 DIAGNOSIS — E08.21 DIABETES MELLITUS DUE TO UNDERLYING CONDITION WITH DIABETIC NEPHROPATHY, WITH LONG-TERM CURRENT USE OF INSULIN (HCC): Primary | ICD-10-CM

## 2023-10-13 DIAGNOSIS — Z79.4 DIABETES MELLITUS DUE TO UNDERLYING CONDITION WITH DIABETIC NEPHROPATHY, WITH LONG-TERM CURRENT USE OF INSULIN (HCC): Primary | ICD-10-CM

## 2023-10-13 PROCEDURE — 99214 OFFICE O/P EST MOD 30 MIN: CPT | Performed by: INTERNAL MEDICINE

## 2023-10-13 RX ORDER — INSULIN GLARGINE 100 [IU]/ML
INJECTION, SOLUTION SUBCUTANEOUS
Qty: 15 ML | Refills: 4 | Status: SHIPPED | OUTPATIENT
Start: 2023-10-13

## 2023-10-13 NOTE — ASSESSMENT & PLAN NOTE
Lab Results   Component Value Date    HGBA1C 7.6 (H) 07/21/2023   Fasting BG ranging , post prandial 140-180  BG have improved since last follow up  Continue with current medications including Metformin 1887 mg BID, Trulicity 3 mg once a week and Lantus 28 units at bedtime  Repeat A1c and Fructosamine in one month  RTC in 4 months   Patient will try to obtain a copy of his most recent DM eye exam from his Ophthalmologist

## 2023-10-13 NOTE — PROGRESS NOTES
Name: Pancho Fontenot      : 1960      MRN: [id number]  Encounter Provider: Luciano Allred Resident  Encounter Date: 10/13/2023   Encounter department: [address] [place] [address]     1. Diabetes mellitus due to underlying condition with diabetic nephropathy, with long-term current use of insulin ([facility])  Assessment & Plan:    Lab Results   Component Value Date    HGBA1C 7.6 (H) 2023   Fasting BG ranging , post prandial 140-180  BG have improved since last follow up  Continue with current medications including Metformin 1688 mg BID, Trulicity 3 mg once a week and Lantus 28 units at bedtime  Repeat A1c and Fructosamine in one month  RTC in 4 months   Patient will try to obtain a copy of his most recent DM eye exam from his Ophthalmologist      Orders:  -     Hemoglobin A1C; Future  -     Fructosamine; Future  -     Insulin Glargine Solostar (Lantus SoloStar) 100 UNIT/ML SOPN; 28 units at night           Subjective      HPI  Ms. Yuliana Santos presents to the clinic for a follow up. He reports that he has been dealing with right knee pain for the past few weeks. Currently using tylenol arthritis without complete resolution of the pain. He also complains of a burning sensation on the left anterior thigh. Reports that he is currently taking metformin 2904 mg BID, Trulicity 3 mg once a week and Lantus 28 units at bed time. Denies any medication side effects. Reports that he usually only eat meals at breakfast and dinner. Reports that he has been reducing his bedtime snacking. Reports that he is willing to receive diabetic education or seen by a nutritionist if covered by his insurance. Review of Systems   Constitutional:  Negative for chills and fever. HENT:  Negative for ear pain and sore throat. Eyes:  Negative for pain and visual disturbance. Respiratory:  Negative for cough and shortness of breath. Cardiovascular:  Negative for chest pain and palpitations. Gastrointestinal:  Negative for abdominal pain and vomiting. Genitourinary:  Negative for dysuria and hematuria. Musculoskeletal:  Positive for arthralgias. Negative for back pain. Paraesthesia -Left anterior thigh   Skin:  Negative for color change and rash. Neurological:  Negative for seizures and syncope. All other systems reviewed and are negative.       Current Outpatient Medications on File Prior to Visit   Medication Sig    albuterol (PROVENTIL HFA,VENTOLIN HFA) 90 mcg/act inhaler INHALE 1 PUFF EVERY 4-6 HOURS AS NEEDED    ascorbic acid (VITAMIN C) 500 mg tablet Take 500 mg by mouth daily    atorvastatin (LIPITOR) 40 mg tablet TAKE 1 TABLET(40 MG) BY MOUTH DAILY    B Complex-Biotin-FA (VITAMIN B50 COMPLEX PO)     Blood Glucose Monitoring Suppl (OneTouch Verio) w/Device KIT Use 3 (three) times a day    Diclofenac Sodium (VOLTAREN) 1 % Apply 2 g topically 4 (four) times a day    dulaglutide (Trulicity) 3 IZ/2.7VF injection Inject 0.5 mL (3 mg total) under the skin every 7 days    Echinacea 380 MG CAPS Take by mouth 2 daily am    fluticasone (FLONASE) 50 mcg/act nasal spray SHAKE LIQUID AND USE 2 SPRAYS IN EACH NOSTRIL DAILY    fluticasone-salmeterol (Advair HFA) 115-21 MCG/ACT inhaler Inhale 2 puffs 2 (two) times a day Rinse mouth after use    gabapentin (NEURONTIN) 100 mg capsule Take 1 capsule (100 mg total) by mouth daily at bedtime    Ginkgo Biloba (GINKOBA PO) Take 60 mg by mouth    glucose blood (OneTouch Verio) test strip Use 1 each 3 (three) times a day Use as instructed    Insulin Pen Needle 31G X 5 MM MISC Use daily    Lancets (OneTouch Delica Plus [id number]) MISC TEST THREE TIMES DAILY    lisinopril (ZESTRIL) 40 mg tablet Take 1 tablet (40 mg total) by mouth daily    loratadine (CLARITIN) 10 mg tablet TAKE 1 TABLET BY MOUTH EVERY DAY    MAGNESIUM CITRATE PO Take 1 tablet by mouth daily      metFORMIN (GLUCOPHAGE) 1000 MG tablet Take 1 tablet (1,000 mg total) by mouth 2 (two) times a day with meals    Multiple Vitamins-Minerals (MULTIVITAMIN ADULTS 50+ PO) Take by mouth daily    omeprazole (PriLOSEC) 40 MG capsule TAKE 1 CAPSULE(40 MG) BY MOUTH DAILY    [DISCONTINUED] Empagliflozin (JARDIANCE) 10 MG TABS tablet Take 1 tablet (10 mg total) by mouth daily    [DISCONTINUED] Insulin Glargine Solostar (Lantus SoloStar) 100 UNIT/ML SOPN 20-28 units at night       Objective     /80 (BP Location: Left arm, Patient Position: Sitting, Cuff Size: Extra-Large)   Pulse 92   Temp 98.5 °F (36.9 °C) (Tympanic)   Resp 20   Ht 6' 1" (1.854 m)   Wt (!) 137 kg (302 lb)   SpO2 95%   BMI 39.84 kg/m²     Physical Exam  Constitutional:       Appearance: He is obese. He is not toxic-appearing or diaphoretic. HENT:      Head: Normocephalic and atraumatic. Right Ear: Tympanic membrane normal.      Left Ear: Tympanic membrane normal.   Cardiovascular:      Rate and Rhythm: Normal rate and regular rhythm. Pulses: Normal pulses. Heart sounds: Normal heart sounds. Pulmonary:      Effort: Pulmonary effort is normal.      Breath sounds: Normal breath sounds. Abdominal:      General: Bowel sounds are normal.      Comments: Abdominal hernia    Musculoskeletal:      Right lower leg: Edema present. Left lower leg: Edema present. Skin:     General: Skin is warm and dry. Neurological:      General: No focal deficit present. Mental Status: He is alert and oriented to person, place, and time.    Psychiatric:         Mood and Affect: Mood normal.         Behavior: Behavior normal.       Luciano Allred Resident

## 2023-10-17 ENCOUNTER — RA CDI HCC (OUTPATIENT)
Dept: OTHER | Facility: HOSPITAL | Age: 63
End: 2023-10-17

## 2023-10-17 NOTE — PROGRESS NOTES
[address] [facility] coding opportunities          Chart Reviewed number of suggestions sent to Provider: 1     Patients Insurance        Commercial Insurance: Albert Briggs

## 2023-10-17 NOTE — PROGRESS NOTES
[facility] [id number] [facility] 70 [facility] [phone]    Assessment & Plan  Problem List Items Addressed This Visit       Obstructive sleep apnea (Chronic)    Diabetic peripheral neuropathy (HCC)    Benign paroxysmal positional vertigo    Smoldering multiple myeloma - Primary    Chronic pain of right knee    Paresthesia of both feet    Type 2 diabetes mellitus, with long-term current use of insulin (HCC)    Goals of care, counseling/discussion       Plan:  1) Symptom management  #Chronic right knee pain   #Paresthesia of right shin  #Paresthesia of bilateral feet  #Diabetes Mellitus with diabetic neuropathy of bilateral lower extremities    -Discussed potential options for pain management including MMJ in light of smoldering multiple myeloma   -Reviewed MMJ certification process and if he wishes to pursue this, he can obtain a patient ID number and can set up an appointment with our office for certification with one of our certifying providers.  -Pain at this time is likely related to his peripheral neuropathy in light of his symptoms of burning/paresthesias to bilateral feet and legs in the setting of his longstanding diabetes  -Reviewed his blood work from 5/23/2023 in which renal function was WNL at that time (Cr 0.88, eGFR 90). -Discussed that his Gabapentin is used for neuropathic pain and it would be worthwhile to go up on this dosing since he felt some mild improvement when he was first initially placed on this.   -He states he will be seeing his PCP in the coming weeks and wants to speak with his PCP in regards to this potential plan for adjustment with his Gabapentin.  He does endorse wanting to take a little medications as possible, but understands that this adjustment may be necessary to provide him with pain relief.   -Patient is getting repeat blood work next month with Oncology as well as specific labs from his recent establishment with an Endocrinologist.   -Patient has not yet seen Orthopedics for his right knee pain (XR of right knee from 7/24/2023 reviewed with no signs of acute osseous abnormality per report). #Diabetes mellitus  -Last A1c 7.6 from 7/21/2023  -Patient recently established with Endocrinology and has a repeat A1c scheduled for next month when he gets his lab work completed for Oncology at the same time. -Recommended continue glycemic control as this may potentially also help with neuropathic pain  -Patient is watching his oral intake and has made efforts to lose weight   -Encouraged continue follow-up with PCP and Endocrinology    #Gastrointestinal   Denies constipation, diarrhea, or bloody bowel movements    #Hx of BPPV  -No issues now with dizziness or vertigo  -Has had Epley maneuver done in the past with success    #KYLEIGH  -sleeps 4-5 hours a night  -Has CPAP machine which is helpful, recommended continuation of this. #Elevated temperature  -Precautions advised to patient to watch for any concerning symptoms as he does not feel ill at this time.  -Precautions to seek medical care if patient feels ill    #Psychosocial    -Support provided   -Supported by his wife ([place]) and 2 children Sam Peck -son; Ailyn Malcolm -dtr)    2) Goals of Care / [address] [place] [address]   -Advanced Directive Counseling Given and reviewed ACP paperwork  -Patient will review with family and will complete when he is ready. He is recommended to bring the completed form back to any [facility] office or my next follow-up appointment for upload to our [address] [place], [place] [address]y [address] Proxy/Agent/ Power of  : Designates his wife, [place], as substitute decision maker     3) Referrals Placed / Medical Equipment Ordered   -None today    4) Follow-Up Recommendations   -Follow-up with PCP and specialist team (Endocrinology, Heme/Onc).    -Follow-up in 3 months or sooner as needed     Medications adjusted this encounter:  Requested Prescriptions      No prescriptions requested or ordered in this encounter     No orders of the defined types were placed in this encounter. There are no discontinued medications. PPS: 100%      Brenda Victoria was seen today for symptoms and planning cares related to above illnesses. Above orders were sent electronically, or provided in hardcopy in clinic. I have reviewed the patient's controlled substance dispensing history in the Prescription Drug Monitoring Program in compliance with the [facility] regulations before prescribing any controlled substances. We appreciate the referral, and wish for him to continue to follow with us. If there are questions or concerns, please contact us through our clinic/answering service 24 hours a day, seven days a week. Visit Information    Accompanied By: No one    Source of History: Self    History Limitations: None    Contacts:Contact Information:  Name: [place], Relationship: Wife, Other: [phone]    History of Present Illness    Chief Complaint  Smoldering Multiple Myeloma  Chronic right knee pain  Paresthesias of bilateral lower extremities  Neuropathic pain to both feet      Brenda Victoria is a 70 y.o. male who presents as a referral from Naomie RADHA Ugalde of Oncology for primary palliative diagnosis of "smoldering multiple myeloma". Consultation is requested for: symptom management, goal of care assessment and decisional support. Care Team: PCP (Dr. Lucille Ch), Heme/Onc Naomie Flow PA-C), Endocrinology    Patient seen and examined today. Rocco Mckeon is a pleasant 70year old male with a diagnosis of smoldering multiple myeloma (dx May 2018) in which he follows Hematology/Oncology (Naomie Flow PA-C). He has had a history of Venofer infusions and B12 supplementation due to iron deficiency. He does have a history of DM with peripheral neuropathy. He is referred to our office today for pain management.    He has chronic right knee pain and paresthesias to the bilateral feet and right leg. He states he has been dealing with chronic right knee pain for some time and had an XR of the right knee which did not reveal any concerning findings (results below). He states he was supposed to follow with Orthopedics for a potential MRI of the knee, however, he has put this on hold. He states ambulating or standing for long periods at work causes pain to his knee (pain 7 out of 10 today). He also states his burning pain to his feet also do not help. He has been on Gabapentin 100mg qHS per his PCP for some time. He does endorse not wanting to take too many medications and is worried about potential side effects of medication, therefore, if he is to take any medications he would want the lowest effective dose. He does endorse some mild improvement when he was initially started on Gabapentin. I did recommend the option to up-titrate his Gabapentin as he is only taking a starting dose at this time and he may benefit from this for this neuropathy. He states he will be seeing his PCP in the coming weeks and will want to discuss with her if this would be a reasonable change as well. We did review options of MMJ for his smoldering multiple myeloma in which he MMJ resource brochure was provided to [facility] Foley. He will consider this option as well. Otherwise, he has been working towards losing weight and has been successful. He is eating well and eating healthier without issue. Denies any nausea, vomiting, bowel issues, urination issues, or other areas of pain. He has follow-up appointments with his PCP, Oncology (for repeat blood work) and Endocrinology (who he recently established with). He is to follow-up with Palliative Care in 3 months or sooner should any changes arise. Of note, he did have a fever in the office today. He states he does not feel feverish or ill and feels overall well. Precautions for seeking medical care given to patient should he feel ill/unwell.     Imaging:  XR Knee right 7/24/2023  There is no acute fracture or dislocation. There is no joint effusion. No significant degenerative changes. No lytic or blastic osseous lesion. Soft tissues are unremarkable. IMPRESSION:  No acute osseous abnormality. CT abdomen 6/15/2023  Large fat-containing umbilical hernia. Hepatic steatosis. Left adrenal adenoma.     Pertinent Palliative Care Domains    Physical Symptoms:ambulates    Psychological Symptoms:no anxiety, good insight, coping well    Social Aspects: support system wife, has 2 children    Spiritual Aspects: to re-visit    Advance Directive and Living Will:    None on file  Power of :  None on file  POLST:  None     Historical Data  Past Medical History:   Diagnosis Date    Asthma     Benign positional vertigo     Diabetes mellitus ([address])     borderline; diet controlled    Diabetes mellitus due to underlying condition with diabetic nephropathy, with long-term current use of insulin ([address]) 5/19/2021    Dyslipidemia     Gastroenteritis     Hyperlipidemia     Hypertension     Lipoma of neck     last assessed - [id number]    Multiple myeloma ([address])     Sleep apnea     has symptoms but not been diagnosed    Wheezing     last assessed - [id number]     Past Surgical History:   Procedure Laterality Date    ESOPHAGOGASTRODUODENOSCOPY  2010    Diagnostic    MASS EXCISION Right 8/21/2017    Procedure: POSTERIOR SHOULDER MASS EXCISION; POSTERIOR NECK MASS EXCISION; CHEST WALL MASS EXCISION;  Surgeon: Wild Simon MD;  Location: [facility];  Service: General    THROAT SURGERY       Social History     Socioeconomic History    Marital status: /Civil Union     Spouse name: Not on file    Number of children: Not on file    Years of education: Not on file    Highest education level: Not on file   Occupational History    Occupation: Self-employed   Tobacco Use    Smoking status: Never    Smokeless tobacco: Never   Vaping Use    Vaping Use: Never used   Substance and Sexual Activity    Alcohol use: Not Currently     Comment: Social drinker per [employer]    Drug use: No    Sexual activity: Not Currently     Partners: Female   Other Topics Concern    Not on file   Social History Narrative    Not on file     Social Determinants of Health     Financial Resource Strain: Low Risk  (1/5/2023)    Overall Financial Resource Strain (CARDIA)     Difficulty of Paying Living Expenses: Not hard at all   Food Insecurity: No Food Insecurity (1/5/2023)    Hunger Vital Sign     Worried About Running Out of Food in the Last Year: Never true     Ran Out of Food in the Last Year: Never true   Transportation Needs: No Transportation Needs (1/5/2023)    PRAPARE - Transportation     Lack of Transportation (Medical): No     Lack of Transportation (Non-Medical):  No   Physical Activity: Not on file   Stress: No Stress Concern Present (4/29/2022)    [address] [place] [address]     Feeling of Stress : Not at all   Social Connections: Not on file   Intimate Partner Violence: Not At Risk (8/16/2022)    Humiliation, Afraid, Rape, and Kick questionnaire     Fear of Current or Ex-Partner: No     Emotionally Abused: No     Physically Abused: No     Sexually Abused: No   Housing Stability: Low Risk  (1/5/2023)    Housing Stability Vital Sign     Unable to Pay for Housing in the Last Year: No     Number of [address] in the Last Year: 1     Unstable Housing in the Last Year: No     Family History   Problem Relation Age of Onset    Hypertension Mother     Diabetes Mother     Hyperlipidemia Mother     Cancer Mother         Malignant neoplasm of the gastrointestinal tract    Diabetes type II Mother         Type 2 diabetes mellitus    Hypertension Father         Essential hypercholesterolemia    Hyperlipidemia Father     Heart disease Father         Arteriosclerotic cardiovascular disease (ASCVD)    Dementia Father     Heart attack Father     Cancer Father         prostate    Thyroid disease Sister Breast cancer Maternal Aunt     Stomach cancer Maternal Grandmother     Stroke Neg Hx      Allergies   Allergen Reactions    Shellfish-Derived Products - Food Allergy Anaphylaxis     Clams and mussels, oysters  Lobster and crab ok    Diphenhydramine      Lightheadedness, nauseous, rapid heartbeat    Other Palpitations and Other (See Comments)     Clams     Current Outpatient Medications   Medication Sig Dispense Refill    albuterol (PROVENTIL HFA,VENTOLIN HFA) 90 mcg/act inhaler INHALE 1 PUFF EVERY 4-6 HOURS AS NEEDED 17 g 2    ascorbic acid (VITAMIN C) 500 mg tablet Take 500 mg by mouth daily      atorvastatin (LIPITOR) 40 mg tablet TAKE 1 TABLET(40 MG) BY MOUTH DAILY 90 tablet 3    B Complex-Biotin-FA (VITAMIN B50 COMPLEX PO)       Blood Glucose Monitoring Suppl (OneTouch Verio) w/Device KIT Use 3 (three) times a day 1 kit 0    Diclofenac Sodium (VOLTAREN) 1 % Apply 2 g topically 4 (four) times a day 100 g 2    dulaglutide (Trulicity) 3 VM/6.6BY injection Inject 0.5 mL (3 mg total) under the skin every 7 days 2 mL 3    fluticasone (FLONASE) 50 mcg/act nasal spray SHAKE LIQUID AND USE 2 SPRAYS IN EACH NOSTRIL DAILY 48 g 2    fluticasone-salmeterol (Advair HFA) 115-21 MCG/ACT inhaler Inhale 2 puffs 2 (two) times a day Rinse mouth after use 12 g 5    gabapentin (NEURONTIN) 100 mg capsule Take 1 capsule (100 mg total) by mouth daily at bedtime 30 capsule 5    Ginkgo Biloba (GINKOBA PO) Take 60 mg by mouth      glucose blood (OneTouch Verio) test strip Use 1 each 3 (three) times a day Use as instructed 100 strip 5    Insulin Glargine Solostar (Lantus SoloStar) 100 UNIT/ML SOPN 28 units at night 15 mL 4    Insulin Pen Needle 31G X 5 MM MISC Use daily 100 each 5    Lancets (OneTouch Delica Plus [id number]) MISC TEST THREE TIMES DAILY 100 each 5    lisinopril (ZESTRIL) 40 mg tablet Take 1 tablet (40 mg total) by mouth daily 90 tablet 4    loratadine (CLARITIN) 10 mg tablet TAKE 1 TABLET BY MOUTH EVERY DAY 90 tablet 2 MAGNESIUM CITRATE PO Take 1 tablet by mouth daily        metFORMIN (GLUCOPHAGE) 1000 MG tablet Take 1 tablet (1,000 mg total) by mouth 2 (two) times a day with meals 180 tablet 2    Multiple Vitamins-Minerals (MULTIVITAMIN ADULTS 50+ PO) Take by mouth daily      omeprazole (PriLOSEC) 40 MG capsule TAKE 1 CAPSULE(40 MG) BY MOUTH DAILY 90 capsule 3    Echinacea 380 MG CAPS Take by mouth 2 daily am       No current facility-administered medications for this visit. Review of Systems   Constitutional: Positive for weight loss (anticipated weight loss through dieting). Negative for chills, decreased appetite, diaphoresis, fever (has elevated temperature in office, but does not feel ill or feverish.) and malaise/fatigue. Eyes:  Negative for visual disturbance. Cardiovascular:  Negative for chest pain and leg swelling. Respiratory:  Negative for cough, shortness of breath and sleep disturbances due to breathing (uses CPAP). Musculoskeletal:  Positive for joint pain (right knee). Negative for muscle weakness and myalgias. Gastrointestinal:  Negative for bloating, abdominal pain, anorexia, change in bowel habit, constipation, diarrhea, dysphagia, excessive appetite, melena, nausea and vomiting. Genitourinary:  Negative for bladder incontinence, dysuria, frequency, hematuria and hesitancy. Neurological:  Positive for numbness (bilateral feet (soles), and left thigh) and paresthesias (bilateral feet). Negative for excessive daytime sleepiness, dizziness, focal weakness, headaches, light-headedness, loss of balance and weakness. Psychiatric/Behavioral:  The patient does not have insomnia. All other systems reviewed and are negative.         Vital Signs    /90 (BP Location: Left arm, Patient Position: Sitting, Cuff Size: Large)   Pulse 80   Temp (!) 101.2 °F (38.4 °C) (Temporal)   Ht 6' 1" (1.854 m)   Wt (!) 138 kg (303 lb 8 oz)   SpO2 97%   BMI 40.04 kg/m²     Physical Exam and Objective Data  Physical Exam  Vitals reviewed. Constitutional:       General: He is not in acute distress. Appearance: Normal appearance. He is not ill-appearing, toxic-appearing or diaphoretic. Comments: Patient with elevated temperature in office, but patient does not feel feverish or ill. No acute distress or discomfort. No signs of acute illness on exam. Patient is pleasantly conversant with appropriate mood, affect and behavior. HENT:      Head: Normocephalic and atraumatic. Nose: Nose normal.      Mouth/Throat:      Mouth: Mucous membranes are moist.   Eyes:      General: No scleral icterus. Right eye: No discharge. Left eye: No discharge. Cardiovascular:      Rate and Rhythm: Normal rate. Abdominal:      General: There is no distension. Comments: Obese abdomen. Musculoskeletal:         General: No swelling. Skin:     General: Skin is warm and dry. Coloration: Skin is not jaundiced or pale. Neurological:      General: No focal deficit present. Mental Status: He is alert and oriented to person, place, and time. Mental status is at baseline. Psychiatric:         Mood and Affect: Mood normal.         Behavior: Behavior normal.         Thought Content: Thought content normal.         Judgment: Judgment normal.       Radiology and Laboratory:  I personally reviewed and interpreted the following results: labs, imaging, specialist notes. 45 minutes were spent in this ambulatory visit with greater than 50% of the time spent face to face with patient in counseling or coordination of care including discussions of symptom assessment and management, medication review, medication adjustment, psychosocial support, chart review, imaging review, lab review, medical marijuana, supportive listening and anticipatory guidance. All of the patient's questions were answered during this discussion. Note Shared.     Karie Canchola,   Palliative & Supportive Care  Clinic/Answering Service: [phone]  You can find me on [employer]. Portions of this document may have been created using dictation software and as such some "sound alike" terms may have been generated by the system. Do not hesitate to contact me with any questions or clarifications.

## 2023-10-18 ENCOUNTER — CONSULT (OUTPATIENT)
Dept: PALLIATIVE MEDICINE | Facility: CLINIC | Age: 63
End: 2023-10-18

## 2023-10-18 VITALS
WEIGHT: 303.5 LBS | OXYGEN SATURATION: 97 % | TEMPERATURE: 101.2 F | DIASTOLIC BLOOD PRESSURE: 90 MMHG | SYSTOLIC BLOOD PRESSURE: 142 MMHG | HEART RATE: 80 BPM | HEIGHT: 73 IN | BODY MASS INDEX: 40.22 KG/M2

## 2023-10-18 DIAGNOSIS — H81.10 BENIGN PAROXYSMAL POSITIONAL VERTIGO: ICD-10-CM

## 2023-10-18 DIAGNOSIS — Z79.4 TYPE 2 DIABETES MELLITUS, WITH LONG-TERM CURRENT USE OF INSULIN (HCC): ICD-10-CM

## 2023-10-18 DIAGNOSIS — E11.9 TYPE 2 DIABETES MELLITUS, WITH LONG-TERM CURRENT USE OF INSULIN (HCC): ICD-10-CM

## 2023-10-18 DIAGNOSIS — D47.2 SMOLDERING MULTIPLE MYELOMA: Primary | ICD-10-CM

## 2023-10-18 DIAGNOSIS — Z71.89 GOALS OF CARE, COUNSELING/DISCUSSION: ICD-10-CM

## 2023-10-18 DIAGNOSIS — M25.561 CHRONIC PAIN OF RIGHT KNEE: ICD-10-CM

## 2023-10-18 DIAGNOSIS — G47.33 OBSTRUCTIVE SLEEP APNEA: Chronic | ICD-10-CM

## 2023-10-18 DIAGNOSIS — G89.29 CHRONIC PAIN OF RIGHT KNEE: ICD-10-CM

## 2023-10-18 DIAGNOSIS — R20.2 PARESTHESIA OF BOTH FEET: ICD-10-CM

## 2023-10-18 DIAGNOSIS — E11.42 DIABETIC PERIPHERAL NEUROPATHY (HCC): ICD-10-CM

## 2023-10-18 NOTE — PATIENT INSTRUCTIONS
It was a pleasure speaking with you today. As discussed:  -Continue your medications as prescribed. -Continue with a bowel regimen to avoid constipation.  -Your temperature was elevated as discussed in office. Watch for symptoms if you feel ill. Seek medical care should you feel unwell. Follow-up in: 3 months or sooner as needed    Please do not hesitate to call me sooner should you have any questions/concerns or needs.

## 2023-10-26 ENCOUNTER — APPOINTMENT (OUTPATIENT)
Dept: LAB | Facility: CLINIC | Age: 63
End: 2023-10-26
Payer: COMMERCIAL

## 2023-10-26 DIAGNOSIS — E08.21 DIABETES MELLITUS DUE TO UNDERLYING CONDITION WITH DIABETIC NEPHROPATHY, WITH LONG-TERM CURRENT USE OF INSULIN (HCC): ICD-10-CM

## 2023-10-26 DIAGNOSIS — Z79.4 DIABETES MELLITUS DUE TO UNDERLYING CONDITION WITH DIABETIC NEPHROPATHY, WITH LONG-TERM CURRENT USE OF INSULIN (HCC): ICD-10-CM

## 2023-10-26 LAB
EST. AVERAGE GLUCOSE BLD GHB EST-MCNC: 163 MG/DL
HBA1C MFR BLD: 7.3 %

## 2023-10-26 PROCEDURE — 3051F HG A1C>EQUAL 7.0%<8.0%: CPT | Performed by: INTERNAL MEDICINE

## 2023-10-26 PROCEDURE — 83036 HEMOGLOBIN GLYCOSYLATED A1C: CPT

## 2023-10-26 PROCEDURE — 36415 COLL VENOUS BLD VENIPUNCTURE: CPT

## 2023-10-26 PROCEDURE — 3051F HG A1C>EQUAL 7.0%<8.0%: CPT | Performed by: OTOLARYNGOLOGY

## 2023-10-26 PROCEDURE — 82985 ASSAY OF GLYCATED PROTEIN: CPT

## 2023-10-27 LAB — FRUCTOSAMINE SERPL-SCNC: 236 UMOL/L (ref 0–285)

## 2023-11-02 DIAGNOSIS — E08.21 DIABETES MELLITUS DUE TO UNDERLYING CONDITION WITH DIABETIC NEPHROPATHY, WITH LONG-TERM CURRENT USE OF INSULIN (HCC): ICD-10-CM

## 2023-11-02 DIAGNOSIS — Z79.4 DIABETES MELLITUS DUE TO UNDERLYING CONDITION WITH DIABETIC NEPHROPATHY, WITH LONG-TERM CURRENT USE OF INSULIN (HCC): ICD-10-CM

## 2023-11-02 RX ORDER — DULAGLUTIDE 3 MG/.5ML
INJECTION, SOLUTION SUBCUTANEOUS
Qty: 2 ML | Refills: 3 | Status: SHIPPED | OUTPATIENT
Start: 2023-11-02

## 2023-11-06 DIAGNOSIS — Z79.4 DIABETES MELLITUS DUE TO UNDERLYING CONDITION WITH DIABETIC NEPHROPATHY, WITH LONG-TERM CURRENT USE OF INSULIN (HCC): ICD-10-CM

## 2023-11-06 DIAGNOSIS — E08.21 DIABETES MELLITUS DUE TO UNDERLYING CONDITION WITH DIABETIC NEPHROPATHY, WITH LONG-TERM CURRENT USE OF INSULIN (HCC): ICD-10-CM

## 2023-11-06 RX ORDER — LANCETS 33 GAUGE
EACH MISCELLANEOUS
Qty: 100 EACH | Refills: 5 | Status: SHIPPED | OUTPATIENT
Start: 2023-11-06

## 2023-11-08 ENCOUNTER — TELEPHONE (OUTPATIENT)
Dept: ENDOCRINOLOGY | Facility: CLINIC | Age: 63
End: 2023-11-08

## 2023-11-08 NOTE — TELEPHONE ENCOUNTER
[facility] clinic form has been faxed to [phone] but Dr. William Ley did say there was no qualifying diagnosis for this patient.

## 2023-11-09 ENCOUNTER — HOSPITAL ENCOUNTER (OUTPATIENT)
Dept: ULTRASOUND IMAGING | Facility: HOSPITAL | Age: 63
Discharge: HOME | End: 2023-11-09
Payer: COMMERCIAL

## 2023-11-09 DIAGNOSIS — K75.81 NONALCOHOLIC STEATOHEPATITIS (NASH): ICD-10-CM

## 2023-11-09 PROCEDURE — 76981 USE PARENCHYMA: CPT

## 2023-11-20 ENCOUNTER — APPOINTMENT (OUTPATIENT)
Dept: LAB | Facility: CLINIC | Age: 63
End: 2023-11-20
Payer: COMMERCIAL

## 2023-11-20 DIAGNOSIS — K75.81 NONALCOHOLIC STEATOHEPATITIS (NASH): ICD-10-CM

## 2023-11-20 DIAGNOSIS — D50.9 IRON DEFICIENCY ANEMIA, UNSPECIFIED IRON DEFICIENCY ANEMIA TYPE: ICD-10-CM

## 2023-11-20 DIAGNOSIS — D47.2 SMOLDERING MULTIPLE MYELOMA: Primary | ICD-10-CM

## 2023-11-20 LAB
ALBUMIN SERPL BCP-MCNC: 4.1 G/DL (ref 3.5–5)
ALP SERPL-CCNC: 89 U/L (ref 34–104)
ALT SERPL W P-5'-P-CCNC: 27 U/L (ref 7–52)
ANION GAP SERPL CALCULATED.3IONS-SCNC: 5 MMOL/L
AST SERPL W P-5'-P-CCNC: 15 U/L (ref 13–39)
BASOPHILS # BLD AUTO: 0.07 THOUSANDS/ÂΜL (ref 0–0.1)
BASOPHILS NFR BLD AUTO: 1 % (ref 0–1)
BILIRUB SERPL-MCNC: 0.59 MG/DL (ref 0.2–1)
BUN SERPL-MCNC: 11 MG/DL (ref 5–25)
CALCIUM SERPL-MCNC: 9.4 MG/DL (ref 8.4–10.2)
CHLORIDE SERPL-SCNC: 102 MMOL/L (ref 96–108)
CO2 SERPL-SCNC: 32 MMOL/L (ref 21–32)
CREAT SERPL-MCNC: 0.8 MG/DL (ref 0.6–1.3)
EOSINOPHIL # BLD AUTO: 0.37 THOUSAND/ÂΜL (ref 0–0.61)
EOSINOPHIL NFR BLD AUTO: 5 % (ref 0–6)
ERYTHROCYTE [DISTWIDTH] IN BLOOD BY AUTOMATED COUNT: 14.4 % (ref 11.6–15.1)
FERRITIN SERPL-MCNC: 56 NG/ML (ref 24–336)
GFR SERPL CREATININE-BSD FRML MDRD: 93 ML/MIN/1.73SQ M
GLUCOSE P FAST SERPL-MCNC: 118 MG/DL (ref 65–99)
HCT VFR BLD AUTO: 42.1 % (ref 36.5–49.3)
HGB BLD-MCNC: 13.3 G/DL (ref 12–17)
IGA SERPL-MCNC: 91 MG/DL (ref 66–433)
IGG SERPL-MCNC: 1610 MG/DL (ref 635–1741)
IGM SERPL-MCNC: 90 MG/DL (ref 45–281)
IMM GRANULOCYTES # BLD AUTO: 0.03 THOUSAND/UL (ref 0–0.2)
IMM GRANULOCYTES NFR BLD AUTO: 0 % (ref 0–2)
IRON SATN MFR SERPL: 21 % (ref 15–50)
IRON SERPL-MCNC: 58 UG/DL (ref 50–212)
LYMPHOCYTES # BLD AUTO: 1.67 THOUSANDS/ÂΜL (ref 0.6–4.47)
LYMPHOCYTES NFR BLD AUTO: 23 % (ref 14–44)
MCH RBC QN AUTO: 30.2 PG (ref 26.8–34.3)
MCHC RBC AUTO-ENTMCNC: 31.6 G/DL (ref 31.4–37.4)
MCV RBC AUTO: 96 FL (ref 82–98)
MONOCYTES # BLD AUTO: 0.51 THOUSAND/ÂΜL (ref 0.17–1.22)
MONOCYTES NFR BLD AUTO: 7 % (ref 4–12)
NEUTROPHILS # BLD AUTO: 4.58 THOUSANDS/ÂΜL (ref 1.85–7.62)
NEUTS SEG NFR BLD AUTO: 64 % (ref 43–75)
NRBC BLD AUTO-RTO: 0 /100 WBCS
PLATELET # BLD AUTO: 201 THOUSANDS/UL (ref 149–390)
PMV BLD AUTO: 9.4 FL (ref 8.9–12.7)
POTASSIUM SERPL-SCNC: 4 MMOL/L (ref 3.5–5.3)
PROT SERPL-MCNC: 7.6 G/DL (ref 6.4–8.4)
RBC # BLD AUTO: 4.4 MILLION/UL (ref 3.88–5.62)
SODIUM SERPL-SCNC: 139 MMOL/L (ref 135–147)
TIBC SERPL-MCNC: 274 UG/DL (ref 250–450)
UIBC SERPL-MCNC: 216 UG/DL (ref 155–355)
WBC # BLD AUTO: 7.23 THOUSAND/UL (ref 4.31–10.16)

## 2023-11-20 PROCEDURE — 83540 ASSAY OF IRON: CPT

## 2023-11-20 PROCEDURE — 83550 IRON BINDING TEST: CPT

## 2023-11-20 PROCEDURE — 36415 COLL VENOUS BLD VENIPUNCTURE: CPT

## 2023-11-20 PROCEDURE — 82784 ASSAY IGA/IGD/IGG/IGM EACH: CPT

## 2023-11-20 PROCEDURE — 82728 ASSAY OF FERRITIN: CPT

## 2023-11-20 PROCEDURE — 85025 COMPLETE CBC W/AUTO DIFF WBC: CPT

## 2023-11-20 PROCEDURE — 80053 COMPREHEN METABOLIC PANEL: CPT

## 2023-11-20 PROCEDURE — 83521 IG LIGHT CHAINS FREE EACH: CPT

## 2023-11-20 PROCEDURE — 84165 PROTEIN E-PHORESIS SERUM: CPT

## 2023-11-21 LAB
KAPPA LC FREE SER-MCNC: 29.2 MG/L (ref 3.3–19.4)
KAPPA LC FREE/LAMBDA FREE SER: 2.83 {RATIO} (ref 0.26–1.65)
LAMBDA LC FREE SERPL-MCNC: 10.3 MG/L (ref 5.7–26.3)

## 2023-11-22 LAB
ALBUMIN SERPL ELPH-MCNC: 3.82 G/DL (ref 3.2–5.1)
ALBUMIN SERPL ELPH-MCNC: 53 % (ref 48–70)
ALPHA1 GLOB SERPL ELPH-MCNC: 0.3 G/DL (ref 0.15–0.47)
ALPHA1 GLOB SERPL ELPH-MCNC: 4.1 % (ref 1.8–7)
ALPHA2 GLOB SERPL ELPH-MCNC: 0.89 G/DL (ref 0.42–1.04)
ALPHA2 GLOB SERPL ELPH-MCNC: 12.4 % (ref 5.9–14.9)
BETA GLOB ABNORMAL SERPL ELPH-MCNC: 0.35 G/DL (ref 0.31–0.57)
BETA1 GLOB SERPL ELPH-MCNC: 4.9 % (ref 4.7–7.7)
BETA2 GLOB SERPL ELPH-MCNC: 4.2 % (ref 3.1–7.9)
BETA2+GAMMA GLOB SERPL ELPH-MCNC: 0.3 G/DL (ref 0.2–0.58)
GAMMA GLOB ABNORMAL SERPL ELPH-MCNC: 1.54 G/DL (ref 0.4–1.66)
GAMMA GLOB SERPL ELPH-MCNC: 21.4 % (ref 6.9–22.3)
IGG/ALB SER: 1.13 {RATIO} (ref 1.1–1.8)
M PEAK ID 2: 1.1 %
M PROTEIN 1 MFR SERPL ELPH: 14.5 %
M PROTEIN 1 SERPL ELPH-MCNC: 1.04 G/DL
M PROTEIN 2 SERPL ELPH-MCNC: 0.08 G/DL
PROT PATTERN SERPL ELPH-IMP: NORMAL
PROT SERPL-MCNC: 7.2 G/DL (ref 6.4–8.2)

## 2023-11-22 PROCEDURE — 84165 PROTEIN E-PHORESIS SERUM: CPT | Performed by: STUDENT IN AN ORGANIZED HEALTH CARE EDUCATION/TRAINING PROGRAM

## 2023-11-27 ENCOUNTER — TELEPHONE (OUTPATIENT)
Dept: ENDOCRINOLOGY | Facility: CLINIC | Age: 63
End: 2023-11-27

## 2023-11-27 NOTE — TELEPHONE ENCOUNTER
Spoke with Mr Fran Calvert re:qualifying diagnosis for DM shoes. On May 2023 note, no deformities are documented. However, Dr. Walker Poster notes hammertoes on May 2022 note. Will attempt to send to [facility] as he has used them for several years and should have prior documentation.

## 2023-12-04 ENCOUNTER — OFFICE VISIT (OUTPATIENT)
Dept: HEMATOLOGY ONCOLOGY | Facility: CLINIC | Age: 63
End: 2023-12-04
Payer: COMMERCIAL

## 2023-12-04 VITALS
HEIGHT: 73 IN | HEART RATE: 94 BPM | SYSTOLIC BLOOD PRESSURE: 138 MMHG | OXYGEN SATURATION: 94 % | DIASTOLIC BLOOD PRESSURE: 88 MMHG | BODY MASS INDEX: 39.56 KG/M2 | WEIGHT: 298.5 LBS | RESPIRATION RATE: 17 BRPM | TEMPERATURE: 97.2 F

## 2023-12-04 DIAGNOSIS — D47.2 SMOLDERING MULTIPLE MYELOMA: Primary | ICD-10-CM

## 2023-12-04 PROBLEM — D64.9 ANEMIA, UNSPECIFIED: Status: RESOLVED | Noted: 2021-05-03 | Resolved: 2023-12-04

## 2023-12-04 PROBLEM — N30.01 ACUTE CYSTITIS WITH HEMATURIA: Status: RESOLVED | Noted: 2020-04-27 | Resolved: 2023-12-04

## 2023-12-04 PROBLEM — M25.561 ACUTE PAIN OF RIGHT KNEE: Status: RESOLVED | Noted: 2023-07-26 | Resolved: 2023-12-04

## 2023-12-04 PROBLEM — R21 RASH: Status: RESOLVED | Noted: 2019-08-14 | Resolved: 2023-12-04

## 2023-12-04 PROCEDURE — 99214 OFFICE O/P EST MOD 30 MIN: CPT | Performed by: INTERNAL MEDICINE

## 2023-12-04 NOTE — PROGRESS NOTES
Hematology Outpatient Follow - Up Note  Manisha Major 70 y.o. male MRN: @ Encounter: [id number]        Date:  12/4/2023        Assessment/ Plan:    #1. 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 Previous history of iron deficiency anemia GI work-up was negative however he had hematuria he received IV iron in 2021    3. Ultrasound of the liver showed mild fibrosis consistent with steatosis        Labs and imaging studies are reviewed by ordering provider once results are available. If there are findings that need immediate attention, you will be contacted when results available. Discussing results and the implication on your healthcare is best discussed in person at your follow-up visit. HPI:    57-year-old  male with history of obesity, asthma, hypertension, dyslipidemia, diabetes mellitus type 2, had been complaining of tingling and numbness in his feet and hand for the past year, most recently a feeling of hypersensitivity involving the anterior aspect of the left thigh area, evaluation by Neurology on March 2018 showed monoclonal gammopathy with M protein of 1.40 grams/deciliters, IgG kappa. He has normal vitamin B12 level, TSH, heavy metal screen, Lyme disease. CBC performed August 2017 showed hemoglobin of 12.8, WBC 6.6, platelets [id number], 88% neutrophils, 19% lymphocytes     A bone marrow biopsy in May 2018 showed 10-12% plasma cells, fish panel for multiple myeloma was negative, cytogenetics showed normal male chromosomes and deletion of Y chromosome in some of the cells consistent with normal finding in advanced age patient.      Bone skeletal survey 5/2018 showed no evidence of lytic lesions      Received IV Venofer on 09/2021, upper and lower endoscopy and capsule endoscopy was unremarkable, liver ultrasound showed significant fibrosis with steatosis     He is on supplements  Interval History:        Previous Treatment:         Test Results:  Component  Ref Range & Units 11/20/23  7:32 AM 5/30/23  8:44 AM 10/18/22  8:52 AM 6/8/22 10:24 AM 2/3/22  7:54 AM 10/19/21 11:29 AM 8/18/21 10:03 AM   Ig Avant Free Light Chain  3.3 - 19.4 mg/L 29.2 High  27.6 High  27.9 High  22.8 High  22.6 High  29.2 High  30.0 High    Ig Lambda Free Light Chain  5.7 - 26.3 mg/L 10.3 10.5 10.0 9.9 8.7 10.0 10.4   Kappa/Lambda FluidC Ratio  0.26 - 1.65 2.83 High  2.63 High  2.79 High  2.30 High  2.60 High  2.92 High  2.88 High      Imaging: US elastography/UGAP    Result Date: 11/14/2023  Narrative: ELASTOGRAPHY, LIVER ULTRASOUND INDICATION:   T87.74: Nonalcoholic steatohepatitis (ANDREWS). COMPARISON: 10/18/2022 TECHNIQUE: Targeted ultrasound of the liver was performed with a curvilinear transducer on a Biocartis e10 utilizing 2D SWE. A total of 10 shear wave Elastography samples were obtained. FINDINGS: Shear Wave Elastography sampling was performed to evaluate stiffness changes within the liver associated with fibrosis. E1  5.49 kPa E2  7.11 kPa E3  5.86 kPa E4  8.43 kPa E5  8.91 kPa E6  7.07 kPa E7  5.59 kPa E8  3.33 kPa E9  12.81 kPa E10 5.44 kPa E median:  6.47 kPa. The corresponding Metavir score is F0-F1(absent or mild fibrosis), range 0-8.26kPa. <1.66 m/s. IQR/median:  40 %. (IQR of less than 30% is considered a satisfactory data set). Note: that the stage of liver fibrosis may be overestimated by clinical conditions unrelated to fibrosis, including but not limited to, nonfasting, hepatic inflammation, vascular congestion, biliary obstruction, and infiltrative liver disease.  Furthermore, in some patients with NAFLD, the cut-off values for compensated advanced chronic liver disease may be lower (7-9 kPa). In causes other than viral hepatitis and nonalcoholic fatty liver disease, the cut-off values are not well established. When comparing measurements across time, a clinically significant change should be considered when the delta change is greater than 10%. In all these conditions, however, liver stiffness values within the normal range exclude significant liver fibrosis. Ultrasound-guided attenuation parameter (UGAP) Liver Steatosis Grading A1  0.8 dB/cm/MHz A2  0.83 dB/cm/MHz A3  0.81 dB/cm/MHz A4  0.86 dB/cm/MHz A5  0.78 dB/cm/MHz A6  0.83 dB/cm/MHz A7  0.85 dB/cm/MHz A8  0.82 dB/cm/MHz A9  0.85 dB/cm/MHz A10 0.89 dB/cm/MHz Attenuation coefficient:  0.83 dB/cm/MHz Liver steatosis grading:  S3 ( > 67% steatosis) >0.76 dB/cm/MHz IQR/Med:  4.4 % (Less than or equal to 30% is a satisfactory data set.) Reference White paper for GE ultrasound-guided attenuation parameter https://www.[employer]/. au/-/[email or web address]. pdf?la=en-au     Impression: Metavir score: F0 - F1, Absent or Mild Fibrosis According to the updated SRU consensus statement, a liver stiffness of 6.47 kPa, which in the absence of other known clinical signs*, rules out compensated advanced chronic liver disease (cACLD). If there are known clinical signs, may need further test for confirmation. https://pubs. rsna.org/doi/10.1148/radiol. 9112711034 Liver steatosis grading:  S3 ( > 67% steatosis) Note: If this is a follow-up study to a prior UGAP exam, please note that the reference value cutoffs have been updated and may reflect a significant change in resulting steatosis grading score. Please directly compare the number value reading for direct comparison rather than the prior S score.  Workstation performed: [id number]       Labs:   Lab Results   Component Value Date    WBC 7.23 11/20/2023    HGB 13.3 11/20/2023    HCT 42.1 11/20/2023    MCV 96 11/20/2023     11/20/2023     Lab Results   Component Value Date    NA 136 05/17/2017    K 4.0 11/20/2023     11/20/2023    CO2 32 11/20/2023    ANIONGAP 9 01/13/2015    BUN 11 11/20/2023    CREATININE 0.80 11/20/2023    GLUCOSE 206 (H) 01/13/2015    GLUF 118 (H) 11/20/2023    CALCIUM 9.4 11/20/2023    CORRECTEDCA 9.8 10/18/2022    AST 15 11/20/2023    ALT 27 11/20/2023    ALKPHOS 89 11/20/2023    PROT 7.5 05/17/2017    BILITOT 0.5 05/17/2017    EGFR 93 11/20/2023       Lab Results   Component Value Date    IRON 58 11/20/2023    TIBC 274 11/20/2023    FERRITIN 56 11/20/2023       Lab Results   Component Value Date    JNLNSPGG92 350 06/02/2023         ROS: Review of Systems   Constitutional: Negative. Negative for appetite change, chills, diaphoresis, fatigue, fever and unexpected weight change. HENT:   Negative for hearing loss, lump/mass, mouth sores, nosebleeds, sore throat, trouble swallowing and voice change. Eyes: Negative. Negative for eye problems and icterus. Respiratory: Negative. Negative for chest tightness, cough, hemoptysis and shortness of breath. Cardiovascular:  Negative for chest pain and leg swelling. Gastrointestinal:  Negative for abdominal distention, abdominal pain, blood in stool, constipation, diarrhea and nausea. Endocrine: Negative. Genitourinary:  Negative for dysuria, frequency, hematuria and pelvic pain. Musculoskeletal: Negative. Negative for arthralgias, back pain, flank pain, gait problem, myalgias and neck stiffness. Skin:  Negative for itching and rash. Neurological:  Positive for numbness. Negative for dizziness, gait problem, headaches, light-headedness and speech difficulty. Hematological:  Negative for adenopathy. Does not bruise/bleed easily. Psychiatric/Behavioral:  Negative for confusion, decreased concentration, depression and sleep disturbance. The patient is not nervous/anxious.            Current Medications: Reviewed  Allergies: Reviewed  PMH/FH/SH:  Reviewed      Physical Exam:    Body surface area is 2.55 meters squared. Wt Readings from Last 3 Encounters:   12/04/23 135 kg (298 lb 8 oz)   10/18/23 (!) 138 kg (303 lb 8 oz)   10/13/23 (!) 137 kg (302 lb)        Temp Readings from Last 3 Encounters:   12/04/23 (!) 97.2 °F (36.2 °C) (Temporal)   10/18/23 (!) 101.2 °F (38.4 °C) (Temporal)   10/13/23 98.5 °F (36.9 °C) (Tympanic)        BP Readings from Last 3 Encounters:   12/04/23 138/88   10/18/23 142/90   10/13/23 132/80         Pulse Readings from Last 3 Encounters:   12/04/23 94   10/18/23 80   10/13/23 92        Physical Exam  Vitals reviewed. Constitutional:       General: He is not in acute distress. Appearance: He is well-developed. He is obese. He is not diaphoretic. HENT:      Head: Normocephalic and atraumatic. Eyes:      Conjunctiva/sclera: Conjunctivae normal.   Neck:      Trachea: No tracheal deviation. Cardiovascular:      Rate and Rhythm: Normal rate and regular rhythm. Heart sounds: No murmur heard. No friction rub. No gallop. Pulmonary:      Effort: Pulmonary effort is normal. No respiratory distress. Breath sounds: No decreased air movement. No decreased breath sounds, wheezing or rales. Chest:      Chest wall: No tenderness. Abdominal:      General: There is no distension. Palpations: Abdomen is soft. There is no hepatomegaly or splenomegaly. Tenderness: There is no abdominal tenderness. Hernia: A hernia is present. Musculoskeletal:      Cervical back: Normal range of motion and neck supple. Right lower leg: No edema. Left lower leg: No edema. Lymphadenopathy:      Head:      Right side of head: No submental or submandibular adenopathy. Left side of head: No submental or submandibular adenopathy. Cervical: No cervical adenopathy. Upper Body:      Right upper body: No supraclavicular or axillary adenopathy. Left upper body: No supraclavicular or axillary adenopathy. Lower Body: No right inguinal adenopathy.  No left inguinal adenopathy. Skin:     General: Skin is warm and dry. Coloration: Skin is not pale. Findings: No erythema or rash. Neurological:      General: No focal deficit present. Mental Status: He is alert and oriented to person, place, and time. Psychiatric:         Behavior: Behavior normal.         Thought Content: Thought content normal.         Judgment: Judgment normal.         ECO    Goals and Barriers:  Current Goal: Minimize effects of disease. Barriers: None. Patient's Capacity to Self Care:  Patient is able to self care.     Code Status: [unfilled]

## 2023-12-05 ENCOUNTER — TELEPHONE (OUTPATIENT)
Age: 63
End: 2023-12-05

## 2023-12-05 NOTE — TELEPHONE ENCOUNTER
Caller: Brai Aguillon    Doctor/Office: Dr. Farhat Dubois    #: [phone] ( its ok to leave a voice mail)    Escalation: Appointment Patient's orthotics were denied by insurance because there was no mention of hammertoe. Does patient need a follow up to have this resubmitted or can a new script be issued? I scheduled patient for 1-24 if he needs the follow up. If he does not, please fax new script to [address] [facility] [address]. Please call patient to update him on what's going on and cancel next follow up if he does not need it.  Thank you

## 2023-12-14 ENCOUNTER — HOSPITAL ENCOUNTER (EMERGENCY)
Facility: HOSPITAL | Age: 63
Discharge: HOME | End: 2023-12-14
Attending: EMERGENCY MEDICINE
Payer: COMMERCIAL

## 2023-12-14 ENCOUNTER — APPOINTMENT (EMERGENCY)
Dept: CT IMAGING | Facility: HOSPITAL | Age: 63
End: 2023-12-14
Payer: COMMERCIAL

## 2023-12-14 VITALS
SYSTOLIC BLOOD PRESSURE: 147 MMHG | BODY MASS INDEX: 38.98 KG/M2 | TEMPERATURE: 97.5 F | RESPIRATION RATE: 16 BRPM | OXYGEN SATURATION: 93 % | WEIGHT: 295.42 LBS | HEART RATE: 97 BPM | DIASTOLIC BLOOD PRESSURE: 79 MMHG

## 2023-12-14 DIAGNOSIS — S09.90XA CLOSED HEAD INJURY, INITIAL ENCOUNTER: ICD-10-CM

## 2023-12-14 DIAGNOSIS — H81.10 BENIGN PAROXYSMAL POSITIONAL VERTIGO, UNSPECIFIED LATERALITY: Primary | ICD-10-CM

## 2023-12-14 DIAGNOSIS — R42 DIZZINESS: ICD-10-CM

## 2023-12-14 DIAGNOSIS — R55 VASOVAGAL SYNCOPE: ICD-10-CM

## 2023-12-14 LAB
ALBUMIN SERPL BCP-MCNC: 3.8 G/DL (ref 3.5–5)
ALP SERPL-CCNC: 84 U/L (ref 34–104)
ALT SERPL W P-5'-P-CCNC: 27 U/L (ref 7–52)
ANION GAP SERPL CALCULATED.3IONS-SCNC: 8 MMOL/L
AST SERPL W P-5'-P-CCNC: 16 U/L (ref 13–39)
BASOPHILS # BLD AUTO: 0.04 THOUSANDS/ÂΜL (ref 0–0.1)
BASOPHILS NFR BLD AUTO: 1 % (ref 0–1)
BILIRUB SERPL-MCNC: 0.66 MG/DL (ref 0.2–1)
BUN SERPL-MCNC: 9 MG/DL (ref 5–25)
CALCIUM SERPL-MCNC: 9 MG/DL (ref 8.4–10.2)
CHLORIDE SERPL-SCNC: 101 MMOL/L (ref 96–108)
CO2 SERPL-SCNC: 26 MMOL/L (ref 21–32)
CREAT SERPL-MCNC: 0.71 MG/DL (ref 0.6–1.3)
EOSINOPHIL # BLD AUTO: 0.27 THOUSAND/ÂΜL (ref 0–0.61)
EOSINOPHIL NFR BLD AUTO: 3 % (ref 0–6)
ERYTHROCYTE [DISTWIDTH] IN BLOOD BY AUTOMATED COUNT: 14.1 % (ref 11.6–15.1)
GFR SERPL CREATININE-BSD FRML MDRD: 98 ML/MIN/1.73SQ M
GLUCOSE SERPL-MCNC: 202 MG/DL (ref 65–140)
HCT VFR BLD AUTO: 41.7 % (ref 36.5–49.3)
HGB BLD-MCNC: 13.3 G/DL (ref 12–17)
IMM GRANULOCYTES # BLD AUTO: 0.1 THOUSAND/UL (ref 0–0.2)
IMM GRANULOCYTES NFR BLD AUTO: 1 % (ref 0–2)
LYMPHOCYTES # BLD AUTO: 1.04 THOUSANDS/ÂΜL (ref 0.6–4.47)
LYMPHOCYTES NFR BLD AUTO: 13 % (ref 14–44)
MCH RBC QN AUTO: 30.1 PG (ref 26.8–34.3)
MCHC RBC AUTO-ENTMCNC: 31.9 G/DL (ref 31.4–37.4)
MCV RBC AUTO: 94 FL (ref 82–98)
MONOCYTES # BLD AUTO: 0.33 THOUSAND/ÂΜL (ref 0.17–1.22)
MONOCYTES NFR BLD AUTO: 4 % (ref 4–12)
NEUTROPHILS # BLD AUTO: 6.45 THOUSANDS/ÂΜL (ref 1.85–7.62)
NEUTS SEG NFR BLD AUTO: 78 % (ref 43–75)
NRBC BLD AUTO-RTO: 0 /100 WBCS
PLATELET # BLD AUTO: 187 THOUSANDS/UL (ref 149–390)
PMV BLD AUTO: 9.2 FL (ref 8.9–12.7)
POTASSIUM SERPL-SCNC: 3.9 MMOL/L (ref 3.5–5.3)
PROT SERPL-MCNC: 7.6 G/DL (ref 6.4–8.4)
RBC # BLD AUTO: 4.42 MILLION/UL (ref 3.88–5.62)
SODIUM SERPL-SCNC: 135 MMOL/L (ref 135–147)
WBC # BLD AUTO: 8.23 THOUSAND/UL (ref 4.31–10.16)

## 2023-12-14 PROCEDURE — G1004 CDSM NDSC: HCPCS

## 2023-12-14 PROCEDURE — 36415 COLL VENOUS BLD VENIPUNCTURE: CPT | Performed by: EMERGENCY MEDICINE

## 2023-12-14 PROCEDURE — 85025 COMPLETE CBC W/AUTO DIFF WBC: CPT | Performed by: EMERGENCY MEDICINE

## 2023-12-14 PROCEDURE — 99285 EMERGENCY DEPT VISIT HI MDM: CPT | Performed by: EMERGENCY MEDICINE

## 2023-12-14 PROCEDURE — 70450 CT HEAD/BRAIN W/O DYE: CPT

## 2023-12-14 PROCEDURE — 96374 THER/PROPH/DIAG INJ IV PUSH: CPT

## 2023-12-14 PROCEDURE — 93005 ELECTROCARDIOGRAM TRACING: CPT

## 2023-12-14 PROCEDURE — 80053 COMPREHEN METABOLIC PANEL: CPT | Performed by: EMERGENCY MEDICINE

## 2023-12-14 PROCEDURE — 99284 EMERGENCY DEPT VISIT MOD MDM: CPT

## 2023-12-14 RX ORDER — DIAZEPAM 5 MG/ML
2.5 INJECTION, SOLUTION INTRAMUSCULAR; INTRAVENOUS ONCE
Status: COMPLETED | OUTPATIENT
Start: 2023-12-14 | End: 2023-12-14

## 2023-12-14 RX ORDER — MECLIZINE HCL 12.5 MG/1
25 TABLET ORAL ONCE
Status: COMPLETED | OUTPATIENT
Start: 2023-12-14 | End: 2023-12-14

## 2023-12-14 RX ORDER — ONDANSETRON 2 MG/ML
1 INJECTION INTRAMUSCULAR; INTRAVENOUS ONCE
Status: COMPLETED | OUTPATIENT
Start: 2023-12-14 | End: 2023-12-14

## 2023-12-14 RX ADMIN — MECLIZINE 25 MG: 12.5 TABLET ORAL at 08:59

## 2023-12-14 RX ADMIN — DIAZEPAM 2.5 MG: 10 INJECTION, SOLUTION INTRAMUSCULAR; INTRAVENOUS at 08:59

## 2023-12-14 NOTE — ED PROVIDER NOTES
History  Chief Complaint   Patient presents with    Dizziness     Pt started with vertigo last night. This morning woke up and had an episode of syncopy. +HS, -THINNERS, +LOC. +nausea/vomiting, no relief from zofran given by EMS. 76-year-old male, long history of vertigo, states last night he felt dizziness, room spinning, nausea, symptoms consistent with previous episodes of vertigo, went to sleep, woke up this morning upon standing out of bed had severe dizziness with room spinning sensation. Walked to the bathroom urinated, dizziness severely worsened and he fell to the ground. Believes he completely lost consciousness, hit his head into a door but does not recall this just all the damage to the door after. Moderate headache, no neck pain no chest pain no abdominal pain no extremity pain. ,  Currently states he feels improved with his eyes closed and laying perfectly still, room spinning sensation severely worsens with opening his eyes. ,  Nauseous and vomiting, given Zofran prehospital.  History obtained from patient and paramedics      History provided by:  EMS personnel and patient  Dizziness  Associated symptoms: nausea and vomiting    Associated symptoms: no chest pain, no diarrhea, no headaches, no palpitations and no shortness of breath        Prior to Admission Medications   Prescriptions Last Dose Informant Patient Reported? Taking?    B Complex-Biotin-FA (VITAMIN B50 COMPLEX PO)  Self Yes No   Blood Glucose Monitoring Suppl (OneTouch Verio) w/Device KIT   No No   Sig: Use 3 (three) times a day   Diclofenac Sodium (VOLTAREN) 1 %   No No   Sig: Apply 2 g topically 4 (four) times a day   Echinacea 380 MG CAPS   Yes No   Sig: Take by mouth 2 daily am   Ginkgo Biloba (GINKOBA PO)  Self Yes No   Sig: Take 60 mg by mouth   Insulin Glargine Solostar (Lantus SoloStar) 100 UNIT/ML SOPN   No No   Si units at night   Insulin Pen Needle 31G X 5 MM MISC   No No   Sig: Use daily   Lancets (OneTouch Delica Plus [id number]) MISC   No No   Sig: TEST THREE TIMES DAILY   MAGNESIUM CITRATE PO  Self Yes No   Sig: Take 1 tablet by mouth daily     Multiple Vitamins-Minerals (MULTIVITAMIN ADULTS 50+ PO)  Self Yes No   Sig: Take by mouth daily   Trulicity 3 NI/5.8JA injection   No No   Sig: ADMINISTER 3 MG UNDER THE SKIN EVERY 7 DAYS   albuterol (PROVENTIL HFA,VENTOLIN HFA) 90 mcg/act inhaler  Self No No   Sig: INHALE 1 PUFF EVERY 4-6 HOURS AS NEEDED   ascorbic acid (VITAMIN C) 500 mg tablet  Self Yes No   Sig: Take 500 mg by mouth daily   atorvastatin (LIPITOR) 40 mg tablet  Self No No   Sig: TAKE 1 TABLET(40 MG) BY MOUTH DAILY   fluticasone (FLONASE) 50 mcg/act nasal spray  Self No No   Sig: SHAKE LIQUID AND USE 2 SPRAYS IN EACH NOSTRIL DAILY   fluticasone-salmeterol (Advair HFA) 115-21 MCG/ACT inhaler   No No   Sig: Inhale 2 puffs 2 (two) times a day Rinse mouth after use   gabapentin (NEURONTIN) 100 mg capsule   No No   Sig: Take 1 capsule (100 mg total) by mouth daily at bedtime   glucose blood (OneTouch Verio) test strip   No No   Sig: Use 1 each 3 (three) times a day Use as instructed   lisinopril (ZESTRIL) 40 mg tablet   No No   Sig: Take 1 tablet (40 mg total) by mouth daily   loratadine (CLARITIN) 10 mg tablet   No No   Sig: TAKE 1 TABLET BY MOUTH EVERY DAY   metFORMIN (GLUCOPHAGE) 1000 MG tablet   No No   Sig: Take 1 tablet (1,000 mg total) by mouth 2 (two) times a day with meals   omeprazole (PriLOSEC) 40 MG capsule  Self No No   Sig: TAKE 1 CAPSULE(40 MG) BY MOUTH DAILY      Facility-Administered Medications: None       Past Medical History:   Diagnosis Date    Asthma     Benign positional vertigo     Diabetes mellitus (HCC)     borderline; diet controlled    Diabetes mellitus due to underlying condition with diabetic nephropathy, with long-term current use of insulin ([facility]) 5/19/2021    Dyslipidemia     Gastroenteritis     Hyperlipidemia     Hypertension     Lipoma of neck     last assessed - [id number] Multiple myeloma (HCC)     Sleep apnea     has symptoms but not been diagnosed    Wheezing     last assessed - [id number]       Past Surgical History:   Procedure Laterality Date    ESOPHAGOGASTRODUODENOSCOPY  2010    Diagnostic    MASS EXCISION Right 8/21/2017    Procedure: POSTERIOR SHOULDER MASS EXCISION; POSTERIOR NECK MASS EXCISION; CHEST WALL MASS EXCISION;  Surgeon: Juliette Cotton MD;  Location: [facility];  Service: General    THROAT SURGERY         Family History   Problem Relation Age of Onset    Hypertension Mother     Diabetes Mother     Hyperlipidemia Mother     Cancer Mother         Malignant neoplasm of the gastrointestinal tract    Diabetes type II Mother         Type 2 diabetes mellitus    Hypertension Father         Essential hypercholesterolemia    Hyperlipidemia Father     Heart disease Father         Arteriosclerotic cardiovascular disease (ASCVD)    Dementia Father     Heart attack Father     Cancer Father         prostate    Thyroid disease Sister     Breast cancer Maternal Aunt     Stomach cancer Maternal Grandmother     Stroke Neg Hx      I have reviewed and agree with the history as documented. E-Cigarette/Vaping    E-Cigarette Use Never User      E-Cigarette/Vaping Substances    Nicotine No     THC No     CBD No     Flavoring No     Other No     Unknown No      Social History     Tobacco Use    Smoking status: Never    Smokeless tobacco: Never   Vaping Use    Vaping status: Never Used   Substance Use Topics    Alcohol use: Not Currently     Comment: Social drinker per [employer]    Drug use: No       Review of Systems   Constitutional:  Negative for activity change, chills, diaphoresis and fever. HENT:  Negative for congestion, sinus pressure and sore throat. Eyes:  Negative for pain and visual disturbance. Respiratory:  Negative for cough, chest tightness, shortness of breath, wheezing and stridor. Cardiovascular:  Negative for chest pain and palpitations.    Gastrointestinal: Positive for nausea and vomiting. Negative for abdominal distention, abdominal pain, constipation and diarrhea. Genitourinary:  Negative for dysuria and frequency. Musculoskeletal:  Negative for neck pain and neck stiffness. Skin:  Negative for rash. Neurological:  Positive for dizziness. Negative for speech difficulty, light-headedness, numbness and headaches. Physical Exam  Physical Exam  Vitals reviewed. Constitutional:       General: He is not in acute distress. Appearance: He is well-developed. He is not diaphoretic. HENT:      Head: Normocephalic. Right Ear: External ear normal.      Left Ear: External ear normal.      Nose: Nose normal.   Eyes:      General:         Right eye: No discharge. Left eye: No discharge. Pupils: Pupils are equal, round, and reactive to light. Neck:      Trachea: No tracheal deviation. Cardiovascular:      Rate and Rhythm: Normal rate and regular rhythm. Heart sounds: Normal heart sounds. No murmur heard. Pulmonary:      Effort: Pulmonary effort is normal. No respiratory distress. Breath sounds: Normal breath sounds. No stridor. Abdominal:      General: There is no distension. Palpations: Abdomen is soft. Tenderness: There is no abdominal tenderness. There is no guarding or rebound. Musculoskeletal:         General: Normal range of motion. Cervical back: Normal range of motion and neck supple. Skin:     General: Skin is warm and dry. Coloration: Skin is not pale. Findings: No erythema. Neurological:      General: No focal deficit present. Mental Status: He is alert and oriented to person, place, and time.          Vital Signs  ED Triage Vitals   Temperature Pulse Respirations Blood Pressure SpO2   12/14/23 0849 12/14/23 0845 12/14/23 0845 12/14/23 0845 12/14/23 0845   97.5 °F (36.4 °C) 98 22 (!) 201/105 95 %      Temp Source Heart Rate Source Patient Position - Orthostatic VS BP Location FiO2 (%)   12/14/23 0849 12/14/23 0845 12/14/23 0845 12/14/23 0845 --   Oral Monitor Sitting Right arm       Pain Score       12/14/23 0845       No Pain           Vitals:    12/14/23 0915 12/14/23 0930 12/14/23 1000 12/14/23 1030   BP: 156/83 134/67 126/65 147/79   Pulse: 98 100 99 97   Patient Position - Orthostatic VS:  Sitting Sitting Sitting         Visual Acuity      ED Medications  Medications   ondansetron (FOR EMS ONLY) (ZOFRAN) 4 mg/2 mL injection 4 mg (0 mg Does not apply Given to EMS 12/14/23 0844)   diazepam (VALIUM) injection 2.5 mg (2.5 mg Intravenous Given 12/14/23 0859)   meclizine (ANTIVERT) tablet 25 mg (25 mg Oral Given 12/14/23 0859)       Diagnostic Studies  Results Reviewed       Procedure Component Value Units Date/Time    Comprehensive metabolic panel [[id number]]  (Abnormal) Collected: 12/14/23 0855    Lab Status: Final result Specimen: Blood from Arm, Left Updated: 12/14/23 0921     Sodium 135 mmol/L      Potassium 3.9 mmol/L      Chloride 101 mmol/L      CO2 26 mmol/L      ANION GAP 8 mmol/L      BUN 9 mg/dL      Creatinine 0.71 mg/dL      Glucose 202 mg/dL      Calcium 9.0 mg/dL      AST 16 U/L      ALT 27 U/L      Alkaline Phosphatase 84 U/L      Total Protein 7.6 g/dL      Albumin 3.8 g/dL      Total Bilirubin 0.66 mg/dL      eGFR 98 ml/min/1.73sq m     Narrative:      [place] guidelines for Chronic Kidney Disease (CKD):     Stage 1 with normal or high GFR (GFR > 90 mL/min/1.73 square meters)    Stage 2 Mild CKD (GFR = 60-89 mL/min/1.73 square meters)    Stage 3A Moderate CKD (GFR = 45-59 mL/min/1.73 square meters)    Stage 3B Moderate CKD (GFR = 30-44 mL/min/1.73 square meters)    Stage 4 Severe CKD (GFR = 15-29 mL/min/1.73 square meters)    Stage 5 End Stage CKD (GFR <15 mL/min/1.73 square meters)  Note: GFR calculation is accurate only with a steady state creatinine    CBC and differential [[id number]]  (Abnormal) Collected: 12/14/23 0856    Lab Status: Final result Specimen: Blood from Arm, Left Updated: 12/14/23 0903     WBC 8.23 Thousand/uL      RBC 4.42 Million/uL      Hemoglobin 13.3 g/dL      Hematocrit 41.7 %      MCV 94 fL      MCH 30.1 pg      MCHC 31.9 g/dL      RDW 14.1 %      MPV 9.2 fL      Platelets 260 Thousands/uL      nRBC 0 /100 WBCs      Neutrophils Relative 78 %      Immat GRANS % 1 %      Lymphocytes Relative 13 %      Monocytes Relative 4 %      Eosinophils Relative 3 %      Basophils Relative 1 %      Neutrophils Absolute 6.45 Thousands/µL      Immature Grans Absolute 0.10 Thousand/uL      Lymphocytes Absolute 1.04 Thousands/µL      Monocytes Absolute 0.33 Thousand/µL      Eosinophils Absolute 0.27 Thousand/µL      Basophils Absolute 0.04 Thousands/µL                    CT head without contrast   Final Result by Rin Orona MD (12/14 1104)      No acute intracranial abnormality. Moderate chronic microangiopathy. Resident: Brenna Herrera, the attending radiologist, have reviewed the images and agree with the final report above.       Workstation performed: [id number]                    Procedures  ECG 12 Lead Documentation Only    Date/Time: 12/14/2023 8:50 AM    Performed by: Emerson Boston DO  Authorized by: Emerson Boston DO    ECG reviewed by me, the ED Provider: yes    Patient location:  ED  Previous ECG:     Previous ECG:  Compared to current    Comparison ECG info:  7.28.2017    Similarity:  No change  Interpretation:     Interpretation: normal    Rate:     ECG rate:  98    ECG rate assessment: normal    Rhythm:     Rhythm: sinus rhythm    Ectopy:     Ectopy: none    QRS:     QRS axis:  Normal    QRS intervals:  Normal  Conduction:     Conduction: normal    ST segments:     ST segments:  Normal  T waves:     T waves: normal             ED Course  ED Course as of 12/14/23 1538   Thu Dec 14, 2023   0930 Hyperglycemic but no evidence of DKA   0957 Repeat evaluation, resting comfortably, blood pressure significantly decreased, patient's wife at bedside, agrees this consistent with previous episodes of vertigo. Patient and wife both report improvement. ,  Patient relaxed and somnolent from Valium but maintaining airway and oxygenation without any supplemental oxygen.,  Await CAT scan,   1121 Patient feels improvement, will discharge                                             Medical Decision Making        Initial ED assessment: 77-year-old male, sensation of room spinning, dizziness, had a loss of consciousness while urinating, currently complaining of headache and sensation of room spinning    Initial DDx includes but is not limited to:   From the syncope/fall, possibly intracranial injury such as hemorrhage, skull fracture, closed head injury or concussion, no signs or symptoms of spine injury, C-spine cleared by Nexus criteria, full range of motion of all extremities to doubt extremity fracture or injury. Syncope likely related to vertigo less likely cardiac etiology but arrhythmia is considered. ,  Patient sensation of room spinning is likely secondary to vertigo as he has had this in the past and states it feels identical less likely posterior circulation abnormality/cerebral pathology. He has no other stroke symptoms. Current NIH stroke scale limited due to participation because of severe dizziness/room spinning but appears to have an NIH scale of 0    Initial ED plan:   CT head to evaluate for intracranial pathology from fall/head injury. ,  Blood work, IV Zofran and IV Valium, p.o. meclizine. Ultimate disposition pending ED workup.         Final ED summary/disposition:   After evaluation and workup in the emergency department,    symptoms improved, patient strongly feels this feels identical to previous episodes of vertigo no evidence of intracranial injury from fall, patient discharged, follow-up with vestibular therapy if symptoms continue to persist.    Amount and/or Complexity of Data Reviewed  Labs: ordered. Radiology: ordered. Risk  Prescription drug management. Disposition  Final diagnoses:   Benign paroxysmal positional vertigo, unspecified laterality   Vasovagal syncope   Dizziness   Closed head injury, initial encounter     Time reflects when diagnosis was documented in both MDM as applicable and the Disposition within this note       Time User Action Codes Description Comment    12/14/2023 11:22 AM Jesica Angulo Add [H81.10] Benign paroxysmal positional vertigo, unspecified laterality     12/14/2023 11:22 AM Tony FERNANDES Add [R55] Vasovagal syncope     12/14/2023 11:22 AM Jesica Angulo Add [R42] Dizziness     12/14/2023 11:22 AM Jesica Angulo Add [S09.90XA] Closed head injury, initial encounter           ED Disposition       ED Disposition   Discharge    Condition   Stable    Date/Time   u Dec 14, 2023 11:21 AM    Comment   [address] discharge to home/self care.                    Follow-up Information       Follow up With Specialties Details Why Contact Info Additional Information    Physical Therapy at [address] [place] [address] Physical Therapy Call in 1 day To arrange for the next available appointment [address]  [phone] Physical Therapy at [address] [place] [address], [address]            Discharge Medication List as of 12/14/2023 11:22 AM        CONTINUE these medications which have NOT CHANGED    Details   albuterol (PROVENTIL HFA,VENTOLIN HFA) 90 mcg/act inhaler INHALE 1 PUFF EVERY 4-6 HOURS AS NEEDED, Normal      ascorbic acid (VITAMIN C) 500 mg tablet Take 500 mg by mouth daily, Historical Med      atorvastatin (LIPITOR) 40 mg tablet TAKE 1 TABLET(40 MG) BY MOUTH DAILY, Normal      B Complex-Biotin-FA (VITAMIN B50 COMPLEX PO) Historical Med      Blood Glucose Monitoring Suppl (OneTouch Verio) w/Device KIT Use 3 (three) times a day, Starting Fri 6/16/2023, Normal      Diclofenac Sodium (VOLTAREN) 1 % Apply 2 g topically 4 (four) times a day, Starting Wed 7/26/2023, Normal      Echinacea 380 MG CAPS Take by mouth 2 daily am, Historical Med      fluticasone (FLONASE) 50 mcg/act nasal spray SHAKE LIQUID AND USE 2 SPRAYS IN EACH NOSTRIL DAILY, Normal      fluticasone-salmeterol (Advair HFA) 115-21 MCG/ACT inhaler Inhale 2 puffs 2 (two) times a day Rinse mouth after use, Starting Tue 7/25/2023, Normal      gabapentin (NEURONTIN) 100 mg capsule Take 1 capsule (100 mg total) by mouth daily at bedtime, Starting Wed 9/13/2023, Normal      Ginkgo Biloba (GINKOBA PO) Take 60 mg by mouth, Historical Med      glucose blood (OneTouch Verio) test strip Use 1 each 3 (three) times a day Use as instructed, Starting Wed 8/16/2023, Normal      Insulin Glargine Solostar (Lantus SoloStar) 100 UNIT/ML SOPN 28 units at night, Normal      Insulin Pen Needle 31G X 5 MM MISC Use daily, Starting Tue 10/10/2023, Normal      Lancets (OneTouch Delica Plus [id number]) MISC TEST THREE TIMES DAILY, Normal      lisinopril (ZESTRIL) 40 mg tablet Take 1 tablet (40 mg total) by mouth daily, Starting Wed 8/16/2023, Normal      loratadine (CLARITIN) 10 mg tablet TAKE 1 TABLET BY MOUTH EVERY DAY, Starting Mon 9/18/2023, Normal      MAGNESIUM CITRATE PO Take 1 tablet by mouth daily  , Historical Med      metFORMIN (GLUCOPHAGE) 1000 MG tablet Take 1 tablet (1,000 mg total) by mouth 2 (two) times a day with meals, Starting Wed 6/14/2023, Normal      Multiple Vitamins-Minerals (MULTIVITAMIN ADULTS 50+ PO) Take by mouth daily, Historical Med      omeprazole (PriLOSEC) 40 MG capsule TAKE 1 CAPSULE(40 MG) BY MOUTH DAILY, Normal      Trulicity 3 ST/9.0JM injection ADMINISTER 3 MG UNDER THE SKIN EVERY 7 DAYS, Normal             No discharge procedures on file.     PDMP Review         Value Time User    PDMP Reviewed  Yes 10/18/2023  7:45 AM Dallin Cardenas DO            ED Provider  Electronically Signed by             Adithya Levy, DO  12/14/23 1536

## 2023-12-17 LAB
ATRIAL RATE: 98 BPM
P AXIS: 46 DEGREES
PR INTERVAL: 192 MS
QRS AXIS: 15 DEGREES
QRSD INTERVAL: 90 MS
QT INTERVAL: 336 MS
QTC INTERVAL: 428 MS
T WAVE AXIS: 37 DEGREES
VENTRICULAR RATE: 98 BPM

## 2024-01-16 ENCOUNTER — TELEPHONE (OUTPATIENT)
Dept: ENDOCRINOLOGY | Facility: CLINIC | Age: 64
End: 2024-01-16

## 2024-01-16 ENCOUNTER — TELEPHONE (OUTPATIENT)
Dept: FAMILY MEDICINE CLINIC | Facility: CLINIC | Age: 64
End: 2024-01-16

## 2024-01-16 NOTE — TELEPHONE ENCOUNTER
Patient left a message on our RX line requesting refill for Trulicity 3MG/05 milligrams. Patient states that he has been out for 2 weeks. Per chart review, this medication is prescribed by endocrinology. Returned phone call and spoke with patient's wife. Advised her that patient must contact endocrinology for this refill. She confirmed.

## 2024-01-16 NOTE — TELEPHONE ENCOUNTER
Name of medication/s patient is requesting to be refilled trulicity.    Medication dosage      How often is medication being taken  .    Is patient requesting 30 or 90 day supply,  .90    [facility]    Last Visit 11/27/2023  Next Visit Visit date not found

## 2024-01-17 ENCOUNTER — OFFICE VISIT (OUTPATIENT)
Dept: PALLIATIVE MEDICINE | Facility: CLINIC | Age: 64
End: 2024-01-17
Payer: MEDICARE

## 2024-01-17 VITALS
SYSTOLIC BLOOD PRESSURE: 144 MMHG | BODY MASS INDEX: 39.78 KG/M2 | DIASTOLIC BLOOD PRESSURE: 76 MMHG | WEIGHT: 301.5 LBS | TEMPERATURE: 97.8 F | HEART RATE: 86 BPM | OXYGEN SATURATION: 94 %

## 2024-01-17 DIAGNOSIS — Z79.4 DIABETES MELLITUS DUE TO UNDERLYING CONDITION WITH DIABETIC NEPHROPATHY, WITH LONG-TERM CURRENT USE OF INSULIN (HCC): ICD-10-CM

## 2024-01-17 DIAGNOSIS — R20.2 PARESTHESIA OF BOTH FEET: ICD-10-CM

## 2024-01-17 DIAGNOSIS — E08.21 DIABETES MELLITUS DUE TO UNDERLYING CONDITION WITH DIABETIC NEPHROPATHY, WITH LONG-TERM CURRENT USE OF INSULIN (HCC): ICD-10-CM

## 2024-01-17 DIAGNOSIS — Z79.4 TYPE 2 DIABETES MELLITUS, WITH LONG-TERM CURRENT USE OF INSULIN (HCC): ICD-10-CM

## 2024-01-17 DIAGNOSIS — R42 VERTIGO: ICD-10-CM

## 2024-01-17 DIAGNOSIS — G62.9 PERIPHERAL POLYNEUROPATHY: ICD-10-CM

## 2024-01-17 DIAGNOSIS — D47.2 SMOLDERING MULTIPLE MYELOMA: Primary | ICD-10-CM

## 2024-01-17 DIAGNOSIS — Z51.5 PALLIATIVE CARE PATIENT: ICD-10-CM

## 2024-01-17 DIAGNOSIS — E11.9 TYPE 2 DIABETES MELLITUS, WITH LONG-TERM CURRENT USE OF INSULIN (HCC): ICD-10-CM

## 2024-01-17 PROCEDURE — 99214 OFFICE O/P EST MOD 30 MIN: CPT | Performed by: STUDENT IN AN ORGANIZED HEALTH CARE EDUCATION/TRAINING PROGRAM

## 2024-01-17 RX ORDER — DULAGLUTIDE 3 MG/.5ML
INJECTION, SOLUTION SUBCUTANEOUS
Qty: 2 ML | Refills: 5 | Status: SHIPPED | OUTPATIENT
Start: 2024-01-17 | End: 2024-01-22 | Stop reason: SDUPTHER

## 2024-01-17 NOTE — ASSESSMENT & PLAN NOTE
Secondary to polyneuropathy related to diabetes.  -Reviewed patient's medications and he states he has not had his Trulicity for the past 2-3 weeks.  -He has called and awaiting refill from his Endocrinologist.    -We did discuss option of increasing his Gabapentin as he is only taking 100mg qHS.  -I feel he may benefit from an up-titration of this to further help with his neuropathic pain (sharp, stabbing, tingling/paresthesias) to both feet.  -He states he will discuss with his PCP about potential adjustments if they are in agreement.   -Renal function WNL - Labs reviewed from 12/14/2023 with Cr 0.71 and eGFR of 98

## 2024-01-17 NOTE — ASSESSMENT & PLAN NOTE
Patient endorses history of Vertigo.  Had syncopal episode last month and went to the hospital for evaluation.  -Was suspected to be related to his vertigo.  -Patient did have his syncopal episode immediately after urination - potentially micturation syncope could have contributed as well.    -Patient has a referral with a specialist for evaluating his Vertigo. He is otherwise stable at this time.

## 2024-01-17 NOTE — PROGRESS NOTES
Outpatient Follow-Up - Palliative and Supportive Care   Brittany Lee 63 y.o. male [id number]    Assessment & Plan  Problem List Items Addressed This Visit       Smoldering multiple myeloma - Primary     Follows Dr. Box of Hematology/Oncology.  Currently stable per last visit from 12/4/2023.    Patient has completed MMJ application process with patient ID Number # [id number].  -He wishes to seek certification and therefore, will be scheduled with one of our providers to have MMJ certification.  -Patient wishes to follow-up with me on a as-needed basis thereafter.  -He knows to call my office with any changes.           Peripheral polyneuropathy    Paresthesia of both feet     Secondary to polyneuropathy related to diabetes.  -Reviewed patient's medications and he states he has not had his Trulicity for the past 2-3 weeks.  -He has called and awaiting refill from his Endocrinologist.    -We did discuss option of increasing his Gabapentin as he is only taking 100mg qHS.  -I feel he may benefit from an up-titration of this to further help with his neuropathic pain (sharp, stabbing, tingling/paresthesias) to both feet.  -He states he will discuss with his PCP about potential adjustments if they are in agreement.   -Renal function WNL - Labs reviewed from 12/14/2023 with Cr 0.71 and eGFR of 98         Type 2 diabetes mellitus, with long-term current use of insulin ([facility])       Lab Results   Component Value Date    HGBA1C 7.3 (H) 10/26/2023     Patient following with Endocrinology and feels medications have been helpful in controlling his sugars, especially his Trulicity.          Palliative care patient    Vertigo     Patient endorses history of Vertigo.  Had syncopal episode last month and went to the hospital for evaluation.  -Was suspected to be related to his vertigo.  -Patient did have his syncopal episode immediately after urination - potentially micturation syncope could have contributed as well.    -Patient has a  referral with a specialist for evaluating his Vertigo. He is otherwise stable at this time.            Referrals Placed / Medical Equipment Ordered  -None today    4) Follow-Up Recommendations  -Follow-up with PCP and current medical specialists  -Follow-up with palliative care: for [facility] certification.   -Follow-up with this [facility] provider as needed at patient's request     Medications adjusted this encounter:  Requested Prescriptions      No prescriptions requested or ordered in this encounter     No orders of the defined types were placed in this encounter.    There are no discontinued medications.      Brittany Lee was seen today for symptoms and planning cares related to above illnesses.  Above orders were sent electronically, or provided in hardcopy in clinic.  I have reviewed the patient's controlled substance dispensing history in the Prescription Drug Monitoring Program in compliance with the [facility] regulations before prescribing any controlled substances.  We appreciate the referral, and wish for him to continue to follow with us.  If there are questions or concerns, please contact us through our clinic/answering service 24 hours a day, seven days a week.      Visit Information    Accompanied By: No one    Source of History: Self    History Limitations: None    Contacts:Caregiver Information: Name: Wife, [place]    Chief Complaint  Polyneuropathy  Smoldering Multiple Myeloma    History of Present Illness  Brittany Lee is a 63 y.o. male who presents for follow-up of smoldering multiple myeloma.    Pertinent issues include: symptom management  Mr. Lee is here for routine follow-up. He states no new symptoms other than his consistent neuropathy to both feet. We re-visited the idea of up-titration of his Gabapentin as he is only on 100mg qHS with WNL renal function which I feel he could benefit from adjustments. He states he will speak with his PCP about this and see if this would be an option to further help  "with his neuropathy, which is likely related to his long-standing diabetes.  He states he has had good control of his sugars with his Trulicity but has been without it for the past 3 weeks. He has contacted his Endocrinologist yesterday and is awaiting response for refill. He otherwise states he has lost weight as he has been watching his diet and adopting a healthier meal plan. He is otherwise doing well with no other concerns or complaints at this time. He requests for MMJ certification and to follow-up as needed with this [facility] provider. Will set patient up with a [facility] provider to assist with MMJ certification.      Past medical, surgical, social, and family histories are reviewed and pertinent updates are made.    Review of Systems   Constitutional: Negative for chills, decreased appetite, fever and malaise/fatigue.   Eyes:  Negative for visual disturbance.   Cardiovascular:  Positive for syncope (had a syncopal episode last month and went to ER - was told Vertigo related (could also be micturation syncope as patient \"passed out\" immediately after urinating) - no further new episodes). Negative for chest pain, irregular heartbeat and palpitations.   Respiratory:  Negative for shortness of breath.    Musculoskeletal:  Negative for muscle weakness and myalgias.   Gastrointestinal:  Negative for abdominal pain, constipation, diarrhea, dysphagia, nausea and vomiting.   Genitourinary:  Negative for dysuria.   Neurological:  Positive for numbness (lower extremities 2/2 neuropathy) and paresthesias (lower extremities 2/2 neuropathy). Negative for headaches and light-headedness.   Psychiatric/Behavioral:  Negative for memory loss. The patient does not have insomnia.    All other systems reviewed and are negative.        Vital Signs    /76 (BP Location: Left arm, Patient Position: Sitting, Cuff Size: Standard)   Pulse 86   Temp 97.8 °F (36.6 °C) (Temporal)   Wt (!) 137 kg (301 lb 8 oz)   SpO2 94%   BMI 39.78 " kg/m²     Physical Exam and Objective Data  Physical Exam  Vitals reviewed.   Constitutional:       General: He is not in acute distress.     Appearance: Normal appearance. He is well-developed. He is not ill-appearing, toxic-appearing or diaphoretic.   HENT:      Head: Normocephalic and atraumatic.      Nose: Nose normal.   Eyes:      General: No scleral icterus.        Right eye: No discharge.         Left eye: No discharge.   Cardiovascular:      Rate and Rhythm: Normal rate.   Pulmonary:      Effort: Pulmonary effort is normal. No respiratory distress.   Abdominal:      General: There is no distension.      Comments: Round abdomen.   Musculoskeletal:         General: No swelling.   Skin:     General: Skin is warm and dry.      Coloration: Skin is not jaundiced or pale.   Neurological:      General: No focal deficit present.      Mental Status: He is alert and oriented to person, place, and time. Mental status is at baseline.      Gait: Gait normal.   Psychiatric:         Mood and Affect: Mood normal.         Behavior: Behavior normal.         Thought Content: Thought content normal.         Judgment: Judgment normal.           Radiology and Laboratory:  I personally reviewed and interpreted the following results: labs, imaging, specialists.    30 minutes were spent in this ambulatory visit with greater than 50% of the time spent face to face with patient in counseling or coordination of care. A description of the counseling / coordination of care: symptom assessment and management, medication review, psychosocial support, chart review, imaging review, lab review, medical marijuana, supportive listening, and anticipatory guidance.. All of the patient's questions were answered during this discussion.    Note Shared.    Dallin Verdin DO  Palliative & Supportive Care  Clinic/Answering Service: [phone]  You can find me on [employer].    Portions of this document may have been created using dictation software and  "as such some \"sound alike\" terms may have been generated by the system. Do not hesitate to contact me with any questions or clarifications.    "

## 2024-01-17 NOTE — ASSESSMENT & PLAN NOTE
Lab Results   Component Value Date    HGBA1C 7.3 (H) 10/26/2023     Patient following with Endocrinology and feels medications have been helpful in controlling his sugars, especially his Trulicity.

## 2024-01-17 NOTE — PATIENT INSTRUCTIONS
It was a pleasure speaking with you today.    As discussed:  -Continue your medications as prescribed.  -Continue with a bowel regimen to avoid constipation.  -Can consider increase in your Gabapentin if your neuropathy continues to be an issue (as discussed, you can speak with your primary care provider regarding their opinion as well)    Follow-up at next appointment for MMJ Certification  -Can follow-up with me as needed as discussed or if any symptoms arise.    Please do not hesitate to call me sooner should you have any questions/concerns or needs.

## 2024-01-17 NOTE — ASSESSMENT & PLAN NOTE
Follows Dr. Box of Hematology/Oncology.  Currently stable per last visit from 12/4/2023.    Patient has completed MMJ application process with patient ID Number # [id number].  -He wishes to seek certification and therefore, will be scheduled with one of our providers to have MMJ certification.  -Patient wishes to follow-up with me on a as-needed basis thereafter.  -He knows to call my office with any changes.

## 2024-01-18 NOTE — TELEPHONE ENCOUNTER
Patient calling that her medication needs a prior auth and is asking for a call back .. Thank you    Auth number [id number]   [facility]  Id [id number]

## 2024-01-19 ENCOUNTER — DOCUMENTATION (OUTPATIENT)
Dept: INTERNAL MEDICINE CLINIC | Facility: CLINIC | Age: 64
End: 2024-01-19

## 2024-01-19 NOTE — TELEPHONE ENCOUNTER
Becka bruk, I called and spoke to wife and told her to medication can be picked up at pharmacy, she will forward the message to patient.

## 2024-01-19 NOTE — PROGRESS NOTES
Med autorization completed today for Trulicity.   Auth number [id number] and it is valid until December 31 2024.

## 2024-01-19 NOTE — TELEPHONE ENCOUNTER
Has the authrozation been submitted and if so what is the outcome. Patient has been waiting for 3 weeks now. And he has missed two doses and his numbers are going higher and if he doesn't get this soon he will miss another dose on Monday? Can someone please inform patient as to what is happening.

## 2024-01-22 DIAGNOSIS — E08.21 DIABETES MELLITUS DUE TO UNDERLYING CONDITION WITH DIABETIC NEPHROPATHY, WITH LONG-TERM CURRENT USE OF INSULIN (HCC): ICD-10-CM

## 2024-01-22 DIAGNOSIS — Z79.4 DIABETES MELLITUS DUE TO UNDERLYING CONDITION WITH DIABETIC NEPHROPATHY, WITH LONG-TERM CURRENT USE OF INSULIN (HCC): ICD-10-CM

## 2024-01-22 RX ORDER — DULAGLUTIDE 3 MG/.5ML
INJECTION, SOLUTION SUBCUTANEOUS
Qty: 2 ML | Refills: 5 | Status: SHIPPED | OUTPATIENT
Start: 2024-01-22

## 2024-01-31 ENCOUNTER — OFFICE VISIT (OUTPATIENT)
Dept: FAMILY MEDICINE CLINIC | Facility: CLINIC | Age: 64
End: 2024-01-31

## 2024-01-31 VITALS
HEIGHT: 74 IN | RESPIRATION RATE: 18 BRPM | TEMPERATURE: 98 F | BODY MASS INDEX: 38.55 KG/M2 | WEIGHT: 300.4 LBS | OXYGEN SATURATION: 99 % | SYSTOLIC BLOOD PRESSURE: 157 MMHG | HEART RATE: 89 BPM | DIASTOLIC BLOOD PRESSURE: 97 MMHG

## 2024-01-31 DIAGNOSIS — H10.9 BACTERIAL CONJUNCTIVITIS: Primary | ICD-10-CM

## 2024-01-31 DIAGNOSIS — Z79.4 DIABETES MELLITUS DUE TO UNDERLYING CONDITION WITH DIABETIC NEPHROPATHY, WITH LONG-TERM CURRENT USE OF INSULIN (HCC): ICD-10-CM

## 2024-01-31 DIAGNOSIS — E08.21 DIABETES MELLITUS DUE TO UNDERLYING CONDITION WITH DIABETIC NEPHROPATHY, WITH LONG-TERM CURRENT USE OF INSULIN (HCC): ICD-10-CM

## 2024-01-31 PROCEDURE — 99213 OFFICE O/P EST LOW 20 MIN: CPT | Performed by: FAMILY MEDICINE

## 2024-01-31 RX ORDER — ERYTHROMYCIN 5 MG/G
0.5 OINTMENT OPHTHALMIC
Qty: 3.5 G | Refills: 0 | Status: SHIPPED | OUTPATIENT
Start: 2024-01-31 | End: 2024-01-31

## 2024-01-31 RX ORDER — OFLOXACIN 3 MG/ML
1 SOLUTION/ DROPS OPHTHALMIC 4 TIMES DAILY
Status: DISCONTINUED | OUTPATIENT
Start: 2024-01-31 | End: 2024-01-31

## 2024-01-31 RX ORDER — ERYTHROMYCIN 5 MG/G
0.5 OINTMENT OPHTHALMIC
Qty: 3.5 G | Refills: 0 | Status: SHIPPED | OUTPATIENT
Start: 2024-01-31 | End: 2024-02-03

## 2024-01-31 NOTE — PROGRESS NOTES
Assessment/Plan:      Diagnoses and all orders for this visit:    Bacterial conjunctivitis    -     erythromycin (ILOTYCIN) ophthalmic ointment; Administer 0.5 inches to the right eye daily at bedtime for 3 days    Diabetes mellitus due to underlying condition with diabetic nephropathy, with long-term current use of insulin (HCC)  -     Insulin Pen Needle 31G X 5 MM MISC; Use daily     63-year-old gentleman with a history significant for, but not limited to type 2 diabetes mellitus, presenting with unilateral, right eye discharge and injection for a few days, in addition to multiple family members with similar ocular symptoms.  The afore-mentioned suggest bacterial conjunctivitis, for which patient has been provided with a course of intraocular antibiotics, in addition to recommendations for minimizing risk of transmission  including not sharing towels or pillowcases with other household members in addition to maintain adequate ocular hygiene, with warm compresses using a clean towel.    Subjective:     Patient ID: Brittany Lee is a 63 y.o. male.    Mr. Lee is a 63-year-old gentleman, who presents with a 3-day history of grayish-white discharge from the right eye with irritation; patient states that multiple family members have recently been experiencing similar ocular symptoms.  He has been awakening with crusting of the eyelids.  Patient denies any ocular pain or visual disturbance at this time.  He does not endorse fever, chills or other systemic symptoms.      Review of Systems   Constitutional:  Negative for chills and fever.   HENT:  Negative for sore throat.    Eyes:  Positive for discharge and redness. Negative for photophobia, pain and visual disturbance.   Respiratory:  Negative for shortness of breath.    Cardiovascular:  Negative for chest pain.   Genitourinary:  Negative for difficulty urinating.   Skin:  Negative for color change.   Neurological:  Negative for headaches.   Psychiatric/Behavioral:   Negative for agitation and behavioral problems.          Objective:     Physical Exam  Constitutional:       Appearance: Normal appearance.   HENT:      Head: Normocephalic and atraumatic.   Eyes:      General: Vision grossly intact. No visual field deficit.        Right eye: Discharge present.      Conjunctiva/sclera:      Right eye: Right conjunctiva is injected. Exudate present. No chemosis.     Left eye: Left conjunctiva is not injected. No chemosis or exudate.  Cardiovascular:      Rate and Rhythm: Normal rate and regular rhythm.   Pulmonary:      Effort: Pulmonary effort is normal. No respiratory distress.   Abdominal:      General: There is no distension.   Musculoskeletal:         General: Normal range of motion.   Skin:     General: Skin is dry.   Neurological:      Mental Status: He is alert and oriented to person, place, and time.   Psychiatric:         Mood and Affect: Mood normal.         Behavior: Behavior normal.

## 2024-02-02 ENCOUNTER — OFFICE VISIT (OUTPATIENT)
Dept: SURGERY | Facility: CLINIC | Age: 64
End: 2024-02-02
Payer: COMMERCIAL

## 2024-02-02 VITALS
RESPIRATION RATE: 12 BRPM | DIASTOLIC BLOOD PRESSURE: 82 MMHG | TEMPERATURE: 98.8 F | HEIGHT: 72 IN | SYSTOLIC BLOOD PRESSURE: 147 MMHG | WEIGHT: 301.8 LBS | OXYGEN SATURATION: 97 % | BODY MASS INDEX: 40.88 KG/M2 | HEART RATE: 89 BPM

## 2024-02-02 DIAGNOSIS — K42.9 UMBILICAL HERNIA WITHOUT OBSTRUCTION AND WITHOUT GANGRENE: Primary | ICD-10-CM

## 2024-02-02 PROCEDURE — 99214 OFFICE O/P EST MOD 30 MIN: CPT | Performed by: SURGERY

## 2024-02-02 NOTE — ASSESSMENT & PLAN NOTE
63-year-old male with a ventral hernia, as well as excess dilated skin.    Plan:  - Will plan on laparoscopic or robotic ventral hernia repair with mesh, as well as umbilicoplasty. Risks and benefits of surgery were reviewed and the patient was amenable to proceed.  Specific risks included: Hernia recurrence, seroma, and urinary retention.  - Will schedule at the patient's earliest convenience.  - Patient was counseled of the signs of symptoms of bowel incarceration was advised to call immediately if any of those symptoms should arise.

## 2024-02-02 NOTE — PROGRESS NOTES
Office Visit - General Surgery  Brittany Lee MRN: [id number]  Encounter: [id number]  Assessment/Plan    Problem List Items Addressed This Visit       Umbilical hernia - Primary     63-year-old male with a ventral hernia, as well as excess dilated skin.    Plan:  - Will plan on laparoscopic or robotic ventral hernia repair with mesh, as well as umbilicoplasty. Risks and benefits of surgery were reviewed and the patient was amenable to proceed.  Specific risks included: Hernia recurrence, seroma, and urinary retention.  - Will schedule at the patient's earliest convenience.  - Patient was counseled of the signs of symptoms of bowel incarceration was advised to call immediately if any of those symptoms should arise.               Subjective    Brittany Lee is a 63 y.o. male who presents for an umbilical hernia.  He has a long history of a fat-containing ventral hernia, that has been increasing in size over the past 6 months since our last visit.  He was originally planning on surgical intervention, however developed a bout of vertigo and had to cancel.  He continues to have some discomfort at the protrusion as he bumps up against things, but no other specific symptomatology.  Of note, he is dealing with a vertigo episode at this time, and is wearing some scopolamine patches.  I reviewed a CT scan of the abdomen pelvis in PACS from 6/15/2023, which I ordered her last visit.  This is consistent with a fat-containing ventral hernia with a 3 cm neck, and a significant amount of intra-abdominal fat within the hernia sac.  His abdominal wall skin is stretched out on CT scan, and quite thin.      Pt  has a past medical history of Asthma, Benign positional vertigo, Diabetes mellitus ([facility]), Diabetes mellitus due to underlying condition with diabetic nephropathy, with long-term current use of insulin ([facility]) (5/19/2021), Dyslipidemia, Gastroenteritis, Hyperlipidemia, Hypertension, Lipoma of neck, Multiple myeloma ([facility]), Sleep  apnea, and Wheezing.    Pt  has a past surgical history that includes Throat surgery; Mass excision (Right, 8/21/2017); and Esophagogastroduodenoscopy (2010).    family history includes Breast cancer in his maternal aunt; Cancer in his father and mother; Dementia in his father; Diabetes in his mother; Diabetes type II in his mother; Heart attack in his father; Heart disease in his father; Hyperlipidemia in his father and mother; Hypertension in his father and mother; Stomach cancer in his maternal grandmother; Thyroid disease in his sister.    Brittany Lee reports that he has never smoked. He has never used smokeless tobacco. He reports that he does not currently use alcohol. He reports that he does not use drugs.      Current Outpatient Medications on File Prior to Visit   Medication Sig Dispense Refill    albuterol (PROVENTIL HFA,VENTOLIN HFA) 90 mcg/act inhaler INHALE 1 PUFF EVERY 4-6 HOURS AS NEEDED 17 g 2    ascorbic acid (VITAMIN C) 500 mg tablet Take 500 mg by mouth daily      atorvastatin (LIPITOR) 40 mg tablet TAKE 1 TABLET(40 MG) BY MOUTH DAILY 90 tablet 3    B Complex-Biotin-FA (VITAMIN B50 COMPLEX PO)       Blood Glucose Monitoring Suppl (OneTouch Verio) w/Device KIT Use 3 (three) times a day 1 kit 0    Diclofenac Sodium (VOLTAREN) 1 % Apply 2 g topically 4 (four) times a day 100 g 2    dulaglutide (Trulicity) 3 MG/0.5ML injection Inject 3mg subcut once per week 2 mL 5    Echinacea 380 MG CAPS Take by mouth 2 daily am      erythromycin (ILOTYCIN) ophthalmic ointment Administer 0.5 inches to the right eye daily at bedtime for 3 days 3.5 g 0    fluticasone (FLONASE) 50 mcg/act nasal spray SHAKE LIQUID AND USE 2 SPRAYS IN EACH NOSTRIL DAILY 48 g 2    fluticasone-salmeterol (Advair HFA) 115-21 MCG/ACT inhaler Inhale 2 puffs 2 (two) times a day Rinse mouth after use 12 g 5    gabapentin (NEURONTIN) 100 mg capsule Take 1 capsule (100 mg total) by mouth daily at bedtime 30 capsule 5    Ginkgo Biloba (GINKOBA  PO) Take 60 mg by mouth      glucose blood (OneTouch Verio) test strip Use 1 each 3 (three) times a day Use as instructed 100 strip 5    Insulin Glargine Solostar (Lantus SoloStar) 100 UNIT/ML SOPN 28 units at night 15 mL 4    Insulin Pen Needle 31G X 5 MM MISC Use daily 100 each 5    Lancets (OneTouch Delica Plus [id number]) MISC TEST THREE TIMES DAILY 100 each 5    lisinopril (ZESTRIL) 40 mg tablet Take 1 tablet (40 mg total) by mouth daily 90 tablet 4    loratadine (CLARITIN) 10 mg tablet TAKE 1 TABLET BY MOUTH EVERY DAY 90 tablet 2    MAGNESIUM CITRATE PO Take 1 tablet by mouth daily        metFORMIN (GLUCOPHAGE) 1000 MG tablet Take 1 tablet (1,000 mg total) by mouth 2 (two) times a day with meals 180 tablet 2    Multiple Vitamins-Minerals (MULTIVITAMIN ADULTS 50+ PO) Take by mouth daily      omeprazole (PriLOSEC) 40 MG capsule TAKE 1 CAPSULE(40 MG) BY MOUTH DAILY 90 capsule 3     No current facility-administered medications on file prior to visit.     Allergies   Allergen Reactions    Shellfish-Derived Products - Food Allergy Anaphylaxis     Clams and mussels, oysters  Lobster and crab ok    Diphenhydramine      Lightheadedness, nauseous, rapid heartbeat    Other Palpitations and Other (See Comments)     Clams       Review of Systems   Constitutional:  Negative for appetite change, chills, diaphoresis and fever.   HENT:  Negative for nosebleeds and trouble swallowing.         Vertiginous   Eyes: Negative.    Respiratory:  Negative for cough, shortness of breath and wheezing.    Cardiovascular:  Negative for chest pain, palpitations and leg swelling.   Gastrointestinal:  Negative for abdominal distention, abdominal pain, nausea and vomiting.   Genitourinary:  Negative for difficulty urinating, flank pain and frequency.   Musculoskeletal:  Negative for arthralgias, joint swelling and myalgias.   Skin:  Negative for pallor and rash.   Neurological:  Negative for dizziness, facial asymmetry and speech difficulty.    Hematological:  Does not bruise/bleed easily.   Psychiatric/Behavioral:  Negative for agitation and confusion.    All other systems reviewed and are negative.      Objective    Vitals:    02/02/24 0832   BP: 147/82   Pulse: 89   Resp: 12   Temp: 98.8 °F (37.1 °C)   SpO2: 97%       Physical Exam  Vitals and nursing note reviewed.   Constitutional:       General: He is not in acute distress.     Appearance: Normal appearance. He is not toxic-appearing.   HENT:      Head: Normocephalic and atraumatic.      Mouth/Throat:      Mouth: Mucous membranes are moist.   Eyes:      Extraocular Movements: Extraocular movements intact.      Pupils: Pupils are equal, round, and reactive to light.   Cardiovascular:      Rate and Rhythm: Normal rate and regular rhythm.      Pulses: Normal pulses.   Pulmonary:      Effort: Pulmonary effort is normal. No respiratory distress.      Breath sounds: Normal breath sounds. No wheezing.   Abdominal:      General: There is no distension.      Palpations: Abdomen is soft. There is no mass.      Tenderness: There is no abdominal tenderness. There is no guarding or rebound.      Hernia: A hernia is present.      Comments: Fat-containing ventral hernia, with significant protrusion with thinning of his abdominal wall skin.  Partially reducible.   Musculoskeletal:         General: No swelling or deformity. Normal range of motion.      Cervical back: Normal range of motion and neck supple.      Right lower leg: No edema.      Left lower leg: No edema.   Skin:     General: Skin is warm and dry.      Coloration: Skin is not jaundiced.   Neurological:      General: No focal deficit present.      Mental Status: He is alert and oriented to person, place, and time.   Psychiatric:         Mood and Affect: Mood normal.         Behavior: Behavior normal.

## 2024-02-04 ENCOUNTER — TELEPHONE (OUTPATIENT)
Dept: OTHER | Facility: OTHER | Age: 64
End: 2024-02-04

## 2024-02-04 NOTE — TELEPHONE ENCOUNTER
Patient is calling regarding cancelling an appointment.    Date/Time: 02/05/2024 9 AM     Patient was rescheduled: YES [] NO [x]    Patient requesting call back to reschedule: YES [] NO [x]    Patient will call back to reschedule

## 2024-02-08 DIAGNOSIS — Z79.4 DIABETES MELLITUS DUE TO UNDERLYING CONDITION WITH DIABETIC NEPHROPATHY, WITH LONG-TERM CURRENT USE OF INSULIN (HCC): ICD-10-CM

## 2024-02-08 DIAGNOSIS — E08.21 DIABETES MELLITUS DUE TO UNDERLYING CONDITION WITH DIABETIC NEPHROPATHY, WITH LONG-TERM CURRENT USE OF INSULIN (HCC): ICD-10-CM

## 2024-02-08 RX ORDER — INSULIN GLARGINE 100 [IU]/ML
INJECTION, SOLUTION SUBCUTANEOUS
Qty: 15 ML | Refills: 4 | Status: SHIPPED | OUTPATIENT
Start: 2024-02-08

## 2024-02-08 NOTE — TELEPHONE ENCOUNTER
My name is Brittany Lee. The prescription, my number is [phone], the pharmacy is [facility], their number is [phone] and this is for the Lantus Solar Star pen injection 3 milligram. I think that the prescription number is [phone], five, and again that's for the Lantus Solar Star. I have a few days left on this. There's been a problem once again getting a prescription. It's been for a week and 1/2 for some reason nobody's given authorization Again. [id number] Brittany Lee. WADE. Thank you.

## 2024-02-09 DIAGNOSIS — K22.70 BARRETT'S ESOPHAGUS WITHOUT DYSPLASIA: ICD-10-CM

## 2024-02-09 RX ORDER — OMEPRAZOLE 40 MG/1
40 CAPSULE, DELAYED RELEASE ORAL DAILY
Qty: 90 CAPSULE | Refills: 3 | Status: SHIPPED | OUTPATIENT
Start: 2024-02-09

## 2024-02-12 ENCOUNTER — TELEPHONE (OUTPATIENT)
Dept: FAMILY MEDICINE CLINIC | Facility: CLINIC | Age: 64
End: 2024-02-12

## 2024-02-12 DIAGNOSIS — H10.9 BACTERIAL CONJUNCTIVITIS: Primary | ICD-10-CM

## 2024-02-12 RX ORDER — OFLOXACIN 3 MG/ML
1 SOLUTION/ DROPS OPHTHALMIC 4 TIMES DAILY
Qty: 5 ML | Refills: 0 | Status: SHIPPED | OUTPATIENT
Start: 2024-02-12

## 2024-02-12 NOTE — TELEPHONE ENCOUNTER
Patient called asking if he could have an eyedrop solution sent out as the ointment was very difficult to deal with, patient stated the pinkeye has traveled from the right eye to his left,    [id number] - [place], [place] - [address]

## 2024-02-15 ENCOUNTER — EVALUATION (OUTPATIENT)
Dept: PHYSICAL THERAPY | Facility: CLINIC | Age: 64
End: 2024-02-15
Payer: COMMERCIAL

## 2024-02-15 DIAGNOSIS — R42 DIZZINESS: Primary | ICD-10-CM

## 2024-02-15 DIAGNOSIS — H81.10 BPPV (BENIGN PAROXYSMAL POSITIONAL VERTIGO), UNSPECIFIED LATERALITY: ICD-10-CM

## 2024-02-15 PROCEDURE — 97112 NEUROMUSCULAR REEDUCATION: CPT

## 2024-02-15 PROCEDURE — 97162 PT EVAL MOD COMPLEX 30 MIN: CPT

## 2024-02-15 NOTE — PROGRESS NOTES
PT Evaluation     Today's date: 2/15/2024  Patient name: Brittany Lee  : 1960  MRN: [id number]  Referring provider: Son Paul MD  Dx:   Encounter Diagnosis     ICD-10-CM    1. Dizziness  R42       2. BPPV (benign paroxysmal positional vertigo), unspecified laterality  H81.10           Start Time: 1715  Stop Time: 1820  Total time in clinic (min): 65 minutes    Assessment  Assessment details: Brittany Lee is a 63 y.o. male  presents with room spinning dizziness that started approximately 2 months ago. Triggering events include: rolling in bed; supine to/from sitting; and bending over. His symptoms last for 1-2 minutes when they are triggered, however have been relatively constant since starting 2 months ago. Due to this, he feels off-balance while walking and during most movements, as well as symptoms at rest. He reports the last time he did a self-Epley was 2 days ago, symptoms have been less intense since.    PT examination findings include: Dizziness Handicap Index score of 94/100 indicating high self-perceived impairment and significant dizziness with all activities, dizziness with R sidelying test (no notable nystagmus) and possble positive bow and lean test however did not demonstrate positive roll testing. HINTS examination was negative, he did demonstrate some hypometria noted on vertical saccades (1 beat). Subjective dizziness findings during R sidelying test may be consistent with R posterior semi-circular canal benign paroxysmal positional vertigo.     R Semont maneuver was performed x 1 repetition and tolerated well with symptoms produced with each position change. He was instructed to try to avoid laying flat and bending down today until he goes to bed today. He verbalized understanding. The patient would benefit from skilled physical therapy to provide canalith repositioning, exercises, neuromuscular reeducation, manual techniques, gait training, and modalities as deemed necessary in  order to help him achieve his goals and decrease his dizziness symptoms. Due to recurrence of symptoms as well as duration of current symptoms, Brittany may need additional therapy for motion sensitivity and habituation to movement.  Impairments: activity intolerance, impaired balance and lacks appropriate home exercise program  Understanding of Dx/Px/POC: good   Prognosis: good    Goals  STG  (4 weeks)     1. Resolve BPPV with (-) DHP and Roll test noted  2. Assess need for habituation exercises to facilitate return to normal motion  3. Patient will report improved tolerance to all ADL tasks with minimal symptoms noted    LTG  (8 weeks)     1. Patient will demonstrate ability to perform all work and home tasks without symptoms  2. Patient will demonstrate ability to perform HT in gait without hesitation or symptoms scoring >22/30 on FGA  3. Patient will decrease DHI score to <20 points demonstrating return to previous home and work tasks     Plan  Patient would benefit from: skilled physical therapy  Planned therapy interventions: balance, neuromuscular re-education, canalith repositioning, patient education, self care, therapeutic activities, therapeutic exercise, home exercise program and gait training  Frequency: 2x week  Duration in weeks: 8  Plan of Care beginning date: 2/15/2024  Plan of Care expiration date: 4/11/2024  Treatment plan discussed with: patient      Subjective Evaluation    History of Present Illness  Mechanism of injury: Reports that he has had vertigo on and over for the last 30 years. He states he usually gets this and can't do anything. 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 Then the dizziness hit him again when he was watching TV and it came on. He states that he got the dizziness come back and he went to the bathroom and turned to wash his hands and passed out and hit his head on the closet door, was in the ED. Since then he states he has  "not been out of the house since then and has been in bed. He states that he has spinning dizziness. If he lays down on an incline with 2 pillows it calms down in this position. He states that he also gets head pressure.     He states when this happened 30 years ago it lasted for 11 weeks. He went to a neurologist and he had an epley maneuver completed. He states he has been doing epley manuevers when the dizziness returns since.     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       Per medical chart from ER on 12/14/23  \"63-year-old male, long history of vertigo, states last night he felt dizziness, room spinning, nausea, symptoms consistent with previous episodes of vertigo, went to sleep, woke up this morning upon standing out of bed had severe dizziness with room spinning sensation.  Walked to the bathroom urinated, dizziness severely worsened and he fell to the ground.  Believes he completely lost consciousness, hit his head into a door but does not recall this just all the damage to the door after.  Moderate headache, no neck pain no chest pain no abdominal pain no extremity pain.,  Currently states he feels improved with his eyes closed and laying perfectly still, room spinning sensation severely worsens with opening his eyes.,  Nauseous and vomiting, given Zofran prehospital.  History obtained from patient and paramedics\"  Patient Goals  Patient goals for therapy: return to work        Objective     Concurrent Complaints  Positive for headaches (pressure), nausea/motion sickness, tinnitus (years, no change, high pitch) and hearing loss (no recent changes, more sensitivity). Negative for visual change, memory loss and aural fullness  Neuro Exam:     Dizziness  Positive for disequilibrium, vertigo, oscillopsia, light-headedness and rocking or swaying.   Negative for diplopia.     Exacerbating factors  Positive for bending over, rolling in bed (R side worse), looking up, walking, turning head and " supine to/from sitting.     Symptoms   Duration: 1-2 minutes  Frequency: constant  Intensity at best: 4/10  Intensity at worst: 10/10  Average intensity: 7/10    Headaches   Patient reports headaches: Yes (pressure).   Frequency: few times a week  Location: top of the head, pushing forward on eyes    Oculomotor exam   Oculomotor ROM: WNL  Resting nystagmus: not present   Gaze holding nystagmus: not present left  and not present right  Smooth pursuits: within normal limits  Vertical saccades: dizziness, slowed, hypometric up  Horizontal saccades: hypometric  and dizziness, slowed, hypometric both directions  Cover test: normal  Crossover test: normal  Head thrust: left normal and right normal    Positional testing   Roll test   Left horizontal canal: WNL  Right horizontal canal: WNL  Positional testing comment: Sidelying test completed, symptomatic (mild) in R sidelying test, lasting about 30 seconds, no significant nystagmus noted.  L sidelying test negative    [place] and lean: L beating nystagmus looking up, 2 beats only  Reports R beating nystagmus down, no noticeable nystagmus           Precautions: HTN, DMII, peripheral neuropathy,     POC expires Unit limit Auth Expiration date PT/OT/ST + Visit Limit?   4/11 BOMN 12/31 BOMN                           Visit/Unit Tracking  AUTH Status:  Date 2/15             none Used 1              Remaining                     Manuals 2/15                                                      Neuro Re-Ed           Semont R x 1 rep                                                                            Ther Ex                                                                                                   Ther Activity                                 Gait Training                                 Education Vestibular anatomy, review of Epley maneuver and post maneuver instructions, incidence and recurrence of BPPV, typical vs atypical BPPV symptoms, POC

## 2024-02-19 ENCOUNTER — OFFICE VISIT (OUTPATIENT)
Dept: FAMILY MEDICINE CLINIC | Facility: CLINIC | Age: 64
End: 2024-02-19

## 2024-02-19 VITALS
SYSTOLIC BLOOD PRESSURE: 136 MMHG | OXYGEN SATURATION: 97 % | BODY MASS INDEX: 40.09 KG/M2 | HEIGHT: 72 IN | DIASTOLIC BLOOD PRESSURE: 75 MMHG | WEIGHT: 296 LBS | TEMPERATURE: 99.3 F | HEART RATE: 96 BPM

## 2024-02-19 DIAGNOSIS — J45.40 MODERATE PERSISTENT ASTHMA WITHOUT COMPLICATION: Primary | ICD-10-CM

## 2024-02-19 DIAGNOSIS — R42 VERTIGO: ICD-10-CM

## 2024-02-19 DIAGNOSIS — H93.13 TINNITUS OF BOTH EARS: ICD-10-CM

## 2024-02-19 DIAGNOSIS — H10.33 ACUTE CONJUNCTIVITIS OF BOTH EYES, UNSPECIFIED ACUTE CONJUNCTIVITIS TYPE: ICD-10-CM

## 2024-02-19 PROCEDURE — 99214 OFFICE O/P EST MOD 30 MIN: CPT | Performed by: FAMILY MEDICINE

## 2024-02-19 RX ORDER — PREDNISONE 20 MG/1
40 TABLET ORAL DAILY
Qty: 10 TABLET | Refills: 0 | Status: SHIPPED | OUTPATIENT
Start: 2024-02-19 | End: 2024-02-24

## 2024-02-19 RX ORDER — AZELASTINE HYDROCHLORIDE 0.5 MG/ML
1 SOLUTION/ DROPS OPHTHALMIC 2 TIMES DAILY
Qty: 6 ML | Refills: 0 | Status: SHIPPED | OUTPATIENT
Start: 2024-02-19

## 2024-02-19 NOTE — ASSESSMENT & PLAN NOTE
Chronic but worsened as per patient in the last couple weeks in the right ear as well as a popping sensation in the ear  -Started to attend vestibular physical therapy  -Interested in evaluation by ENT, referral placed

## 2024-02-19 NOTE — ASSESSMENT & PLAN NOTE
Started vestibular PT; he still has episodes and not sure if PT helps  -wants to be reevaluated by ENT due to worsening symptoms  -referral placed

## 2024-02-19 NOTE — ASSESSMENT & PLAN NOTE
Positive contacts of wife and grandson  -Started on Ocuflox about a week ago with minimal improvement  -Bilateral conjunctival injection, will add antihistamine drops azelastine; continue Ocuflox  -Follow-up in 1 week

## 2024-02-19 NOTE — PROGRESS NOTES
Assessment/Plan:    Moderate persistent asthma without complication  Uses Advair regularly  Cough and chest tightness for the last 5 days, use over-the-counter cough suppressant with some temporal relief  Auditory wheezing throughout lungs bilaterally, will prescribe prednisone 40 mg for 5 days  May continue over-the-counter cough suppressant as needed  Follow-up in 1 week    Tinnitus of both ears  Chronic but worsened as per patient in the last couple weeks in the right ear as well as a popping sensation in the ear  -Started to attend vestibular physical therapy  -Interested in evaluation by ENT, referral placed    Vertigo  Started vestibular PT; he still has episodes and not sure if PT helps  -wants to be reevaluated by ENT due to worsening symptoms  -referral placed    Acute conjunctivitis of both eyes  Positive contacts of wife and grandson  -Started on Ocuflox about a week ago with minimal improvement  -Bilateral conjunctival injection, will add antihistamine drops azelastine; continue Ocuflox  -Follow-up in 1 week       Diagnoses and all orders for this visit:    Moderate persistent asthma without complication  -     predniSONE 20 mg tablet; Take 2 tablets (40 mg total) by mouth daily for 5 days    Acute conjunctivitis of both eyes, unspecified acute conjunctivitis type  -     azelastine (OPTIVAR) 0.05 % ophthalmic solution; Administer 1 drop to both eyes 2 (two) times a day    Tinnitus of both ears  -     Ambulatory Referral to Otolaryngology; Future    Vertigo  -     Ambulatory Referral to Otolaryngology; Future          Subjective:      Patient ID: Brittany Lee is a 63 y.o. male.    Patient presented as acute visit with multiple complaints including bilateral red eyes with watery discharge for about 1 week, cough that started 5 days ago associated with chest tightness as well as bilateral tinnitus, worse on the right ear, decreased hearing on the right ear that has been chronic but got worse for the last 2  weeks.        The following portions of the patient's history were reviewed and updated as appropriate: allergies, current medications, past family history, past medical history, past social history, past surgical history, and problem list.    Review of Systems   Constitutional:  Negative for chills, diaphoresis, fatigue and fever.   HENT:  Negative for congestion, ear discharge, ear pain, mouth sores, rhinorrhea, sore throat and trouble swallowing.    Eyes:  Positive for discharge (clear) and itching (feeling discomfort). Negative for photophobia, pain and visual disturbance.   Respiratory:  Positive for cough, chest tightness and wheezing. Negative for shortness of breath.    Cardiovascular:  Negative for chest pain, palpitations and leg swelling.   Gastrointestinal:  Negative for abdominal distention, abdominal pain, blood in stool, constipation, diarrhea and nausea.   Genitourinary:  Negative for difficulty urinating and frequency.   Musculoskeletal:  Negative for joint swelling and neck stiffness.   Skin:  Negative for color change, pallor and rash.   Neurological:  Negative for dizziness, syncope, numbness and headaches.         Objective:      /75 (BP Location: Right arm, Patient Position: Sitting, Cuff Size: Large)   Pulse 96   Temp 99.3 °F (37.4 °C) (Temporal)   Ht 6' (1.829 m)   Wt 134 kg (296 lb)   SpO2 97%   BMI 40.14 kg/m²          Physical Exam  HENT:      Right Ear: Tympanic membrane, ear canal and external ear normal.      Left Ear: Tympanic membrane, ear canal and external ear normal.   Eyes:      General:         Right eye: No discharge.         Left eye: Discharge (clear) present.     Conjunctiva/sclera:      Right eye: Right conjunctiva is injected.      Left eye: Left conjunctiva is injected.   Cardiovascular:      Rate and Rhythm: Normal rate and regular rhythm.      Pulses: Normal pulses.   Pulmonary:      Effort: Pulmonary effort is normal. No respiratory distress.      Breath  sounds: Wheezing (throughout bilaterally) present.   Neurological:      General: No focal deficit present.      Mental Status: He is alert.

## 2024-02-19 NOTE — ASSESSMENT & PLAN NOTE
Uses Advair regularly  Cough and chest tightness for the last 5 days, use over-the-counter cough suppressant with some temporal relief  Auditory wheezing throughout lungs bilaterally, will prescribe prednisone 40 mg for 5 days  May continue over-the-counter cough suppressant as needed  Follow-up in 1 week

## 2024-02-21 PROBLEM — H10.33 ACUTE CONJUNCTIVITIS OF BOTH EYES: Status: RESOLVED | Noted: 2024-02-19 | Resolved: 2024-02-21

## 2024-02-22 ENCOUNTER — OFFICE VISIT (OUTPATIENT)
Dept: PHYSICAL THERAPY | Facility: CLINIC | Age: 64
End: 2024-02-22
Payer: COMMERCIAL

## 2024-02-22 DIAGNOSIS — R42 DIZZINESS: Primary | ICD-10-CM

## 2024-02-22 DIAGNOSIS — H81.10 BPPV (BENIGN PAROXYSMAL POSITIONAL VERTIGO), UNSPECIFIED LATERALITY: ICD-10-CM

## 2024-02-22 PROCEDURE — 97112 NEUROMUSCULAR REEDUCATION: CPT

## 2024-02-22 NOTE — PROGRESS NOTES
"Daily Note     Today's date: 2024  Patient name: Brittany Lee  : 1960  MRN: [id number]  Referring provider: Son Paul MD  Dx:   Encounter Diagnosis     ICD-10-CM    1. Dizziness  R42       2. BPPV (benign paroxysmal positional vertigo), unspecified laterality  H81.10                      Subjective: Felt a little nauseous and dizziness after last session, went back to its baseline by the time he got home. ENT referral was made. He reports that he has been making good \"baby steps\" with getting better, and yesterday was a better day. He states that when he got up this morning he had the sensation that his eyes couldn't focus but would \"glide to the R\" and he would need to bring them back. He reports that it was like that for most of the morning and by the early afternoon it was getting better until where he is now. He reports that he feels like he is swimmy and off balance when he turns his head quickly. Pressure in his head and nausea currently.       Objective: See treatment diary below      Assessment: Tolerated treatment well. Patient demonstrated fatigue post treatment, exhibited good technique with therapeutic exercises, and would benefit from continued PT. Further discussed BPPV symptoms, and other symptoms that may be related more to motion sensitivity and prolonged dizziness. Discussed importance of resolving BPPV symptoms prior to starting habituation exercises and that patient may have motion sensitivity due to avoidance of head turns and turning, and quick movements due to recurrence of BPPV symptoms from the past 35 years. Tolerated Semont maneuver well, continues to require cues to keep head turned to the L during. Decreased symptoms with repeated testing. Demonstrated slight nystgamus (upbeating rotational) with slight latency during 1st and 2nd trials. Continue to progress as tolerated.      Plan: Continue per plan of care.      Precautions: HTN, DMII, peripheral neuropathy, "     POC expires Unit limit Auth Expiration date PT/OT/ST + Visit Limit?   4/11 BOMN 12/31 BOMN                           Visit/Unit Tracking  AUTH Status:  Date 2/15 2/22            none Used 1 2             Remaining                     Manuals 2/15 2/22                                                     Neuro Re-Ed           Semont R x 1 rep R x 3 reps                                                                           Ther Ex                                                                                                   Ther Activity                                 Gait Training                                 Education Vestibular anatomy, review of Epley maneuver and post maneuver instructions, incidence and recurrence of BPPV, typical vs atypical BPPV symptoms, POC POC, motion sensitivity vs BPPV, plan for follow up sessions, vitamin D levels and seasonality of BPPV, post maneuver instructions, habituation exercises

## 2024-02-27 ENCOUNTER — OFFICE VISIT (OUTPATIENT)
Dept: PHYSICAL THERAPY | Facility: CLINIC | Age: 64
End: 2024-02-27
Payer: COMMERCIAL

## 2024-02-27 DIAGNOSIS — R42 DIZZINESS: Primary | ICD-10-CM

## 2024-02-27 DIAGNOSIS — H81.10 BPPV (BENIGN PAROXYSMAL POSITIONAL VERTIGO), UNSPECIFIED LATERALITY: ICD-10-CM

## 2024-02-27 PROCEDURE — 97112 NEUROMUSCULAR REEDUCATION: CPT

## 2024-02-27 NOTE — PROGRESS NOTES
"Daily Note     Today's date: 2024  Patient name: Brittany Lee  : 1960  MRN: [id number]  Referring provider: Son Paul MD  Dx:   Encounter Diagnosis     ICD-10-CM    1. Dizziness  R42       2. BPPV (benign paroxysmal positional vertigo), unspecified laterality  H81.10                        Subjective: Reports he has had a headache yesterday and today, felt a little off when he got home after last session, but it has been better since. 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 He states that he continues to feel \"like by brain is swollen in my head\" and has been maintaining constant lately. He states that he doesn't need the cane anymore in the house and is moving around the house better.        Objective: See treatment diary below      Assessment: Tolerated treatment well. Patient demonstrated fatigue post treatment, exhibited good technique with therapeutic exercises, and would benefit from continued PT. Slight nystagmus in R sidelying test on 1st trial. Decreased dizziness with repetitions without any notable nystagmus, and decrease subjective symptoms with repetitions. Demonstrated improved technique with Semont maneuver compared to previous sessions. Continue to progress as tolerated.    Plan: Continue per plan of care.      Precautions: HTN, DMII, peripheral neuropathy,     POC expires Unit limit Auth Expiration date PT/OT/ST + Visit Limit?    BOMN  BOMN                           Visit/Unit Tracking  AUTH Status:  Date 2/15 2/22 2/27           none Used 1 2 3            Remaining                     Manuals 2/15 2/22 2/27                                          Neuro Re-Ed         Semont R x 1 rep R x 3 reps R x 4 reps                                                            Ther Ex                                                                                 Ther Activity                           Gait Training   "                         Education Vestibular anatomy, review of Epley maneuver and post maneuver instructions, incidence and recurrence of BPPV, typical vs atypical BPPV symptoms, POC POC, motion sensitivity vs BPPV, plan for follow up sessions, vitamin D levels and seasonality of BPPV, post maneuver instructions, habituation exercises

## 2024-02-29 ENCOUNTER — OFFICE VISIT (OUTPATIENT)
Dept: PHYSICAL THERAPY | Facility: CLINIC | Age: 64
End: 2024-02-29
Payer: COMMERCIAL

## 2024-02-29 DIAGNOSIS — R42 DIZZINESS: Primary | ICD-10-CM

## 2024-02-29 DIAGNOSIS — H81.10 BPPV (BENIGN PAROXYSMAL POSITIONAL VERTIGO), UNSPECIFIED LATERALITY: ICD-10-CM

## 2024-02-29 PROCEDURE — 97112 NEUROMUSCULAR REEDUCATION: CPT

## 2024-02-29 NOTE — PROGRESS NOTES
Daily Note     Today's date: 2024  Patient name: Brittany Lee  : 1960  MRN: [id number]  Referring provider: Son Paul MD  Dx:   Encounter Diagnosis     ICD-10-CM    1. Dizziness  R42       2. BPPV (benign paroxysmal positional vertigo), unspecified laterality  H81.10                          Subjective: Reports he was good for a few hours after last session, then started to have nausea and a swirly feeling, not that the room is spinning and feeling off. Reports he continues to feel that way yesterday and the day before. Lessened symptoms after therapy. 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 He states he tried to walk around a little more to work things out, didn't make it better but didn't make it worse.         Objective: See treatment diary below      Assessment: Tolerated treatment well. Patient demonstrated fatigue post treatment, exhibited good technique with therapeutic exercises, and would benefit from continued PT. Patient reports no room spinning dizziness with Semont maneuver or sidelying test, and no nystagmus noted during session. Most dizziness with coming up from R sidelying position to seated position during Semont. Discussed that dizziness with performing maneuver may be more motion sensitivity and most likely not BPPV. Discussed attempting habituation exercise of R sidelying position to sitting at EOB x 10 reps if room spinning dizziness continues to not return. Discussed that if sidelying test is negative next session with start with habituation exercises. Continue to progress as tolerated.    Plan: Continue per plan of care.      Precautions: HTN, DMII, peripheral neuropathy,     POC expires Unit limit Auth Expiration date PT/OT/ST + Visit Limit?    BOMN  BOMN                           Visit/Unit Tracking  AUTH Status:  Date 2/15 2/22 2/27 2/29          none Used 1 2 3 4           Remaining                     Manuals 2/15 2/22 2/27 2/29                                          Neuro Re-Ed         Semont R x 1 rep R x 3 reps R x 4 reps R x 4 reps                                                           Ther Ex                                                                                 Ther Activity                           Gait Training                           Education Vestibular anatomy, review of Epley maneuver and post maneuver instructions, incidence and recurrence of BPPV, typical vs atypical BPPV symptoms, POC POC, motion sensitivity vs BPPV, plan for follow up sessions, vitamin D levels and seasonality of BPPV, post maneuver instructions, habituation exercises  Dizziness vs motion sensitivity, discussed habituation exercises and POC

## 2024-03-05 ENCOUNTER — OFFICE VISIT (OUTPATIENT)
Dept: PHYSICAL THERAPY | Facility: CLINIC | Age: 64
End: 2024-03-05
Payer: COMMERCIAL

## 2024-03-05 DIAGNOSIS — R42 DIZZINESS: Primary | ICD-10-CM

## 2024-03-05 DIAGNOSIS — H81.10 BPPV (BENIGN PAROXYSMAL POSITIONAL VERTIGO), UNSPECIFIED LATERALITY: ICD-10-CM

## 2024-03-05 PROCEDURE — 97112 NEUROMUSCULAR REEDUCATION: CPT

## 2024-03-05 NOTE — PROGRESS NOTES
Daily Note     Today's date: 3/5/2024  Patient name: Brittany Lee  : 1960  MRN: [id number]  Referring provider: Son Paul MD  Dx:   Encounter Diagnosis     ICD-10-CM    1. Dizziness  R42       2. BPPV (benign paroxysmal positional vertigo), unspecified laterality  H81.10                            Subjective: Yesterday was a little off, but it cleared up. Walked outside in the yard a little bit. Felt a little more dizzy in the evening but it calmed down. 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 He states that he has his cane with him, but doesn't feel like he needs it. Brings it on when he moves to the side and gets up, but it settles down faster. Reports that he is feeling better today.         Objective: See treatment diary below      Assessment: Tolerated treatment well. Patient demonstrated fatigue post treatment, exhibited good technique with therapeutic exercises, and would benefit from continued PT. Sidelying test completed bilaterally with no increase in symptoms, added [place]-Doroff exercises during session. Performed 1 side at a time with 5 reps each side due to most symptoms elicited during R sidelying to sitting (usually when at a 45 degree angle). Continued to have dizziness while changing positions, however no significant dizziness once in sidelying position and no vertigo symptoms. Added VOR x 1 exercises during session in seated position, tolerated well with some increased dizziness. Given VOR x 1 and Mackenzie-Doroff exercise handouts during session for HEP. Continue to progress as tolerated.    Plan: Continue per plan of care.      Precautions: HTN, DMII, peripheral neuropathy,     POC expires Unit limit Auth Expiration date PT/OT/ST + Visit Limit?    BOMN  BOMN                           Visit/Unit Tracking  AUTH Status:  Date 2/15 2/22 2/27 2/29 3/5         none Used 1 2 3 4 5          Remaining                     Manuals 2/15  "2/22 2/27 2/29 3/5                                        Neuro Re-Ed         Semont R x 1 rep R x 3 reps R x 4 reps R x 4 reps     [place]     X 5 reps each direction    VOR x 1     Seated, plain 30\" x 3 each horiztonal/vertical                                        Ther Ex                                                                                 Ther Activity                           Gait Training                           Education Vestibular anatomy, review of Epley maneuver and post maneuver instructions, incidence and recurrence of BPPV, typical vs atypical BPPV symptoms, POC POC, motion sensitivity vs BPPV, plan for follow up sessions, vitamin D levels and seasonality of BPPV, post maneuver instructions, habituation exercises  Dizziness vs motion sensitivity, discussed habituation exercises and POC Handout issued and reviewed for charly alicia exercises                        Access Code: [email or web address]  URL: [email or web address]  Date: 03/05/2024  Prepared by: Matilda Norman    Exercises  - Seated Gaze Stabilization with Head Rotation  - 1 x daily - 7 x weekly - 3 sets - 30 hold  - Seated Gaze Stabilization with Head Nod  - 1 x daily - 7 x weekly - 3 sets - 30 hold     "

## 2024-03-07 ENCOUNTER — OFFICE VISIT (OUTPATIENT)
Dept: PHYSICAL THERAPY | Facility: CLINIC | Age: 64
End: 2024-03-07
Payer: COMMERCIAL

## 2024-03-07 DIAGNOSIS — H81.10 BPPV (BENIGN PAROXYSMAL POSITIONAL VERTIGO), UNSPECIFIED LATERALITY: ICD-10-CM

## 2024-03-07 DIAGNOSIS — R42 DIZZINESS: Primary | ICD-10-CM

## 2024-03-07 DIAGNOSIS — E11.42 TYPE 2 DIABETES MELLITUS WITH DIABETIC POLYNEUROPATHY, WITHOUT LONG-TERM CURRENT USE OF INSULIN (HCC): ICD-10-CM

## 2024-03-07 PROCEDURE — 97112 NEUROMUSCULAR REEDUCATION: CPT

## 2024-03-07 PROCEDURE — 97150 GROUP THERAPEUTIC PROCEDURES: CPT

## 2024-03-07 RX ORDER — BLOOD SUGAR DIAGNOSTIC
STRIP MISCELLANEOUS
Qty: 100 STRIP | Refills: 5 | Status: SHIPPED | OUTPATIENT
Start: 2024-03-07

## 2024-03-07 NOTE — PROGRESS NOTES
"Daily Note     Today's date: 3/7/2024  Patient name: Brittany Lee  : 1960  MRN: [id number]  Referring provider: Son Paul MD  Dx:   Encounter Diagnosis     ICD-10-CM    1. Dizziness  R42       2. BPPV (benign paroxysmal positional vertigo), unspecified laterality  H81.10                              Subjective: Reports he felt a little off this morning, but the rest of the day was ok. 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         Objective: See treatment diary below  1:1 with PT from 1420-5856  Group from [id number]    Assessment: Tolerated treatment well. Patient demonstrated fatigue post treatment, exhibited good technique with therapeutic exercises, and would benefit from continued PT. Decreased dizziness with Mackenzie-Dorkoko exercises compared to previous session. Added saccades in vertical plane to (dizziness during evaluation) and 9 card saccades in standing with cards on bolster without significant increase in symptoms. Added ambulation with HT/HN in hallway with mild increased in dizziness/offbalance. Continue to progress as tolerated.    Plan: Continue per plan of care.      Precautions: HTN, DMII, peripheral neuropathy,     POC expires Unit limit Auth Expiration date PT/OT/ST + Visit Limit?    BOMN  BOMN                           Visit/Unit Tracking  AUTH Status:  Date 2/15 2/22 2/27 2/29 3/5 3/7        none Used 1 2 3 4 5 6         Remaining                     Manuals 2/22 2/27 2/29 3/5 3/7                                   Neuro Re-Ed        Semont R x 3 reps R x 4 reps R x 4 reps     Mackenzie-Doroff    X 5 reps each direction X 5 rps each direction   VOR x 1    Seated, plain 30\" x 3 each horiztonal/vertical    Spot it     Seated, 2 cards with vertical saccades, x 10 reps    Standing 9 cards with saccades to find 3 of the same item between cards x 5 minutes   Amb with HT/HN     Hallway x 2 laps head horizontal,  X 3 laps vertical                   Ther Ex     "                                                                    Ther Activity                        Gait Training                        Education POC, motion sensitivity vs BPPV, plan for follow up sessions, vitamin D levels and seasonality of BPPV, post maneuver instructions, habituation exercises  Dizziness vs motion sensitivity, discussed habituation exercises and POC Handout issued and reviewed for charly alicia exercises                      Access Code: [email or web address]  URL: [email or web address]  Date: 03/05/2024  Prepared by: Matilda Norman    Exercises  - Seated Gaze Stabilization with Head Rotation  - 1 x daily - 7 x weekly - 3 sets - 30 hold  - Seated Gaze Stabilization with Head Nod  - 1 x daily - 7 x weekly - 3 sets - 30 hold

## 2024-03-12 ENCOUNTER — OFFICE VISIT (OUTPATIENT)
Dept: PHYSICAL THERAPY | Facility: CLINIC | Age: 64
End: 2024-03-12
Payer: COMMERCIAL

## 2024-03-12 DIAGNOSIS — Z79.4 DIABETES MELLITUS DUE TO UNDERLYING CONDITION WITH DIABETIC NEPHROPATHY, WITH LONG-TERM CURRENT USE OF INSULIN (HCC): ICD-10-CM

## 2024-03-12 DIAGNOSIS — E08.21 DIABETES MELLITUS DUE TO UNDERLYING CONDITION WITH DIABETIC NEPHROPATHY, WITH LONG-TERM CURRENT USE OF INSULIN (HCC): ICD-10-CM

## 2024-03-12 DIAGNOSIS — R42 DIZZINESS: Primary | ICD-10-CM

## 2024-03-12 DIAGNOSIS — H81.10 BPPV (BENIGN PAROXYSMAL POSITIONAL VERTIGO), UNSPECIFIED LATERALITY: ICD-10-CM

## 2024-03-12 PROCEDURE — 97112 NEUROMUSCULAR REEDUCATION: CPT

## 2024-03-12 RX ORDER — DULAGLUTIDE 3 MG/.5ML
INJECTION, SOLUTION SUBCUTANEOUS
Qty: 6 ML | Refills: 1 | Status: SHIPPED | OUTPATIENT
Start: 2024-03-12

## 2024-03-12 NOTE — PROGRESS NOTES
Daily Note     Today's date: 3/12/2024  Patient name: Brittany Lee  : 1960  MRN: [id number]  Referring provider: Son Paul MD  Dx:   Encounter Diagnosis     ICD-10-CM    1. Dizziness  R42       2. BPPV (benign paroxysmal positional vertigo), unspecified laterality  H81.10                                Subjective: Reports after he left last time he felt much better, almost 100%. When he woke up the next day he didn't feel as good. No reports of room spinning dizziness, states he feels more off balance. He states that he gets the most off balance when he turns quickly behind him. 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        Objective: See treatment diary below      Assessment: Tolerated treatment well. Patient demonstrated fatigue post treatment, exhibited good technique with therapeutic exercises, and would benefit from continued PT. Continued vestibular exercises for habituation. VOR x 1 was performed in standing, continues to have decreased speed with slight dizziness, continued education on importance of exercises to decrease motion sensitivity and tolerance to activity. Improved stability noted with ambulation with HT/HN in hallway. Added cone transfer with 180 degree turning and placing down and picking up from floor with symptom provocation. Discussed continuing Mackenzie-Doroff exercises if he continues to feel off balance (more than just first repetition once getting up in the morning. Continue to progress as tolerated.    Plan: Continue per plan of care.      Precautions: HTN, DMII, peripheral neuropathy,     POC expires Unit limit Auth Expiration date PT/OT/ST + Visit Limit?    BOMN  BOMN                           Visit/Unit Tracking  AUTH Status:  Date 2/15 2/22 2/27 2/29 3/5 3/7 3/12       none Used 1 2 3 4 5 6 7        Remaining                     Manuals  3/5 3/7 3/12                                    "    Neuro Re-Ed         Semont R x 3 reps R x 4 reps R x 4 reps      Margarito    X 5 reps each direction X 5 rps each direction    VOR x 1    Seated, plain 30\" x 3 each horiztonal/vertical  Standing plain, 30\" x 3 each horizontal/vertical   Spot it     Seated, 2 cards with vertical saccades, x 10 reps    Standing 9 cards with saccades to find 3 of the same item between cards x 5 minutes    Amb with HT/HN     Hallway x 2 laps head horizontal,  X 3 laps vertical Hallway x 3 laps HT/HN each with gaze stabilization   cones      10 cones on bolster and turn behind x 2 reps each direction to each side    Bolster on side and place cone on floor in front, 10 cones each direction x 2 reps            Ther Ex                                                                                 Ther Activity                           Gait Training                           Education POC, motion sensitivity vs BPPV, plan for follow up sessions, vitamin D levels and seasonality of BPPV, post maneuver instructions, habituation exercises  Dizziness vs motion sensitivity, discussed habituation exercises and POC Handout issued and reviewed for charly alicia exercises                         Access Code: [email or web address]  URL: [email or web address]  Date: 03/05/2024  Prepared by: Matilda Norman    Exercises  - Seated Gaze Stabilization with Head Rotation  - 1 x daily - 7 x weekly - 3 sets - 30 hold  - Seated Gaze Stabilization with Head Nod  - 1 x daily - 7 x weekly - 3 sets - 30 hold     "

## 2024-03-14 ENCOUNTER — OFFICE VISIT (OUTPATIENT)
Dept: PHYSICAL THERAPY | Facility: CLINIC | Age: 64
End: 2024-03-14
Payer: COMMERCIAL

## 2024-03-14 DIAGNOSIS — H81.10 BPPV (BENIGN PAROXYSMAL POSITIONAL VERTIGO), UNSPECIFIED LATERALITY: ICD-10-CM

## 2024-03-14 DIAGNOSIS — R42 DIZZINESS: Primary | ICD-10-CM

## 2024-03-14 PROCEDURE — 97112 NEUROMUSCULAR REEDUCATION: CPT

## 2024-03-14 NOTE — PROGRESS NOTES
"Progress Note     Today's date: 3/14/2024  Patient name: Brittany Lee  : 1960  MRN: [id number]  Referring provider: Son Paul MD  Dx:   Encounter Diagnosis     ICD-10-CM    1. Dizziness  R42       2. BPPV (benign paroxysmal positional vertigo), unspecified laterality  H81.10                                  Subjective: Had a good day today. 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 Felt a little off balance/dizzy when he left last time. Felt a little off balance when he got up this morning and \"thought he was going to spark something\" but nothing happened. He reports he is getting back to normal. Reports that he is not laying flat, laying on his L side (hasn't tried his R side yet), and on a wedge. He states that the he continues to have a weird feeling  when laying on his side. He reports that his wife is still driving him for right now. Some dizziness with walking in the grocery store. Perceived improvement 80-85%.        Objective: See treatment diary below    MCTSIB:  Firm EO: 30 seconds  Firm EC: 30 seconds  Foam EO: 30 seconds  Foam EC: 30 seconds, increased sway    SSGS: 0.84m/s   FGA:   DHI: 28 points (mild)        STG  (4 weeks)     1. Resolve BPPV with (-) DHP and Roll test noted MET  2. Assess need for habituation exercises to facilitate return to normal motion MET, needed and started  3. Patient will report improved tolerance to all ADL tasks with minimal symptoms noted MET     LTG  (8 weeks)     1. Patient will demonstrate ability to perform all work and home tasks without symptoms  2. Patient will demonstrate ability to perform HT in gait without hesitation or symptoms scoring >22/30 on FGA  3. Patient will decrease DHI score to <20 points demonstrating return to previous home and work tasks     Assessment: Brittany has attended 7 sessions of physical therapy for BPPV and chronic dizziness. He has met 3/3 short term goals and is making good " progress with therapy with decreased symptoms. DHI has decreased from 94 points to 28 points (from severe to mild perceived severity). He has not had any room spinning dizziness and has not tested positive for positional dizziness in a few sessions, and these symptoms appear to be resolved. Brittany continues to have some motion sensitivity with turning, positional changes, quick head turns, bending, and work tasks. He continues to have mild symptoms with VOR x 1 and mild hesitancy with head movements with walking. FGA score of 19/30 does place him at increased risk of falls (cut-off of 22/30) and self-selected gait speed of 0.84m/s is below cut-off score of 1.0m/s. Brittany would benefit from continued physical therapy to decrease dizziness, improve balance, improve motion tolerance, improve ability to perform work tasks and home tasks, improve functional mobility, and maximize function. Continue 2x/week per current POC for 4 additional weeks.        Plan:   Patient would benefit from: skilled physical therapy  Planned therapy interventions: balance, neuromuscular re-education, canalith repositioning, patient education, self care, therapeutic activities, therapeutic exercise, home exercise program and gait training  Frequency: 2x week  Duration in weeks: 4  Plan of Care beginning date: 2/15/2024  Plan of Care expiration date: 4/11/2024  Treatment plan discussed with: patient     Precautions: HTN, DMII, peripheral neuropathy,     POC expires Unit limit Auth Expiration date PT/OT/ST + Visit Limit?   4/11 BOMN 12/31 BOMN                           Visit/Unit Tracking  AUTH Status:  Date 2/15 2/22 2/27 2/29 3/5 3/7 3/12 3/14      none Used 1 2 3 4 5 6 7 8       Remaining                     Manuals 2/22 2/27 2/29 3/5 3/7 3/12 3/14                                           Neuro Re-Ed       FGA 19/30  SSGS   Semont R x 3 reps R x 4 reps R x 4 reps       Mackenzie-Doroff    X 5 reps each direction X 5 rps each direction     VOR x  "1    Seated, plain 30\" x 3 each horiztonal/vertical  Standing plain, 30\" x 3 each horizontal/vertical Standing, plain 30\" x 3 each horiztonal/vertical   Spot it     Seated, 2 cards with vertical saccades, x 10 reps    Standing 9 cards with saccades to find 3 of the same item between cards x 5 minutes     Amb with HT/HN     Hallway x 2 laps head horizontal,  X 3 laps vertical Hallway x 3 laps HT/HN each with gaze stabilization Hallway x 2 laps each   cones      10 cones on bolster and turn behind x 2 reps each direction to each side    Bolster on side and place cone on floor in front, 10 cones each direction x 2 reps              Ther Ex                                                                                          Ther Activity                              Gait Training                              Education POC, motion sensitivity vs BPPV, plan for follow up sessions, vitamin D levels and seasonality of BPPV, post maneuver instructions, habituation exercises  Dizziness vs motion sensitivity, discussed habituation exercises and POC Handout issued and reviewed for charly alicia exercises   Chronic dizziness, POC, DHI, acute vs chronic dizziness, habituation and need for additional therapy                         Access Code: [email or web address]  URL: [email or web address]  Date: 03/05/2024  Prepared by: Matilda Norman    Exercises  - Seated Gaze Stabilization with Head Rotation  - 1 x daily - 7 x weekly - 3 sets - 30 hold  - Seated Gaze Stabilization with Head Nod  - 1 x daily - 7 x weekly - 3 sets - 30 hold     "

## 2024-03-19 ENCOUNTER — OFFICE VISIT (OUTPATIENT)
Dept: PHYSICAL THERAPY | Facility: CLINIC | Age: 64
End: 2024-03-19
Payer: COMMERCIAL

## 2024-03-19 DIAGNOSIS — H81.10 BPPV (BENIGN PAROXYSMAL POSITIONAL VERTIGO), UNSPECIFIED LATERALITY: ICD-10-CM

## 2024-03-19 DIAGNOSIS — R42 DIZZINESS: Primary | ICD-10-CM

## 2024-03-19 PROCEDURE — 97112 NEUROMUSCULAR REEDUCATION: CPT

## 2024-03-19 NOTE — PROGRESS NOTES
"Daily Note     Today's date: 3/19/2024  Patient name: Brittany Lee  : 1960  MRN: [id number]  Referring provider: Son Paul MD  Dx:   Encounter Diagnosis     ICD-10-CM    1. Dizziness  R42       2. BPPV (benign paroxysmal positional vertigo), unspecified laterality  H81.10                      Subjective: Patient reports that on , Monday and this morning was feeling off balance. States that he was feeling better after last session for a few days. States he continues to have pressure in his head that is relatively constant.       Objective: See treatment diary below  Self-directed exercise from 6022-7881, [id number]  1:1 with PT from 9569-6317      Assessment: Tolerated treatment well. Patient demonstrated fatigue post treatment, exhibited good technique with therapeutic exercises, and would benefit from continued PT. No significant dizziness with saccades with some head turning during spot-it activity. Improved ability to perform ambulation with head turns/nods, adding head movements with self ball toss. Mild instability during first trial of each foam HT/HN during session, improved with repetitions. Continue to progress as tolerated.      Plan: Continue per plan of care.      Precautions: HTN, DMII, peripheral neuropathy,     POC expires Unit limit Auth Expiration date PT/OT/ST + Visit Limit?    BOMN  BOMN                           Visit/Unit Tracking  AUTH Status:  Date 2/15 2/22 2/27 2/29 3/5 3/7 3/12 3/14 3/19     none Used 1 2 3 4 5 6 7 8 9      Remaining                     Manuals 3/5 3/7 3/12 3/14 3/19                                   Neuro Re-Ed    FGA   SSGS    Roge Pimentel X 5 reps each direction X 5 rps each direction      VOR x 1 Seated, plain 30\" x 3 each horiztonal/vertical  Standing plain, 30\" x 3 each horizontal/vertical Standing, plain 30\" x 3 each horiztonal/vertical    Spot it  Seated, 2 cards with vertical saccades, x 10 reps    Standing 9 cards " "with saccades to find 3 of the same item between cards x 5 minutes   Horiztonal saccades x 8 minutes with head movements  No dizziness   Amb with HT/HN  Hallway x 2 laps head horizontal,  X 3 laps vertical Hallway x 3 laps HT/HN each with gaze stabilization Hallway x 2 laps each Hallway x 2 laps each       Ball toss     Amb in hallway tossing ball side to side x 2 laps  Up/down x 2 laps   cones   10 cones on bolster and turn behind x 2 reps each direction to each side    Bolster on side and place cone on floor in front, 10 cones each direction x 2 reps  10 cones on floor and turn to side, place on floor x 2 each direction on each side    Bolster in front with blocks x 12 blocks to place behind on counter x 2 reps each direction    1 trial of alternating sides (5 to each side) each direction   foam     Foam EO HT/HN 30\" x 2 each   tandem     // bars (short) x 4 laps   TM        Ther Ex                                                                        Ther Activity                        Gait Training                        Education Handout issued and reviewed for charly sherman   Chronic dizziness, POC, DHI, acute vs chronic dizziness, habituation and need for additional therapy                      Access Code: [email or web address]  URL: [email or web address]  Date: 03/05/2024  Prepared by: Matilda Norman    Exercises  - Seated Gaze Stabilization with Head Rotation  - 1 x daily - 7 x weekly - 3 sets - 30 hold  - Seated Gaze Stabilization with Head Nod  - 1 x daily - 7 x weekly - 3 sets - 30 hold       "

## 2024-03-21 ENCOUNTER — OFFICE VISIT (OUTPATIENT)
Dept: PHYSICAL THERAPY | Facility: CLINIC | Age: 64
End: 2024-03-21
Payer: COMMERCIAL

## 2024-03-21 DIAGNOSIS — H81.10 BPPV (BENIGN PAROXYSMAL POSITIONAL VERTIGO), UNSPECIFIED LATERALITY: ICD-10-CM

## 2024-03-21 DIAGNOSIS — R42 DIZZINESS: Primary | ICD-10-CM

## 2024-03-21 PROCEDURE — 97112 NEUROMUSCULAR REEDUCATION: CPT

## 2024-03-21 NOTE — PROGRESS NOTES
"Daily Note     Today's date: 3/21/2024  Patient name: Brittany Lee  : 1960  MRN: [id number]  Referring provider: Son Paul MD  Dx:   No diagnosis found.                 Subjective: Patient reports that he felt ok after last session. States that he feels off balance, no spinning.       Objective: See treatment diary below        Assessment: Tolerated treatment well. Patient demonstrated fatigue post treatment, exhibited good technique with therapeutic exercises, and would benefit from continued PT. Added blaze pod activity with turning behind to hit pods on counter with moderate dizziness evoked. Added cone pick ups with 180 degree turn with good stability and mild dizziness. Improved stability noted with foam HT/HN compared to last session. Continue to progress as tolerated.      Plan: Continue per plan of care.      Precautions: HTN, DMII, peripheral neuropathy,     POC expires Unit limit Auth Expiration date PT/OT/ST + Visit Limit?    BOMN  BOMN                           Visit/Unit Tracking  AUTH Status:  Date 2/15 2/22 2/27 2/29 3/5 3/7 3/12 3/14 3/19 3/21    none Used 1 2 3 4 5 6 7 8 9 10     Remaining                     Manuals 3/5 3/7 3/12 3/14 3/19 3/21                                       Neuro Re-Ed    FGA   SSGS     Roge Pimentel X 5 reps each direction X 5 rps each direction       VOR x 1 Seated, plain 30\" x 3 each horiztonal/vertical  Standing plain, 30\" x 3 each horizontal/vertical Standing, plain 30\" x 3 each horiztonal/vertical     Spot it  Seated, 2 cards with vertical saccades, x 10 reps    Standing 9 cards with saccades to find 3 of the same item between cards x 5 minutes   Horiztonal saccades x 8 minutes with head movements  No dizziness    Amb with HT/HN  Hallway x 2 laps head horizontal,  X 3 laps vertical Hallway x 3 laps HT/HN each with gaze stabilization Hallway x 2 laps each Hallway x 2 laps each     Hallway x 3 laps each   Ball toss     Amb in " "hallway tossing ball side to side x 2 laps  Up/down x 2 laps Amb in hallway tossing ball side to side x  laps  Up/down x 2 laps    Standing with lateral toss against wall  2x 15    Tennis ball v-bounce x 10 reps each side   cones   10 cones on bolster and turn behind x 2 reps each direction to each side    Bolster on side and place cone on floor in front, 10 cones each direction x 2 reps  10 cones on floor and turn to side, place on floor x 2 each direction on each side    Bolster in front with blocks x 12 blocks to place behind on counter x 2 reps each direction    1 trial of alternating sides (5 to each side) each direction 10 cone on bolster to transfer behind on counter (10 cones) x 2 each direction on each side    10 cones on floor and turn 180 degree behind 1 each direction   foam     Foam EO HT/HN 30\" x 2 each Foam FTEO HT/HN 30\" x 2 each   tandem     // bars (short) x 4 laps // bars (short) x 4 laps   TM         Blaze pods      3 pods, 1 in front on bolster, 2 behind on counter, 30 second intervals with 30 second rest breaks  1st trial: 19 hits  2nd trial: 23 hits  3rd trial:26 hits   Ther Ex                                                                                 Ther Activity                           Gait Training                           Education Handout issued and reviewed for charly sherman   Chronic dizziness, POC, DHI, acute vs chronic dizziness, habituation and need for additional therapy                         Access Code: [email or web address]  URL: [email or web address]  Date: 03/05/2024  Prepared by: Matilda Norman    Exercises  - Seated Gaze Stabilization with Head Rotation  - 1 x daily - 7 x weekly - 3 sets - 30 hold  - Seated Gaze Stabilization with Head Nod  - 1 x daily - 7 x weekly - 3 sets - 30 hold       "

## 2024-03-26 ENCOUNTER — OFFICE VISIT (OUTPATIENT)
Dept: PHYSICAL THERAPY | Facility: CLINIC | Age: 64
End: 2024-03-26
Payer: COMMERCIAL

## 2024-03-26 DIAGNOSIS — H81.10 BPPV (BENIGN PAROXYSMAL POSITIONAL VERTIGO), UNSPECIFIED LATERALITY: ICD-10-CM

## 2024-03-26 DIAGNOSIS — R42 DIZZINESS: Primary | ICD-10-CM

## 2024-03-26 PROCEDURE — 97112 NEUROMUSCULAR REEDUCATION: CPT

## 2024-03-26 NOTE — PROGRESS NOTES
"Daily Note     Today's date: 3/26/2024  Patient name: Brittany Lee  : 1960  MRN: [id number]  Referring provider: Son Paul MD  Dx:   Encounter Diagnosis     ICD-10-CM    1. Dizziness  R42       2. BPPV (benign paroxysmal positional vertigo), unspecified laterality  H81.10                        Subjective: Reports that today is a good day. He states that the exercises of bending and twisting are helping him. States he has been getting headaches when looking down. He states that his normal activities and in the shop he is doing much better.      Objective: See treatment diary below        Assessment: Tolerated treatment well. Patient demonstrated fatigue post treatment, exhibited good technique with therapeutic exercises, and would benefit from continued PT. Continues to have dizziness with bending with cone . Decreased dizziness with ball tossing and against the wall. Increased repetitions with blaze pod activity with continued dizziness. Increased dizziness with cone  on R side requiring seated rest break. Continue to progress as tolerated.      Plan: Continue per plan of care.      Precautions: HTN, DMII, peripheral neuropathy,     POC expires Unit limit Auth Expiration date PT/OT/ST + Visit Limit?    BOMN  BOMN                           Visit/Unit Tracking  AUTH Status:  Date 2/22 2/27 2/29 3/5 3/7 3/12 3/14 3/19 3/21 3/26   none Used 2 3 4 5 6 7 8 9 10 11    Remaining                    Manuals 3/7 3/12 3/14 3/19 3/21 3/26                                       Neuro Re-Ed   FGA   SSGS      Roge Pimentel X 5 rps each direction        VOR x 1  Standing plain, 30\" x 3 each horizontal/vertical Standing, plain 30\" x 3 each horiztonal/vertical      Spot it Seated, 2 cards with vertical saccades, x 10 reps    Standing 9 cards with saccades to find 3 of the same item between cards x 5 minutes   Horiztonal saccades x 8 minutes with head movements  No dizziness   " "  Amb with HT/HN Hallway x 2 laps head horizontal,  X 3 laps vertical Hallway x 3 laps HT/HN each with gaze stabilization Hallway x 2 laps each Hallway x 2 laps each     Hallway x 3 laps each Hallway x 2 laps each   Ball toss    Amb in hallway tossing ball side to side x 2 laps  Up/down x 2 laps Amb in hallway tossing ball side to side x  laps  Up/down x 2 laps    Standing with lateral toss against wall  2x 15    Tennis ball v-bounce x 10 reps each side Amb in hallway tossing ball side to side x 2 laps  Up/down x 2 laps    Standing with lateral toss against wall 2 x 15 each side       cones  10 cones on bolster and turn behind x 2 reps each direction to each side    Bolster on side and place cone on floor in front, 10 cones each direction x 2 reps  10 cones on floor and turn to side, place on floor x 2 each direction on each side    Bolster in front with blocks x 12 blocks to place behind on counter x 2 reps each direction    1 trial of alternating sides (5 to each side) each direction 10 cone on bolster to transfer behind on counter (10 cones) x 2 each direction on each side    10 cones on floor and turn 180 degree behind 1 each direction 12 cones on floor and turn 180 to put on mat table x 2 each direction on each side       foam    Foam EO HT/HN 30\" x 2 each Foam FTEO HT/HN 30\" x 2 each Foam FTEO  HT/HN 30\" x 2 each    FAEC 30\" x 2   tandem    // bars (short) x 4 laps // bars (short) x 4 laps Along mat table x 1 repetitions   TM         Blaze pods     3 pods, 1 in front on bolster, 2 behind on counter, 30 second intervals with 30 second rest breaks  1st trial: 19 hits  2nd trial: 23 hits  3rd trial:26 hits 2 pods in front, 2 behind with twisting, 1 minute intervals with 30 second rest breaks between  24 hits  28 hits  25 hits  31 hits   Ther Ex                                                                                 Ther Activity                           Gait Training                           Education   " Chronic dizziness, POC, DHI, acute vs chronic dizziness, habituation and need for additional therapy                          Access Code: [email or web address]  URL: [email or web address]  Date: 03/05/2024  Prepared by: Matilda Norman    Exercises  - Seated Gaze Stabilization with Head Rotation  - 1 x daily - 7 x weekly - 3 sets - 30 hold  - Seated Gaze Stabilization with Head Nod  - 1 x daily - 7 x weekly - 3 sets - 30 hold

## 2024-03-28 ENCOUNTER — OFFICE VISIT (OUTPATIENT)
Dept: PHYSICAL THERAPY | Facility: CLINIC | Age: 64
End: 2024-03-28
Payer: COMMERCIAL

## 2024-03-28 ENCOUNTER — ANESTHESIA EVENT (OUTPATIENT)
Dept: PERIOP | Facility: HOSPITAL | Age: 64
End: 2024-03-28
Payer: COMMERCIAL

## 2024-03-28 DIAGNOSIS — R42 DIZZINESS: Primary | ICD-10-CM

## 2024-03-28 DIAGNOSIS — H81.10 BPPV (BENIGN PAROXYSMAL POSITIONAL VERTIGO), UNSPECIFIED LATERALITY: ICD-10-CM

## 2024-03-28 PROCEDURE — 97112 NEUROMUSCULAR REEDUCATION: CPT

## 2024-03-28 NOTE — PROGRESS NOTES
"Daily Note     Today's date: 3/28/2024  Patient name: Brittany Lee  : 1960  MRN: [id number]  Referring provider: Son Paul MD  Dx:   Encounter Diagnosis     ICD-10-CM    1. Dizziness  R42       2. BPPV (benign paroxysmal positional vertigo), unspecified laterality  H81.10                          Subjective: Cleared out the middle bay of his workshop and has been doing a lot more around his workshop. He states that he hasn't been having any dizziness. States that he removed one more of the pillows behind him when he sleep, as well as rolled onto his side without any dizziness.       Objective: See treatment diary below        Assessment: Tolerated treatment well. Patient demonstrated fatigue post treatment, exhibited good technique with therapeutic exercises, and would benefit from continued PT. No significant dizziness during session. Some difficulty with bending down to floor with blaze pod activity, however more due to knee pain than dizziness. Discussed re-assessment next session if patient is feeling like he is 100% recovered with potential 30 day hold due to surgery coming up in the future. Continue to progress as tolerated.      Plan: Continue per plan of care.      Precautions: HTN, DMII, peripheral neuropathy,     POC expires Unit limit Auth Expiration date PT/OT/ST + Visit Limit?    BOMN  BOMN                           Visit/Unit Tracking  AUTH Status:  Date 2/22 2/27 2/29 3/5 3/7 3/12 3/14 3/19 3/21 3/26   none Used 2 3 4 5 6 7 8 9 10 11    Remaining                    Manuals 3/12 3/14 3/19 3/21 3/26 3/28                                       Neuro Re-Ed  FGA   SSGS       Roge Pimentel         VOR x 1 Standing plain, 30\" x 3 each horizontal/vertical Standing, plain 30\" x 3 each horiztonal/vertical       Spot it   Horiztonal saccades x 8 minutes with head movements  No dizziness      Amb with HT/HN Hallway x 3 laps HT/HN each with gaze stabilization Hallway x 2 " "laps each Hallway x 2 laps each     Hallway x 3 laps each Hallway x 2 laps each    Ball toss   Amb in hallway tossing ball side to side x 2 laps  Up/down x 2 laps Amb in hallway tossing ball side to side x  laps  Up/down x 2 laps    Standing with lateral toss against wall  2x 15    Tennis ball v-bounce x 10 reps each side Amb in hallway tossing ball side to side x 2 laps  Up/down x 2 laps    Standing with lateral toss against wall 2 x 15 each side     Amb in hallway tossing ball side to side x 2 laps  Up/down x 2 laps    Standing with lateral toss against wall 3 x 10 each side   cones 10 cones on bolster and turn behind x 2 reps each direction to each side    Bolster on side and place cone on floor in front, 10 cones each direction x 2 reps  10 cones on floor and turn to side, place on floor x 2 each direction on each side    Bolster in front with blocks x 12 blocks to place behind on counter x 2 reps each direction    1 trial of alternating sides (5 to each side) each direction 10 cone on bolster to transfer behind on counter (10 cones) x 2 each direction on each side    10 cones on floor and turn 180 degree behind 1 each direction 12 cones on floor and turn 180 to put on mat table x 2 each direction on each side     12 cones on bolster to behind with twist 2 each direction on each side    12 cones on floor and turn 180 degrees to put on mat table x 2 each direction, then alternating R/L x 2    foam   Foam EO HT/HN 30\" x 2 each Foam FTEO HT/HN 30\" x 2 each Foam FTEO  HT/HN 30\" x 2 each    FAEC 30\" x 2    tandem   // bars (short) x 4 laps // bars (short) x 4 laps Along mat table x 1 repetitions    TM         Blaze pods    3 pods, 1 in front on bolster, 2 behind on counter, 30 second intervals with 30 second rest breaks  1st trial: 19 hits  2nd trial: 23 hits  3rd trial:26 hits 2 pods in front, 2 behind with twisting, 1 minute intervals with 30 second rest breaks between  24 hits  28 hits  25 hits  31 hits 1 pod in " front, 2 behind, with twisting. 30 second intervals with 30 second rest breaks    1st trial: 23 hits  2nd trial: 30 hits  3rd trial: 27 hits    4 pods, 2 on 6-inch with wedge) and 2 behind  1st trial 15 hits  2nd trial: 22 hits  3rd trial:23 hits   Ther Ex                                                                                 Ther Activity                           Gait Training                           Education  Chronic dizziness, POC, DHI, acute vs chronic dizziness, habituation and need for additional therapy                           Access Code: [email or web address]  URL: [email or web address]  Date: 03/05/2024  Prepared by: Matilda Norman    Exercises  - Seated Gaze Stabilization with Head Rotation  - 1 x daily - 7 x weekly - 3 sets - 30 hold  - Seated Gaze Stabilization with Head Nod  - 1 x daily - 7 x weekly - 3 sets - 30 hold

## 2024-03-28 NOTE — PRE-PROCEDURE INSTRUCTIONS
Pre-Surgery Instructions:   Medication Instructions    albuterol (PROVENTIL HFA,VENTOLIN HFA) 90 mcg/act inhaler Uses PRN- OK to take day of surgery    ascorbic acid (VITAMIN C) 500 mg tablet Stop taking 7 days prior to surgery.    atorvastatin (LIPITOR) 40 mg tablet Take night before surgery    azelastine (OPTIVAR) 0.05 % ophthalmic solution Should be completed by DOS    B Complex-Biotin-FA (VITAMIN B50 COMPLEX PO) Stop taking 7 days prior to surgery.    cyanocobalamin (VITAMIN B-12) 100 MCG tablet Stop taking 7 days prior to surgery.    dulaglutide (Trulicity) 3 MG/0.5ML injection Inst to hold 7 days before sx, states last dose Mon 4/1    Echinacea 380 MG CAPS Stop taking 7 days prior to surgery.    fluticasone (FLONASE) 50 mcg/act nasal spray Uses PRN- OK to take day of surgery    fluticasone-salmeterol (Advair HFA) 115-21 MCG/ACT inhaler Take day of surgery.    gabapentin (NEURONTIN) 100 mg capsule Take night before surgery    Ginkgo Biloba (GINKOBA PO) Stop taking 7 days prior to surgery.    Insulin Glargine Solostar (Lantus SoloStar) 100 UNIT/ML SOPN Take day of surgery.    lisinopril (ZESTRIL) 40 mg tablet Hold day of surgery.    loratadine (CLARITIN) 10 mg tablet Take day of surgery.    MAGNESIUM CITRATE PO Stop taking 7 days prior to surgery.    metFORMIN (GLUCOPHAGE) 1000 MG tablet Hold day of surgery.    Multiple Vitamins-Minerals (MULTIVITAMIN ADULTS 50+ PO) Stop taking 7 days prior to surgery.    ofloxacin (OCUFLOX) 0.3 % ophthalmic solution To be completed by DOS    omeprazole (PriLOSEC) 40 MG capsule Take day of surgery.    Medication instructions for day surgery reviewed. Please use only a sip of water to take your instructed medications. Avoid aspirin and all over the counter vitamins, supplements and NSAIDS for one week prior to surgery per anesthesia guidelines. Tylenol is ok to take as needed. Inst to bring inhalers w/ him DOS.       You will receive a call one business day prior to surgery with  an arrival time and hospital directions. If your surgery is scheduled on a Monday, the hospital will be calling you on the Friday prior to your surgery. If you have not heard from anyone by 8pm, please call the hospital supervisor through the hospital  at [phone]. (Michael [phone] or Delmar [phone]).    Do not eat or drink anything after midnight the night before your surgery, including candy, mints, lifesavers, or chewing gum. Do not drink alcohol 24hrs before your surgery. Try not to smoke at least 24hrs before your surgery.       Follow the pre surgery showering instructions as listed in the “My Surgical Experience Booklet” or otherwise provided by your surgeon's office. Do not use a blade to shave the surgical area 1 week before surgery. It is okay to use a clean electric clippers up to 24 hours before surgery. Do not apply any lotions, creams, including makeup, cologne, deodorant, or perfumes after showering on the day of your surgery. Do not use dry shampoo, hair spray, hair gel, or any type of hair products.     No contact lenses, eye make-up, or artificial eyelashes. Remove nail polish, including gel polish, and any artificial, gel, or acrylic nails if possible. Remove all jewelry including rings and body piercing jewelry.     Wear causal clothing that is easy to take on and off. Consider your type of surgery.    Keep any valuables, jewelry, piercings at home. Please bring any specially ordered equipment (sling, braces) if indicated.    Arrange for a responsible person to drive you to and from the hospital on the day of your surgery. Please confirm the visitor policy for the day of your procedure when you receive your phone call with an arrival time.     Call the surgeon's office with any new illnesses, exposures, or additional questions prior to surgery.    Please reference your “My Surgical Experience Booklet” for additional information to prepare for your upcoming surgery.

## 2024-04-02 ENCOUNTER — OFFICE VISIT (OUTPATIENT)
Dept: PHYSICAL THERAPY | Facility: CLINIC | Age: 64
End: 2024-04-02
Payer: COMMERCIAL

## 2024-04-02 DIAGNOSIS — H81.10 BPPV (BENIGN PAROXYSMAL POSITIONAL VERTIGO), UNSPECIFIED LATERALITY: ICD-10-CM

## 2024-04-02 DIAGNOSIS — R42 DIZZINESS: Primary | ICD-10-CM

## 2024-04-02 DIAGNOSIS — E08.41 DIABETIC MONONEUROPATHY ASSOCIATED WITH DIABETES MELLITUS DUE TO UNDERLYING CONDITION (HCC): ICD-10-CM

## 2024-04-02 PROCEDURE — 97112 NEUROMUSCULAR REEDUCATION: CPT

## 2024-04-02 RX ORDER — GABAPENTIN 100 MG/1
CAPSULE ORAL
Qty: 30 CAPSULE | Refills: 5 | Status: SHIPPED | OUTPATIENT
Start: 2024-04-02

## 2024-04-02 NOTE — PROGRESS NOTES
Progress Note     Today's date: 2024  Patient name: Brittany Lee  : 1960  MRN: [id number]  Referring provider: Son Paul MD  Dx:   Encounter Diagnosis     ICD-10-CM    1. Dizziness  R42       2. BPPV (benign paroxysmal positional vertigo), unspecified laterality  H81.10                            Subjective: Reports that he was at the dentist, didn't lay back all the way, but did not have any problems. States he is doing well and thinks that today can be his last session for now. He reports he feels that he is doing better than he was prior to having the dizziness start, he is no longer hesitant to look up and is back to doing work in his workshop and turning much better. Reports he feels that he is about 100% better. He reports he has not done a lot of yardwork and things outside yet due to the weather, but thinks he would be ok. He goes for hernia surgery on 24.       Objective: See treatment diary below    MCTSIB:  Firm EO: 30 seconds  Firm EC: 30 seconds  Foam EO: 30 seconds  Foam EC: 30 seconds, increased sway     SSGS: 0.84m/s   FGA:   DHI: 28 points (mild)     24:  Foam EO: 30 seconds  Foam EC: 30 seconds, increased sway    SSGS: 1.14m/s  FGA:   DHI: 4 points  3MBWT: 3.1 seconds     STG  (4 weeks)     1. Resolve BPPV with (-) DHP and Roll test noted MET  2. Assess need for habituation exercises to facilitate return to normal motion MET, needed and started  3. Patient will report improved tolerance to all ADL tasks with minimal symptoms noted MET     LTG  (8 weeks)     1. Patient will demonstrate ability to perform all work and home tasks without symptoms MET  2. Patient will demonstrate ability to perform HT in gait without hesitation or symptoms scoring >22/30 on FGA MET  3. Patient will decrease DHI score to <20 points demonstrating return to previous home and work tasks MET    Assessment:   Brittany has attended a total of 13 sessions of physical therapy for dizziness and  "BPPV. He has not had any room spinning dizziness in the last few weeks and has resumed all normal daily activities and work activities. He has made significant progress with therapy with DHI improving initially from 94 points to 4 points during today's session (sometimes dizziness with quick head movements and bending down). Since progress note on 3/14, FGA improved from 19/30 to 29/30, self-selected gait speed has improved from 0.84m/s to 1.14m/s (now considered normal gait speed), and 3MBWT is below cut-off score of 4.5 seconds for increased fall risk. Brittany has met all of his short and long term goals at this time and reports he has returned to 100% improvement. Discussed 30 day hold with discharge if patient does not return during this period of time. Patient agreeable to plan.        Plan: 30 day hold with anticipated discharge if patient does not return to therapy.     Precautions: HTN, DMII, peripheral neuropathy,     POC expires Unit limit Auth Expiration date PT/OT/ST + Visit Limit?   4/11 BOMN 12/31 BOMN                           Visit/Unit Tracking  AUTH Status:  Date 2/29 3/5 3/7 3/12 3/14 3/19 3/21 3/26 4/2   none Used 4 5 6 7 8 9 10 11 12    Remaining                   Manuals 3/14 3/19 3/21 3/26 3/28 4/2                                       Neuro Re-Ed FGA 19/30  SSGS     FGA 29/30  SSGS   Roge Pimentel         VOR x 1 Standing, plain 30\" x 3 each horiztonal/vertical        Spot it  Horiztonal saccades x 8 minutes with head movements  No dizziness       Amb with HT/HN Hallway x 2 laps each Hallway x 2 laps each     Hallway x 3 laps each Hallway x 2 laps each     Ball toss  Amb in hallway tossing ball side to side x 2 laps  Up/down x 2 laps Amb in hallway tossing ball side to side x  laps  Up/down x 2 laps    Standing with lateral toss against wall  2x 15    Tennis ball v-bounce x 10 reps each side Amb in hallway tossing ball side to side x 2 laps  Up/down x 2 laps    Standing with " "lateral toss against wall 2 x 15 each side     Amb in hallway tossing ball side to side x 2 laps  Up/down x 2 laps    Standing with lateral toss against wall 3 x 10 each side    cones  10 cones on floor and turn to side, place on floor x 2 each direction on each side    Bolster in front with blocks x 12 blocks to place behind on counter x 2 reps each direction    1 trial of alternating sides (5 to each side) each direction 10 cone on bolster to transfer behind on counter (10 cones) x 2 each direction on each side    10 cones on floor and turn 180 degree behind 1 each direction 12 cones on floor and turn 180 to put on mat table x 2 each direction on each side     12 cones on bolster to behind with twist 2 each direction on each side    12 cones on floor and turn 180 degrees to put on mat table x 2 each direction, then alternating R/L x 2     foam  Foam EO HT/HN 30\" x 2 each Foam FTEO HT/HN 30\" x 2 each Foam FTEO  HT/HN 30\" x 2 each    FAEC 30\" x 2     tandem  // bars (short) x 4 laps // bars (short) x 4 laps Along mat table x 1 repetitions     TM         Blaze pods   3 pods, 1 in front on bolster, 2 behind on counter, 30 second intervals with 30 second rest breaks  1st trial: 19 hits  2nd trial: 23 hits  3rd trial:26 hits 2 pods in front, 2 behind with twisting, 1 minute intervals with 30 second rest breaks between  24 hits  28 hits  25 hits  31 hits 1 pod in front, 2 behind, with twisting. 30 second intervals with 30 second rest breaks    1st trial: 23 hits  2nd trial: 30 hits  3rd trial: 27 hits    4 pods, 2 on 6-inch with wedge) and 2 behind  1st trial 15 hits  2nd trial: 22 hits  3rd trial:23 hits    Ther Ex                                                                                 Ther Activity                           Gait Training                           Education Chronic dizziness, POC, DHI, acute vs chronic dizziness, habituation and need for additional therapy     DHI, progress with therapy, outcome " measures, 30 day hold process, returning to therapy if recurrence of symptoms and importance of quick intervention to avoid motion sensitivity                       Access Code: [email or web address]  URL: [email or web address]  Date: 03/05/2024  Prepared by: Matilda Norman    Exercises  - Seated Gaze Stabilization with Head Rotation  - 1 x daily - 7 x weekly - 3 sets - 30 hold  - Seated Gaze Stabilization with Head Nod  - 1 x daily - 7 x weekly - 3 sets - 30 hold

## 2024-04-04 ENCOUNTER — APPOINTMENT (OUTPATIENT)
Dept: PHYSICAL THERAPY | Facility: CLINIC | Age: 64
End: 2024-04-04
Payer: COMMERCIAL

## 2024-04-08 NOTE — ANESTHESIA PREPROCEDURE EVALUATION
Procedure:  REPAIR HERNIA VENTRAL LAPAROSCOPIC W/ ROBOT W/ MESH with Umbilicoplasty (Abdomen)    Relevant Problems   CARDIO   (+) HAMEED (dyspnea on exertion)   (+) Essential hypertension   (+) Mixed hyperlipidemia      ENDO   (+) Type 2 diabetes mellitus, with long-term current use of insulin (HCC)      GI/HEPATIC   (+) Fatty liver   (+) Liver lesion      HEMATOLOGY   (+) Iron deficiency anemia, unspecified   (+) Smoldering multiple myeloma      NEURO/PSYCH   (+) Diabetic peripheral neuropathy (HCC)   (+) Paresthesia   (+) Paresthesia of both feet      PULMONARY   (+) HAMEED (dyspnea on exertion)   (+) Dyspnea   (+) Mild intermittent asthma without complication   (+) Moderate persistent asthma without complication   (+) Obstructive sleep apnea        Physical Exam    Airway    Mallampati score: II  TM Distance: >3 FB  Neck ROM: full     Dental   No notable dental hx     Cardiovascular  Cardiovascular exam normal    Pulmonary  Pulmonary exam normal     Other Findings    12/2023  NSR    Chronic HAMEED-evaluated with myocardial perfusion test in 2021-nl    Hb 13,3    Anesthesia Plan  ASA Score- 3     Anesthesia Type- general with ASA Monitors.         Additional Monitors:     Airway Plan: ETT.           Plan Factors-Exercise tolerance (METS): >4 METS.    Chart reviewed. EKG reviewed. Imaging results reviewed. Existing labs reviewed. Patient summary reviewed.    Patient is not a current smoker.      Obstructive sleep apnea risk education given perioperatively.        Induction- intravenous.    Postoperative Plan- Plan for postoperative opioid use.     Informed Consent- Anesthetic plan and risks discussed with patient.  I personally reviewed this patient with the CRNA. Discussed and agreed on the Anesthesia Plan with the CRNA..

## 2024-04-09 ENCOUNTER — APPOINTMENT (OUTPATIENT)
Dept: PHYSICAL THERAPY | Facility: CLINIC | Age: 64
End: 2024-04-09
Payer: COMMERCIAL

## 2024-04-09 ENCOUNTER — ANESTHESIA (OUTPATIENT)
Dept: PERIOP | Facility: HOSPITAL | Age: 64
End: 2024-04-09
Payer: COMMERCIAL

## 2024-04-09 ENCOUNTER — HOSPITAL ENCOUNTER (OUTPATIENT)
Facility: HOSPITAL | Age: 64
Setting detail: OUTPATIENT SURGERY
Discharge: HOME | End: 2024-04-09
Attending: SURGERY | Admitting: SURGERY
Payer: COMMERCIAL

## 2024-04-09 VITALS
RESPIRATION RATE: 18 BRPM | HEIGHT: 72 IN | OXYGEN SATURATION: 97 % | BODY MASS INDEX: 39.47 KG/M2 | SYSTOLIC BLOOD PRESSURE: 158 MMHG | DIASTOLIC BLOOD PRESSURE: 88 MMHG | WEIGHT: 291.4 LBS | HEART RATE: 86 BPM | TEMPERATURE: 97.3 F

## 2024-04-09 DIAGNOSIS — K42.9 UMBILICAL HERNIA WITHOUT OBSTRUCTION AND WITHOUT GANGRENE: Primary | ICD-10-CM

## 2024-04-09 DIAGNOSIS — K43.9 VENTRAL HERNIA: ICD-10-CM

## 2024-04-09 LAB
GLUCOSE SERPL-MCNC: 137 MG/DL (ref 65–140)
GLUCOSE SERPL-MCNC: 157 MG/DL (ref 65–140)

## 2024-04-09 PROCEDURE — S2900 ROBOTIC SURGICAL SYSTEM: HCPCS | Performed by: SURGERY

## 2024-04-09 PROCEDURE — 49594 RPR AA HRN 1ST 3-10 NCR/STRN: CPT

## 2024-04-09 PROCEDURE — 49594 RPR AA HRN 1ST 3-10 NCR/STRN: CPT | Performed by: SURGERY

## 2024-04-09 PROCEDURE — 88302 TISSUE EXAM BY PATHOLOGIST: CPT | Performed by: PATHOLOGY

## 2024-04-09 PROCEDURE — S2900 ROBOTIC SURGICAL SYSTEM: HCPCS

## 2024-04-09 PROCEDURE — C1781 MESH (IMPLANTABLE): HCPCS | Performed by: SURGERY

## 2024-04-09 PROCEDURE — 82948 REAGENT STRIP/BLOOD GLUCOSE: CPT

## 2024-04-09 DEVICE — VENTRALIGHT ST MESH WITH ECHO PS POSITONING SYSTEM
Type: IMPLANTABLE DEVICE | Site: ABDOMEN | Status: FUNCTIONAL
Brand: VENTRALIGHT ST MESH WITH ECHO PS POSITONING SYSTEM

## 2024-04-09 RX ORDER — LABETALOL HYDROCHLORIDE 5 MG/ML
INJECTION, SOLUTION INTRAVENOUS AS NEEDED
Status: DISCONTINUED | OUTPATIENT
Start: 2024-04-09 | End: 2024-04-09

## 2024-04-09 RX ORDER — ROCURONIUM BROMIDE 10 MG/ML
INJECTION, SOLUTION INTRAVENOUS AS NEEDED
Status: DISCONTINUED | OUTPATIENT
Start: 2024-04-09 | End: 2024-04-09

## 2024-04-09 RX ORDER — HYDROMORPHONE HCL/PF 1 MG/ML
SYRINGE (ML) INJECTION AS NEEDED
Status: DISCONTINUED | OUTPATIENT
Start: 2024-04-09 | End: 2024-04-09

## 2024-04-09 RX ORDER — ONDANSETRON 2 MG/ML
INJECTION INTRAMUSCULAR; INTRAVENOUS AS NEEDED
Status: DISCONTINUED | OUTPATIENT
Start: 2024-04-09 | End: 2024-04-09

## 2024-04-09 RX ORDER — LIDOCAINE HYDROCHLORIDE 10 MG/ML
INJECTION, SOLUTION EPIDURAL; INFILTRATION; INTRACAUDAL; PERINEURAL AS NEEDED
Status: DISCONTINUED | OUTPATIENT
Start: 2024-04-09 | End: 2024-04-09

## 2024-04-09 RX ORDER — CEFAZOLIN SODIUM 1 G/3ML
INJECTION, POWDER, FOR SOLUTION INTRAMUSCULAR; INTRAVENOUS AS NEEDED
Status: DISCONTINUED | OUTPATIENT
Start: 2024-04-09 | End: 2024-04-09

## 2024-04-09 RX ORDER — ACETAMINOPHEN 325 MG/1
975 TABLET ORAL ONCE
Status: COMPLETED | OUTPATIENT
Start: 2024-04-09 | End: 2024-04-09

## 2024-04-09 RX ORDER — FENTANYL CITRATE 50 UG/ML
INJECTION, SOLUTION INTRAMUSCULAR; INTRAVENOUS AS NEEDED
Status: DISCONTINUED | OUTPATIENT
Start: 2024-04-09 | End: 2024-04-09

## 2024-04-09 RX ORDER — FENTANYL CITRATE/PF 50 MCG/ML
50 SYRINGE (ML) INJECTION
Status: DISCONTINUED | OUTPATIENT
Start: 2024-04-09 | End: 2024-04-09 | Stop reason: HOSPADM

## 2024-04-09 RX ORDER — HYDROMORPHONE HCL/PF 1 MG/ML
0.5 SYRINGE (ML) INJECTION
Status: COMPLETED | OUTPATIENT
Start: 2024-04-09 | End: 2024-04-09

## 2024-04-09 RX ORDER — OXYCODONE HYDROCHLORIDE 5 MG/1
5 TABLET ORAL EVERY 4 HOURS PRN
Qty: 10 TABLET | Refills: 0 | Status: SHIPPED | OUTPATIENT
Start: 2024-04-09 | End: 2024-04-19

## 2024-04-09 RX ORDER — SODIUM CHLORIDE 9 MG/ML
INJECTION, SOLUTION INTRAVENOUS CONTINUOUS PRN
Status: DISCONTINUED | OUTPATIENT
Start: 2024-04-09 | End: 2024-04-09

## 2024-04-09 RX ORDER — SODIUM CHLORIDE, SODIUM LACTATE, POTASSIUM CHLORIDE, CALCIUM CHLORIDE 600; 310; 30; 20 MG/100ML; MG/100ML; MG/100ML; MG/100ML
INJECTION, SOLUTION INTRAVENOUS CONTINUOUS PRN
Status: DISCONTINUED | OUTPATIENT
Start: 2024-04-09 | End: 2024-04-09

## 2024-04-09 RX ORDER — MIDAZOLAM HYDROCHLORIDE 2 MG/2ML
INJECTION, SOLUTION INTRAMUSCULAR; INTRAVENOUS AS NEEDED
Status: DISCONTINUED | OUTPATIENT
Start: 2024-04-09 | End: 2024-04-09

## 2024-04-09 RX ORDER — BUPIVACAINE HYDROCHLORIDE 2.5 MG/ML
INJECTION, SOLUTION EPIDURAL; INFILTRATION; INTRACAUDAL AS NEEDED
Status: DISCONTINUED | OUTPATIENT
Start: 2024-04-09 | End: 2024-04-09 | Stop reason: HOSPADM

## 2024-04-09 RX ORDER — SODIUM CHLORIDE, SODIUM LACTATE, POTASSIUM CHLORIDE, CALCIUM CHLORIDE 600; 310; 30; 20 MG/100ML; MG/100ML; MG/100ML; MG/100ML
125 INJECTION, SOLUTION INTRAVENOUS CONTINUOUS
Status: DISCONTINUED | OUTPATIENT
Start: 2024-04-09 | End: 2024-04-09 | Stop reason: HOSPADM

## 2024-04-09 RX ORDER — HYDROMORPHONE HCL IN WATER/PF 6 MG/30 ML
0.2 PATIENT CONTROLLED ANALGESIA SYRINGE INTRAVENOUS
Status: DISCONTINUED | OUTPATIENT
Start: 2024-04-09 | End: 2024-04-09 | Stop reason: HOSPADM

## 2024-04-09 RX ORDER — SUCCINYLCHOLINE/SOD CL,ISO/PF 100 MG/5ML
SYRINGE (ML) INTRAVENOUS AS NEEDED
Status: DISCONTINUED | OUTPATIENT
Start: 2024-04-09 | End: 2024-04-09

## 2024-04-09 RX ORDER — SENNOSIDES 8.6 MG
650 CAPSULE ORAL ONCE
COMMUNITY

## 2024-04-09 RX ORDER — ALBUTEROL SULFATE 2.5 MG/3ML
2.5 SOLUTION RESPIRATORY (INHALATION) ONCE
Status: COMPLETED | OUTPATIENT
Start: 2024-04-09 | End: 2024-04-09

## 2024-04-09 RX ORDER — DEXAMETHASONE SODIUM PHOSPHATE 10 MG/ML
INJECTION, SOLUTION INTRAMUSCULAR; INTRAVENOUS AS NEEDED
Status: DISCONTINUED | OUTPATIENT
Start: 2024-04-09 | End: 2024-04-09

## 2024-04-09 RX ORDER — PROPOFOL 10 MG/ML
INJECTION, EMULSION INTRAVENOUS AS NEEDED
Status: DISCONTINUED | OUTPATIENT
Start: 2024-04-09 | End: 2024-04-09

## 2024-04-09 RX ADMIN — ONDANSETRON 4 MG: 2 INJECTION INTRAMUSCULAR; INTRAVENOUS at 16:29

## 2024-04-09 RX ADMIN — HYDROMORPHONE HYDROCHLORIDE 0.5 MG: 1 INJECTION, SOLUTION INTRAMUSCULAR; INTRAVENOUS; SUBCUTANEOUS at 18:55

## 2024-04-09 RX ADMIN — ALBUTEROL SULFATE 2.5 MG: 2.5 SOLUTION RESPIRATORY (INHALATION) at 12:01

## 2024-04-09 RX ADMIN — PHENYLEPHRINE HYDROCHLORIDE 20 MCG/MIN: 10 INJECTION INTRAVENOUS at 15:42

## 2024-04-09 RX ADMIN — MIDAZOLAM 2 MG: 1 INJECTION INTRAMUSCULAR; INTRAVENOUS at 15:15

## 2024-04-09 RX ADMIN — Medication 100 MG: at 15:24

## 2024-04-09 RX ADMIN — Medication 10 MG: at 17:11

## 2024-04-09 RX ADMIN — FENTANYL CITRATE 50 MCG: 50 INJECTION INTRAMUSCULAR; INTRAVENOUS at 15:35

## 2024-04-09 RX ADMIN — CEFAZOLIN 3000 MG: 1 INJECTION, POWDER, FOR SOLUTION INTRAMUSCULAR; INTRAVENOUS at 15:33

## 2024-04-09 RX ADMIN — ACETAMINOPHEN 975 MG: 325 TABLET, FILM COATED ORAL at 12:01

## 2024-04-09 RX ADMIN — HYDROMORPHONE HYDROCHLORIDE 0.5 MG: 1 INJECTION, SOLUTION INTRAMUSCULAR; INTRAVENOUS; SUBCUTANEOUS at 18:10

## 2024-04-09 RX ADMIN — LIDOCAINE HYDROCHLORIDE 5 ML: 10 INJECTION, SOLUTION EPIDURAL; INFILTRATION; INTRACAUDAL; PERINEURAL at 15:24

## 2024-04-09 RX ADMIN — HYDROMORPHONE HYDROCHLORIDE 0.2 MG: 0.2 INJECTION, SOLUTION INTRAMUSCULAR; INTRAVENOUS; SUBCUTANEOUS at 17:53

## 2024-04-09 RX ADMIN — SUGAMMADEX 264 MG: 100 INJECTION, SOLUTION INTRAVENOUS at 16:55

## 2024-04-09 RX ADMIN — SODIUM CHLORIDE: 0.9 INJECTION, SOLUTION INTRAVENOUS at 15:27

## 2024-04-09 RX ADMIN — HYDROMORPHONE HYDROCHLORIDE 1 MG: 1 INJECTION, SOLUTION INTRAMUSCULAR; INTRAVENOUS; SUBCUTANEOUS at 15:38

## 2024-04-09 RX ADMIN — SODIUM CHLORIDE, SODIUM LACTATE, POTASSIUM CHLORIDE, AND CALCIUM CHLORIDE: .6; .31; .03; .02 INJECTION, SOLUTION INTRAVENOUS at 15:18

## 2024-04-09 RX ADMIN — ROCURONIUM BROMIDE 5 MG: 10 INJECTION, SOLUTION INTRAVENOUS at 16:33

## 2024-04-09 RX ADMIN — FENTANYL CITRATE 50 MCG: 50 INJECTION INTRAMUSCULAR; INTRAVENOUS at 17:32

## 2024-04-09 RX ADMIN — SODIUM CHLORIDE, SODIUM LACTATE, POTASSIUM CHLORIDE, AND CALCIUM CHLORIDE 125 ML/HR: .6; .31; .03; .02 INJECTION, SOLUTION INTRAVENOUS at 11:59

## 2024-04-09 RX ADMIN — PROPOFOL 200 MG: 10 INJECTION, EMULSION INTRAVENOUS at 15:24

## 2024-04-09 RX ADMIN — FENTANYL CITRATE 50 MCG: 50 INJECTION INTRAMUSCULAR; INTRAVENOUS at 17:43

## 2024-04-09 RX ADMIN — DEXAMETHASONE SODIUM PHOSPHATE 10 MG: 10 INJECTION, SOLUTION INTRAMUSCULAR; INTRAVENOUS at 15:38

## 2024-04-09 RX ADMIN — FENTANYL CITRATE 50 MCG: 50 INJECTION INTRAMUSCULAR; INTRAVENOUS at 15:24

## 2024-04-09 RX ADMIN — HYDROMORPHONE HYDROCHLORIDE 0.2 MG: 0.2 INJECTION, SOLUTION INTRAMUSCULAR; INTRAVENOUS; SUBCUTANEOUS at 18:04

## 2024-04-09 RX ADMIN — HYDROMORPHONE HYDROCHLORIDE 0.2 MG: 0.2 INJECTION, SOLUTION INTRAMUSCULAR; INTRAVENOUS; SUBCUTANEOUS at 17:59

## 2024-04-09 RX ADMIN — ROCURONIUM BROMIDE 50 MG: 10 INJECTION, SOLUTION INTRAVENOUS at 15:33

## 2024-04-09 RX ADMIN — FENTANYL CITRATE 100 MCG: 50 INJECTION INTRAMUSCULAR; INTRAVENOUS at 15:52

## 2024-04-09 NOTE — DISCHARGE INSTR - AVS FIRST PAGE
Post-Operative Care Instructions             Dr. Masood Vázquez M.D.    1. General: You will feel pulling sensations around the wound and/or aches and pains around the incisions. This is normal. Even minor surgery is a change in your body and this is your body’s way of reaction to it. If you have had abdominal surgery, it may help to support the incision with a small pillow or blanket for comfort when moving or coughing.    2. Wound care:    Bandage/Dressing - Make sure to remove the bandage in about 48 hours, unless instructed otherwise. You usually don't have to redress the wound after 24-48 hours, unless for comfort. Keep the incision clean and dry. Let air get to it. If the Steri-Strips fall off, just keep the wound clean.     Glue - Leave glue alone, it will fall off on its own, no need for an additional dressings    3. Water: You may shower over the wound, unless there are drain tubes left in place. Do not bathe or use a pool or hot tub until cleared by the physician. You may shower right over the staples, glue or Steri-Strips and rinse wound with soapy water but do not scrub incision pat dry when you are done.    4. Activity: You may go up and down stairs, walk as much as you are comfortable, but walk at least 3 times each day. If you have had abdominal or hernia surgery, do not lift anything heavier than 15 pounds for at least 4 weeks.    5. Diet: You may resume a regular diet. If you had a same-day surgery or overnight stay surgery, you may wish to eat lightly for a few days: soups, crackers, and sandwiches. You may resume a regular diet when ready.    6. Medications: Resume all of your previous medications, unless told otherwise by the doctor. Tylenol and ibuoprofen is always fine, unless you are taking any narcotic pain medication containing Tylenol (such as Percocet, Darvocet, Vicodin, or anything containing acetaminophen). Do not take Tylenol if you're taking these medications. You do not need to take  the narcotic pain medications unless you are having significant pain and discomfort.    7. Driving: He will need someone to drive you home on the day of surgery. Do not drive or make any important decisions while on narcotic pain medication or 24 hours and after anesthesia or sedation for surgery. Generally, you may drive when your off all narcotic pain medications, and you can turn in your seat comfortably to check your blind spot.     8. Upset Stomach: You may take Maalox, Tums, or similar items for an upset stomach. If your narcotic pain medication causes an upset stomach, do not take it on an empty stomach. Try taking it with at least some crackers or toast.     9. Constipation: Patients often experienced constipation after surgery. You may take over-the-counter medication for this, such as Metamucil, Senokot, Dulcolax, milk of magnesia, etc. You may take a suppository unless you have had anorectal surgery such as a procedure on your hemorrhoids. If you experience significant nausea or vomiting after abdominal surgery, call the office before trying any of these medications.    10. Call the office: If you are experiencing any of the following, fevers above 101.5°, significant nausea or vomiting, if the wound develops drainage and/or is excessive redness around the wound, or if you have significant diarrhea or other worsening symptoms.    11. Pain: You may be given a prescription for pain. This will be given to the hospital, the day of surgery.    12. Sexual Activity: You may resume sexual activity when you feel ready and comfortable and your incision is sealed and healed without apparent infection risk.    [place] [facility]  Phone: [phone]

## 2024-04-09 NOTE — ANESTHESIA POSTPROCEDURE EVALUATION
Post-Op Assessment Note    CV Status:  Stable  Pain Score: 0    Pain management: adequate       Mental Status:  Sleepy and arousable   Hydration Status:  Stable   PONV Controlled:  None   Airway Patency:  Patent     Post Op Vitals Reviewed: Yes    No anethesia notable event occurred.    Staff: CRNA               BP   183/103   Temp   97.7   Pulse  93   Resp   14   SpO2   97%

## 2024-04-09 NOTE — H&P
H&P Exam - General Surgery   Brittany Lee 63 y.o. male MRN: [id number]  Unit/Bed#: OR [facility] Encounter: [id number]    Assessment/Plan     Assessment:  63-year-old male with a fat-containing umbilical hernia  Plan:  Will plan for robotic ventral hernia repair with mesh, and umbilicoplasty.  Patient is amenable to risk and benefits, and we will proceed back to the operating room expeditiously.    History of Present Illness     HPI:  Brittany Lee is a 63 y.o. male who presents with a fat-containing umbilical hernia.  Patient with a long history of a slowly enlarging hernia at his umbilicus.  This causes him pain and discomfort, and he bumps up against it.  After appropriate preoperative workup he planned for elective repair of this defect.    Review of Systems   Constitutional:  Negative for appetite change, chills, diaphoresis and fever.   HENT:  Negative for nosebleeds and trouble swallowing.    Eyes: Negative.    Respiratory:  Negative for cough, shortness of breath and wheezing.    Cardiovascular:  Negative for chest pain, palpitations and leg swelling.   Gastrointestinal:  Negative for abdominal distention, abdominal pain, nausea and vomiting.   Genitourinary:  Negative for difficulty urinating, flank pain and frequency.   Musculoskeletal:  Negative for arthralgias, joint swelling and myalgias.   Skin:  Negative for pallor and rash.   Neurological:  Negative for dizziness, facial asymmetry and speech difficulty.   Hematological:  Does not bruise/bleed easily.   Psychiatric/Behavioral:  Negative for agitation and confusion.    All other systems reviewed and are negative.      Historical Information   Past Medical History:   Diagnosis Date    Asthma     Benign positional vertigo     CPAP (continuous positive airway pressure) dependence     Diabetes mellitus (HCC)     borderline; diet controlled    Diabetes mellitus due to underlying condition with diabetic nephropathy, with long-term current use of insulin (HCC)  05/19/2021    Dyslipidemia     Gastroenteritis     GERD (gastroesophageal reflux disease)     Hyperlipidemia     Hypertension     Lipoma of neck     last assessed - [id number]    Multiple myeloma (HCC)     Sleep apnea     has symptoms but not been diagnosed    Wheezing     last assessed - [id number]     Past Surgical History:   Procedure Laterality Date    COLONOSCOPY      ESOPHAGOGASTRODUODENOSCOPY  2010    Diagnostic    HAND SURGERY      MASS EXCISION Right 08/21/2017    Procedure: POSTERIOR SHOULDER MASS EXCISION; POSTERIOR NECK MASS EXCISION; CHEST WALL MASS EXCISION;  Surgeon: Sivakumar Quintero MD;  Location: [facility];  Service: General    THROAT SURGERY      states sedated for food bolus removal     Social History   Social History     Substance and Sexual Activity   Alcohol Use Not Currently    Comment: rare social occasion     Social History     Substance and Sexual Activity   Drug Use No     Social History     Tobacco Use   Smoking Status Never   Smokeless Tobacco Never     E-Cigarette/Vaping    E-Cigarette Use Never User      E-Cigarette/Vaping Substances    Nicotine No     THC No     CBD No     Flavoring No     Other No     Unknown No      Family History:   Family History   Problem Relation Age of Onset    Hypertension Mother     Diabetes Mother     Hyperlipidemia Mother     Cancer Mother         Malignant neoplasm of the gastrointestinal tract    Diabetes type II Mother         Type 2 diabetes mellitus    Hypertension Father         Essential hypercholesterolemia    Hyperlipidemia Father     Heart disease Father         Arteriosclerotic cardiovascular disease (ASCVD)    Dementia Father     Heart attack Father     Cancer Father         prostate    Thyroid disease Sister     Breast cancer Maternal Aunt     Stomach cancer Maternal Grandmother     Stroke Neg Hx        Meds/Allergies   PTA meds:   Prior to Admission Medications   Prescriptions Last Dose Informant Patient Reported? Taking?   B Complex-Biotin-FA  (VITAMIN B50 COMPLEX PO) Past Week Self Yes Yes   Blood Glucose Monitoring Suppl (OneTouch Verio) w/Device KIT 2024 at [place]  No Yes   Sig: Use 3 (three) times a day   Echinacea 380 MG CAPS Past Week  Yes Yes   Sig: Take by mouth 2 daily am   Ginkgo Biloba (GINKOBA PO) Past Week Self Yes Yes   Sig: Take 60 mg by mouth   Insulin Glargine Solostar (Lantus SoloStar) 100 UNIT/ML SOPN 2024 at 2100  No Yes   Si units at night   Insulin Pen Needle 31G X 5 MM MISC 2024 at 2100  No Yes   Sig: Use daily   Lancets (OneTouch Delica Plus [id number]) MISC 2024 at [place]  No Yes   Sig: TEST THREE TIMES DAILY   MAGNESIUM CITRATE PO Past Week Self Yes Yes   Sig: Take 1 tablet by mouth daily     Multiple Vitamins-Minerals (MULTIVITAMIN ADULTS 50+ PO) Past Week Self Yes Yes   Sig: Take by mouth daily   OneTouch Verio test strip 2024 at [place]  No Yes   Sig: USE THREE TIMES DAILY AS INSTRUCTED   acetaminophen (TYLENOL) 650 mg CR tablet 2024 at 2000 Self Yes Yes   Sig: Take 650 mg by mouth once   albuterol (PROVENTIL HFA,VENTOLIN HFA) 90 mcg/act inhaler More than a month Self No No   Sig: INHALE 1 PUFF EVERY 4-6 HOURS AS NEEDED   ascorbic acid (VITAMIN C) 500 mg tablet Past Week Self Yes Yes   Sig: Take 500 mg by mouth daily   atorvastatin (LIPITOR) 40 mg tablet 2024 Self No Yes   Sig: TAKE 1 TABLET(40 MG) BY MOUTH DAILY   azelastine (OPTIVAR) 0.05 % ophthalmic solution Past Month  No Yes   Sig: Administer 1 drop to both eyes 2 (two) times a day   cyanocobalamin (VITAMIN B-12) 100 MCG tablet Past Week  Yes Yes   Sig: Take 100 mcg by mouth daily   dulaglutide (Trulicity) 3 MG/0.5ML injection 2024 at 2100  No Yes   Sig: ADMINISTER 3 MG UNDER THE SKIN 1 TIME EVERY WEEK   fluticasone (FLONASE) 50 mcg/act nasal spray More than a month Self No No   Sig: SHAKE LIQUID AND USE 2 SPRAYS IN EACH NOSTRIL DAILY   Patient taking differently: States As needed   fluticasone-salmeterol (Advair HFA) 115-21 MCG/ACT inhaler  4/9/2024 at 0900  No Yes   Sig: Inhale 2 puffs 2 (two) times a day Rinse mouth after use   gabapentin (NEURONTIN) 100 mg capsule 4/8/2024 at 2100  No Yes   Sig: TAKE 1 CAPSULE(100 MG) BY MOUTH DAILY AT BEDTIME   lisinopril (ZESTRIL) 40 mg tablet Past Week  No Yes   Sig: Take 1 tablet (40 mg total) by mouth daily   loratadine (CLARITIN) 10 mg tablet Past Week  No Yes   Sig: TAKE 1 TABLET BY MOUTH EVERY DAY   metFORMIN (GLUCOPHAGE) 1000 MG tablet Past Week  No Yes   Sig: Take 1 tablet (1,000 mg total) by mouth 2 (two) times a day with meals   ofloxacin (OCUFLOX) 0.3 % ophthalmic solution Past Month  No Yes   Sig: Administer 1 drop to both eyes 4 (four) times a day   omeprazole (PriLOSEC) 40 MG capsule Past Week  No Yes   Sig: Take 1 capsule (40 mg total) by mouth daily      Facility-Administered Medications: None     Allergies   Allergen Reactions    Shellfish-Derived Products - Food Allergy Anaphylaxis     Clams and mussels, oysters  Lobster and crab ok    Diphenhydramine      Lightheadedness, nauseous, rapid heartbeat    Other Palpitations, Other (See Comments) and Vomiting     Clams       Objective   First Vitals:   Blood Pressure: 158/93 (04/09/24 1128)  Pulse: 85 (04/09/24 1128)  Temperature: 98.5 °F (36.9 °C) (04/09/24 1128)  Temp Source: Temporal (04/09/24 1128)  Respirations: 18 (04/09/24 1128)  Height: 6' (182.9 cm) (03/28/24 0730)  Weight - Scale: 133 kg (293 lb) (03/28/24 0730)  SpO2: 96 % (04/09/24 1128)    Current Vitals:   Blood Pressure: 158/93 (04/09/24 1128)  Pulse: 85 (04/09/24 1128)  Temperature: 98.5 °F (36.9 °C) (04/09/24 1128)  Temp Source: Temporal (04/09/24 1128)  Respirations: 18 (04/09/24 1128)  Height: 6' (182.9 cm) (04/09/24 1128)  Weight - Scale: 132 kg (291 lb 6.4 oz) (04/09/24 1128)  SpO2: 96 % (04/09/24 1128)    No intake or output data in the 24 hours ending 04/09/24 1238    Invasive Devices       Peripheral Intravenous Line  Duration             Peripheral IV 12/14/23 Left;Ventral  "(anterior) Forearm 117 days    Peripheral IV 04/09/24 Dorsal (posterior);Right Hand <1 day                    Physical Exam  Vitals and nursing note reviewed.   Constitutional:       General: He is not in acute distress.     Appearance: He is well-developed.   HENT:      Head: Normocephalic and atraumatic.   Eyes:      Conjunctiva/sclera: Conjunctivae normal.   Cardiovascular:      Rate and Rhythm: Normal rate and regular rhythm.      Heart sounds: No murmur heard.  Pulmonary:      Effort: Pulmonary effort is normal. No respiratory distress.      Breath sounds: Normal breath sounds.   Abdominal:      Palpations: Abdomen is soft.      Tenderness: There is no abdominal tenderness.      Hernia: A hernia is present.      Comments: Incarcerated fat-containing umbilical hernia.   Musculoskeletal:         General: No swelling.      Cervical back: Neck supple.   Skin:     General: Skin is warm and dry.      Capillary Refill: Capillary refill takes less than 2 seconds.   Neurological:      Mental Status: He is alert.   Psychiatric:         Mood and Affect: Mood normal.         Lab Results: I have personally reviewed pertinent lab results.  , CBC: No results found for: \"WBC\", \"HGB\", \"HCT\", \"MCV\", \"PLT\", \"ADJUSTEDWBC\", \"RBC\", \"MCH\", \"MCHC\", \"RDW\", \"MPV\", \"NRBC\", CMP: No results found for: \"SODIUM\", \"K\", \"CL\", \"CO2\", \"ANIONGAP\", \"BUN\", \"CREATININE\", \"GLUCOSE\", \"CALCIUM\", \"AST\", \"ALT\", \"ALKPHOS\", \"PROT\", \"BILITOT\", \"EGFR\", Coagulation: No results found for: \"PT\", \"INR\", \"APTT\", Urinalysis: No results found for: \"COLORU\", \"CLARITYU\", \"SPECGRAV\", \"PHUR\", \"LEUKOCYTESUR\", \"NITRITE\", \"PROTEINUA\", \"GLUCOSEU\", \"KETONESU\", \"BILIRUBINUR\", \"BLOODU\", Amylase: No results found for: \"AMYLASE\", Lipase: No results found for: \"LIPASE\"  Imaging: I have personally reviewed pertinent films in PACS  EKG, Pathology, and Other Studies: I have personally reviewed pertinent reports.      Code Status: No Order  Advance Directive and Living Will:    "   Power of :    POLST:

## 2024-04-10 NOTE — PROGRESS NOTES
Patient able to tolerate water and crackers, urinated twice, pain less and BP improved. Discharged via w/c in stable condition.

## 2024-04-11 ENCOUNTER — APPOINTMENT (OUTPATIENT)
Dept: PHYSICAL THERAPY | Facility: CLINIC | Age: 64
End: 2024-04-11
Payer: COMMERCIAL

## 2024-04-11 PROCEDURE — 88302 TISSUE EXAM BY PATHOLOGIST: CPT | Performed by: PATHOLOGY

## 2024-04-16 ENCOUNTER — APPOINTMENT (OUTPATIENT)
Dept: PHYSICAL THERAPY | Facility: CLINIC | Age: 64
End: 2024-04-16
Payer: COMMERCIAL

## 2024-04-18 ENCOUNTER — APPOINTMENT (OUTPATIENT)
Dept: PHYSICAL THERAPY | Facility: CLINIC | Age: 64
End: 2024-04-18
Payer: COMMERCIAL

## 2024-04-19 ENCOUNTER — OFFICE VISIT (OUTPATIENT)
Dept: SURGERY | Facility: CLINIC | Age: 64
End: 2024-04-19

## 2024-04-19 VITALS
DIASTOLIC BLOOD PRESSURE: 89 MMHG | WEIGHT: 294.6 LBS | HEIGHT: 72 IN | OXYGEN SATURATION: 97 % | RESPIRATION RATE: 18 BRPM | SYSTOLIC BLOOD PRESSURE: 164 MMHG | BODY MASS INDEX: 39.9 KG/M2 | HEART RATE: 95 BPM

## 2024-04-19 DIAGNOSIS — K42.9 UMBILICAL HERNIA WITHOUT OBSTRUCTION AND WITHOUT GANGRENE: Primary | ICD-10-CM

## 2024-04-19 PROCEDURE — 99024 POSTOP FOLLOW-UP VISIT: CPT | Performed by: SURGERY

## 2024-04-19 NOTE — ASSESSMENT & PLAN NOTE
63-year-old male status post robotic ventral hernia repair with mesh and umbilicoplasty on 4/9/2024, here for follow-up.    Plan:  - The patient's pathology was reviewed, and understanding was acknowledged.  - The patient may return to all normal activities at this time.   - I reiterated the lifting restriction of 20 lb until 4 weeks postoperatively, and advised that there are no restrictions from a dietary perspective   - The patient may return to clinic as needed, and can call with any questions should they arise.

## 2024-04-19 NOTE — PROGRESS NOTES
Assessment/Plan:    Umbilical hernia  63-year-old male status post robotic ventral hernia repair with mesh and umbilicoplasty on 4/9/2024, here for follow-up.    Plan:  - The patient's pathology was reviewed, and understanding was acknowledged.  - The patient may return to all normal activities at this time.   - I reiterated the lifting restriction of 20 lb until 4 weeks postoperatively, and advised that there are no restrictions from a dietary perspective   - The patient may return to clinic as needed, and can call with any questions should they arise.       Diagnoses and all orders for this visit:    Umbilical hernia without obstruction and without gangrene          Subjective:      Patient ID: Brittany Lee is a 63 y.o. male.    Brittany Lee is a 63 y.o. male who presents s/p robotic ventral hernia repair with mesh, and umbilicoplasty on 4/9/2024 for follow-up.  Overall, they are doing quite well without significant complaints.  They deny any significant pain, fevers, chills, chest pain, or shortness of breath.  They are tolerating regular diet, and moving their bowels normally.  They have been doing most activities around the house, without restriction or limitation, while abiding by their lifting restrictions.  Surgical pathology was consistent with benign skin and hernia sac.          The following portions of the patient's history were reviewed and updated as appropriate: He  has a past medical history of Asthma, Benign positional vertigo, CPAP (continuous positive airway pressure) dependence, Diabetes mellitus ([facility]), Diabetes mellitus due to underlying condition with diabetic nephropathy, with long-term current use of insulin ([facility]) (05/19/2021), Dyslipidemia, Gastroenteritis, GERD (gastroesophageal reflux disease), Hyperlipidemia, Hypertension, Lipoma of neck, Multiple myeloma ([facility]), Sleep apnea, and Wheezing.  He   Patient Active Problem List    Diagnosis Date Noted    Palliative care patient 01/17/2024     Vertigo 01/17/2024    Chronic pain of right knee 10/18/2023    Paresthesia of both feet 10/18/2023    Type 2 diabetes mellitus, with long-term current use of insulin (HCC) 10/18/2023    Goals of care, counseling/discussion 10/18/2023    Hammertoe, bilateral 09/19/2023    Annual physical exam 07/26/2023    Iron deficiency anemia, unspecified 06/05/2023    B12 deficiency 06/05/2023    Left wrist pain 05/16/2023    Sore throat 03/07/2023    Bronchitis 01/05/2023    Chronic bronchitis with wheezing (HCC) 05/26/2022    Tinnitus of both ears 04/29/2022    Fatty liver 10/25/2021    Other male erectile dysfunction 08/18/2021    Diabetes mellitus due to underlying condition with diabetic nephropathy, with long-term current use of insulin (HCC) 05/19/2021    Mixed hyperlipidemia 05/06/2021    HAMEED (dyspnea on exertion) 05/06/2021    Family history of early CAD 05/06/2021    Dyspnea 03/16/2021    Urinary hesitancy 11/02/2020    Screening for HIV (human immunodeficiency virus) 08/12/2020    Bilateral lower extremity edema 04/23/2019    Mild intermittent asthma without complication 04/23/2019    Peripheral polyneuropathy 07/25/2018    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    Diabetic peripheral neuropathy (HCC) 03/06/2018    Paresthesia 03/06/2018    Meralgia paresthetica of left side 03/06/2018    Obstructive sleep apnea 02/26/2018    Insufficient sleep syndrome 02/26/2018    Essential hypertension 02/12/2018    Encounter for immunization 02/12/2018    Kent's esophagus 11/22/2016    Adrenal incidentaloma (HCC) 06/18/2015    Benign paroxysmal positional vertigo 06/18/2015    Liver lesion 06/18/2015    Umbilical hernia 06/18/2015    Lipoma of back 04/13/2015    Moderate persistent asthma without complication 01/14/2015    Allergic rhinitis 04/15/2013     He  has a past surgical history that includes Throat surgery; Mass excision (Right, 08/21/2017); Esophagogastroduodenoscopy (2010); Colonoscopy; Hand surgery; and pr rpr aa  hernia recr 3-10 cm reducible (N/A, 4/9/2024).  His family history includes Breast cancer in his maternal aunt; Cancer in his father and mother; Dementia in his father; Diabetes in his mother; Diabetes type II in his mother; Heart attack in his father; Heart disease in his father; Hyperlipidemia in his father and mother; Hypertension in his father and mother; Stomach cancer in his maternal grandmother; Thyroid disease in his sister.  He  reports that he has never smoked. He has never used smokeless tobacco. He reports that he does not currently use alcohol. He reports that he does not use drugs.  Current Outpatient Medications   Medication Sig Dispense Refill    acetaminophen (TYLENOL) 650 mg CR tablet Take 650 mg by mouth once      albuterol (PROVENTIL HFA,VENTOLIN HFA) 90 mcg/act inhaler INHALE 1 PUFF EVERY 4-6 HOURS AS NEEDED 17 g 2    ascorbic acid (VITAMIN C) 500 mg tablet Take 500 mg by mouth daily      atorvastatin (LIPITOR) 40 mg tablet TAKE 1 TABLET(40 MG) BY MOUTH DAILY 90 tablet 3    B Complex-Biotin-FA (VITAMIN B50 COMPLEX PO)       Blood Glucose Monitoring Suppl (OneTouch Verio) w/Device KIT Use 3 (three) times a day 1 kit 0    cyanocobalamin (VITAMIN B-12) 100 MCG tablet Take 100 mcg by mouth daily      dulaglutide (Trulicity) 3 MG/0.5ML injection ADMINISTER 3 MG UNDER THE SKIN 1 TIME EVERY WEEK 6 mL 1    Echinacea 380 MG CAPS Take by mouth 2 daily am      fluticasone (FLONASE) 50 mcg/act nasal spray SHAKE LIQUID AND USE 2 SPRAYS IN EACH NOSTRIL DAILY (Patient taking differently: States As needed) 48 g 2    fluticasone-salmeterol (Advair HFA) 115-21 MCG/ACT inhaler Inhale 2 puffs 2 (two) times a day Rinse mouth after use 12 g 5    gabapentin (NEURONTIN) 100 mg capsule TAKE 1 CAPSULE(100 MG) BY MOUTH DAILY AT BEDTIME 30 capsule 5    Ginkgo Biloba (GINKOBA PO) Take 60 mg by mouth      Insulin Glargine Solostar (Lantus SoloStar) 100 UNIT/ML SOPN 28 units at night 15 mL 4    Insulin Pen Needle 31G X 5 MM  MISC Use daily 100 each 5    Lancets (OneTouch Delica Plus [id number]) MISC TEST THREE TIMES DAILY 100 each 5    lisinopril (ZESTRIL) 40 mg tablet Take 1 tablet (40 mg total) by mouth daily 90 tablet 4    loratadine (CLARITIN) 10 mg tablet TAKE 1 TABLET BY MOUTH EVERY DAY 90 tablet 2    MAGNESIUM CITRATE PO Take 1 tablet by mouth daily        metFORMIN (GLUCOPHAGE) 1000 MG tablet Take 1 tablet (1,000 mg total) by mouth 2 (two) times a day with meals 180 tablet 2    Multiple Vitamins-Minerals (MULTIVITAMIN ADULTS 50+ PO) Take by mouth daily      omeprazole (PriLOSEC) 40 MG capsule Take 1 capsule (40 mg total) by mouth daily 90 capsule 3    OneTouch Verio test strip USE THREE TIMES DAILY AS INSTRUCTED 100 strip 5    azelastine (OPTIVAR) 0.05 % ophthalmic solution Administer 1 drop to both eyes 2 (two) times a day (Patient not taking: Reported on 4/19/2024) 6 mL 0    ofloxacin (OCUFLOX) 0.3 % ophthalmic solution Administer 1 drop to both eyes 4 (four) times a day (Patient not taking: Reported on 4/19/2024) 5 mL 0    oxyCODONE (Roxicodone) 5 immediate release tablet Take 1 tablet (5 mg total) by mouth every 4 (four) hours as needed for severe pain for up to 10 days Max Daily Amount: 30 mg (Patient not taking: Reported on 4/19/2024) 10 tablet 0     No current facility-administered medications for this visit.     Current Outpatient Medications on File Prior to Visit   Medication Sig    acetaminophen (TYLENOL) 650 mg CR tablet Take 650 mg by mouth once    albuterol (PROVENTIL HFA,VENTOLIN HFA) 90 mcg/act inhaler INHALE 1 PUFF EVERY 4-6 HOURS AS NEEDED    ascorbic acid (VITAMIN C) 500 mg tablet Take 500 mg by mouth daily    atorvastatin (LIPITOR) 40 mg tablet TAKE 1 TABLET(40 MG) BY MOUTH DAILY    B Complex-Biotin-FA (VITAMIN B50 COMPLEX PO)     Blood Glucose Monitoring Suppl (OneTouch Verio) w/Device KIT Use 3 (three) times a day    cyanocobalamin (VITAMIN B-12) 100 MCG tablet Take 100 mcg by mouth daily    dulaglutide  (Trulicity) 3 MG/0.5ML injection ADMINISTER 3 MG UNDER THE SKIN 1 TIME EVERY WEEK    Echinacea 380 MG CAPS Take by mouth 2 daily am    fluticasone (FLONASE) 50 mcg/act nasal spray SHAKE LIQUID AND USE 2 SPRAYS IN EACH NOSTRIL DAILY (Patient taking differently: States As needed)    fluticasone-salmeterol (Advair HFA) 115-21 MCG/ACT inhaler Inhale 2 puffs 2 (two) times a day Rinse mouth after use    gabapentin (NEURONTIN) 100 mg capsule TAKE 1 CAPSULE(100 MG) BY MOUTH DAILY AT BEDTIME    Ginkgo Biloba (GINKOBA PO) Take 60 mg by mouth    Insulin Glargine Solostar (Lantus SoloStar) 100 UNIT/ML SOPN 28 units at night    Insulin Pen Needle 31G X 5 MM MISC Use daily    Lancets (OneTouch Delica Plus [id number]) MISC TEST THREE TIMES DAILY    lisinopril (ZESTRIL) 40 mg tablet Take 1 tablet (40 mg total) by mouth daily    loratadine (CLARITIN) 10 mg tablet TAKE 1 TABLET BY MOUTH EVERY DAY    MAGNESIUM CITRATE PO Take 1 tablet by mouth daily      metFORMIN (GLUCOPHAGE) 1000 MG tablet Take 1 tablet (1,000 mg total) by mouth 2 (two) times a day with meals    Multiple Vitamins-Minerals (MULTIVITAMIN ADULTS 50+ PO) Take by mouth daily    omeprazole (PriLOSEC) 40 MG capsule Take 1 capsule (40 mg total) by mouth daily    OneTouch Verio test strip USE THREE TIMES DAILY AS INSTRUCTED    azelastine (OPTIVAR) 0.05 % ophthalmic solution Administer 1 drop to both eyes 2 (two) times a day (Patient not taking: Reported on 4/19/2024)    ofloxacin (OCUFLOX) 0.3 % ophthalmic solution Administer 1 drop to both eyes 4 (four) times a day (Patient not taking: Reported on 4/19/2024)    oxyCODONE (Roxicodone) 5 immediate release tablet Take 1 tablet (5 mg total) by mouth every 4 (four) hours as needed for severe pain for up to 10 days Max Daily Amount: 30 mg (Patient not taking: Reported on 4/19/2024)     No current facility-administered medications on file prior to visit.     He is allergic to shellfish-derived products - food allergy,  diphenhydramine, and other..    Review of Systems   Constitutional:  Negative for appetite change, chills, diaphoresis and fever.   HENT:  Negative for nosebleeds and trouble swallowing.    Eyes: Negative.    Respiratory:  Negative for cough, shortness of breath and wheezing.    Cardiovascular:  Negative for chest pain, palpitations and leg swelling.   Gastrointestinal:  Negative for abdominal distention, abdominal pain, nausea and vomiting.   Genitourinary:  Negative for difficulty urinating, flank pain and frequency.   Musculoskeletal:  Negative for arthralgias, joint swelling and myalgias.   Skin:  Negative for pallor and rash.   Neurological:  Negative for dizziness, facial asymmetry and speech difficulty.   Hematological:  Does not bruise/bleed easily.   Psychiatric/Behavioral:  Negative for agitation and confusion.    All other systems reviewed and are negative.        Objective:      /89 (BP Location: Left arm, Patient Position: Sitting, Cuff Size: Large)   Pulse 95   Resp 18   Ht 6' (1.829 m)   Wt 134 kg (294 lb 9.6 oz)   SpO2 97%   BMI 39.95 kg/m²          Physical Exam  Vitals and nursing note reviewed.   Constitutional:       General: He is not in acute distress.     Appearance: Normal appearance. He is not toxic-appearing.   HENT:      Head: Normocephalic and atraumatic.      Mouth/Throat:      Mouth: Mucous membranes are moist.   Eyes:      Extraocular Movements: Extraocular movements intact.      Pupils: Pupils are equal, round, and reactive to light.   Cardiovascular:      Rate and Rhythm: Normal rate and regular rhythm.      Pulses: Normal pulses.   Pulmonary:      Effort: Pulmonary effort is normal. No respiratory distress.      Breath sounds: Normal breath sounds. No wheezing.   Abdominal:      General: There is no distension.      Palpations: Abdomen is soft. There is no mass.      Tenderness: There is no abdominal tenderness. There is no guarding or rebound.      Hernia: No hernia is  present.      Comments: Well-healed port sites and an infraumbilical incision, no evidence of hernia.   Musculoskeletal:         General: No swelling or deformity. Normal range of motion.      Cervical back: Normal range of motion and neck supple.      Right lower leg: No edema.      Left lower leg: No edema.   Skin:     General: Skin is warm and dry.      Coloration: Skin is not jaundiced.   Neurological:      General: No focal deficit present.      Mental Status: He is alert and oriented to person, place, and time.   Psychiatric:         Mood and Affect: Mood normal.         Behavior: Behavior normal.

## 2024-04-23 ENCOUNTER — APPOINTMENT (OUTPATIENT)
Dept: PHYSICAL THERAPY | Facility: CLINIC | Age: 64
End: 2024-04-23
Payer: COMMERCIAL

## 2024-04-25 ENCOUNTER — APPOINTMENT (OUTPATIENT)
Dept: PHYSICAL THERAPY | Facility: CLINIC | Age: 64
End: 2024-04-25
Payer: COMMERCIAL

## 2024-04-30 ENCOUNTER — APPOINTMENT (OUTPATIENT)
Dept: PHYSICAL THERAPY | Facility: CLINIC | Age: 64
End: 2024-04-30
Payer: COMMERCIAL

## 2024-05-09 DIAGNOSIS — E78.2 MIXED HYPERLIPIDEMIA: ICD-10-CM

## 2024-05-10 RX ORDER — ATORVASTATIN CALCIUM 40 MG/1
TABLET, FILM COATED ORAL
Qty: 90 TABLET | Refills: 3 | Status: SHIPPED | OUTPATIENT
Start: 2024-05-10

## 2024-05-24 ENCOUNTER — APPOINTMENT (OUTPATIENT)
Dept: LAB | Facility: CLINIC | Age: 64
End: 2024-05-24
Payer: COMMERCIAL

## 2024-05-24 DIAGNOSIS — D47.2 SMOLDERING MULTIPLE MYELOMA: ICD-10-CM

## 2024-05-24 DIAGNOSIS — K75.81 NONALCOHOLIC STEATOHEPATITIS: ICD-10-CM

## 2024-05-24 LAB
ALBUMIN SERPL BCP-MCNC: 4 G/DL (ref 3.5–5)
ALP SERPL-CCNC: 92 U/L (ref 34–104)
ALT SERPL W P-5'-P-CCNC: 28 U/L (ref 7–52)
ANION GAP SERPL CALCULATED.3IONS-SCNC: 6 MMOL/L (ref 4–13)
AST SERPL W P-5'-P-CCNC: 19 U/L (ref 13–39)
BASOPHILS # BLD AUTO: 0.06 THOUSANDS/ÂΜL (ref 0–0.1)
BASOPHILS NFR BLD AUTO: 1 % (ref 0–1)
BILIRUB SERPL-MCNC: 0.71 MG/DL (ref 0.2–1)
BUN SERPL-MCNC: 11 MG/DL (ref 5–25)
CALCIUM SERPL-MCNC: 9.4 MG/DL (ref 8.4–10.2)
CHLORIDE SERPL-SCNC: 98 MMOL/L (ref 96–108)
CO2 SERPL-SCNC: 31 MMOL/L (ref 21–32)
CREAT SERPL-MCNC: 0.78 MG/DL (ref 0.6–1.3)
EOSINOPHIL # BLD AUTO: 0.43 THOUSAND/ÂΜL (ref 0–0.61)
EOSINOPHIL NFR BLD AUTO: 6 % (ref 0–6)
ERYTHROCYTE [DISTWIDTH] IN BLOOD BY AUTOMATED COUNT: 14.3 % (ref 11.6–15.1)
GFR SERPL CREATININE-BSD FRML MDRD: 94 ML/MIN/1.73SQ M
GLUCOSE P FAST SERPL-MCNC: 183 MG/DL (ref 65–99)
HCT VFR BLD AUTO: 42.9 % (ref 36.5–49.3)
HGB BLD-MCNC: 13.4 G/DL (ref 12–17)
IGA SERPL-MCNC: 103 MG/DL (ref 66–433)
IGG SERPL-MCNC: 1684 MG/DL (ref 635–1741)
IGM SERPL-MCNC: 89 MG/DL (ref 45–281)
IMM GRANULOCYTES # BLD AUTO: 0.02 THOUSAND/UL (ref 0–0.2)
IMM GRANULOCYTES NFR BLD AUTO: 0 % (ref 0–2)
LYMPHOCYTES # BLD AUTO: 1.61 THOUSANDS/ÂΜL (ref 0.6–4.47)
LYMPHOCYTES NFR BLD AUTO: 22 % (ref 14–44)
MCH RBC QN AUTO: 29.6 PG (ref 26.8–34.3)
MCHC RBC AUTO-ENTMCNC: 31.2 G/DL (ref 31.4–37.4)
MCV RBC AUTO: 95 FL (ref 82–98)
MONOCYTES # BLD AUTO: 0.43 THOUSAND/ÂΜL (ref 0.17–1.22)
MONOCYTES NFR BLD AUTO: 6 % (ref 4–12)
NEUTROPHILS # BLD AUTO: 4.68 THOUSANDS/ÂΜL (ref 1.85–7.62)
NEUTS SEG NFR BLD AUTO: 65 % (ref 43–75)
NRBC BLD AUTO-RTO: 0 /100 WBCS
PLATELET # BLD AUTO: 225 THOUSANDS/UL (ref 149–390)
PMV BLD AUTO: 10 FL (ref 8.9–12.7)
POTASSIUM SERPL-SCNC: 4.7 MMOL/L (ref 3.5–5.3)
PROT SERPL-MCNC: 7.9 G/DL (ref 6.4–8.4)
RBC # BLD AUTO: 4.53 MILLION/UL (ref 3.88–5.62)
SODIUM SERPL-SCNC: 135 MMOL/L (ref 135–147)
WBC # BLD AUTO: 7.23 THOUSAND/UL (ref 4.31–10.16)

## 2024-05-24 PROCEDURE — 83521 IG LIGHT CHAINS FREE EACH: CPT

## 2024-05-24 PROCEDURE — 80053 COMPREHEN METABOLIC PANEL: CPT

## 2024-05-24 PROCEDURE — 82784 ASSAY IGA/IGD/IGG/IGM EACH: CPT

## 2024-05-24 PROCEDURE — 85025 COMPLETE CBC W/AUTO DIFF WBC: CPT

## 2024-05-24 PROCEDURE — 86334 IMMUNOFIX E-PHORESIS SERUM: CPT

## 2024-05-24 PROCEDURE — 84165 PROTEIN E-PHORESIS SERUM: CPT

## 2024-05-24 PROCEDURE — 36415 COLL VENOUS BLD VENIPUNCTURE: CPT

## 2024-05-25 LAB
KAPPA LC FREE SER-MCNC: 25.5 MG/L (ref 3.3–19.4)
KAPPA LC FREE/LAMBDA FREE SER: 2.48 {RATIO} (ref 0.26–1.65)
LAMBDA LC FREE SERPL-MCNC: 10.3 MG/L (ref 5.7–26.3)

## 2024-05-28 LAB
ALBUMIN SERPL ELPH-MCNC: 3.89 G/DL (ref 3.2–5.1)
ALBUMIN SERPL ELPH-MCNC: 51.8 % (ref 48–70)
ALPHA1 GLOB SERPL ELPH-MCNC: 0.32 G/DL (ref 0.15–0.47)
ALPHA1 GLOB SERPL ELPH-MCNC: 4.3 % (ref 1.8–7)
ALPHA2 GLOB SERPL ELPH-MCNC: 0.94 G/DL (ref 0.42–1.04)
ALPHA2 GLOB SERPL ELPH-MCNC: 12.5 % (ref 5.9–14.9)
BETA GLOB ABNORMAL SERPL ELPH-MCNC: 0.43 G/DL (ref 0.31–0.57)
BETA1 GLOB SERPL ELPH-MCNC: 5.7 % (ref 4.7–7.7)
BETA2 GLOB SERPL ELPH-MCNC: 4.2 % (ref 3.1–7.9)
BETA2+GAMMA GLOB SERPL ELPH-MCNC: 0.32 G/DL (ref 0.2–0.58)
GAMMA GLOB ABNORMAL SERPL ELPH-MCNC: 1.61 G/DL (ref 0.4–1.66)
GAMMA GLOB SERPL ELPH-MCNC: 21.5 % (ref 6.9–22.3)
IGG/ALB SER: 1.07 {RATIO} (ref 1.1–1.8)
INTERPRETATION UR IFE-IMP: NORMAL
M PEAK ID 2: 1.1 %
M PROTEIN 1 MFR SERPL ELPH: 14 %
M PROTEIN 1 SERPL ELPH-MCNC: 1.05 G/DL
M PROTEIN 2 SERPL ELPH-MCNC: 0.08 G/DL
PROT PATTERN SERPL ELPH-IMP: ABNORMAL
PROT SERPL-MCNC: 7.5 G/DL (ref 6.4–8.2)

## 2024-05-28 PROCEDURE — 84165 PROTEIN E-PHORESIS SERUM: CPT | Performed by: STUDENT IN AN ORGANIZED HEALTH CARE EDUCATION/TRAINING PROGRAM

## 2024-05-28 PROCEDURE — 86334 IMMUNOFIX E-PHORESIS SERUM: CPT | Performed by: STUDENT IN AN ORGANIZED HEALTH CARE EDUCATION/TRAINING PROGRAM

## 2024-05-29 ENCOUNTER — OFFICE VISIT (OUTPATIENT)
Dept: PODIATRY | Facility: CLINIC | Age: 64
End: 2024-05-29
Payer: COMMERCIAL

## 2024-05-29 VITALS
BODY MASS INDEX: 39.95 KG/M2 | SYSTOLIC BLOOD PRESSURE: 149 MMHG | RESPIRATION RATE: 18 BRPM | HEART RATE: 85 BPM | DIASTOLIC BLOOD PRESSURE: 69 MMHG | HEIGHT: 72 IN

## 2024-05-29 DIAGNOSIS — M20.42 HAMMERTOE, BILATERAL: ICD-10-CM

## 2024-05-29 DIAGNOSIS — E11.42 DIABETIC PERIPHERAL NEUROPATHY (HCC): Primary | ICD-10-CM

## 2024-05-29 DIAGNOSIS — M20.41 HAMMERTOE, BILATERAL: ICD-10-CM

## 2024-05-29 PROCEDURE — 99213 OFFICE O/P EST LOW 20 MIN: CPT | Performed by: PODIATRIST

## 2024-05-29 NOTE — PROGRESS NOTES
Ambulatory Visit  Name: Brittany Lee      : 1960      MRN: [id number]  Encounter Provider: Tom Carrera DPM  Encounter Date: 2024   Encounter department: [facility] PODIATRY [facility]    Assessment & Plan   1. Diabetic peripheral neuropathy (HCC)  -     Diabetic Shoe  -     Diabetic Shoe Inserts  2. Hammertoe, bilateral  -     Diabetic Shoe  -     Diabetic Shoe Inserts    Diagnosis and options discussed with patient  Patient agreeable to the plan as stated below    -DM foot risk is low. Recommend annual DM foot exam, mild parasthesia but normal neurovascular exam.   -Discussed DM risk to lower extremities, proper shoe gear, and daily monitoring of feet.   -Discussed weight loss and suitable exercise regiment.   -Reviewed most recent PCP visit on 2024  -Educated on A1C and the risks of poorly controlled Diabetes. Reviewed recent A1C:  Lab Results   Component Value Date    HGBA1C 7.3 (H) 10/26/2023    HGBA1C 8.3 (H) 2019   .       History of Present Illness     Brittany Lee is a 63 y.o. male who presents for annual DM foot exam. He recently had hernia repaired. HE has had bouts of vertigo over the year, working with his PCP. His A1C is 7.3, much improved.     Review of Systems  As stated in HPI, otherwise normal    Medical History Reviewed by provider this encounter:  Tobacco  Allergies  Meds  Problems  Med Hx  Surg Hx  Fam Hx        Objective     /69   Pulse 85   Resp 18   Ht 6' (1.829 m)   BMI 39.95 kg/m²     Physical Exam  Vitals reviewed.   Constitutional:       Appearance: He is obese. He is not ill-appearing.   Cardiovascular:      Rate and Rhythm: Normal rate.      Pulses: Normal pulses. no weak pulses.           Dorsalis pedis pulses are 2+ on the right side and 2+ on the left side.        Posterior tibial pulses are 2+ on the right side and 2+ on the left side.   Musculoskeletal:         General: Swelling present. No deformity.      Right foot: Normal  range of motion. No deformity.      Left foot: Normal range of motion. No deformity.   Feet:      Right foot:      Protective Sensation:   10 sites sensed.      Skin integrity: No ulcer, skin breakdown, erythema, warmth, callus or dry skin.      Left foot:      Protective Sensation:   10 sites sensed.      Skin integrity: No ulcer, skin breakdown, erythema, warmth, callus or dry skin.   Skin:     Capillary Refill: Capillary refill takes less than 2 seconds.      Findings: No erythema.   Neurological:      Mental Status: He is alert and oriented to person, place, and time.      Sensory: Sensory deficit (parasthesia) present.     Diabetic Foot Exam    Patient's shoes and socks removed.    Right Foot/Ankle   Right Foot Inspection  Skin Exam: skin normal and skin intact. No dry skin, no warmth, no callus, no erythema, no maceration, no abnormal color, no pre-ulcer, no ulcer and no callus.     Toe Exam: swelling.     Sensory   Vibration: intact  Monofilament testing: intact    Vascular  Capillary refills: < 3 seconds  The right DP pulse is 2+. The right PT pulse is 2+.     Left Foot/Ankle  Left Foot Inspection  Skin Exam: skin normal and skin intact. No dry skin, no warmth, no erythema, no maceration, normal color, no pre-ulcer, no ulcer and no callus.     Toe Exam: swelling.     Sensory   Vibration: intact  Monofilament testing: intact    Vascular  Capillary refills: < 3 seconds  The left DP pulse is 2+. The left PT pulse is 2+.     Assign Risk Category  No deformity present  No loss of protective sensation  No weak pulses  Risk: 0    Administrative Statements

## 2024-05-30 ENCOUNTER — APPOINTMENT (OUTPATIENT)
Dept: LAB | Facility: CLINIC | Age: 64
End: 2024-05-30
Payer: COMMERCIAL

## 2024-05-30 DIAGNOSIS — E78.2 MIXED HYPERLIPIDEMIA: ICD-10-CM

## 2024-05-30 DIAGNOSIS — K76.0 FATTY LIVER: ICD-10-CM

## 2024-05-30 LAB
CHOLEST SERPL-MCNC: 153 MG/DL
HDLC SERPL-MCNC: 37 MG/DL
LDLC SERPL CALC-MCNC: 73 MG/DL (ref 0–100)
NONHDLC SERPL-MCNC: 116 MG/DL
TRIGL SERPL-MCNC: 214 MG/DL

## 2024-05-30 PROCEDURE — 36415 COLL VENOUS BLD VENIPUNCTURE: CPT

## 2024-05-30 PROCEDURE — 80061 LIPID PANEL: CPT

## 2024-06-03 ENCOUNTER — OFFICE VISIT (OUTPATIENT)
Dept: HEMATOLOGY ONCOLOGY | Facility: CLINIC | Age: 64
End: 2024-06-03
Payer: COMMERCIAL

## 2024-06-03 ENCOUNTER — APPOINTMENT (OUTPATIENT)
Dept: LAB | Facility: CLINIC | Age: 64
End: 2024-06-03
Payer: COMMERCIAL

## 2024-06-03 VITALS
OXYGEN SATURATION: 94 % | DIASTOLIC BLOOD PRESSURE: 72 MMHG | HEIGHT: 72 IN | WEIGHT: 291.5 LBS | BODY MASS INDEX: 39.48 KG/M2 | SYSTOLIC BLOOD PRESSURE: 144 MMHG | HEART RATE: 96 BPM

## 2024-06-03 DIAGNOSIS — R73.03 PREDIABETES: ICD-10-CM

## 2024-06-03 DIAGNOSIS — F51.12 INSUFFICIENT SLEEP SYNDROME: ICD-10-CM

## 2024-06-03 DIAGNOSIS — F51.12 INSUFFICIENT SLEEP SYNDROME: Primary | ICD-10-CM

## 2024-06-03 DIAGNOSIS — E83.59 OTHER DISORDERS OF CALCIUM METABOLISM: ICD-10-CM

## 2024-06-03 DIAGNOSIS — E08.21 DIABETES MELLITUS DUE TO UNDERLYING CONDITION WITH DIABETIC NEPHROPATHY, WITH LONG-TERM CURRENT USE OF INSULIN (HCC): ICD-10-CM

## 2024-06-03 DIAGNOSIS — C90.00 MULTIPLE MYELOMA NOT HAVING ACHIEVED REMISSION (HCC): ICD-10-CM

## 2024-06-03 DIAGNOSIS — Z79.4 DIABETES MELLITUS DUE TO UNDERLYING CONDITION WITH DIABETIC NEPHROPATHY, WITH LONG-TERM CURRENT USE OF INSULIN (HCC): ICD-10-CM

## 2024-06-03 DIAGNOSIS — E66.01 MORBID OBESITY (HCC): ICD-10-CM

## 2024-06-03 LAB
25(OH)D3 SERPL-MCNC: 39.8 NG/ML (ref 30–100)
CK SERPL-CCNC: 91 U/L (ref 39–308)
EST. AVERAGE GLUCOSE BLD GHB EST-MCNC: 160 MG/DL
HBA1C MFR BLD: 7.2 %
TSH SERPL DL<=0.05 MIU/L-ACNC: 2.21 UIU/ML (ref 0.45–4.5)

## 2024-06-03 PROCEDURE — 99214 OFFICE O/P EST MOD 30 MIN: CPT | Performed by: INTERNAL MEDICINE

## 2024-06-03 PROCEDURE — 82550 ASSAY OF CK (CPK): CPT

## 2024-06-03 PROCEDURE — 84443 ASSAY THYROID STIM HORMONE: CPT

## 2024-06-03 PROCEDURE — 83036 HEMOGLOBIN GLYCOSYLATED A1C: CPT

## 2024-06-03 PROCEDURE — 36415 COLL VENOUS BLD VENIPUNCTURE: CPT

## 2024-06-03 PROCEDURE — 82306 VITAMIN D 25 HYDROXY: CPT

## 2024-06-03 NOTE — PROGRESS NOTES
Hematology Outpatient Follow - Up Note  Brittany Lee 63 y.o. male MRN: @ Encounter: [id number]        Date:  6/3/2024        Assessment/ Plan:    1. 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  Sleep disorder was obstructive sleep apnea    Fatigue with muscle ache will check CPK    Check vitamin D deficiency    Fatty liver    Labs and imaging studies are reviewed by ordering provider once results are available. If there are findings that need immediate attention, you will be contacted when results available.   Discussing results and the implication on your healthcare is best discussed in person at your follow-up visit.       HPI:  63-year-old  male with history of obesity, asthma, hypertension, dyslipidemia, diabetes mellitus type 2, had been complaining of tingling and numbness in his feet and hand for the past year, most recently a feeling of hypersensitivity involving the anterior aspect of the left thigh area, evaluation by Neurology on March 2018 showed monoclonal gammopathy with M protein of 1.40 grams/deciliters, IgG kappa.     He has normal vitamin B12 level, TSH, heavy metal screen, Lyme disease. CBC performed August 2017 showed hemoglobin of 12.8, WBC 6.6, platelets [id number], 71% neutrophils, 19% lymphocytes     A bone marrow biopsy in May 2018 showed 10-12% plasma cells, fish panel for multiple myeloma was negative, cytogenetics showed normal male chromosomes and deletion of Y chromosome in some of the cells consistent with normal finding in advanced age patient.     Bone skeletal survey 5/2018 showed no evidence of lytic lesions      Received IV Venofer on 09/2021, upper and lower endoscopy  and capsule endoscopy was unremarkable, liver ultrasound showed significant fibrosis with steatosis    Interval History:        Previous Treatment:         Test Results:    Imaging: No results found.    Labs:   Lab Results   Component Value Date    WBC 7.23 05/24/2024    HGB 13.4 05/24/2024    HCT 42.9 05/24/2024    MCV 95 05/24/2024     05/24/2024     Lab Results   Component Value Date     05/17/2017    K 4.7 05/24/2024    CL 98 05/24/2024    CO2 31 05/24/2024    ANIONGAP 9 01/13/2015    BUN 11 05/24/2024    CREATININE 0.78 05/24/2024    GLUCOSE 206 (H) 01/13/2015    GLUF 183 (H) 05/24/2024    CALCIUM 9.4 05/24/2024    CORRECTEDCA 9.8 10/18/2022    AST 19 05/24/2024    ALT 28 05/24/2024    ALKPHOS 92 05/24/2024    PROT 7.5 05/17/2017    BILITOT 0.5 05/17/2017    EGFR 94 05/24/2024       Lab Results   Component Value Date    IRON 58 11/20/2023    TIBC 274 11/20/2023    FERRITIN 56 11/20/2023       Lab Results   Component Value Date    ONLALYGC82 350 06/02/2023         ROS: Review of Systems   Constitutional:  Positive for fatigue. Negative for appetite change, chills, diaphoresis, fever and unexpected weight change.   HENT:   Negative for hearing loss, lump/mass, mouth sores, nosebleeds, sore throat, trouble swallowing and voice change.    Eyes: Negative.  Negative for eye problems and icterus.   Respiratory: Negative.  Negative for chest tightness, cough, hemoptysis and shortness of breath.    Cardiovascular:  Negative for chest pain and leg swelling.   Gastrointestinal:  Negative for abdominal distention, abdominal pain, blood in stool, constipation, diarrhea and nausea.   Endocrine: Negative.    Genitourinary:  Negative for dysuria, frequency, hematuria and pelvic pain.    Musculoskeletal:  Positive for gait problem. Negative for arthralgias, back pain, flank pain, myalgias and neck stiffness.   Skin:  Negative for itching and rash.   Neurological:  Positive for gait problem and numbness. Negative  for dizziness, headaches, light-headedness and speech difficulty.   Hematological:  Negative for adenopathy. Does not bruise/bleed easily.   Psychiatric/Behavioral:  Negative for confusion, decreased concentration, depression and sleep disturbance. The patient is not nervous/anxious.           Current Medications: Reviewed  Allergies: Reviewed  PMH/FH/SH:  Reviewed      Physical Exam:    Body surface area is 2.5 meters squared.    Wt Readings from Last 3 Encounters:   06/03/24 132 kg (291 lb 8 oz)   04/19/24 134 kg (294 lb 9.6 oz)   04/09/24 132 kg (291 lb 6.4 oz)        Temp Readings from Last 3 Encounters:   04/09/24 (!) 97.3 °F (36.3 °C) (Oral)   02/19/24 99.3 °F (37.4 °C) (Temporal)   02/02/24 98.8 °F (37.1 °C) (Temporal)        BP Readings from Last 3 Encounters:   06/03/24 144/72   05/29/24 149/69   04/19/24 164/89         Pulse Readings from Last 3 Encounters:   06/03/24 96   05/29/24 85   04/19/24 95        Physical Exam  Vitals reviewed.   Constitutional:       General: He is not in acute distress.     Appearance: He is well-developed. He is obese. He is not diaphoretic.   HENT:      Head: Normocephalic and atraumatic.   Eyes:      Conjunctiva/sclera: Conjunctivae normal.   Neck:      Trachea: No tracheal deviation.   Cardiovascular:      Rate and Rhythm: Normal rate and regular rhythm.      Heart sounds: No murmur heard.     No friction rub. No gallop.   Pulmonary:      Effort: Pulmonary effort is normal. No respiratory distress.      Breath sounds: Normal breath sounds. No wheezing or rales.   Chest:      Chest wall: No tenderness.   Abdominal:      General: There is no distension.      Palpations: Abdomen is soft.      Tenderness: There is no abdominal tenderness.   Musculoskeletal:      Cervical back: Normal range of motion and neck supple.      Right lower leg: Edema present.      Left lower leg: Edema present.   Lymphadenopathy:      Cervical: No cervical adenopathy.   Skin:     General: Skin is warm  and dry.      Coloration: Skin is not pale.      Findings: No erythema.   Neurological:      Mental Status: He is alert. Mental status is at baseline.      Motor: Weakness present.   Psychiatric:         Behavior: Behavior normal.         Thought Content: Thought content normal.         ECOG PS:1    Goals and Barriers:  Current Goal: Minimize effects of disease.   Barriers: None.      Patient's Capacity to Self Care:  Patient is able to self care.    Code Status: @[facility]@

## 2024-06-30 DIAGNOSIS — Z79.4 DIABETES MELLITUS DUE TO UNDERLYING CONDITION WITH DIABETIC NEPHROPATHY, WITH LONG-TERM CURRENT USE OF INSULIN (HCC): ICD-10-CM

## 2024-06-30 DIAGNOSIS — E08.21 DIABETES MELLITUS DUE TO UNDERLYING CONDITION WITH DIABETIC NEPHROPATHY, WITH LONG-TERM CURRENT USE OF INSULIN (HCC): ICD-10-CM

## 2024-07-01 RX ORDER — INSULIN GLARGINE 100 [IU]/ML
INJECTION, SOLUTION SUBCUTANEOUS
Qty: 15 ML | Refills: 4 | Status: SHIPPED | OUTPATIENT
Start: 2024-07-01

## 2024-07-31 DIAGNOSIS — J45.40 MODERATE PERSISTENT ASTHMA WITHOUT COMPLICATION: ICD-10-CM

## 2024-07-31 RX ORDER — LORATADINE 10 MG/1
10 TABLET ORAL DAILY
Qty: 90 TABLET | Refills: 2 | Status: SHIPPED | OUTPATIENT
Start: 2024-07-31

## 2024-08-04 DIAGNOSIS — E08.21 DIABETES MELLITUS DUE TO UNDERLYING CONDITION WITH DIABETIC NEPHROPATHY, WITH LONG-TERM CURRENT USE OF INSULIN (HCC): ICD-10-CM

## 2024-08-04 DIAGNOSIS — Z79.4 DIABETES MELLITUS DUE TO UNDERLYING CONDITION WITH DIABETIC NEPHROPATHY, WITH LONG-TERM CURRENT USE OF INSULIN (HCC): ICD-10-CM

## 2024-08-05 RX ORDER — LANCETS 33 GAUGE
EACH MISCELLANEOUS
Qty: 100 EACH | Refills: 5 | Status: SHIPPED | OUTPATIENT
Start: 2024-08-05

## 2024-08-07 DIAGNOSIS — E11.42 TYPE 2 DIABETES MELLITUS WITH DIABETIC POLYNEUROPATHY, WITHOUT LONG-TERM CURRENT USE OF INSULIN (HCC): ICD-10-CM

## 2024-08-19 DIAGNOSIS — Z79.4 DIABETES MELLITUS DUE TO UNDERLYING CONDITION WITH DIABETIC NEPHROPATHY, WITH LONG-TERM CURRENT USE OF INSULIN (HCC): ICD-10-CM

## 2024-08-19 DIAGNOSIS — E08.21 DIABETES MELLITUS DUE TO UNDERLYING CONDITION WITH DIABETIC NEPHROPATHY, WITH LONG-TERM CURRENT USE OF INSULIN (HCC): ICD-10-CM

## 2024-08-20 RX ORDER — DULAGLUTIDE 3 MG/.5ML
INJECTION, SOLUTION SUBCUTANEOUS
Qty: 6 ML | Refills: 1 | Status: SHIPPED | OUTPATIENT
Start: 2024-08-20

## 2024-09-16 ENCOUNTER — OFFICE VISIT (OUTPATIENT)
Dept: CARDIOLOGY CLINIC | Facility: CLINIC | Age: 64
End: 2024-09-16
Payer: COMMERCIAL

## 2024-09-16 VITALS
WEIGHT: 290.8 LBS | DIASTOLIC BLOOD PRESSURE: 82 MMHG | BODY MASS INDEX: 39.44 KG/M2 | OXYGEN SATURATION: 98 % | SYSTOLIC BLOOD PRESSURE: 140 MMHG

## 2024-09-16 DIAGNOSIS — E08.21 DIABETES MELLITUS DUE TO UNDERLYING CONDITION WITH DIABETIC NEPHROPATHY, WITH LONG-TERM CURRENT USE OF INSULIN (HCC): ICD-10-CM

## 2024-09-16 DIAGNOSIS — I10 ESSENTIAL HYPERTENSION: Primary | ICD-10-CM

## 2024-09-16 DIAGNOSIS — H81.10 BENIGN PAROXYSMAL POSITIONAL VERTIGO, UNSPECIFIED LATERALITY: ICD-10-CM

## 2024-09-16 DIAGNOSIS — E78.2 MIXED HYPERLIPIDEMIA: ICD-10-CM

## 2024-09-16 DIAGNOSIS — Z79.4 DIABETES MELLITUS DUE TO UNDERLYING CONDITION WITH DIABETIC NEPHROPATHY, WITH LONG-TERM CURRENT USE OF INSULIN (HCC): ICD-10-CM

## 2024-09-16 DIAGNOSIS — G47.33 OBSTRUCTIVE SLEEP APNEA: Chronic | ICD-10-CM

## 2024-09-16 PROBLEM — E11.9 TYPE 2 DIABETES MELLITUS, WITH LONG-TERM CURRENT USE OF INSULIN (HCC): Status: RESOLVED | Noted: 2023-10-18 | Resolved: 2024-09-16

## 2024-09-16 PROBLEM — E11.42 DIABETIC PERIPHERAL NEUROPATHY (HCC): Status: RESOLVED | Noted: 2018-03-06 | Resolved: 2024-09-16

## 2024-09-16 PROBLEM — G62.9 PERIPHERAL POLYNEUROPATHY: Status: RESOLVED | Noted: 2018-07-25 | Resolved: 2024-09-16

## 2024-09-16 PROCEDURE — 99214 OFFICE O/P EST MOD 30 MIN: CPT | Performed by: INTERNAL MEDICINE

## 2024-09-16 NOTE — PROGRESS NOTES
Cardiology Follow Up    Brittany Lee  1960  [id number]  [facility]  [address]  [place] [id number] [phone]    1. Essential hypertension        2. Obstructive sleep apnea        3. Diabetes mellitus due to underlying condition with diabetic nephropathy, with long-term current use of insulin (HCC)        4. Benign paroxysmal positional vertigo, unspecified laterality        5. Mixed hyperlipidemia              Discussion/Summary: All of his assessed cardiac problems are stable. I have reviewed his medications and made no changes. No cardiac testing is ordered. RTO 1 year.      Interval History: I last saw him 5/6/2021. ECHO and nuclear stress test at that time should a normal EF with no ischemia.  He has not had any cardiac problems since.    He is not very active due to his neuropathy. He denies CP, significant SOB or LE edema.    His weight is down from 317 to 290 lbs.      /82    A1C 7.2  LDL 73    His main complaint is fatigue/        Patient Active Problem List   Diagnosis    Essential hypertension    Encounter for immunization    Obstructive sleep apnea    Insufficient sleep syndrome    Morbid obesity (HCC)    Paresthesia    Meralgia paresthetica of left side    Adrenal incidentaloma (HCC)    Allergic rhinitis    Kent's esophagus    Benign paroxysmal positional vertigo    Lipoma of back    Liver lesion    Moderate persistent asthma without complication    Umbilical hernia    Smoldering multiple myeloma    Bilateral lower extremity edema    Mild intermittent asthma without complication    Screening for HIV (human immunodeficiency virus)    Urinary hesitancy    Dyspnea    Mixed hyperlipidemia    HAMEED (dyspnea on exertion)    Family history of early CAD    Diabetes mellitus due to underlying condition with diabetic nephropathy, with long-term current use of insulin (HCC)    Other male erectile dysfunction     Fatty liver    Tinnitus of both ears    Chronic bronchitis with wheezing ([facility])    Bronchitis    Sore throat    Left wrist pain    Iron deficiency anemia, unspecified    B12 deficiency    Annual physical exam    Hammertoe, bilateral    Chronic pain of right knee    Paresthesia of both feet    Goals of care, counseling/discussion    Palliative care patient    Vertigo    Multiple myeloma not having achieved remission ([facility])     Past Medical History:   Diagnosis Date    Anemia     Asthma     Benign positional vertigo     CPAP (continuous positive airway pressure) dependence     Diabetes mellitus ([facility])     borderline; diet controlled    Diabetes mellitus due to underlying condition with diabetic nephropathy, with long-term current use of insulin ([facility]) 05/19/2021    Dyslipidemia     Gastroenteritis     GERD (gastroesophageal reflux disease)     Hyperlipidemia     Hypertension     Lipoma of neck     last assessed - [id number]    Multiple myeloma ([facility])     Sleep apnea     has symptoms but not been diagnosed    Wheezing     last assessed - 12Jan2015     Social History     Socioeconomic History    Marital status: /Civil Union     Spouse name: Not on file    Number of children: Not on file    Years of education: Not on file    Highest education level: Not on file   Occupational History    Occupation: Self-employed   Tobacco Use    Smoking status: Never    Smokeless tobacco: Never   Vaping Use    Vaping status: Never Used   Substance and Sexual Activity    Alcohol use: Not Currently     Comment: rare social occasion    Drug use: No    Sexual activity: Not Currently     Partners: Female   Other Topics Concern    Not on file   Social History Narrative    Not on file     Social Determinants of Health     Financial Resource Strain: Low Risk  (1/31/2024)    Overall Financial Resource Strain (CARDIA)     Difficulty of Paying Living Expenses: Not hard at all   Food Insecurity: No Food Insecurity (1/31/2024)    Hunger Vital  Sign     Worried About Running Out of Food in the Last Year: Never true     Ran Out of Food in the Last Year: Never true   Transportation Needs: No Transportation Needs (1/31/2024)    PRAPARE - Transportation     Lack of Transportation (Medical): No     Lack of Transportation (Non-Medical): No   Physical Activity: Inactive (1/31/2024)    Exercise Vital Sign     Days of Exercise per Week: 0 days     Minutes of Exercise per Session: 0 min   Stress: No Stress Concern Present (1/31/2024)    [employer] - Occupational Stress Questionnaire     Feeling of Stress : Not at all   Social Connections: Not on file   Intimate Partner Violence: Not At Risk (1/31/2024)    Humiliation, Afraid, Rape, and Kick questionnaire     Fear of Current or Ex-Partner: No     Emotionally Abused: No     Physically Abused: No     Sexually Abused: No   Housing Stability: Low Risk  (1/31/2024)    Housing Stability Vital Sign     Unable to Pay for Housing in the Last Year: No     Number of Times Moved in the Last Year: 1     Homeless in the Last Year: No      Family History   Problem Relation Age of Onset    Hypertension Mother     Diabetes Mother     Hyperlipidemia Mother     Cancer Mother         Malignant neoplasm of the gastrointestinal tract    Diabetes type II Mother         Type 2 diabetes mellitus    Hypertension Father         Essential hypercholesterolemia    Hyperlipidemia Father     Heart disease Father         Arteriosclerotic cardiovascular disease (ASCVD)    Dementia Father     Heart attack Father     Cancer Father         prostate    Thyroid disease Sister     Breast cancer Maternal Aunt     Stomach cancer Maternal Grandmother     Stroke Neg Hx      Past Surgical History:   Procedure Laterality Date    COLONOSCOPY      ESOPHAGOGASTRODUODENOSCOPY  2010    Diagnostic    HAND SURGERY      MASS EXCISION Right 08/21/2017    Procedure: POSTERIOR SHOULDER MASS EXCISION; POSTERIOR NECK MASS EXCISION; CHEST WALL  MASS EXCISION;  Surgeon: Sivakumar Quintero MD;  Location: [facility];  Service: General    CT RPR AA HERNIA RECR 3-10 CM REDUCIBLE N/A 4/9/2024    Procedure: REPAIR HERNIA VENTRAL LAPAROSCOPIC W/ ROBOT W/ MESH with Umbilicoplasty;  Surgeon: Masood Vázquez MD;  Location: [facility];  Service: General    THROAT SURGERY      states sedated for food bolus removal       Current Outpatient Medications:     acetaminophen (TYLENOL) 650 mg CR tablet, Take 650 mg by mouth once, Disp: , Rfl:     albuterol (PROVENTIL HFA,VENTOLIN HFA) 90 mcg/act inhaler, INHALE 1 PUFF EVERY 4-6 HOURS AS NEEDED, Disp: 17 g, Rfl: 2    ascorbic acid (VITAMIN C) 500 mg tablet, Take 500 mg by mouth daily, Disp: , Rfl:     atorvastatin (LIPITOR) 40 mg tablet, TAKE 1 TABLET(40 MG) BY MOUTH DAILY, Disp: 90 tablet, Rfl: 3    B Complex-Biotin-FA (VITAMIN B50 COMPLEX PO), , Disp: , Rfl:     Blood Glucose Monitoring Suppl (OneTouch Verio) w/Device KIT, Use 3 (three) times a day, Disp: 1 kit, Rfl: 0    cyanocobalamin (VITAMIN B-12) 100 MCG tablet, Take 100 mcg by mouth daily, Disp: , Rfl:     Echinacea 380 MG CAPS, Take by mouth 2 daily am, Disp: , Rfl:     fluticasone (FLONASE) 50 mcg/act nasal spray, SHAKE LIQUID AND USE 2 SPRAYS IN EACH NOSTRIL DAILY (Patient taking differently: States As needed), Disp: 48 g, Rfl: 2    fluticasone-salmeterol (Advair HFA) 115-21 MCG/ACT inhaler, Inhale 2 puffs 2 (two) times a day Rinse mouth after use, Disp: 12 g, Rfl: 5    gabapentin (NEURONTIN) 100 mg capsule, TAKE 1 CAPSULE(100 MG) BY MOUTH DAILY AT BEDTIME, Disp: 30 capsule, Rfl: 5    Ginkgo Biloba (GINKOBA PO), Take 60 mg by mouth, Disp: , Rfl:     Insulin Glargine Solostar (Lantus SoloStar) 100 UNIT/ML SOPN, ADMINISTER 28 UNITS UNDER THE SKIN AT NIGHT, Disp: 15 mL, Rfl: 4    Insulin Pen Needle 31G X 5 MM MISC, Use daily, Disp: 100 each, Rfl: 5    Lancets (OneTouch Delica Plus [id number]) MISC, TEST THREE TIMES DAILY, Disp: 100 each, Rfl: 5    lisinopril (ZESTRIL) 40 mg  tablet, Take 1 tablet (40 mg total) by mouth daily, Disp: 90 tablet, Rfl: 4    loratadine (CLARITIN) 10 mg tablet, TAKE 1 TABLET BY MOUTH EVERY DAY, Disp: 90 tablet, Rfl: 2    MAGNESIUM CITRATE PO, Take 1 tablet by mouth daily  , Disp: , Rfl:     metFORMIN (GLUCOPHAGE) 1000 MG tablet, Take 1 tablet (1,000 mg total) by mouth 2 (two) times a day with meals, Disp: 180 tablet, Rfl: 2    Multiple Vitamins-Minerals (MULTIVITAMIN ADULTS 50+ PO), Take by mouth daily, Disp: , Rfl:     omeprazole (PriLOSEC) 40 MG capsule, Take 1 capsule (40 mg total) by mouth daily, Disp: 90 capsule, Rfl: 3    OneTouch Verio test strip, USE THREE TIMES DAILY AS INSTRUCTED, Disp: 100 strip, Rfl: 5    Trulicity 3 MG/0.5ML injection, ADMINISTER 3 MG UNDER THE SKIN 1 TIME EVERY WEEK, Disp: 6 mL, Rfl: 1    ofloxacin (OCUFLOX) 0.3 % ophthalmic solution, Administer 1 drop to both eyes 4 (four) times a day (Patient not taking: Reported on 4/19/2024), Disp: 5 mL, Rfl: 0  Allergies   Allergen Reactions    Shellfish-Derived Products - Food Allergy Anaphylaxis     Clams and mussels, oysters  Lobster and crab ok    Diphenhydramine      Lightheadedness, nauseous, rapid heartbeat    Other Palpitations, Other (See Comments) and Vomiting     Clams     Vitals:    09/16/24 0806   BP: 140/82   BP Location: Left arm   Patient Position: Sitting   Cuff Size: Large   SpO2: 98%   Weight: 132 kg (290 lb 12.8 oz)     Weight (last 2 days)       Date/Time Weight    09/16/24 0806 132 (290.8)           Blood pressure 140/82, weight 132 kg (290 lb 12.8 oz), SpO2 98%., Body mass index is 39.44 kg/m².    Labs:  Appointment on 06/03/2024   Component Date Value    Total CK 06/03/2024 91     TSH 3RD GENERATON 06/03/2024 2.211     Hemoglobin A1C 06/03/2024 7.2 (H)     EAG 06/03/2024 160     Vit D, 25-Hydroxy 06/03/2024 39.8    Appointment on 05/30/2024   Component Date Value    Cholesterol 05/30/2024 153     Triglycerides 05/30/2024 214 (H)     HDL, Direct 05/30/2024 37 (L)      LDL Calculated 05/30/2024 73     Non-HDL-Chol (CHOL-HDL) 05/30/2024 116    Appointment on 05/24/2024   Component Date Value    WBC 05/24/2024 7.23     RBC 05/24/2024 4.53     Hemoglobin 05/24/2024 13.4     Hematocrit 05/24/2024 42.9     MCV 05/24/2024 95     MCH 05/24/2024 29.6     MCHC 05/24/2024 31.2 (L)     RDW 05/24/2024 14.3     MPV 05/24/2024 10.0     Platelets 05/24/2024 225     nRBC 05/24/2024 0     Segmented % 05/24/2024 65     Immature Grans % 05/24/2024 0     Lymphocytes % 05/24/2024 22     Monocytes % 05/24/2024 6     Eosinophils Relative 05/24/2024 6     Basophils Relative 05/24/2024 1     Absolute Neutrophils 05/24/2024 4.68     Absolute Immature Grans 05/24/2024 0.02     Absolute Lymphocytes 05/24/2024 1.61     Absolute Monocytes 05/24/2024 0.43     Eosinophils Absolute 05/24/2024 0.43     Basophils Absolute 05/24/2024 0.06     Sodium 05/24/2024 135     Potassium 05/24/2024 4.7     Chloride 05/24/2024 98     CO2 05/24/2024 31     ANION GAP 05/24/2024 6     BUN 05/24/2024 11     Creatinine 05/24/2024 0.78     Glucose, Fasting 05/24/2024 183 (H)     Calcium 05/24/2024 9.4     AST 05/24/2024 19     ALT 05/24/2024 28     Alkaline Phosphatase 05/24/2024 92     Total Protein 05/24/2024 7.9     Albumin 05/24/2024 4.0     Total Bilirubin 05/24/2024 0.71     eGFR 05/24/2024 94     IGA 05/24/2024 103     IGG 05/24/2024 1,684     IGM 05/24/2024 89     Ig [place] Free Light Chain 05/24/2024 25.5 (H)     Ig Lambda Free Light Anna* 05/24/2024 10.3     Kappa/Lambda FluidC Ratio 05/24/2024 2.48 (H)     A/G Ratio 05/24/2024 1.07 (L)     Albumin Electrophoresis 05/24/2024 51.8     Albumin CONC 05/24/2024 3.89     Alpha 1 05/24/2024 4.3     ALPHA 1 CONC 05/24/2024 0.32     Alpha 2 05/24/2024 12.5     ALPHA 2 CONC 05/24/2024 0.94     Beta-1 05/24/2024 5.7     BETA 1 CONC 05/24/2024 0.43     Beta-2 05/24/2024 4.2     BETA 2 CONC 05/24/2024 0.32     Gamma Globulin 05/24/2024 21.5     GAMMA CONC 05/24/2024 1.61     M Peak ID 1  05/24/2024 14.00     M PEAK 1 CONC 05/24/2024 1.05     M Peak ID 2 05/24/2024 1.10     M Peak 2 CONC 05/24/2024 0.08     Total Protein 05/24/2024 7.5     SPEP Interpretation 05/24/2024 See Comment     Immunofixation Interpret* 05/24/2024 See Comment    Admission on 04/09/2024, Discharged on 04/09/2024   Component Date Value    POC Glucose 04/09/2024 137     Case Report 04/09/2024                      Value:Surgical Pathology Report                         Case: [id number]                                  Authorizing Provider:  Masood Vázquez MD         Collected:           04/09/2024 1637              Ordering Location:     [facility]      Received:            04/09/2024 [facility]                                     Hospital Operating Room                                                      Pathologist:           Josse Delong MD                                                          Specimen:    umbilicoplasty                                                                             Final Diagnosis 04/09/2024                      Value:A.  Skin and soft tissue, ventral hernia, umbilicoplasty:                          -Portion of benign skin and associated mesothelial lined fibroadipose                           tissue, compatible with hernia sac, negative for atypia or malignancy.    Additional Information 04/09/2024                      Value:All reported additional testing was performed with appropriately reactive                           controls.  These tests were developed and their performance                           characteristics determined by [facility] or                           appropriate performing facility, though some tests may be performed on                           tissues which have not been validated for performance characteristics                           (such as staining performed on alcohol exposed cell blocks and decalcified                         "   tissues).  Results should be interpreted with caution and in the context                           of the patients’ clinical condition. These tests may not be cleared or                           approved by the U.S. Food and Drug Administration, though the FDA has                           determined that such clearance or approval is not necessary. These tests                           are used for clinical purposes and they should not be regarded as                           investigational or for research. This laboratory has been approved by CLIA                           88, designated as a high-complexity laboratory and is qualified to perform                           these tests.                          Interpretation performed at [facility]                           [id number]                              Gross Description 04/09/2024                      Value:A. The specimen is received in formalin, labeled with the patient's name                           and hospital number, and is designated \" umbilicoplasty.\"  It consists of                           a 5.2 x 3.5 x 0.6 cm fragment of tan-red to red-purple, soft                           fibromembranous tissue.  Attached to the tissue is a 4.8 x 1.7 cm white,                           wrinkled skin ellipse.  No masses or lesions are identified.                            Representative sections are submitted in 1 cassette.                                                    Note: The estimated total formalin fixation time based upon information                           provided by the submitting clinician and the standard processing schedule                           is under 72 hours.                                                    MScheib    POC Glucose 04/09/2024 157 (H)      Imaging: No results found.    Review of Systems:  Review of Systems   Constitutional:  Positive for fatigue. Negative for diaphoresis, fever " and unexpected weight change.   HENT: Negative.     Respiratory:  Negative for cough, shortness of breath and wheezing.    Cardiovascular:  Negative for chest pain, palpitations and leg swelling.   Gastrointestinal:  Negative for abdominal pain, diarrhea and nausea.   Musculoskeletal:  Negative for gait problem and myalgias.   Skin:  Negative for rash.   Neurological:  Negative for dizziness and numbness.   Psychiatric/Behavioral: Negative.         Physical Exam:  Physical Exam  Constitutional:       Appearance: He is well-developed.   HENT:      Head: Normocephalic and atraumatic.   Eyes:      Pupils: Pupils are equal, round, and reactive to light.   Neck:      Vascular: No JVD.   Cardiovascular:      Rate and Rhythm: Regular rhythm.      Pulses: Normal pulses.           Carotid pulses are 2+ on the right side and 2+ on the left side.     Heart sounds: S1 normal and S2 normal.   Pulmonary:      Effort: Pulmonary effort is normal.      Breath sounds: Normal breath sounds. No wheezing or rales.   Abdominal:      General: Bowel sounds are normal.      Palpations: Abdomen is soft.   Musculoskeletal:         General: No tenderness. Normal range of motion.      Cervical back: Normal range of motion and neck supple.   Skin:     General: Skin is warm.   Neurological:      Mental Status: He is alert and oriented to person, place, and time.      Cranial Nerves: No cranial nerve deficit.      Deep Tendon Reflexes: Reflexes are normal and symmetric.

## 2024-09-20 ENCOUNTER — CONSULT (OUTPATIENT)
Dept: INTERNAL MEDICINE CLINIC | Facility: CLINIC | Age: 64
End: 2024-09-20
Payer: COMMERCIAL

## 2024-09-20 VITALS
HEIGHT: 72 IN | DIASTOLIC BLOOD PRESSURE: 78 MMHG | HEART RATE: 96 BPM | RESPIRATION RATE: 18 BRPM | SYSTOLIC BLOOD PRESSURE: 118 MMHG | TEMPERATURE: 98.4 F | OXYGEN SATURATION: 96 % | WEIGHT: 289 LBS | BODY MASS INDEX: 39.14 KG/M2

## 2024-09-20 DIAGNOSIS — Z79.4 DIABETES MELLITUS DUE TO UNDERLYING CONDITION WITH DIABETIC NEPHROPATHY, WITH LONG-TERM CURRENT USE OF INSULIN (HCC): Primary | ICD-10-CM

## 2024-09-20 DIAGNOSIS — E08.21 DIABETES MELLITUS DUE TO UNDERLYING CONDITION WITH DIABETIC NEPHROPATHY, WITH LONG-TERM CURRENT USE OF INSULIN (HCC): Primary | ICD-10-CM

## 2024-09-20 LAB — SL AMB POCT HEMOGLOBIN AIC: 7 (ref ?–6.5)

## 2024-09-20 PROCEDURE — 99214 OFFICE O/P EST MOD 30 MIN: CPT | Performed by: INTERNAL MEDICINE

## 2024-09-20 PROCEDURE — 83036 HEMOGLOBIN GLYCOSYLATED A1C: CPT | Performed by: INTERNAL MEDICINE

## 2024-09-20 RX ORDER — LANOLIN ALCOHOL/MO/W.PET/CERES
1 CREAM (GRAM) TOPICAL 2 TIMES DAILY
COMMUNITY

## 2024-09-20 NOTE — ASSESSMENT & PLAN NOTE
Lab Results   Component Value Date    HGBA1C 7.0 (A) 09/20/2024   AM blood glucose ranging from 120s-200s, PM blood glucose from 98-160s  Did have 2 reported episodes of hypoglycemia over the past month  Fairly controlled  Regimen: Lantus 28 units, Metformin 1000 mg BID, Trulicity 3 mg once a weekly  Continue current regimen  Instructed patient to follow-up with ophthalmologist this year  Follow-up A1C in 3 months  Next endo follow-up in 4-5 months

## 2024-09-20 NOTE — PROGRESS NOTES
INTERNAL MEDICINE OFFICE VISIT       NAME: Brittany Lee  AGE: 64 y.o. SEX: male       : 1960        MRN: [id number]    DATE: 2024  TIME: 10:37 AM    Assessment and Plan   1. Diabetes mellitus due to underlying condition with diabetic nephropathy, with long-term current use of insulin (HCC)  Assessment & Plan:    Lab Results   Component Value Date    HGBA1C 7.0 (A) 2024   AM blood glucose ranging from 120s-200s, PM blood glucose from 98-160s  Did have 2 reported episodes of hypoglycemia over the past month  Fairly controlled  Regimen: Lantus 28 units, Metformin 1000 mg BID, Trulicity 3 mg once a weekly  Continue current regimen  Instructed patient to follow-up with ophthalmologist this year  Follow-up A1C in 3 months  Next endo follow-up in 4-5 months    Orders:  -     POCT hemoglobin A1c  -     Hemoglobin A1C; Future; Expected date: 2025       Chief Complaint     Chief Complaint   Patient presents with    Diabetes       History of Present Illness   Brittany Lee is a 64 y.o.-year-old male who presents to the clinic today for his diabetes follow-up. He was last seen in 10/2023.     He presents with no acute concerns. He has been monitoring his blood glucose at home regularly. His AM blood glucose levels have been ranging around 120s-200s, PM blood glucose ranging 98-160s. He does not reports any episodes of severe hyperglycemia. He did have episodes of hypoglycemia in the past month where he was alerted by his glucose monitor. Reports he felt nauseated, lightheaded at that time. Resolved after he had some juice and snacks.  He tries to watch his diet, reports eating a lot of honeydews, melons, over the summer. He stays active by doing garden work. He reports neuropathy associated with his diabetes. He last saw his podiatrist on May 2024 which showed mild paresthesia but normal neurovascular exam. He has been prescribed diabetic shoe and shoe inserts, currently in the process of obtaining them.  He has not seen his eye doctor for the past 2 years. He denies blurry vision but reports he feels he needs a change in prescription. Recommended for him to follow-up appointment with eye doctor this year.    Review of Systems   Review of Systems   Constitutional: Negative.    HENT: Negative.     Eyes: Negative.    Respiratory: Negative.     Cardiovascular: Negative.    Gastrointestinal: Negative.    Endocrine: Negative.    Genitourinary: Negative.    Musculoskeletal: Negative.    Skin: Negative.    Neurological:  Positive for numbness.       Active Problem List     Patient Active Problem List   Diagnosis    Essential hypertension    Encounter for immunization    Obstructive sleep apnea    Insufficient sleep syndrome    Morbid obesity (HCC)    Paresthesia    Meralgia paresthetica of left side    Adrenal incidentaloma (HCC)    Allergic rhinitis    Kent's esophagus    Benign paroxysmal positional vertigo    Lipoma of back    Liver lesion    Moderate persistent asthma without complication    Umbilical hernia    Smoldering multiple myeloma    Bilateral lower extremity edema    Mild intermittent asthma without complication    Screening for HIV (human immunodeficiency virus)    Urinary hesitancy    Dyspnea    Mixed hyperlipidemia    HAMEED (dyspnea on exertion)    Family history of early CAD    Diabetes mellitus due to underlying condition with diabetic nephropathy, with long-term current use of insulin (HCC)    Other male erectile dysfunction    Fatty liver    Tinnitus of both ears    Chronic bronchitis with wheezing (HCC)    Bronchitis    Sore throat    Left wrist pain    Iron deficiency anemia, unspecified    B12 deficiency    Annual physical exam    Hammertoe, bilateral    Chronic pain of right knee    Paresthesia of both feet    Goals of care, counseling/discussion    Palliative care patient    Vertigo    Multiple myeloma not having achieved remission (HCC)       The following portions of the patient's history were  reviewed and updated as appropriate: allergies, current medications, past family history, past medical history, past social history, past surgical history, and problem list.    Objective     Vitals:    09/20/24 0927   BP: 118/78   Pulse: 96   Resp: 18   Temp: 98.4 °F (36.9 °C)   SpO2: 96%     Wt Readings from Last 3 Encounters:   09/20/24 131 kg (289 lb)   09/16/24 132 kg (290 lb 12.8 oz)   06/03/24 132 kg (291 lb 8 oz)       Physical Exam  Vitals reviewed.   Constitutional:       General: He is not in acute distress.     Appearance: Normal appearance.   HENT:      Head: Normocephalic and atraumatic.      Mouth/Throat:      Mouth: Mucous membranes are moist.      Pharynx: Oropharynx is clear.   Eyes:      Conjunctiva/sclera: Conjunctivae normal.   Cardiovascular:      Rate and Rhythm: Normal rate and regular rhythm.      Pulses: Normal pulses.      Heart sounds: Normal heart sounds. No murmur heard.  Pulmonary:      Effort: Pulmonary effort is normal. No respiratory distress.      Breath sounds: Normal breath sounds.   Abdominal:      General: There is no distension.      Palpations: Abdomen is soft.      Tenderness: There is no abdominal tenderness.   Musculoskeletal:         General: No swelling or tenderness.      Cervical back: Neck supple. No tenderness.   Skin:     General: Skin is warm and dry.   Neurological:      Mental Status: He is alert and oriented to person, place, and time.         Pertinent Laboratory/Diagnostic Studies:  I have reviewed pertinent labs:  Most Recent Labs:   Lab Results   Component Value Date    WBC 7.23 05/24/2024    RBC 4.53 05/24/2024    HGB 13.4 05/24/2024    HCT 42.9 05/24/2024     05/24/2024    RDW 14.3 05/24/2024    NEUTROABS 4.68 05/24/2024    SODIUM 135 05/24/2024    K 4.7 05/24/2024    CL 98 05/24/2024    CO2 31 05/24/2024    BUN 11 05/24/2024    CREATININE 0.78 05/24/2024    GLUC 202 (H) 12/14/2023    GLUF 183 (H) 05/24/2024    CALCIUM 9.4 05/24/2024    AST 19  05/24/2024    ALT 28 05/24/2024    ALKPHOS 92 05/24/2024    TP 7.9 05/24/2024    TP 7.5 05/24/2024    ALB 4.0 05/24/2024    TBILI 0.71 05/24/2024    CHOLESTEROL 153 05/30/2024    HDL 37 (L) 05/30/2024    TRIG 214 (H) 05/30/2024    LDLCALC 73 05/30/2024    NONHDLC 116 05/30/2024    [id number] 2.211 06/03/2024    HGBA1C 7.0 (A) 09/20/2024     06/03/2024         Orders Placed This Encounter   Procedures    Hemoglobin A1C    POCT hemoglobin A1c       ALLERGIES:  Allergies   Allergen Reactions    Shellfish-Derived Products - Food Allergy Anaphylaxis     Clams and mussels, oysters  Lobster and crab ok    Diphenhydramine      Lightheadedness, nauseous, rapid heartbeat    Other Palpitations, Other (See Comments) and Vomiting     Clams       Current Medications     Current Outpatient Medications   Medication Sig Dispense Refill    acetaminophen (TYLENOL) 650 mg CR tablet Take 650 mg by mouth once      albuterol (PROVENTIL HFA,VENTOLIN HFA) 90 mcg/act inhaler INHALE 1 PUFF EVERY 4-6 HOURS AS NEEDED 17 g 2    ascorbic acid (VITAMIN C) 500 mg tablet Take 500 mg by mouth daily      atorvastatin (LIPITOR) 40 mg tablet TAKE 1 TABLET(40 MG) BY MOUTH DAILY 90 tablet 3    B Complex-Biotin-FA (VITAMIN B50 COMPLEX PO)       Blood Glucose Monitoring Suppl (OneTouch Verio) w/Device KIT Use 3 (three) times a day 1 kit 0    calcium citrate-vitamin D 315 mg-5 mcg tablet Take 1 tablet by mouth 2 (two) times a day      cyanocobalamin (VITAMIN B-12) 100 MCG tablet Take 100 mcg by mouth daily      Echinacea 380 MG CAPS Take by mouth 2 daily am      fluticasone (FLONASE) 50 mcg/act nasal spray SHAKE LIQUID AND USE 2 SPRAYS IN EACH NOSTRIL DAILY (Patient taking differently: States As needed) 48 g 2    fluticasone-salmeterol (Advair HFA) 115-21 MCG/ACT inhaler Inhale 2 puffs 2 (two) times a day Rinse mouth after use 12 g 5    gabapentin (NEURONTIN) 100 mg capsule TAKE 1 CAPSULE(100 MG) BY MOUTH DAILY AT BEDTIME 30 capsule 5    Ginkgo  Biloba (GINKOBA PO) Take 60 mg by mouth      Insulin Glargine Solostar (Lantus SoloStar) 100 UNIT/ML SOPN ADMINISTER 28 UNITS UNDER THE SKIN AT NIGHT 15 mL 4    Insulin Pen Needle 31G X 5 MM MISC Use daily 100 each 5    Lancets (OneTouch Delica Plus [id number]) MISC TEST THREE TIMES DAILY 100 each 5    lisinopril (ZESTRIL) 40 mg tablet Take 1 tablet (40 mg total) by mouth daily 90 tablet 4    loratadine (CLARITIN) 10 mg tablet TAKE 1 TABLET BY MOUTH EVERY DAY 90 tablet 2    MAGNESIUM CITRATE PO Take 1 tablet by mouth daily        metFORMIN (GLUCOPHAGE) 1000 MG tablet Take 1 tablet (1,000 mg total) by mouth 2 (two) times a day with meals 180 tablet 2    Multiple Vitamins-Minerals (MULTIVITAMIN ADULTS 50+ PO) Take by mouth daily      omeprazole (PriLOSEC) 40 MG capsule Take 1 capsule (40 mg total) by mouth daily 90 capsule 3    OneTouch Verio test strip USE THREE TIMES DAILY AS INSTRUCTED 100 strip 5    Trulicity 3 MG/0.5ML injection ADMINISTER 3 MG UNDER THE SKIN 1 TIME EVERY WEEK 6 mL 1    ofloxacin (OCUFLOX) 0.3 % ophthalmic solution Administer 1 drop to both eyes 4 (four) times a day (Patient not taking: Reported on 4/19/2024) 5 mL 0     No current facility-administered medications for this visit.         Health Maintenance        Alisha Young MD

## 2024-09-30 DIAGNOSIS — E08.21 DIABETES MELLITUS DUE TO UNDERLYING CONDITION WITH DIABETIC NEPHROPATHY, WITH LONG-TERM CURRENT USE OF INSULIN (HCC): ICD-10-CM

## 2024-09-30 DIAGNOSIS — Z79.4 DIABETES MELLITUS DUE TO UNDERLYING CONDITION WITH DIABETIC NEPHROPATHY, WITH LONG-TERM CURRENT USE OF INSULIN (HCC): ICD-10-CM

## 2024-10-04 DIAGNOSIS — E08.21 DIABETES MELLITUS DUE TO UNDERLYING CONDITION WITH DIABETIC NEPHROPATHY, WITH LONG-TERM CURRENT USE OF INSULIN (HCC): ICD-10-CM

## 2024-10-04 DIAGNOSIS — Z79.4 DIABETES MELLITUS DUE TO UNDERLYING CONDITION WITH DIABETIC NEPHROPATHY, WITH LONG-TERM CURRENT USE OF INSULIN (HCC): ICD-10-CM

## 2024-10-04 RX ORDER — DULAGLUTIDE 3 MG/.5ML
INJECTION, SOLUTION SUBCUTANEOUS
Qty: 6 ML | Refills: 1 | Status: SHIPPED | OUTPATIENT
Start: 2024-10-04

## 2024-10-04 RX ORDER — DULAGLUTIDE 3 MG/.5ML
INJECTION, SOLUTION SUBCUTANEOUS
Qty: 6 ML | Refills: 1 | OUTPATIENT
Start: 2024-10-04

## 2024-10-04 NOTE — TELEPHONE ENCOUNTER
Reason for call:   [x] Refill   [] Prior Auth  [x] Other: Pt is out of medications     Office:   [] PCP/Provider -   [x] Specialty/Provider -     Medication: Trulicity 3 MG/0.5ML  ADMINISTER 3 MG UNDER THE SKIN 1 TIME EVERY WEEK       Pharmacy: [facility] [address]     Does the patient have enough for 3 days?   [] Yes   [x] No - Send as HP to POD

## 2024-11-04 ENCOUNTER — APPOINTMENT (OUTPATIENT)
Dept: RADIOLOGY | Facility: HOSPITAL | Age: 64
End: 2024-11-04
Payer: COMMERCIAL

## 2024-11-04 ENCOUNTER — HOSPITAL ENCOUNTER (OUTPATIENT)
Dept: ULTRASOUND IMAGING | Facility: HOSPITAL | Age: 64
Discharge: HOME | End: 2024-11-04
Payer: COMMERCIAL

## 2024-11-04 DIAGNOSIS — K76.0 FATTY (CHANGE OF) LIVER, NOT ELSEWHERE CLASSIFIED: ICD-10-CM

## 2024-11-04 PROCEDURE — 76981 USE PARENCHYMA: CPT

## 2024-11-07 DIAGNOSIS — E08.41 DIABETIC MONONEUROPATHY ASSOCIATED WITH DIABETES MELLITUS DUE TO UNDERLYING CONDITION (HCC): ICD-10-CM

## 2024-11-07 RX ORDER — GABAPENTIN 100 MG/1
CAPSULE ORAL
Qty: 30 CAPSULE | Refills: 5 | Status: SHIPPED | OUTPATIENT
Start: 2024-11-07

## 2024-11-12 ENCOUNTER — HOSPITAL ENCOUNTER (OUTPATIENT)
Dept: ULTRASOUND IMAGING | Facility: HOSPITAL | Age: 64
Discharge: HOME | End: 2024-11-12

## 2024-11-12 DIAGNOSIS — K76.0 FATTY LIVER: ICD-10-CM

## 2024-11-13 ENCOUNTER — APPOINTMENT (OUTPATIENT)
Dept: LAB | Facility: CLINIC | Age: 64
End: 2024-11-13
Payer: COMMERCIAL

## 2024-11-13 DIAGNOSIS — K76.0 FATTY LIVER: ICD-10-CM

## 2024-11-13 LAB
ALBUMIN SERPL BCG-MCNC: 4 G/DL (ref 3.5–5)
ALP SERPL-CCNC: 90 U/L (ref 34–104)
ALT SERPL W P-5'-P-CCNC: 31 U/L (ref 7–52)
ANION GAP SERPL CALCULATED.3IONS-SCNC: 5 MMOL/L (ref 4–13)
AST SERPL W P-5'-P-CCNC: 20 U/L (ref 13–39)
BASOPHILS # BLD AUTO: 0.06 THOUSANDS/ÂΜL (ref 0–0.1)
BASOPHILS NFR BLD AUTO: 1 % (ref 0–1)
BILIRUB SERPL-MCNC: 0.55 MG/DL (ref 0.2–1)
BUN SERPL-MCNC: 10 MG/DL (ref 5–25)
CALCIUM SERPL-MCNC: 9.3 MG/DL (ref 8.4–10.2)
CHLORIDE SERPL-SCNC: 101 MMOL/L (ref 96–108)
CO2 SERPL-SCNC: 31 MMOL/L (ref 21–32)
CREAT SERPL-MCNC: 0.83 MG/DL (ref 0.6–1.3)
EOSINOPHIL # BLD AUTO: 0.37 THOUSAND/ÂΜL (ref 0–0.61)
EOSINOPHIL NFR BLD AUTO: 5 % (ref 0–6)
ERYTHROCYTE [DISTWIDTH] IN BLOOD BY AUTOMATED COUNT: 14.5 % (ref 11.6–15.1)
GFR SERPL CREATININE-BSD FRML MDRD: 91 ML/MIN/1.73SQ M
GLUCOSE P FAST SERPL-MCNC: 145 MG/DL (ref 65–99)
HCT VFR BLD AUTO: 41.4 % (ref 36.5–49.3)
HGB BLD-MCNC: 13.1 G/DL (ref 12–17)
IMM GRANULOCYTES # BLD AUTO: 0.02 THOUSAND/UL (ref 0–0.2)
IMM GRANULOCYTES NFR BLD AUTO: 0 % (ref 0–2)
LYMPHOCYTES # BLD AUTO: 1.64 THOUSANDS/ÂΜL (ref 0.6–4.47)
LYMPHOCYTES NFR BLD AUTO: 22 % (ref 14–44)
MCH RBC QN AUTO: 29.6 PG (ref 26.8–34.3)
MCHC RBC AUTO-ENTMCNC: 31.6 G/DL (ref 31.4–37.4)
MCV RBC AUTO: 94 FL (ref 82–98)
MONOCYTES # BLD AUTO: 0.53 THOUSAND/ÂΜL (ref 0.17–1.22)
MONOCYTES NFR BLD AUTO: 7 % (ref 4–12)
NEUTROPHILS # BLD AUTO: 4.78 THOUSANDS/ÂΜL (ref 1.85–7.62)
NEUTS SEG NFR BLD AUTO: 65 % (ref 43–75)
NRBC BLD AUTO-RTO: 0 /100 WBCS
PLATELET # BLD AUTO: 220 THOUSANDS/UL (ref 149–390)
PMV BLD AUTO: 9.5 FL (ref 8.9–12.7)
POTASSIUM SERPL-SCNC: 4.8 MMOL/L (ref 3.5–5.3)
PROT SERPL-MCNC: 7.6 G/DL (ref 6.4–8.4)
RBC # BLD AUTO: 4.42 MILLION/UL (ref 3.88–5.62)
SODIUM SERPL-SCNC: 137 MMOL/L (ref 135–147)
WBC # BLD AUTO: 7.4 THOUSAND/UL (ref 4.31–10.16)

## 2024-11-13 PROCEDURE — 80053 COMPREHEN METABOLIC PANEL: CPT

## 2024-11-13 PROCEDURE — 85025 COMPLETE CBC W/AUTO DIFF WBC: CPT

## 2024-11-13 PROCEDURE — 36415 COLL VENOUS BLD VENIPUNCTURE: CPT

## 2024-11-26 ENCOUNTER — OFFICE VISIT (OUTPATIENT)
Dept: FAMILY MEDICINE CLINIC | Facility: CLINIC | Age: 64
End: 2024-11-26

## 2024-11-26 VITALS
TEMPERATURE: 98.8 F | WEIGHT: 282 LBS | BODY MASS INDEX: 38.19 KG/M2 | OXYGEN SATURATION: 97 % | HEART RATE: 100 BPM | SYSTOLIC BLOOD PRESSURE: 157 MMHG | DIASTOLIC BLOOD PRESSURE: 90 MMHG | HEIGHT: 72 IN

## 2024-11-26 DIAGNOSIS — Z86.19 HISTORY OF VIRAL GASTROENTERITIS: Primary | ICD-10-CM

## 2024-11-26 DIAGNOSIS — J45.20 MILD INTERMITTENT ASTHMA WITHOUT COMPLICATION: ICD-10-CM

## 2024-11-26 DIAGNOSIS — J45.909 UNCOMPLICATED ASTHMA, UNSPECIFIED ASTHMA SEVERITY, UNSPECIFIED WHETHER PERSISTENT: ICD-10-CM

## 2024-11-26 PROCEDURE — 99213 OFFICE O/P EST LOW 20 MIN: CPT | Performed by: FAMILY MEDICINE

## 2024-11-26 PROCEDURE — G2211 COMPLEX E/M VISIT ADD ON: HCPCS | Performed by: FAMILY MEDICINE

## 2024-11-26 RX ORDER — ALBUTEROL SULFATE 90 UG/1
INHALANT RESPIRATORY (INHALATION)
Qty: 17 G | Refills: 2 | Status: SHIPPED | OUTPATIENT
Start: 2024-11-26

## 2024-11-26 RX ORDER — FLUTICASONE PROPIONATE AND SALMETEROL XINAFOATE 115; 21 UG/1; UG/1
2 AEROSOL, METERED RESPIRATORY (INHALATION) 2 TIMES DAILY
Qty: 12 G | Refills: 5 | Status: SHIPPED | OUTPATIENT
Start: 2024-11-26 | End: 2024-12-02 | Stop reason: SDUPTHER

## 2024-11-26 NOTE — PROGRESS NOTES
Name: Brittany Lee      : 1960      MRN: [id number]  Encounter Provider: Ivan Murguia DO  Encounter Date: 2024   Encounter department: [facility]  :  Assessment & Plan  History of viral gastroenteritis  Reported 2 to 3-day history of dry heaving, vomiting, diarrhea, bloating starting on 11/15  Wife had similar symptoms   Since this, patient has had constipation, bloating, no appetite, but denies vomiting or nausea  He has only been trying some chicken broth with minimal fruit and yogurt over the past few days.  He did have a bowel movement this morning, but otherwise has not been passing stools as frequently.  He is belching more frequently and has some epigastric discomfort and increased GERD symptoms  His hepatologist told him to take double dose of his omeprazole on  but patient has not been taking his medications for the past week  Patient not attempted to consume more food or progress his diet  Exam: Normal bowel sounds, soft abdomen, discomfort on palpation of epigastric region; nondistended    Plan:  -Try to advance diet as possible  -Start taking Omeprazole 40mg BID as instructed -- will likely help with his GERD symptoms  -Can add Carafate if needed  -Start adding back in his home medications  -Given RTO and ED precautions  -F/u in about two weeks       Uncomplicated asthma, unspecified asthma severity, unspecified whether persistent  Refill albuterol  Orders:    albuterol (PROVENTIL HFA,VENTOLIN HFA) 90 mcg/act inhaler; INHALE 1 PUFF EVERY 4-6 HOURS AS NEEDED    Mild intermittent asthma without complication  Refill Advair  Orders:    fluticasone-salmeterol (Advair HFA) 115-21 MCG/ACT inhaler; Inhale 2 puffs 2 (two) times a day Rinse mouth after use           History of Present Illness     Patient is a 64-year-old male who presents to the office today for continued decreased appetite, bloating.  On 11/15, patient began having nausea and then started  dry heaving and vomiting and had associated diarrhea.  His wife had similar symptoms at the time.  Since then, patient has remained constipated, with bloating and no appetite.  Over the past few days he is only tried some chicken broth and fruit and yogurt.  He has had decreased bowel movements but did have a bowel movement this morning with assistance from an enema.  He also has associated increased GERD symptoms as well as epigastric discomfort.  No stephany abdominal pain, melena, hematochezia, hematemesis.            Review of Systems   Constitutional:  Positive for appetite change (Decreased). Negative for chills and fever.   HENT:  Negative for ear pain and sore throat.    Eyes:  Negative for pain and visual disturbance.   Respiratory:  Negative for cough and shortness of breath.    Cardiovascular:  Negative for chest pain and palpitations.   Gastrointestinal:  Positive for abdominal pain (Epigastric discomfort) and constipation. Negative for abdominal distention, anal bleeding, diarrhea, nausea and vomiting.        Bloating, abdominal discomfort   Genitourinary:  Negative for dysuria, flank pain, frequency, hematuria and urgency.   Musculoskeletal:  Negative for arthralgias and back pain.   Skin:  Negative for color change and rash.   Neurological:  Negative for seizures and syncope.   All other systems reviewed and are negative.         Objective   /90 (BP Location: Left arm, Patient Position: Sitting, Cuff Size: Large)   Pulse 100   Temp 98.8 °F (37.1 °C) (Temporal)   Ht 6' (1.829 m)   Wt 128 kg (282 lb)   SpO2 97%   BMI 38.25 kg/m²      Physical Exam  Vitals reviewed.   Constitutional:       General: He is not in acute distress.     Appearance: He is well-developed.   HENT:      Head: Normocephalic and atraumatic.      Right Ear: External ear normal.      Left Ear: External ear normal.      Nose: Nose normal.      Mouth/Throat:      Mouth: Mucous membranes are moist.      Pharynx: Oropharynx is  clear.   Eyes:      Extraocular Movements: Extraocular movements intact.      Conjunctiva/sclera: Conjunctivae normal.   Cardiovascular:      Rate and Rhythm: Normal rate and regular rhythm.      Heart sounds: No murmur heard.  Pulmonary:      Effort: Pulmonary effort is normal. No respiratory distress.      Breath sounds: Normal breath sounds.   Abdominal:      General: Abdomen is flat. Bowel sounds are normal. There is no distension.      Palpations: Abdomen is soft. There is no mass.      Tenderness: There is abdominal tenderness (Slight discomfort on palpation of epigastrium). There is no guarding.   Musculoskeletal:         General: No swelling.      Cervical back: Neck supple.      Right lower leg: No edema.      Left lower leg: No edema.   Skin:     General: Skin is warm and dry.      Capillary Refill: Capillary refill takes less than 2 seconds.   Neurological:      Mental Status: He is alert and oriented to person, place, and time. Mental status is at baseline.   Psychiatric:         Mood and Affect: Mood normal.

## 2024-11-26 NOTE — ASSESSMENT & PLAN NOTE
Refill albuterol  Orders:    albuterol (PROVENTIL HFA,VENTOLIN HFA) 90 mcg/act inhaler; INHALE 1 PUFF EVERY 4-6 HOURS AS NEEDED

## 2024-12-01 ENCOUNTER — RA CDI HCC (OUTPATIENT)
Dept: OTHER | Facility: HOSPITAL | Age: 64
End: 2024-12-01

## 2024-12-02 DIAGNOSIS — J45.20 MILD INTERMITTENT ASTHMA WITHOUT COMPLICATION: ICD-10-CM

## 2024-12-02 RX ORDER — FLUTICASONE PROPIONATE AND SALMETEROL XINAFOATE 115; 21 UG/1; UG/1
2 AEROSOL, METERED RESPIRATORY (INHALATION) 2 TIMES DAILY
Qty: 12 G | Refills: 5 | Status: SHIPPED | OUTPATIENT
Start: 2024-12-02

## 2024-12-02 NOTE — PROGRESS NOTES
HCC coding opportunities          Chart Reviewed number of suggestions sent to Provider: 1  E11.40     Patients Insurance     Medicare Insurance: [employer] Advantage

## 2024-12-03 ENCOUNTER — APPOINTMENT (OUTPATIENT)
Dept: LAB | Facility: CLINIC | Age: 64
End: 2024-12-03
Payer: COMMERCIAL

## 2024-12-03 DIAGNOSIS — C90.00 MULTIPLE MYELOMA NOT HAVING ACHIEVED REMISSION (HCC): ICD-10-CM

## 2024-12-03 DIAGNOSIS — C90.00 MULTIPLE MYELOMA NOT HAVING ACHIEVED REMISSION (HCC): Primary | ICD-10-CM

## 2024-12-03 LAB
ALBUMIN SERPL BCG-MCNC: 4 G/DL (ref 3.5–5)
ALP SERPL-CCNC: 102 U/L (ref 34–104)
ALT SERPL W P-5'-P-CCNC: 36 U/L (ref 7–52)
ANION GAP SERPL CALCULATED.3IONS-SCNC: 6 MMOL/L (ref 4–13)
AST SERPL W P-5'-P-CCNC: 22 U/L (ref 13–39)
BASOPHILS # BLD AUTO: 0.08 THOUSANDS/ΜL (ref 0–0.1)
BASOPHILS NFR BLD AUTO: 1 % (ref 0–1)
BILIRUB SERPL-MCNC: 0.62 MG/DL (ref 0.2–1)
BUN SERPL-MCNC: 15 MG/DL (ref 5–25)
CALCIUM SERPL-MCNC: 9.4 MG/DL (ref 8.4–10.2)
CHLORIDE SERPL-SCNC: 101 MMOL/L (ref 96–108)
CO2 SERPL-SCNC: 29 MMOL/L (ref 21–32)
CREAT SERPL-MCNC: 0.99 MG/DL (ref 0.6–1.3)
EOSINOPHIL # BLD AUTO: 0.29 THOUSAND/ΜL (ref 0–0.61)
EOSINOPHIL NFR BLD AUTO: 4 % (ref 0–6)
ERYTHROCYTE [DISTWIDTH] IN BLOOD BY AUTOMATED COUNT: 14.6 % (ref 11.6–15.1)
FERRITIN SERPL-MCNC: 51 NG/ML (ref 24–336)
GFR SERPL CREATININE-BSD FRML MDRD: 79 ML/MIN/1.73SQ M
GLUCOSE P FAST SERPL-MCNC: 170 MG/DL (ref 65–99)
HCT VFR BLD AUTO: 42 % (ref 36.5–49.3)
HGB BLD-MCNC: 13.7 G/DL (ref 12–17)
IMM GRANULOCYTES # BLD AUTO: 0.02 THOUSAND/UL (ref 0–0.2)
IMM GRANULOCYTES NFR BLD AUTO: 0 % (ref 0–2)
IRON SATN MFR SERPL: 17 % (ref 15–50)
IRON SERPL-MCNC: 52 UG/DL (ref 50–212)
LYMPHOCYTES # BLD AUTO: 1.71 THOUSANDS/ΜL (ref 0.6–4.47)
LYMPHOCYTES NFR BLD AUTO: 22 % (ref 14–44)
MCH RBC QN AUTO: 29.5 PG (ref 26.8–34.3)
MCHC RBC AUTO-ENTMCNC: 32.6 G/DL (ref 31.4–37.4)
MCV RBC AUTO: 90 FL (ref 82–98)
MONOCYTES # BLD AUTO: 0.51 THOUSAND/ΜL (ref 0.17–1.22)
MONOCYTES NFR BLD AUTO: 7 % (ref 4–12)
NEUTROPHILS # BLD AUTO: 5.09 THOUSANDS/ΜL (ref 1.85–7.62)
NEUTS SEG NFR BLD AUTO: 66 % (ref 43–75)
NRBC BLD AUTO-RTO: 0 /100 WBCS
PLATELET # BLD AUTO: 322 THOUSANDS/UL (ref 149–390)
PMV BLD AUTO: 9.4 FL (ref 8.9–12.7)
POTASSIUM SERPL-SCNC: 4.9 MMOL/L (ref 3.5–5.3)
PROT SERPL-MCNC: 7.8 G/DL (ref 6.4–8.4)
RBC # BLD AUTO: 4.65 MILLION/UL (ref 3.88–5.62)
SODIUM SERPL-SCNC: 136 MMOL/L (ref 135–147)
TIBC SERPL-MCNC: 307 UG/DL (ref 250–450)
UIBC SERPL-MCNC: 255 UG/DL (ref 155–355)
WBC # BLD AUTO: 7.7 THOUSAND/UL (ref 4.31–10.16)

## 2024-12-03 PROCEDURE — 83550 IRON BINDING TEST: CPT

## 2024-12-03 PROCEDURE — 80053 COMPREHEN METABOLIC PANEL: CPT

## 2024-12-03 PROCEDURE — 36415 COLL VENOUS BLD VENIPUNCTURE: CPT

## 2024-12-03 PROCEDURE — 83540 ASSAY OF IRON: CPT

## 2024-12-03 PROCEDURE — 82728 ASSAY OF FERRITIN: CPT

## 2024-12-03 PROCEDURE — 85025 COMPLETE CBC W/AUTO DIFF WBC: CPT

## 2024-12-05 ENCOUNTER — OFFICE VISIT (OUTPATIENT)
Age: 64
End: 2024-12-05
Payer: COMMERCIAL

## 2024-12-05 ENCOUNTER — APPOINTMENT (OUTPATIENT)
Dept: LAB | Facility: CLINIC | Age: 64
End: 2024-12-05
Payer: COMMERCIAL

## 2024-12-05 VITALS
SYSTOLIC BLOOD PRESSURE: 128 MMHG | BODY MASS INDEX: 37.52 KG/M2 | TEMPERATURE: 97.8 F | DIASTOLIC BLOOD PRESSURE: 68 MMHG | HEART RATE: 106 BPM | OXYGEN SATURATION: 95 % | HEIGHT: 72 IN | WEIGHT: 277 LBS

## 2024-12-05 DIAGNOSIS — K74.02 STAGE 3 HEPATIC FIBROSIS: ICD-10-CM

## 2024-12-05 DIAGNOSIS — K76.0 METABOLIC DYSFUNCTION-ASSOCIATED STEATOTIC LIVER DISEASE (MASLD): Primary | ICD-10-CM

## 2024-12-05 DIAGNOSIS — K76.0 HEPATIC STEATOSIS: ICD-10-CM

## 2024-12-05 DIAGNOSIS — Z11.59 ENCOUNTER FOR SCREENING FOR OTHER VIRAL DISEASES: ICD-10-CM

## 2024-12-05 LAB
INR PPP: 0.91 (ref 0.85–1.19)
PROTHROMBIN TIME: 12.9 SECONDS (ref 12.3–15)

## 2024-12-05 PROCEDURE — 86705 HEP B CORE ANTIBODY IGM: CPT

## 2024-12-05 PROCEDURE — 86704 HEP B CORE ANTIBODY TOTAL: CPT

## 2024-12-05 PROCEDURE — 85610 PROTHROMBIN TIME: CPT

## 2024-12-05 PROCEDURE — 36415 COLL VENOUS BLD VENIPUNCTURE: CPT

## 2024-12-05 PROCEDURE — 99204 OFFICE O/P NEW MOD 45 MIN: CPT | Performed by: STUDENT IN AN ORGANIZED HEALTH CARE EDUCATION/TRAINING PROGRAM

## 2024-12-05 PROCEDURE — 87340 HEPATITIS B SURFACE AG IA: CPT

## 2024-12-05 PROCEDURE — 82104 ALPHA-1-ANTITRYPSIN PHENO: CPT

## 2024-12-05 PROCEDURE — G2211 COMPLEX E/M VISIT ADD ON: HCPCS | Performed by: STUDENT IN AN ORGANIZED HEALTH CARE EDUCATION/TRAINING PROGRAM

## 2024-12-05 PROCEDURE — 82103 ALPHA-1-ANTITRYPSIN TOTAL: CPT

## 2024-12-05 PROCEDURE — 86803 HEPATITIS C AB TEST: CPT

## 2024-12-05 RX ORDER — RESMETIROM 100 MG/1
TABLET, COATED ORAL
COMMUNITY
Start: 2024-11-27

## 2024-12-05 NOTE — PROGRESS NOTES
Name: Brittany Lee      : 1960      MRN: [id number]  Encounter Provider: Nayeli Walter MD  Encounter Date: 2024   Encounter department: [facility] GASTROENTEROLOGY SPECIAL[facility] [address]  :  Assessment & Plan  Stage 3 hepatic fibrosis  Mr. Soto is a 64-year-old male with a pertinent medical history of hepatic steatosis, smoldering multiple myeloma, obesity, insulin-dependent diabetes complicated by polyneuropathy, hypertension, hyperlipidemia, and GERD who presents to establish care with hepatology after his most recent ultrasound elastography showed concern for F3 fibrosis.  It is certainly interesting that he had F2 fibrosis and  that regressed and now, his elastography showed a drastic change from absent to F3 fibrosis over the past 1 year.  He is on Trulicity and has lost about 40 pounds over the last few years.        Suspect he has MASLD and his risk factors for developing liver disease include metabolic syndrome as well as history of multiple myeloma although, I suspect that the latter would not present with steatotic liver disease.  Clinically, there is no other evidence of chronic liver disease as his liver enzymes, albumin, platelet count are all within normal limits    Plan:  - Will check alpha 1 antitrypsin phenotype, chronic hepatitis panel, PT/INR (to check liver synthetic function)  - Given the variation in his elastography over the years, will check and Enhanced liver fibrosis score.   - If his ELF show significant fibrosis, this combined with his elastography showing F3 disease would give us two noninvasive measures of fibrosis.  If this is the case, he will benefit from treatment with Rezdiffra  - Will reach out to him once the results return and if still inconclusive, we will discuss possibly liver biopsy.      Orders:    enhanced liver fibrosis (elf) score; Future    Hepatic steatosis    Orders:    Chronic Hepatitis Panel; Future    Alpha 1 Antitrypsin Phenotype; Future     Protime-INR; Future    enhanced liver fibrosis (elf) score; Future    Encounter for screening for other viral diseases    Orders:    Chronic Hepatitis Panel; Future      Follow-up with general gastroenterologist for his GERD and Kent's esophagus.  He will follow-up with hepatology in 3 to 6 months      History of Present Illness     HPI  Brittany Lee is a 64 y.o. male with past medical history of smoldering multiple myeloma, obesity, insulin-dependent type 2 diabetes, HTN, HLD, lower extremity polyneuropathy, GERD and ventral hernia repair, Kent's esophagus who presents to establish care with hepatology.      He follows with Dr Maldonado a private gastroenterologist who was assessing for steatotic liver disease.  He was referred to hepatology after his most recent ultrasound elastography showed F3 fibrosis.  His general gastroenterologist prescribed Rezdiffra but he has not started taking it yet. He reports knowledge of fatty liver disease.  He has been on Trulicity for many years. States he was about 321 pounds when he was started on it and is now down to 277 pounds.  Only consumes alcohol socially and denies family history of liver disease, heavy alcohol use, tobacco use or recreational drug use.    Elastography early November 2024 showed S3 steatosis and inconclusive Metavir score.  A repeat US Elastograpgy in again November 2024 that showed S3 steatosis and now F3 fibrosis. On chart review he has had evidence of hepatic steatosis on abdominal imaging as far back as 2015.  Ultrasound elastography in 2021 showed S3 steatosis and F2 fibrosis.  Subsequent imaging in 2022 and 2023 showed persistent S3 disease but mild/absent fibrosis.       Labs from December 2024 showed AST 22, ALT 36, alkaline phosphatase 102, albumin 4.0, total bilirubin 0.62, hemoglobin 13.7, platelet count 322.  BMI 38.2.  Hepatitis C in 2020 negative.  Iron panel from 2024 with normal ferritin.    Colonoscopy in 2018 with 3 small polyps  resected.      Review of Systems  See HPI     Objective   /68   Pulse (!) 106   Temp 97.8 °F (36.6 °C)   Ht 6' (1.829 m)   Wt 126 kg (277 lb)   SpO2 95%   BMI 37.57 kg/m²      Physical Exam  Vitals and nursing note reviewed.   Constitutional:       General: He is not in acute distress.     Appearance: He is well-developed.   HENT:      Head: Normocephalic and atraumatic.   Eyes:      Conjunctiva/sclera: Conjunctivae normal.   Cardiovascular:      Rate and Rhythm: Normal rate and regular rhythm.      Heart sounds: No murmur heard.  Pulmonary:      Effort: Pulmonary effort is normal. No respiratory distress.      Breath sounds: Normal breath sounds.   Abdominal:      Palpations: Abdomen is soft.      Tenderness: There is no abdominal tenderness.   Musculoskeletal:         General: No swelling.      Cervical back: Neck supple.   Skin:     General: Skin is warm and dry.      Capillary Refill: Capillary refill takes less than 2 seconds.   Neurological:      Mental Status: He is alert.   Psychiatric:         Mood and Affect: Mood normal.       Nayeli Walter MD  Gastroenterology Fellow, PGY- 4  Available on [employer] Secure Chat  12/5/2024 4:03 PM

## 2024-12-06 ENCOUNTER — RESULTS FOLLOW-UP (OUTPATIENT)
Age: 64
End: 2024-12-06

## 2024-12-06 ENCOUNTER — CONSULT (OUTPATIENT)
Dept: INTERNAL MEDICINE CLINIC | Facility: CLINIC | Age: 64
End: 2024-12-06
Payer: COMMERCIAL

## 2024-12-06 VITALS
WEIGHT: 279 LBS | DIASTOLIC BLOOD PRESSURE: 72 MMHG | SYSTOLIC BLOOD PRESSURE: 112 MMHG | TEMPERATURE: 98.2 F | RESPIRATION RATE: 16 BRPM | BODY MASS INDEX: 36.98 KG/M2 | HEART RATE: 96 BPM | OXYGEN SATURATION: 98 % | HEIGHT: 73 IN

## 2024-12-06 DIAGNOSIS — E11.42 TYPE 2 DIABETES MELLITUS WITH DIABETIC POLYNEUROPATHY, WITHOUT LONG-TERM CURRENT USE OF INSULIN (HCC): ICD-10-CM

## 2024-12-06 LAB
HBV CORE AB SER QL: NORMAL
HBV CORE IGM SER QL: NORMAL
HBV SURFACE AG SER QL: NORMAL
HCV AB SER QL: NORMAL

## 2024-12-06 PROCEDURE — 99214 OFFICE O/P EST MOD 30 MIN: CPT | Performed by: INTERNAL MEDICINE

## 2024-12-06 RX ORDER — METFORMIN HYDROCHLORIDE 500 MG/1
500 TABLET, EXTENDED RELEASE ORAL 2 TIMES DAILY WITH MEALS
Qty: 60 TABLET | Refills: 0 | Status: SHIPPED | OUTPATIENT
Start: 2024-12-06 | End: 2025-01-05

## 2024-12-06 NOTE — PROGRESS NOTES
Name: Brittany Lee      : 1960      MRN: [id number]  Encounter Provider: Ijeoma Wilson Endo Resident  Encounter Date: 2024   Encounter department: [facility] INTERNAL MEDICINE [facility]  :  Assessment & Plan  Type 2 diabetes mellitus with diabetic polyneuropathy, without long-term current use of insulin (HCC)    Lab Results   Component Value Date    HGBA1C 7.0 (A) 2024   Regimen: Lantus 28 units, Metformin 1000 mg BID, Trulicity 3 mg once a weekly   AM blood sugars ranging in 120-140s when patient is adherent to night time insulin regimen.  Fairly controlled.   Due to symptoms of gastrointestinal discomfort and nausea, will change metformin to extended release.  Discontinue Metformin 1000 mg  Will start Metformin  mg BID  Will not adjust Trulicity dose at this time as diabetes is well controlled and also has positive effect in reducing hepatic fibrosis.  F/u visit in 3 months.    Orders:    metFORMIN (GLUCOPHAGE-XR) 500 mg 24 hr tablet; Take 1 tablet (500 mg total) by mouth 2 (two) times a day with meals        History of Present Illness   HPI  Brittany Lee is a 64 y.o. male with PMH of smoldering MM, obesity, insulin dependent TIIDM, HTN, HLD, who presents for follow up visit. Patient recently seen by gastroenterology yesterday for follow up liver elastography and found to have stage 3 hepatic fibrosis thought to be 2/2 to MASLD. Was recommended to begin Resmetirom by gastroenterology. He also states that he was recommended by gastroenterology to decrease his trulicity due to concerns for poor appetite. Of note patient states he had a recent episode of gastroenteritis outpatient in mid November which was spread by his family. His appetite is currently better than it was when he had this episode of gastroenteritis, but the patient does say he usually skips breakfast and lunch. He states he has not been taking his insulin as prescribed for the past few weeks due to have poor  "appetite with the gastroenteritis. Upon review of his glucometer his am blood sugars are between 120-140's. He denies any abdominal pain but he does endorse some epigastric discomfort, also has complaint of mild nausea.  History obtained from: patient    Review of Systems   Constitutional:  Negative for chills and fever.   HENT:  Negative for sore throat.    Respiratory:  Negative for cough and shortness of breath.    Cardiovascular:  Negative for chest pain and palpitations.   Gastrointestinal:  Positive for nausea. Negative for abdominal pain and vomiting.   Genitourinary:  Negative for dysuria and hematuria.   Skin:  Negative for color change and rash.   Neurological:  Negative for seizures.   All other systems reviewed and are negative.     Objective   /72 (BP Location: Left arm, Patient Position: Sitting, Cuff Size: Large)   Pulse 96   Temp 98.2 °F (36.8 °C) (Tympanic)   Resp 16   Ht 6' 1\" (1.854 m)   Wt 127 kg (279 lb)   SpO2 98%   BMI 36.81 kg/m²      Physical Exam  Constitutional:       General: He is not in acute distress.     Appearance: Normal appearance. He is not ill-appearing.   HENT:      Mouth/Throat:      Mouth: Mucous membranes are moist.   Eyes:      General: No scleral icterus.     Extraocular Movements: Extraocular movements intact.      Conjunctiva/sclera: Conjunctivae normal.   Cardiovascular:      Rate and Rhythm: Normal rate and regular rhythm.      Heart sounds: No murmur heard.  Pulmonary:      Effort: Pulmonary effort is normal. No respiratory distress.      Breath sounds: No stridor. No wheezing.   Abdominal:      General: Bowel sounds are normal. There is no distension.      Palpations: Abdomen is soft. There is no mass.      Tenderness: There is no abdominal tenderness.   Musculoskeletal:      Right lower leg: No edema.      Left lower leg: No edema.   Neurological:      Mental Status: He is alert and oriented to person, place, and time. Mental status is at baseline. "   Psychiatric:         Mood and Affect: Mood normal.         Behavior: Behavior normal.         Thought Content: Thought content normal.

## 2024-12-06 NOTE — PATIENT INSTRUCTIONS
For your upset gastrointestinal symptoms we have changed your metformin to extended release.  Please take metformin  mg one tablet twice daily with meals.  Please follow up to clinic in 3 months

## 2024-12-09 ENCOUNTER — OFFICE VISIT (OUTPATIENT)
Dept: HEMATOLOGY ONCOLOGY | Facility: CLINIC | Age: 64
End: 2024-12-09
Payer: COMMERCIAL

## 2024-12-09 VITALS
BODY MASS INDEX: 37.24 KG/M2 | TEMPERATURE: 98.1 F | OXYGEN SATURATION: 98 % | HEART RATE: 100 BPM | DIASTOLIC BLOOD PRESSURE: 70 MMHG | WEIGHT: 281 LBS | HEIGHT: 73 IN | SYSTOLIC BLOOD PRESSURE: 126 MMHG | RESPIRATION RATE: 18 BRPM

## 2024-12-09 DIAGNOSIS — D50.9 IRON DEFICIENCY ANEMIA, UNSPECIFIED IRON DEFICIENCY ANEMIA TYPE: ICD-10-CM

## 2024-12-09 DIAGNOSIS — D47.2 SMOLDERING MULTIPLE MYELOMA: Primary | ICD-10-CM

## 2024-12-09 PROBLEM — C90.00 MULTIPLE MYELOMA NOT HAVING ACHIEVED REMISSION (HCC): Status: RESOLVED | Noted: 2024-06-03 | Resolved: 2024-12-09

## 2024-12-09 PROCEDURE — 99213 OFFICE O/P EST LOW 20 MIN: CPT | Performed by: PHYSICIAN ASSISTANT

## 2024-12-09 NOTE — PROGRESS NOTES
Hematology/Oncology Outpatient Follow- up Note  Brittany Lee 64 y.o. male MRN: @ Encounter: [id number]        Date:  12/9/2024      Assessment / Plan:    1. 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   IgG kappa subtype  5/2018 bone marrow biopsy showed 12 to 15% plasma cells with normal cytogenetic and FISH panel for multiple myeloma,.    No evidence of endorgan damage.      #2  Chronic fatigue, diabetes, working with endocrinologist     #3  Cardenas, fatty liver.  Rezdiffra rx but he hasn't yet started.           HPI:  64-year-old  male with history of obesity, asthma, hypertension, dyslipidemia, diabetes mellitus type 2, had been complaining of tingling and numbness in his feet and hand for the past year, most recently a feeling of hypersensitivity involving the anterior aspect of the left thigh area, evaluation by Neurology on March 2018 showed monoclonal gammopathy with M protein of 1.40 grams/deciliters, IgG kappa.     He has normal vitamin B12 level, TSH, heavy metal screen, Lyme disease. CBC performed August 2017 showed hemoglobin of 12.8, WBC 6.6, platelets [id number], 71% neutrophils, 19% lymphocytes     A bone marrow biopsy in May 2018 showed 10-12% plasma cells, fish panel for multiple myeloma was negative, cytogenetics showed normal male chromosomes and deletion of Y chromosome in some of the cells consistent with normal finding in advanced age patient.     Bone skeletal survey 5/2018 showed no evidence of lytic lesions      Received IV Venofer on 09/2021, upper and lower endoscopy and capsule endoscopy was unremarkable.  12/3/24 ferritin stable at 51    Liver ultrasound showed significant fibrosis with steatosis      9/4/24 repeat EGD per Dr. Maldonado-pathology negative for dysplasia, findings of reflux esophagitis, evidence of Kent's esophagus    Intentional weight loss.  Has helped some with his neuropathy in his feet and his right knee pain    Review of Systems   Constitutional:   Positive for fatigue. Negative for appetite change, chills, diaphoresis, fever and unexpected weight change.   HENT:   Negative for mouth sores, nosebleeds, sore throat, tinnitus and voice change.    Eyes:  Negative for eye problems.   Respiratory:  Negative for chest tightness, cough, shortness of breath and wheezing.    Cardiovascular:  Negative for chest pain, leg swelling and palpitations.   Gastrointestinal:  Negative for abdominal distention, abdominal pain, blood in stool, constipation, diarrhea, nausea, rectal pain and vomiting.   Endocrine: Negative for hot flashes.   Genitourinary: Negative.     Musculoskeletal:  Positive for arthralgias and myalgias. Negative for gait problem.   Skin:  Negative for itching and rash.   Neurological:  Negative for dizziness, gait problem, headaches, light-headedness and numbness.   Hematological:  Negative for adenopathy.   Psychiatric/Behavioral:  Positive for sleep disturbance. Negative for confusion. The patient is not nervous/anxious.         Test Results:        Labs:   Lab Results   Component Value Date    HGB 13.7 12/03/2024    HCT 42.0 12/03/2024    MCV 90 12/03/2024     12/03/2024    WBC 7.70 12/03/2024    NRBC 0 12/03/2024     Lab Results   Component Value Date     05/17/2017    K 4.9 12/03/2024     12/03/2024    CO2 29 12/03/2024    ANIONGAP 9 01/13/2015    BUN 15 12/03/2024    CREATININE 0.99 12/03/2024    GLUCOSE 206 (H) 01/13/2015    GLUF 170 (H) 12/03/2024    CALCIUM 9.4 12/03/2024    CORRECTEDCA 9.8 10/18/2022    AST 22 12/03/2024    ALT 36 12/03/2024    ALKPHOS 102 12/03/2024    PROT 7.5 05/17/2017    BILITOT 0.5 05/17/2017    EGFR 79 12/03/2024           Imaging: US follow up  Result Date: 11/12/2024  Narrative: ELASTOGRAPHY, LIVER ULTRASOUND INDICATION: K76.0: Fatty (change of) liver, not elsewhere classified. COMPARISON: None TECHNIQUE: Targeted ultrasound of the liver was performed with a curvilinear transducer on a [employer] e10 utilizing  2D SWE. A total of 10 shear wave Elastography samples were obtained. FINDINGS: Shear Wave Elastography sampling was performed to evaluate stiffness changes within the liver associated with fibrosis. E1 11.89 kPa E2 10.07 kPa E3 10.08 kPa E4 10.92 kPa E5 10.18 kPa E6 10.46 kPa E7 9.7 kPa E8 9.84 kPa E9 10.63 kPa E10 11.91 kPa E median: 10.32 kPa. The corresponding Metavir score is F3 (severe fibrosis), 9.40-11.87 kPa. 1.77-1.99 m/s. IQR/median: 7.5% (IQR of less than 30% is considered a satisfactory data set). Note: The stage of liver fibrosis may be overestimated by clinical conditions unrelated to fibrosis, including but not limited to, nonfasting, hepatic inflammation, vascular congestion, biliary obstruction, and infiltrative liver disease. Furthermore, in  some patients with NAFLD, the cut-off values for compensated advanced chronic liver disease may be lower (7-9 kPa). In causes other than viral hepatitis and nonalcoholic fatty liver disease, the cut-off values are not well established. When comparing measurements across time, a clinically significant change should be considered when the delta change is greater than 10%. In all these conditions, however, liver stiffness values within the normal range exclude significant liver fibrosis. Ultrasound-guided attenuation parameter (UGAP) Liver Steatosis Grading A1 0.77 dB/cm/MHz A2 0.83 dB/cm/MHz A3 0.94 dB/cm/MHz A4 0.86 dB/cm/MHz A5 0.8 dB/cm/MHz A6 0.84 dB/cm/MHz A7 0.81 dB/cm/MHz A8 0.85 dB/cm/MHz A9 0.78 dB/cm/MHz A10 0.83 dB/cm/MHz Attenuation coefficient: 0.83 dB/cm/MHz Liver steatosis grading: S3 ( > 67% steatosis) > 0.76 dB/cm/MHz IQR/Med: 5.5% (Less than or equal to 30% is a satisfactory data set). Reference White paper for [employer] ultrasound-guided attenuation parameter https://www.[employer].com.au/-/[email or web address]?la=en-au     Impression: Metavir score: F3, Severe Fibrosis. According to the updated SRU consensus statement, a  "liver stiffness of 10.32 kPa, suggestive of compensated advanced chronic liver disease (cACLD) but need further test for confirmation. https://pubs.rsna.org/doi/10.1148/radiol.6005889152 Liver steatosis grading: S3 ( > 67% steatosis) Note: If this is a follow-up study to a prior UGAP exam, please note that the reference value cutoffs have been updated and may reflect a significant change in resulting steatosis grading score.  Please directly compare the number value reading for direct comparison rather than the prior S score. The study was marked in EPIC for immediate notification. Workstation performed: [id number]             Allergies:   Allergies   Allergen Reactions    Shellfish-Derived Products - Food Allergy Anaphylaxis     Clams and mussels, oysters  Lobster and crab ok    Diphenhydramine      Lightheadedness, nauseous, rapid heartbeat    Other Palpitations, Other (See Comments) and Vomiting     Clams     Current Medications: Reviewed  PMH/FH/SH:  Reviewed      Physical Exam:    2.48 meters squared    Ht Readings from Last 3 Encounters:   12/09/24 6' 1\" (1.854 m)   12/06/24 6' 1\" (1.854 m)   12/05/24 6' (1.829 m)        Wt Readings from Last 3 Encounters:   12/09/24 127 kg (281 lb)   12/06/24 127 kg (279 lb)   12/05/24 126 kg (277 lb)        Temp Readings from Last 3 Encounters:   12/09/24 98.1 °F (36.7 °C) (Temporal)   12/06/24 98.2 °F (36.8 °C) (Tympanic)   12/05/24 97.8 °F (36.6 °C)        BP Readings from Last 3 Encounters:   12/09/24 126/70   12/06/24 112/72   12/05/24 128/68             Physical Exam  Constitutional:       Appearance: Normal appearance. He is well-developed.   HENT:      Head: Normocephalic and atraumatic.   Cardiovascular:      Rate and Rhythm: Normal rate.   Pulmonary:      Effort: Pulmonary effort is normal.   Skin:     General: Skin is warm and dry.   Neurological:      General: No focal deficit present.      Mental Status: He is alert.   Psychiatric:         Behavior: Behavior " normal.         ECOG:       Emergency Contacts:    Extended Emergency Contact Information  Primary Emergency Contact: Merry Lee  Address: [address] [address] [facility]  Home Phone: [phone]  Mobile Phone: [phone]  Relation: Spouse

## 2024-12-10 LAB — MISCELLANEOUS LAB TEST RESULT: NORMAL

## 2024-12-11 ENCOUNTER — TELEPHONE (OUTPATIENT)
Age: 64
End: 2024-12-11

## 2024-12-11 LAB
A1AT PHENOTYP SERPL IFE: ABNORMAL
A1AT SERPL-MCNC: 193 MG/DL (ref 101–187)

## 2024-12-11 NOTE — TELEPHONE ENCOUNTER
Patients GI provider:  Dr. Walter    Number to return call: [phone]     Reason for call: Pt calling to review lab results.    Scheduled procedure/appointment date if applicable: Appt: 5/9/24

## 2024-12-17 ENCOUNTER — TELEPHONE (OUTPATIENT)
Age: 64
End: 2024-12-17

## 2024-12-17 DIAGNOSIS — K74.02 STAGE 3 HEPATIC FIBROSIS: Primary | ICD-10-CM

## 2024-12-17 NOTE — TELEPHONE ENCOUNTER
Patients GI provider:  Dr. Hooker    Number to return call: [phone]    Reason for call: Pt calling requesting lab results from 12/5/2024 stating he was to start a medication depending on the results, and if Dr. Hooker thinks add'l testing is needed that's fine too but it's been awhile and he still hasn't rec'd results / Explained a message would be sent     Scheduled procedure/appointment date if applicable: Apt/procedure 5/9/2025 with Dr. Hooker in [place]

## 2024-12-19 ENCOUNTER — TRANSCRIBE ORDERS (OUTPATIENT)
Dept: GASTROENTEROLOGY | Facility: CLINIC | Age: 64
End: 2024-12-19

## 2024-12-20 NOTE — TELEPHONE ENCOUNTER
Spoke with pt, pt would like to know what else he can do to improve his numbers other than dietary changes he has made.

## 2024-12-21 ENCOUNTER — HOSPITAL ENCOUNTER (EMERGENCY)
Facility: HOSPITAL | Age: 64
Discharge: HOME | End: 2024-12-21
Attending: EMERGENCY MEDICINE
Payer: COMMERCIAL

## 2024-12-21 VITALS
OXYGEN SATURATION: 95 % | RESPIRATION RATE: 18 BRPM | SYSTOLIC BLOOD PRESSURE: 160 MMHG | HEART RATE: 104 BPM | DIASTOLIC BLOOD PRESSURE: 70 MMHG | TEMPERATURE: 99.6 F

## 2024-12-21 DIAGNOSIS — K05.30 PERIODONTITIS: Primary | ICD-10-CM

## 2024-12-21 DIAGNOSIS — K04.7 DENTAL INFECTION: ICD-10-CM

## 2024-12-21 DIAGNOSIS — K08.89 TOOTH PAIN: ICD-10-CM

## 2024-12-21 PROCEDURE — 99284 EMERGENCY DEPT VISIT MOD MDM: CPT | Performed by: EMERGENCY MEDICINE

## 2024-12-21 PROCEDURE — 99283 EMERGENCY DEPT VISIT LOW MDM: CPT

## 2024-12-21 RX ADMIN — AMOXICILLIN AND CLAVULANATE POTASSIUM 1 TABLET: 875; 125 TABLET, FILM COATED ORAL at 20:09

## 2024-12-22 NOTE — ED ATTENDING ATTESTATION
12/21/2024  I, Richy Aguilar MD, saw and evaluated the patient. I have discussed the patient with the resident/non-physician practitioner and agree with the resident's/non-physician practitioner's findings, Plan of Care, and MDM as documented in the resident's/non-physician practitioner's note, except where noted. All available labs and Radiology studies were reviewed.  I was present for key portions of any procedure(s) performed by the resident/non-physician practitioner and I was immediately available to provide assistance.       At this point I agree with the current assessment done in the Emergency Department.  I have conducted an independent evaluation of this patient a history and physical is as follows:    64-year-old male presented for evaluation of a fracture of tooth #19 on the buccal aspect of the posterior mesial corner occurring 3 to 4 weeks ago.  Notes recently he has developed some pain in the area like a throbbing sensation which is now radiating towards his ear and into the jaw.  He has been using 1300 mg of Tylenol every 8 hours with some improvement.  On exam he has minimal tenderness to the area.  Small area of dentin is visible.    Denies fever, chills or other systemic symptoms.  Temperature is mildly elevated here at 99.6 degrees.  Offered temporary capping which he declined.  Will give antibiotics to cover for infection.  He has follow-up with his dentist in 2 days for evaluation.  Unfortunately he had needed to cancel his prior appointment due to illness.    ED Course         Critical Care Time  Procedures

## 2024-12-22 NOTE — ED PROVIDER NOTES
Time reflects when diagnosis was documented in both MDM as applicable and the Disposition within this note       Time User Action Codes Description Comment    12/21/2024  8:06 PM Irineo Bhagat [K05.30] Periodontitis     12/21/2024  8:06 PM Irinoe Bhagat Add [K08.89] Tooth pain     12/21/2024  8:06 PM Irineo Bhagat [K04.7] Dental infection           ED Disposition       ED Disposition   Discharge    Condition   Stable    Date/Time   Sat Dec 21, 2024  8:07 PM    Comment   Brittany Lee discharge to home/self care.                   Assessment & Plan       Medical Decision Making  Patient seen and examined.  To the left lower second molar is cracked and tender.  There is tenderness over the lower jaw.  There is minimal tenderness over the left zygomatic.  Neurologic exam is within normal limits.  There is no erythema.  There is poor dentition.  Suspect dental fracture, tooth infection, gingivitis.  Will treat with Augmentin.  Discussed alternating Tylenol and Advil for pain control, patient states he understands and is agreeable to this plan.  All questions answered and return precautions discussed.    Risk  Prescription drug management.             Medications   amoxicillin-clavulanate (AUGMENTIN) 875-125 mg per tablet 1 tablet (1 tablet Oral Given 12/21/24 2009)       ED Risk Strat Scores                          SBIRT 22yo+      Flowsheet Row Most Recent Value   Initial Alcohol Screen: US AUDIT-C     1. How often do you have a drink containing alcohol? 1 Filed at: 12/21/2024 1929   3a. Male UNDER 65: How often do you have five or more drinks on one occasion? 0 Filed at: 12/21/2024 1929   Audit-C Score 1 Filed at: 12/21/2024 1929   SHOBHA: How many times in the past year have you...    Used an illegal drug or used a prescription medication for non-medical reasons? Never Filed at: 12/21/2024 1929                            History of Present Illness       Chief Complaint   Patient presents with    Dental Pain      Pt c/o of a headache and pressure on his left side of face. Pt has a cracked molar on the bottom left side believes its that. C/o some nausea and lightheadedness. Took tylenol @1700        Past Medical History:   Diagnosis Date    Anemia     Asthma     Benign positional vertigo     CPAP (continuous positive airway pressure) dependence     Diabetes mellitus (HCC)     borderline; diet controlled    Diabetes mellitus due to underlying condition with diabetic nephropathy, with long-term current use of insulin (HCC) 05/19/2021    Dyslipidemia     Gastroenteritis     GERD (gastroesophageal reflux disease)     Hyperlipidemia     Hypertension     Lipoma of neck     last assessed - [id number]    Multiple myeloma (HCC)     Sleep apnea     has symptoms but not been diagnosed    Wheezing     last assessed - 12Jan2015      Past Surgical History:   Procedure Laterality Date    COLONOSCOPY      ESOPHAGOGASTRODUODENOSCOPY  2010    Diagnostic    HAND SURGERY      MASS EXCISION Right 08/21/2017    Procedure: POSTERIOR SHOULDER MASS EXCISION; POSTERIOR NECK MASS EXCISION; CHEST WALL MASS EXCISION;  Surgeon: Sivakumar Quintero MD;  Location: [facility];  Service: General    MD RPR AA HERNIA RECR 3-10 CM REDUCIBLE N/A 4/9/2024    Procedure: REPAIR HERNIA VENTRAL LAPAROSCOPIC W/ ROBOT W/ MESH with Umbilicoplasty;  Surgeon: Masood Vázquez MD;  Location: [facility];  Service: General    THROAT SURGERY      states sedated for food bolus removal      Family History   Problem Relation Age of Onset    Hypertension Mother     Diabetes Mother     Hyperlipidemia Mother     Cancer Mother         Malignant neoplasm of the gastrointestinal tract    Diabetes type II Mother         Type 2 diabetes mellitus    Hypertension Father         Essential hypercholesterolemia    Hyperlipidemia Father     Heart disease Father         Arteriosclerotic cardiovascular disease (ASCVD)    Dementia Father     Heart attack Father     Cancer Father         prostate    Thyroid  disease Sister     Breast cancer Maternal Aunt     Stomach cancer Maternal Grandmother     Stroke Neg Hx       Social History     Tobacco Use    Smoking status: Never    Smokeless tobacco: Never   Vaping Use    Vaping status: Never Used   Substance Use Topics    Alcohol use: Not Currently     Comment: rare social occasion    Drug use: No      E-Cigarette/Vaping    E-Cigarette Use Never User       E-Cigarette/Vaping Substances    Nicotine No     THC No     CBD No     Flavoring No     Other No     Unknown No       I have reviewed and agree with the history as documented.     64-year-old man presenting with cracked tooth, dental pain, facial pain.  He states he cracked his tooth about a month ago has schedule an appoint with a dentist but had stomach bug and had to reschedule.  Rescheduled appointment is on Monday in 2 days.  Over the last 3 days the pain has gotten worse and is spread up his face to his cheekbone eyebrow and now extending over his head causing headache.  He states has been taking 1300 mg of Tylenol every 8 hours and this has been helping some but is no longer able to control the pain.  He has not been taking ibuprofen because he was told not to take ibuprofen.  He has a history of liver disease but no history of kidney disease.  He endorses occasional chills and a general feeling of malaise as well as nausea that developed over the last couple days while the pain has been worse.  He denies chest pain, shortness of breath, difficulty eating, vomiting, diarrhea, abdominal pain, syncope, presyncope, fevers.      History provided by:  Patient  Dental Pain  Associated symptoms: no congestion, no drooling, no facial swelling, no fever, no headaches and no oral lesions        Review of Systems   Constitutional:  Positive for chills. Negative for fatigue and fever.   HENT:  Positive for dental problem and sinus pressure. Negative for congestion, drooling, ear pain, facial swelling, hearing loss, mouth sores,  postnasal drip, rhinorrhea, sinus pain and sore throat.    Eyes:  Negative for pain and visual disturbance.   Respiratory:  Negative for cough, chest tightness and shortness of breath.    Cardiovascular:  Negative for chest pain and palpitations.   Gastrointestinal:  Negative for abdominal pain, constipation, diarrhea, nausea and vomiting.   Genitourinary:  Negative for difficulty urinating, dysuria and hematuria.   Musculoskeletal:  Negative for back pain.   Skin:  Negative for color change and rash.   Neurological:  Negative for dizziness, seizures, syncope, weakness, light-headedness, numbness and headaches.   All other systems reviewed and are negative.          Objective       ED Triage Vitals   Temperature Pulse Blood Pressure Respirations SpO2 Patient Position - Orthostatic VS   12/21/24 1926 12/21/24 1926 12/21/24 1926 12/21/24 1926 12/21/24 1926 12/21/24 1926   99.6 °F (37.6 °C) (!) 110 (!) 173/105 18 96 % Sitting      Temp Source Heart Rate Source BP Location FiO2 (%) Pain Score    12/21/24 1926 12/21/24 2000 12/21/24 1926 -- 12/21/24 1926    Oral Monitor Right arm  7      Vitals      Date and Time Temp Pulse SpO2 Resp BP Pain Score FACES Pain Rating User   12/21/24 2000 -- 104 95 % 18 160/70 -- -- SB   12/21/24 1926 99.6 °F (37.6 °C) 110 96 % 18 173/105 7 -- JA            Physical Exam  Constitutional:       General: He is not in acute distress.     Appearance: He is not diaphoretic.   HENT:      Head: Normocephalic and atraumatic.      Nose: No congestion or rhinorrhea.      Mouth/Throat:      Mouth: Mucous membranes are moist. No lacerations or oral lesions.      Dentition: Abnormal dentition. Does not have dentures. Dental tenderness, gingival swelling and dental caries present. No dental abscesses.      Pharynx: No oropharyngeal exudate.   Eyes:      General: No scleral icterus.  Cardiovascular:      Rate and Rhythm: Normal rate and regular rhythm.      Heart sounds: Normal heart sounds. No murmur  heard.     No friction rub. No gallop.   Pulmonary:      Effort: No respiratory distress.      Breath sounds: Normal breath sounds. No wheezing, rhonchi or rales.   Abdominal:      General: Abdomen is flat. There is no distension.      Palpations: Abdomen is soft.      Tenderness: There is no abdominal tenderness.   Lymphadenopathy:      Cervical: No cervical adenopathy.   Skin:     General: Skin is warm and dry.      Capillary Refill: Capillary refill takes less than 2 seconds.   Neurological:      General: No focal deficit present.      Mental Status: He is alert and oriented to person, place, and time.         Results Reviewed       None            No orders to display       Procedures    ED Medication and Procedure Management   Prior to Admission Medications   Prescriptions Last Dose Informant Patient Reported? Taking?   B Complex-Biotin-FA (VITAMIN B50 COMPLEX PO)  Self Yes No   Blood Glucose Monitoring Suppl (OneTouch Verio) w/Device KIT  Self No No   Sig: Use 3 (three) times a day   Echinacea 380 MG CAPS  Self Yes No   Sig: Take by mouth 2 daily am   Ginkgo Biloba (GINKOBA PO)  Self Yes No   Sig: Take 60 mg by mouth   Insulin Glargine Solostar (Lantus SoloStar) 100 UNIT/ML SOPN  Self No No   Sig: ADMINISTER 28 UNITS UNDER THE SKIN AT NIGHT   Insulin Pen Needle 31G X 5 MM MISC  Self No No   Sig: Use daily   Lancets (OneTouch Delica Plus [id number]) MISC  Self No No   Sig: TEST THREE TIMES DAILY   MAGNESIUM CITRATE PO  Self Yes No   Sig: Take 1 tablet by mouth daily     Multiple Vitamins-Minerals (MULTIVITAMIN ADULTS 50+ PO)  Self Yes No   Sig: Take by mouth daily   OneTouch Verio test strip  Self No No   Sig: USE THREE TIMES DAILY AS INSTRUCTED   Rezdiffra  Self Yes No   acetaminophen (TYLENOL) 650 mg CR tablet  Self Yes No   Sig: Take 650 mg by mouth once   albuterol (PROVENTIL HFA,VENTOLIN HFA) 90 mcg/act inhaler  Self No No   Sig: INHALE 1 PUFF EVERY 4-6 HOURS AS NEEDED   ascorbic acid (VITAMIN C) 500 mg  tablet  Self Yes No   Sig: Take 500 mg by mouth daily   atorvastatin (LIPITOR) 40 mg tablet  Self No No   Sig: TAKE 1 TABLET(40 MG) BY MOUTH DAILY   calcium citrate-vitamin D 315 mg-5 mcg tablet  Self Yes No   Sig: Take 1 tablet by mouth 2 (two) times a day   cyanocobalamin (VITAMIN B-12) 100 MCG tablet  Self Yes No   Sig: Take 100 mcg by mouth daily   dulaglutide (Trulicity) 3 MG/0.5ML injection  Self No No   Sig: ADMINISTER 3 MG UNDER THE SKIN 1 TIME EVERY WEEK   fluticasone (FLONASE) 50 mcg/act nasal spray  Self No No   Sig: SHAKE LIQUID AND USE 2 SPRAYS IN EACH NOSTRIL DAILY   fluticasone-salmeterol (Advair HFA) 115-21 MCG/ACT inhaler  Self No No   Sig: Inhale 2 puffs 2 (two) times a day Rinse mouth after use   gabapentin (NEURONTIN) 100 mg capsule  Self No No   Sig: TAKE 1 CAPSULE(100 MG) BY MOUTH DAILY AT BEDTIME   lisinopril (ZESTRIL) 40 mg tablet  Self No No   Sig: Take 1 tablet (40 mg total) by mouth daily   loratadine (CLARITIN) 10 mg tablet  Self No No   Sig: TAKE 1 TABLET BY MOUTH EVERY DAY   metFORMIN (GLUCOPHAGE-XR) 500 mg 24 hr tablet   No No   Sig: Take 1 tablet (500 mg total) by mouth 2 (two) times a day with meals   omeprazole (PriLOSEC) 40 MG capsule  Self No No   Sig: Take 1 capsule (40 mg total) by mouth daily      Facility-Administered Medications: None     Discharge Medication List as of 12/21/2024  8:07 PM        START taking these medications    Details   amoxicillin-clavulanate (AUGMENTIN) 875-125 mg per tablet Take 1 tablet by mouth every 12 (twelve) hours for 7 days, Starting Sat 12/21/2024, Until Sat 12/28/2024, Normal           CONTINUE these medications which have NOT CHANGED    Details   acetaminophen (TYLENOL) 650 mg CR tablet Take 650 mg by mouth once, Historical Med      albuterol (PROVENTIL HFA,VENTOLIN HFA) 90 mcg/act inhaler INHALE 1 PUFF EVERY 4-6 HOURS AS NEEDED, Normal      ascorbic acid (VITAMIN C) 500 mg tablet Take 500 mg by mouth daily, Historical Med      atorvastatin  (LIPITOR) 40 mg tablet TAKE 1 TABLET(40 MG) BY MOUTH DAILY, Normal      B Complex-Biotin-FA (VITAMIN B50 COMPLEX PO) Historical Med      Blood Glucose Monitoring Suppl (OneTouch Verio) w/Device KIT Use 3 (three) times a day, Starting Fri 6/16/2023, Normal      calcium citrate-vitamin D 315 mg-5 mcg tablet Take 1 tablet by mouth 2 (two) times a day, Historical Med      cyanocobalamin (VITAMIN B-12) 100 MCG tablet Take 100 mcg by mouth daily, Historical Med      dulaglutide (Trulicity) 3 MG/0.5ML injection ADMINISTER 3 MG UNDER THE SKIN 1 TIME EVERY WEEK, Normal      Echinacea 380 MG CAPS Take by mouth 2 daily am, Historical Med      fluticasone (FLONASE) 50 mcg/act nasal spray SHAKE LIQUID AND USE 2 SPRAYS IN EACH NOSTRIL DAILY, Normal      fluticasone-salmeterol (Advair HFA) 115-21 MCG/ACT inhaler Inhale 2 puffs 2 (two) times a day Rinse mouth after use, Starting Mon 12/2/2024, Normal      gabapentin (NEURONTIN) 100 mg capsule TAKE 1 CAPSULE(100 MG) BY MOUTH DAILY AT BEDTIME, Normal      Ginkgo Biloba (GINKOBA PO) Take 60 mg by mouth, Historical Med      Insulin Glargine Solostar (Lantus SoloStar) 100 UNIT/ML SOPN ADMINISTER 28 UNITS UNDER THE SKIN AT NIGHT, Normal      Insulin Pen Needle 31G X 5 MM MISC Use daily, Starting Wed 1/31/2024, Normal      Lancets (OneTouch Delica Plus [id number]) MISC TEST THREE TIMES DAILY, Normal      lisinopril (ZESTRIL) 40 mg tablet Take 1 tablet (40 mg total) by mouth daily, Starting Wed 8/16/2023, Normal      loratadine (CLARITIN) 10 mg tablet TAKE 1 TABLET BY MOUTH EVERY DAY, Starting Wed 7/31/2024, Normal      MAGNESIUM CITRATE PO Take 1 tablet by mouth daily  , Historical Med      metFORMIN (GLUCOPHAGE-XR) 500 mg 24 hr tablet Take 1 tablet (500 mg total) by mouth 2 (two) times a day with meals, Starting Fri 12/6/2024, Until Sun 1/5/2025, Normal      Multiple Vitamins-Minerals (MULTIVITAMIN ADULTS 50+ PO) Take by mouth daily, Historical Med      omeprazole (PriLOSEC) 40 MG capsule  Take 1 capsule (40 mg total) by mouth daily, Starting Fri 2/9/2024, Normal      OneTouch Verio test strip USE THREE TIMES DAILY AS INSTRUCTED, Normal      [facility]           No discharge procedures on file.  ED SEPSIS DOCUMENTATION   Time reflects when diagnosis was documented in both MDM as applicable and the Disposition within this note       Time User Action Codes Description Comment    12/21/2024  8:06 PM Irineo Bhagat [K05.30] Periodontitis     12/21/2024  8:06 PM Irineo Bhagat [K08.89] Tooth pain     12/21/2024  8:06 PM Irineo Bhagat [K04.7] Dental infection                  Irineo Bhagat MD  12/21/24 2015

## 2024-12-26 NOTE — TELEPHONE ENCOUNTER
Spoke with pt. Relayed recommendations as per dr hahn. Discussed mediterranean diet. All questions and concerns addressed to pt's satisfaction. MELD lab Orders placed for 5/9/25 OV with Dr Hahn as per pt request. Pt verbalized understanding of all information

## 2025-01-07 ENCOUNTER — TELEPHONE (OUTPATIENT)
Age: 65
End: 2025-01-07

## 2025-01-07 NOTE — TELEPHONE ENCOUNTER
dulaglutide (Trulicity) 3 MG/0.5ML injection       Summary: ADMINISTER 3 MG UNDER THE SKIN 1 TIME EVERY WEEK, Normal  Start: 10/04/2024Ord/Sold: 10/04/2024 (O)Ordered On: 10/04/2024Pharmacy: [id number] - [place], [place] - [address]: Taking: Long-term: Med Note:            Ordering Department: [facility]  Authorized By: Kelly Velásquez MD  Dispense: 6 mL  Refills: 1 ordered        Patient has gotten a new insurance and will need a new medication auth replaced. Please advise out to patient with any additional questions.

## 2025-01-08 NOTE — TELEPHONE ENCOUNTER
PA for Trulicity 3 MG/0.5ML injection SUBMITTED to [facility]    via    [x]CMM-KEY: [id number]      [x]PA sent as URGENT    All office notes, labs and other pertaining documents and studies sent. Clinical questions answered. Awaiting determination from insurance company.     Turnaround time for your insurance to make a decision on your Prior Authorization can take 7-21 business days.

## 2025-01-08 NOTE — TELEPHONE ENCOUNTER
[place] for Trulicity 3 MG/0.5ML injection  APPROVED     Date(s) approved 11/8/24-01/07/26    Case # [id number]    Patient advised by          [][employer]hart Message  []Phone call   [x]LMOM  []L/M to call office as no active Communication consent on file  []Unable to leave detailed message as VM not approved on Communication consent       Pharmacy advised by    [x]Fax  []Phone call    Approval letter scanned into [employer] Yes

## 2025-01-09 ENCOUNTER — TELEPHONE (OUTPATIENT)
Dept: GASTROENTEROLOGY | Facility: CLINIC | Age: 65
End: 2025-01-09

## 2025-02-05 DIAGNOSIS — E08.21 DIABETES MELLITUS DUE TO UNDERLYING CONDITION WITH DIABETIC NEPHROPATHY, WITH LONG-TERM CURRENT USE OF INSULIN (HCC): ICD-10-CM

## 2025-02-05 DIAGNOSIS — Z79.4 DIABETES MELLITUS DUE TO UNDERLYING CONDITION WITH DIABETIC NEPHROPATHY, WITH LONG-TERM CURRENT USE OF INSULIN (HCC): ICD-10-CM

## 2025-02-12 DIAGNOSIS — E11.42 TYPE 2 DIABETES MELLITUS WITH DIABETIC POLYNEUROPATHY, WITHOUT LONG-TERM CURRENT USE OF INSULIN (HCC): ICD-10-CM

## 2025-02-12 NOTE — TELEPHONE ENCOUNTER
Reason for call:   [x] Refill   [] Prior Auth  [] Other:     Office:   [x] PCP/Provider - [facility]   [] Specialty/Provider -     Medication: metFORMIN (GLUCOPHAGE-XR) 500 mg 24 hr tab     Dose/Frequency: 500 mg, 2 times daily with meals     Quantity: 180     Pharmacy: [id number]    Does the patient have enough for 3 days?   [x] Yes   [] No - Send as HP to POD

## 2025-02-14 DIAGNOSIS — E08.21 DIABETES MELLITUS DUE TO UNDERLYING CONDITION WITH DIABETIC NEPHROPATHY, WITH LONG-TERM CURRENT USE OF INSULIN (HCC): ICD-10-CM

## 2025-02-14 DIAGNOSIS — Z79.4 DIABETES MELLITUS DUE TO UNDERLYING CONDITION WITH DIABETIC NEPHROPATHY, WITH LONG-TERM CURRENT USE OF INSULIN (HCC): ICD-10-CM

## 2025-02-14 RX ORDER — LANCETS 33 GAUGE
EACH MISCELLANEOUS 3 TIMES DAILY
Qty: 100 EACH | Refills: 5 | Status: SHIPPED | OUTPATIENT
Start: 2025-02-14

## 2025-02-18 RX ORDER — METFORMIN HYDROCHLORIDE 500 MG/1
500 TABLET, EXTENDED RELEASE ORAL 2 TIMES DAILY WITH MEALS
Qty: 180 TABLET | Refills: 1 | Status: SHIPPED | OUTPATIENT
Start: 2025-02-18 | End: 2025-03-20

## 2025-02-18 NOTE — TELEPHONE ENCOUNTER
Patient came to endo clinic and states he never got this medication, he is taking 500mg 1 tablet two times daily , he never picked up shelly 1000mg , because dr mao changed to 500mg. Please send script to [facility]

## 2025-03-07 ENCOUNTER — CONSULT (OUTPATIENT)
Dept: INTERNAL MEDICINE CLINIC | Facility: CLINIC | Age: 65
End: 2025-03-07
Payer: COMMERCIAL

## 2025-03-07 VITALS
WEIGHT: 280 LBS | TEMPERATURE: 98 F | SYSTOLIC BLOOD PRESSURE: 132 MMHG | HEART RATE: 98 BPM | BODY MASS INDEX: 37.11 KG/M2 | DIASTOLIC BLOOD PRESSURE: 80 MMHG | RESPIRATION RATE: 16 BRPM | HEIGHT: 73 IN | OXYGEN SATURATION: 98 %

## 2025-03-07 DIAGNOSIS — Z79.4 DIABETES MELLITUS DUE TO UNDERLYING CONDITION WITH DIABETIC NEPHROPATHY, WITH LONG-TERM CURRENT USE OF INSULIN (HCC): Primary | ICD-10-CM

## 2025-03-07 DIAGNOSIS — K76.0 FATTY LIVER: ICD-10-CM

## 2025-03-07 DIAGNOSIS — G47.33 OBSTRUCTIVE SLEEP APNEA: Chronic | ICD-10-CM

## 2025-03-07 DIAGNOSIS — E08.21 DIABETES MELLITUS DUE TO UNDERLYING CONDITION WITH DIABETIC NEPHROPATHY, WITH LONG-TERM CURRENT USE OF INSULIN (HCC): Primary | ICD-10-CM

## 2025-03-07 LAB — SL AMB POCT HEMOGLOBIN AIC: 6.9 (ref ?–6.5)

## 2025-03-07 PROCEDURE — 99214 OFFICE O/P EST MOD 30 MIN: CPT | Performed by: INTERNAL MEDICINE

## 2025-03-07 PROCEDURE — 83036 HEMOGLOBIN GLYCOSYLATED A1C: CPT | Performed by: INTERNAL MEDICINE

## 2025-03-07 RX ORDER — TIRZEPATIDE 5 MG/.5ML
5 INJECTION, SOLUTION SUBCUTANEOUS WEEKLY
Qty: 2 ML | Refills: 3 | Status: SHIPPED | OUTPATIENT
Start: 2025-03-07

## 2025-03-07 NOTE — ASSESSMENT & PLAN NOTE
"  Lab Results   Component Value Date    HGBA1C 6.9 (A) 03/07/2025     Patient doing well with current medication regimen  Records glucose levels twice daily   Most within range with a few \"cheat days\" showing readings of 211, and 190  Patient reports neuropathy in feet is stable  Patient is due for an eye appointment; he will schedule   He has not been able to follow up with nutrition; encouraged to do so  He notes no new acute symptomology    Orders:    POCT hemoglobin A1c    Tirzepatide (Mounjaro) 5 MG/0.5ML SOAJ; Inject 5 mg under the skin once a week    "

## 2025-03-07 NOTE — PROGRESS NOTES
"Name: Brittany Lee      : 1960      MRN: [id number]  Encounter Provider: Ijeoma Wilson Endo Resident  Encounter Date: 3/7/2025   Encounter department: [facility]  :  Assessment & Plan  Diabetes mellitus due to underlying condition with diabetic nephropathy, with long-term current use of insulin (HCC)    Lab Results   Component Value Date    HGBA1C 6.9 (A) 2025     Patient doing well with current medication regimen  Records glucose levels twice daily   Most within range with a few \"cheat days\" showing readings of 211, and 190  Patient reports neuropathy in feet is stable  Patient is due for an eye appointment; he will schedule   He has not been able to follow up with nutrition; encouraged to do so  He notes no new acute symptomology    Orders:    POCT hemoglobin A1c    Tirzepatide (Mounjaro) 5 MG/0.5ML SOAJ; Inject 5 mg under the skin once a week    Fatty liver  Patient following with GI for MASLD and Stage 3 hepatic fibrosis   Holding Rezediffra treatment at this time   Liver condition may benefit from increased weight loss     Plan:  -Tirzepatide trial     Obstructive sleep apnea  Patient utilizes CPAP   Will most likely benefit from weight loss     Plan  -Tirzepatide trial            History of Present Illness   Diabetes      Brittany Lee is a 64 y.o. male who presents for a 3 month follow up of his diabetes management.  He has been doing well with his medication adjustment of reduce metformin to 500 mg twice daily.  He he reports compliance with all medication with no missed doses.  He reports his blood glucose twice daily and overall reports that he has had very minimal hypoglycemic episodes; recently he has not experienced any side effects of hypoglycemia such as shakes.  He notes that he has the occasional cheat day but otherwise sticks to healthy meals with balanced protein, carbohydrates and fat.  He is planning on buying a treadmill for exercise.  Patient " "notes some mild distress concerning his liver condition, but is otherwise in good health and spirits.  No new acute symptomology noted.    History obtained from: patient    Review of Systems   Constitutional: Negative.    HENT: Negative.     Eyes:  Positive for visual disturbance (Reports vision change).   Cardiovascular: Negative.    Gastrointestinal: Negative.    Genitourinary:         Occasional difficulty with urination stream   Neurological: Negative.           Objective   /80 (BP Location: Left arm, Patient Position: Sitting, Cuff Size: Large)   Pulse 98   Temp 98 °F (36.7 °C) (Tympanic)   Resp 16   Ht 6' 1\" (1.854 m)   Wt 127 kg (280 lb)   SpO2 98%   BMI 36.94 kg/m²      Physical Exam  Constitutional:       General: He is not in acute distress.     Appearance: He is obese. He is not ill-appearing, toxic-appearing or diaphoretic.   HENT:      Nose: No congestion or rhinorrhea.   Eyes:      General: No scleral icterus.        Right eye: No discharge.         Left eye: No discharge.   Cardiovascular:      Rate and Rhythm: Normal rate.      Heart sounds: No murmur heard.     No friction rub. No gallop.   Pulmonary:      Effort: No respiratory distress.      Breath sounds: No stridor. No wheezing, rhonchi or rales.   Chest:      Chest wall: No tenderness.   Abdominal:      General: There is no distension.      Tenderness: There is no abdominal tenderness.   Musculoskeletal:      Cervical back: Normal range of motion.      Right lower leg: No edema.      Left lower leg: No edema.   Skin:     Coloration: Skin is not jaundiced.      Findings: No bruising.   Neurological:      General: No focal deficit present.      Mental Status: He is alert and oriented to person, place, and time. Mental status is at baseline.   Psychiatric:         Mood and Affect: Mood normal.         Behavior: Behavior normal.         Thought Content: Thought content normal.         Judgment: Judgment normal.           "

## 2025-03-07 NOTE — ASSESSMENT & PLAN NOTE
Patient utilizes CPAP   Will most likely benefit from weight loss     Plan  -Tirzepatide trial

## 2025-03-07 NOTE — ASSESSMENT & PLAN NOTE
Patient following with GI for MASLD and Stage 3 hepatic fibrosis   Holding Rezediffra treatment at this time   Liver condition may benefit from increased weight loss     Plan:  -Tirzepatide trial

## 2025-03-25 ENCOUNTER — TELEPHONE (OUTPATIENT)
Dept: CARDIOLOGY CLINIC | Facility: CLINIC | Age: 65
End: 2025-03-25

## 2025-04-30 DIAGNOSIS — E78.2 MIXED HYPERLIPIDEMIA: ICD-10-CM

## 2025-05-01 ENCOUNTER — TELEPHONE (OUTPATIENT)
Age: 65
End: 2025-05-01

## 2025-05-01 ENCOUNTER — TELEPHONE (OUTPATIENT)
Dept: HEMATOLOGY ONCOLOGY | Facility: CLINIC | Age: 65
End: 2025-05-01

## 2025-05-01 RX ORDER — ATORVASTATIN CALCIUM 40 MG/1
40 TABLET, FILM COATED ORAL DAILY
Qty: 90 TABLET | Refills: 1 | Status: SHIPPED | OUTPATIENT
Start: 2025-05-01

## 2025-05-01 NOTE — TELEPHONE ENCOUNTER
Calling questioning if lab work is need prior to upcoming appt  with Dr. Hooker / Told pt yes already in [facility].

## 2025-05-01 NOTE — TELEPHONE ENCOUNTER
Confirmed with patient's wife, appt. With Dr. Box on 6/9/25 has been rescheduled to 7/15/25 due to a change in the provider's schedule.

## 2025-05-02 ENCOUNTER — RESULTS FOLLOW-UP (OUTPATIENT)
Age: 65
End: 2025-05-02

## 2025-05-02 ENCOUNTER — APPOINTMENT (OUTPATIENT)
Dept: LAB | Facility: CLINIC | Age: 65
End: 2025-05-02
Payer: COMMERCIAL

## 2025-05-02 DIAGNOSIS — K74.02 STAGE 3 HEPATIC FIBROSIS: ICD-10-CM

## 2025-05-02 DIAGNOSIS — E08.21 DIABETES MELLITUS DUE TO UNDERLYING CONDITION WITH DIABETIC NEPHROPATHY, WITH LONG-TERM CURRENT USE OF INSULIN (HCC): ICD-10-CM

## 2025-05-02 DIAGNOSIS — Z79.4 DIABETES MELLITUS DUE TO UNDERLYING CONDITION WITH DIABETIC NEPHROPATHY, WITH LONG-TERM CURRENT USE OF INSULIN (HCC): ICD-10-CM

## 2025-05-02 LAB
ALBUMIN SERPL BCG-MCNC: 3.9 G/DL (ref 3.5–5)
ALP SERPL-CCNC: 97 U/L (ref 34–104)
ALT SERPL W P-5'-P-CCNC: 19 U/L (ref 7–52)
ANION GAP SERPL CALCULATED.3IONS-SCNC: 5 MMOL/L (ref 4–13)
AST SERPL W P-5'-P-CCNC: 13 U/L (ref 13–39)
BASOPHILS # BLD AUTO: 0.07 THOUSANDS/ÂΜL (ref 0–0.1)
BASOPHILS NFR BLD AUTO: 1 % (ref 0–1)
BILIRUB SERPL-MCNC: 0.5 MG/DL (ref 0.2–1)
BUN SERPL-MCNC: 13 MG/DL (ref 5–25)
CALCIUM SERPL-MCNC: 9.3 MG/DL (ref 8.4–10.2)
CHLORIDE SERPL-SCNC: 101 MMOL/L (ref 96–108)
CO2 SERPL-SCNC: 31 MMOL/L (ref 21–32)
CREAT SERPL-MCNC: 0.86 MG/DL (ref 0.6–1.3)
EOSINOPHIL # BLD AUTO: 0.47 THOUSAND/ÂΜL (ref 0–0.61)
EOSINOPHIL NFR BLD AUTO: 8 % (ref 0–6)
ERYTHROCYTE [DISTWIDTH] IN BLOOD BY AUTOMATED COUNT: 14.8 % (ref 11.6–15.1)
GFR SERPL CREATININE-BSD FRML MDRD: 90 ML/MIN/1.73SQ M
GLUCOSE P FAST SERPL-MCNC: 155 MG/DL (ref 65–99)
HCT VFR BLD AUTO: 41.3 % (ref 36.5–49.3)
HGB BLD-MCNC: 13 G/DL (ref 12–17)
IMM GRANULOCYTES # BLD AUTO: 0.02 THOUSAND/UL (ref 0–0.2)
IMM GRANULOCYTES NFR BLD AUTO: 0 % (ref 0–2)
INR PPP: 0.87 (ref 0.85–1.19)
LYMPHOCYTES # BLD AUTO: 1.58 THOUSANDS/ÂΜL (ref 0.6–4.47)
LYMPHOCYTES NFR BLD AUTO: 26 % (ref 14–44)
MCH RBC QN AUTO: 28.7 PG (ref 26.8–34.3)
MCHC RBC AUTO-ENTMCNC: 31.5 G/DL (ref 31.4–37.4)
MCV RBC AUTO: 91 FL (ref 82–98)
MONOCYTES # BLD AUTO: 0.48 THOUSAND/ÂΜL (ref 0.17–1.22)
MONOCYTES NFR BLD AUTO: 8 % (ref 4–12)
NEUTROPHILS # BLD AUTO: 3.54 THOUSANDS/ÂΜL (ref 1.85–7.62)
NEUTS SEG NFR BLD AUTO: 57 % (ref 43–75)
NRBC BLD AUTO-RTO: 0 /100 WBCS
PLATELET # BLD AUTO: 228 THOUSANDS/UL (ref 149–390)
PMV BLD AUTO: 9.6 FL (ref 8.9–12.7)
POTASSIUM SERPL-SCNC: 4.9 MMOL/L (ref 3.5–5.3)
PROT SERPL-MCNC: 7.6 G/DL (ref 6.4–8.4)
PROTHROMBIN TIME: 12.5 SECONDS (ref 12.3–15)
RBC # BLD AUTO: 4.53 MILLION/UL (ref 3.88–5.62)
SODIUM SERPL-SCNC: 137 MMOL/L (ref 135–147)
WBC # BLD AUTO: 6.16 THOUSAND/UL (ref 4.31–10.16)

## 2025-05-02 PROCEDURE — 80053 COMPREHEN METABOLIC PANEL: CPT

## 2025-05-02 PROCEDURE — 85025 COMPLETE CBC W/AUTO DIFF WBC: CPT

## 2025-05-02 PROCEDURE — 85610 PROTHROMBIN TIME: CPT

## 2025-05-02 PROCEDURE — 36415 COLL VENOUS BLD VENIPUNCTURE: CPT

## 2025-05-02 PROCEDURE — 83036 HEMOGLOBIN GLYCOSYLATED A1C: CPT

## 2025-05-03 LAB
EST. AVERAGE GLUCOSE BLD GHB EST-MCNC: 160 MG/DL
HBA1C MFR BLD: 7.2 %

## 2025-05-09 ENCOUNTER — OFFICE VISIT (OUTPATIENT)
Age: 65
End: 2025-05-09

## 2025-05-09 VITALS
WEIGHT: 278.8 LBS | SYSTOLIC BLOOD PRESSURE: 124 MMHG | DIASTOLIC BLOOD PRESSURE: 70 MMHG | BODY MASS INDEX: 36.95 KG/M2 | HEIGHT: 73 IN | HEART RATE: 80 BPM

## 2025-05-09 DIAGNOSIS — K74.02 STAGE 3 HEPATIC FIBROSIS: ICD-10-CM

## 2025-05-09 DIAGNOSIS — K76.0 METABOLIC DYSFUNCTION-ASSOCIATED STEATOTIC LIVER DISEASE (MASLD): Primary | ICD-10-CM

## 2025-05-09 NOTE — PROGRESS NOTES
Name: Brittany Lee      : 1960      MRN: [id number]  Encounter Provider: Fatou Hooker MD  Encounter Date: 2025   Encounter department: [facility] GASTROENTEROLOGY SPECIALTY [address]  :  Assessment & Plan  Stage 3 hepatic fibrosis  Mr. Soto is a 64-year-old male with a pertinent medical history of hepatic steatosis, smoldering multiple myeloma, obesity, insulin-dependent diabetes complicated by polyneuropathy, hypertension, hyperlipidemia, and GERD who presents for follow-up of MASLD. Last seen 6 months ago after ultrasound elastography showed concern for F3 fibrosis. Previous studies showed  F2 fibrosis in  that regressed and now, his elastography showed a drastic change from absent to F3 fibrosis.      He  lost about 40 pounds on Trulicity over the years and was recently switched to Mounjaro last month.           Suspect he has MASLD and his risk factors for developing liver disease include metabolic syndrome.  Clinically, there is no other evidence of chronic liver disease as his liver enzymes, albumin, platelet count are all within normal limits.  He is AST improved from 22 to 13 and his ALT improved from 36 to 19    ELF 9.1 suggest low risk of advanced fibrosis    Plan  - Will plan to Repeat US Elastography to restage him  - Continue weight loss with Mounjaro, dietary changes and exercise  -He was previously prescribed Rezdiffra by his general gastroenterologist.  If his elastography shows >F2 fibrosis with optimal study indices, then will plan to start Rezdiffra 100mg  - Recheck CMP, CBC and INR in 6 moths           History of Present Illness   Brittany Lee is a 64 y.o. male with past medical history of smoldering multiple myeloma, obesity, insulin-dependent type 2 diabetes, HTN, HLD, lower extremity polyneuropathy, GERD and ventral hernia repair, Kent's esophagus who presents  for follow up      He was last seen in the hepatology clinic in 2024 after ultrasound elastography showed  F3 fibrosis.  She was recommended to obtain an ELF  since his elastography results over the years have been inconsistent. ELF was 9.1. Most CBC, CMP and INR are stable as well.        He was previously on Trulicity and lost over 40 pounds.  He was recently switched to Mounjaro over the past 1 month.  He states that he had a little bit of weight over the holidays but is now working on losing it.  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          HPI    Review of Systems A complete review of systems is negative other than that noted above in the HPI.      Current Outpatient Medications   Medication Sig Dispense Refill    acetaminophen (TYLENOL) 650 mg CR tablet Take 650 mg by mouth once      albuterol (PROVENTIL HFA,VENTOLIN HFA) 90 mcg/act inhaler INHALE 1 PUFF EVERY 4-6 HOURS AS NEEDED 17 g 2    ascorbic acid (VITAMIN C) 500 mg tablet Take 500 mg by mouth daily      atorvastatin (LIPITOR) 40 mg tablet TAKE 1 TABLET(40 MG) BY MOUTH DAILY 90 tablet 1    B Complex-Biotin-FA (VITAMIN B50 COMPLEX PO)       Blood Glucose Monitoring Suppl (OneTouch Verio) w/Device KIT Use 3 (three) times a day 1 kit 0    calcium citrate-vitamin D 315 mg-5 mcg tablet Take 1 tablet by mouth 2 (two) times a day      cyanocobalamin (VITAMIN B-12) 100 MCG tablet Take 100 mcg by mouth daily      dulaglutide (Trulicity) 3 MG/0.5ML injection ADMINISTER 3 MG UNDER THE SKIN 1 TIME EVERY WEEK 6 mL 1    Echinacea 380 MG CAPS Take by mouth 2 daily am      fluticasone (FLONASE) 50 mcg/act nasal spray SHAKE LIQUID AND USE 2 SPRAYS IN EACH NOSTRIL DAILY 48 g 2    fluticasone-salmeterol (Advair HFA) 115-21 MCG/ACT inhaler Inhale 2 puffs 2 (two) times a day Rinse mouth after use 12 g 5    gabapentin (NEURONTIN) 100 mg capsule TAKE 1 CAPSULE(100 MG) BY MOUTH DAILY AT BEDTIME 30 capsule 5    Ginkgo Biloba (GINKOBA PO) Take 60 mg by mouth      Insulin Glargine Solostar (Lantus SoloStar) 100 UNIT/ML SOPN ADMINISTER 28 UNITS UNDER THE  SKIN AT NIGHT 15 mL 4    Insulin Pen Needle 31G X 5 MM MISC Use daily 100 each 5    Lancets (OneTouch Delica Plus Gsnzuh29B) MISC TEST THREE TIMES DAILY 100 each 5    lisinopril (ZESTRIL) 40 mg tablet Take 1 tablet (40 mg total) by mouth daily 90 tablet 4    loratadine (CLARITIN) 10 mg tablet TAKE 1 TABLET BY MOUTH EVERY DAY 90 tablet 2    MAGNESIUM CITRATE PO Take 1 tablet by mouth daily        metFORMIN (GLUCOPHAGE-XR) 500 mg 24 hr tablet Take 1 tablet (500 mg total) by mouth 2 (two) times a day with meals 180 tablet 1    Multiple Vitamins-Minerals (MULTIVITAMIN ADULTS 50+ PO) Take by mouth daily      omeprazole (PriLOSEC) 40 MG capsule Take 1 capsule (40 mg total) by mouth daily 90 capsule 3    OneTouch Verio test strip USE THREE TIMES DAILY AS INSTRUCTED 100 strip 5    Rezdiffra       Tirzepatide (Mounjaro) 5 MG/0.5ML SOAJ Inject 5 mg under the skin once a week 2 mL 3     No current facility-administered medications for this visit.     Objective   There were no vitals taken for this visit.    Physical Exam  Vitals and nursing note reviewed.   Constitutional:       General: He is not in acute distress.     Appearance: He is well-developed.   HENT:      Head: Normocephalic and atraumatic.   Eyes:      Conjunctiva/sclera: Conjunctivae normal.   Cardiovascular:      Rate and Rhythm: Normal rate and regular rhythm.      Heart sounds: No murmur heard.  Pulmonary:      Effort: Pulmonary effort is normal. No respiratory distress.      Breath sounds: Normal breath sounds.   Abdominal:      Palpations: Abdomen is soft.      Tenderness: There is no abdominal tenderness.   Musculoskeletal:         General: No swelling.      Cervical back: Neck supple.   Skin:     General: Skin is warm and dry.      Capillary Refill: Capillary refill takes less than 2 seconds.   Neurological:      Mental Status: He is alert.   Psychiatric:         Mood and Affect: Mood normal.            Lab Results: I personally reviewed relevant lab  results.           Nayeli Walter MD  Gastroenterology Fellow, PGY- 4  Available on [employer] Secure Chat  5/9/2025 10:52 AM

## 2025-05-09 NOTE — PATIENT INSTRUCTIONS
"Patient states he will schedule US Elastography/UGAP ; \"Central Scheduling\" phone number given to Pt at [facility] on 5/9/25.  "

## 2025-05-14 ENCOUNTER — HOSPITAL ENCOUNTER (OUTPATIENT)
Dept: ULTRASOUND IMAGING | Facility: HOSPITAL | Age: 65
Discharge: HOME | End: 2025-05-14
Payer: COMMERCIAL

## 2025-05-14 DIAGNOSIS — K76.0 METABOLIC DYSFUNCTION-ASSOCIATED STEATOTIC LIVER DISEASE (MASLD): ICD-10-CM

## 2025-05-14 DIAGNOSIS — K74.02 STAGE 3 HEPATIC FIBROSIS: ICD-10-CM

## 2025-05-14 PROCEDURE — 76981 USE PARENCHYMA: CPT

## 2025-05-29 ENCOUNTER — PROCEDURE VISIT (OUTPATIENT)
Dept: PODIATRY | Facility: CLINIC | Age: 65
End: 2025-05-29
Payer: COMMERCIAL

## 2025-05-29 VITALS — HEIGHT: 73 IN | WEIGHT: 275 LBS | BODY MASS INDEX: 36.45 KG/M2

## 2025-05-29 DIAGNOSIS — M20.42 HAMMERTOE, BILATERAL: ICD-10-CM

## 2025-05-29 DIAGNOSIS — M20.41 HAMMERTOE, BILATERAL: ICD-10-CM

## 2025-05-29 DIAGNOSIS — E11.42 DIABETIC PERIPHERAL NEUROPATHY (HCC): Primary | ICD-10-CM

## 2025-05-29 DIAGNOSIS — E11.9 ENCOUNTER FOR DIABETIC FOOT EXAM (HCC): ICD-10-CM

## 2025-05-29 PROCEDURE — 99213 OFFICE O/P EST LOW 20 MIN: CPT | Performed by: PODIATRIST

## 2025-05-29 NOTE — ASSESSMENT & PLAN NOTE
DM foot risk performed. Recommend annual checks unless new symptoms  Orders:  •  Diabetic Shoe Inserts  •  Diabetic Shoe

## 2025-05-29 NOTE — PROGRESS NOTES
"Name: Brittany Lee      : 1960      MRN: [id number]  Encounter Provider: Tom Carrera DPM  Encounter Date: 2025   Encounter department: [facility] PODIATRY [facility]  :  Assessment & Plan  Diabetic peripheral neuropathy (HCC)  Diagnosis and options discussed with patient  Patient agreeable to the plan as stated below    -DM foot risk is low. He has mild paraesthesia but pinprick and vibration intact. Recommend annual DM foot exam  -Discussed DM risk to lower extremities, proper shoe gear, and daily monitoring of feet.   -Discussed weight loss and suitable exercise regiment.   -Reviewed most recent PCP visit on 3/7/2025  -Educated on A1C and the risks of poorly controlled Diabetes. Reviewed recent A1C:  Lab Results   Component Value Date    HGBA1C 7.2 (H) 2025    HGBA1C 8.3 (H) 2019   .     Lab Results   Component Value Date    HGBA1C 7.2 (H) 2025       Orders:  •  Diabetic Shoe Inserts  •  Diabetic Shoe    Hammertoe, bilateral  Shoe gear discussed, no acute issues  Orders:  •  Diabetic Shoe Inserts  •  Diabetic Shoe    Encounter for diabetic foot exam (HCC)  DM foot risk performed. Recommend annual checks unless new symptoms  Orders:  •  Diabetic Shoe Inserts  •  Diabetic Shoe        History of Present Illness   HPI  Brittany Lee is a 64 y.o. male who presents for annual DM foot exam. He gets tingling in his feet from time to time, mainly by the end of the day. A1C well controlled. HE reports no changes in his feet since his annual exam last year. He takes lantus, trulicity, and metformin for his DM.       Review of Systems  As stated in HPI, otherwise normal    Medical History Reviewed by provider this encounter:  Tobacco  Allergies  Meds  Problems  Med Hx  Surg Hx  Fam Hx           Objective   Ht 6' 1\" (1.854 m)   Wt 125 kg (275 lb)   BMI 36.28 kg/m²      Physical Exam  Vitals reviewed.   Constitutional:       Appearance: He is obese. He is not " ill-appearing.     Cardiovascular:      Pulses: Normal pulses. no weak pulses.           Dorsalis pedis pulses are 2+ on the right side and 2+ on the left side.        Posterior tibial pulses are 2+ on the right side and 2+ on the left side.     Musculoskeletal:         General: Deformity present.   Feet:      Right foot:      Protective Sensation: 10 sites tested.  10 sites sensed.      Skin integrity: Skin integrity normal. No ulcer, skin breakdown, erythema, warmth, callus or dry skin.      Left foot:      Protective Sensation: 10 sites tested.  10 sites sensed.      Skin integrity: Skin integrity normal. No ulcer, skin breakdown, erythema, warmth, callus or dry skin.     Skin:     General: Skin is warm and dry.      Capillary Refill: Capillary refill takes less than 2 seconds.      Findings: No bruising.     Neurological:      Mental Status: He is alert.      Sensory: Sensory deficit (parasthesia) present.     Diabetic Foot Exam    Patient's shoes and socks removed.    Right Foot/Ankle   Right Foot Inspection  Skin Exam: skin normal and skin intact. No dry skin, no warmth, no callus, no erythema, no maceration, no abnormal color, no pre-ulcer, no ulcer and no callus.     Toe Exam: right toe deformity.     Sensory   Vibration: intact  Monofilament testing: intact    Vascular  Capillary refills: < 3 seconds  The right DP pulse is 2+. The right PT pulse is 2+.     Right Toe  - Comprehensive Exam  Hammertoes: second toe      Left Foot/Ankle  Left Foot Inspection  Skin Exam: skin normal and skin intact. No dry skin, no warmth, no erythema, no maceration, normal color, no pre-ulcer, no ulcer and no callus.     Toe Exam: left toe deformity.     Sensory   Vibration: intact  Monofilament testing: intact    Vascular  Capillary refills: < 3 seconds  The left DP pulse is 2+. The left PT pulse is 2+.     Left Toe  - Comprehensive Exam  Hammertoes: second toe      Assign Risk Category  Deformity present  No loss of protective  sensation  No weak pulses  Risk: 0

## 2025-05-29 NOTE — ASSESSMENT & PLAN NOTE
Diagnosis and options discussed with patient  Patient agreeable to the plan as stated below    -DM foot risk is low. He has mild paraesthesia but pinprick and vibration intact. Recommend annual DM foot exam  -Discussed DM risk to lower extremities, proper shoe gear, and daily monitoring of feet.   -Discussed weight loss and suitable exercise regiment.   -Reviewed most recent PCP visit on 3/7/2025  -Educated on A1C and the risks of poorly controlled Diabetes. Reviewed recent A1C:  Lab Results   Component Value Date    HGBA1C 7.2 (H) 05/02/2025    HGBA1C 8.3 (H) 04/08/2019   .     Lab Results   Component Value Date    HGBA1C 7.2 (H) 05/02/2025       Orders:  •  Diabetic Shoe Inserts  •  Diabetic Shoe

## 2025-05-31 DIAGNOSIS — E08.41 DIABETIC MONONEUROPATHY ASSOCIATED WITH DIABETES MELLITUS DUE TO UNDERLYING CONDITION (HCC): ICD-10-CM

## 2025-05-31 DIAGNOSIS — I10 ESSENTIAL HYPERTENSION: ICD-10-CM

## 2025-05-31 NOTE — TELEPHONE ENCOUNTER
Medication Refill Request       Medication: lisinopril (ZESTRIL) 40 mg tablet      Dose/Frequency: Take 1 tablet (40 mg total) by mouth daily      Quantity: 90 tablets    Pharmacy: [address] [phone] [address] [place] [id number]    Office:   [x] PCP/Provider - Tammie Hernandez, DO  [] Specialty/Provider -     Does the patient have enough for 3 days?   [x] Yes   [] No - Send as HP to POD    Is the patient completely out of the medication or does not have enough until the next business day?  [] Yes - send to Call Hub  [x] No - Send as HP to POD

## 2025-06-01 ENCOUNTER — RESULTS FOLLOW-UP (OUTPATIENT)
Age: 65
End: 2025-06-01

## 2025-06-02 RX ORDER — GABAPENTIN 100 MG/1
100 CAPSULE ORAL
Qty: 30 CAPSULE | Refills: 0 | Status: SHIPPED | OUTPATIENT
Start: 2025-06-02

## 2025-06-02 RX ORDER — LISINOPRIL 40 MG/1
40 TABLET ORAL DAILY
Qty: 90 TABLET | Refills: 0 | Status: SHIPPED | OUTPATIENT
Start: 2025-06-02

## 2025-06-20 ENCOUNTER — CONSULT (OUTPATIENT)
Dept: INTERNAL MEDICINE CLINIC | Facility: CLINIC | Age: 65
End: 2025-06-20
Payer: COMMERCIAL

## 2025-06-20 VITALS
DIASTOLIC BLOOD PRESSURE: 64 MMHG | HEIGHT: 72 IN | HEART RATE: 96 BPM | TEMPERATURE: 97.6 F | WEIGHT: 275.2 LBS | BODY MASS INDEX: 37.27 KG/M2 | SYSTOLIC BLOOD PRESSURE: 104 MMHG | OXYGEN SATURATION: 97 %

## 2025-06-20 DIAGNOSIS — E08.21 DIABETES MELLITUS DUE TO UNDERLYING CONDITION WITH DIABETIC NEPHROPATHY, WITH LONG-TERM CURRENT USE OF INSULIN (HCC): ICD-10-CM

## 2025-06-20 DIAGNOSIS — Z79.4 DIABETES MELLITUS DUE TO UNDERLYING CONDITION WITH DIABETIC NEPHROPATHY, WITH LONG-TERM CURRENT USE OF INSULIN (HCC): ICD-10-CM

## 2025-06-20 PROCEDURE — 99214 OFFICE O/P EST MOD 30 MIN: CPT | Performed by: INTERNAL MEDICINE

## 2025-06-20 RX ORDER — TIRZEPATIDE 5 MG/.5ML
5 INJECTION, SOLUTION SUBCUTANEOUS WEEKLY
Qty: 2 ML | Refills: 3 | Status: SHIPPED | OUTPATIENT
Start: 2025-06-20

## 2025-06-20 RX ORDER — INSULIN GLARGINE 100 [IU]/ML
INJECTION, SOLUTION SUBCUTANEOUS
Qty: 15 ML | Refills: 4 | Status: SHIPPED | OUTPATIENT
Start: 2025-06-20

## 2025-06-20 NOTE — ASSESSMENT & PLAN NOTE
Lab Results   Component Value Date    HGBA1C 7.2 (H) 05/02/2025   Patient did not bring in his glucose meter at this visit  Reports a couple of lows in the morning and throughout the day  Reports no high readings out of range  Patient states that he has very minimal appetite often skipping breakfast and only eating 1 fruit for lunch  Patient has seen approximately 11 pound weight loss since the last time he was in office.  Patient notes  recent loose stools which she has not seen since for starting his tirzepatide  Patient has followed up with GI who recently completed an ultrasound and lab work; positive impact of tirzepatide noted on his MASLD   They will continue to monitor him off of any medications at this time    Plan  Reduce lantus to 24 units daily   Obtain HBA1C in August 2025   Patient will return in ~4 months   Counseled patient on eating complete meals even if there a small quantity    Orders:    Tirzepatide (Mounjaro) 5 MG/0.5ML SOAJ; Inject 5 mg under the skin once a week    Insulin Glargine Solostar (Lantus SoloStar) 100 UNIT/ML SOPN; ADMINISTER 24 UNITS UNDER THE SKIN AT NIGHT

## 2025-06-20 NOTE — PROGRESS NOTES
Name: Brittany Lee      : 1960      MRN: [id number]  Encounter Provider: Ijeoma Wilson Endo Resident  Encounter Date: 2025   Encounter department: [facility]IC[facility]  :  Assessment & Plan  Diabetes mellitus due to underlying condition with diabetic nephropathy, with long-term current use of insulin (HCC)    Lab Results   Component Value Date    HGBA1C 7.2 (H) 2025   Patient did not bring in his glucose meter at this visit  Reports a couple of lows in the morning and throughout the day  Reports no high readings out of range  Patient states that he has very minimal appetite often skipping breakfast and only eating 1 fruit for lunch  Patient has seen approximately 11 pound weight loss since the last time he was in office.  Patient notes  recent loose stools which she has not seen since for starting his tirzepatide  Patient has followed up with GI who recently completed an ultrasound and lab work; positive impact of tirzepatide noted on his MASLD   They will continue to monitor him off of any medications at this time    Plan  Reduce lantus to 24 units daily   Obtain HBA1C in 2025   Patient will return in ~4 months   Counseled patient on eating complete meals even if there a small quantity    Orders:    Tirzepatide (Mounjaro) 5 MG/0.5ML SOAJ; Inject 5 mg under the skin once a week    Insulin Glargine Solostar (Lantus SoloStar) 100 UNIT/ML SOPN; ADMINISTER 24 UNITS UNDER THE SKIN AT NIGHT        History of Present Illness   Diabetes  Associated symptoms include fatigue.     Brittany Lee is a 64 y.o. male who presents for a 3 month follow up of his diabetes management.  Patient notes that since his last visit he has had decreased appetite.  He notes that he often skips breakfast and eats a singular fruit for lunch.  Patient also notes that he has gone low several times mostly in the morning and the evening.  He has not brought in his glucose monitor to formally  evaluate this visit.  Patient notes that his neuropathic symptoms have been stable since his last visit.  He continues to follow with podiatry and has recently updated his eyeglasses prescription.  And his ophthalmology appointment and his eyes were dilated.  Patient has also recently followed with gastroenterology for his fatty liver, they note good progress under to receptive to hide and will continue to monitor him off any medications for MASLD.  No further new acute symptomology noted.    History obtained from: patient    Review of Systems   Constitutional:  Positive for fatigue.   HENT: Negative.     Eyes: Negative.    Gastrointestinal:  Positive for diarrhea.   Skin: Negative.    Neurological: Negative.           Objective   /64 (BP Location: Left arm, Patient Position: Sitting, Cuff Size: Large)   Pulse 96   Temp 97.6 °F (36.4 °C) (Tympanic)   Ht 6' (1.829 m)   Wt 125 kg (275 lb 3.2 oz)   SpO2 97%   BMI 37.32 kg/m²      Physical Exam  Vitals reviewed.   Constitutional:       General: He is not in acute distress.     Appearance: He is obese. He is not ill-appearing, toxic-appearing or diaphoretic.   HENT:      Nose: No congestion or rhinorrhea.     Eyes:      General: No scleral icterus.        Right eye: No discharge.         Left eye: No discharge.       Cardiovascular:      Rate and Rhythm: Normal rate.      Heart sounds: No murmur heard.     No friction rub. No gallop.   Pulmonary:      Effort: No respiratory distress.      Breath sounds: No stridor. No wheezing, rhonchi or rales.   Chest:      Chest wall: No tenderness.   Abdominal:      General: There is no distension.      Tenderness: There is no abdominal tenderness.     Musculoskeletal:      Cervical back: Normal range of motion.      Right lower leg: No edema.      Left lower leg: No edema.     Skin:     Coloration: Skin is not jaundiced.      Findings: No bruising.     Neurological:      General: No focal deficit present.      Mental  Status: He is alert and oriented to person, place, and time. Mental status is at baseline.     Psychiatric:         Mood and Affect: Mood normal.         Behavior: Behavior normal.         Thought Content: Thought content normal.         Judgment: Judgment normal.

## 2025-06-26 DIAGNOSIS — E08.41 DIABETIC MONONEUROPATHY ASSOCIATED WITH DIABETES MELLITUS DUE TO UNDERLYING CONDITION (HCC): ICD-10-CM

## 2025-06-26 RX ORDER — GABAPENTIN 100 MG/1
100 CAPSULE ORAL
Qty: 30 CAPSULE | Refills: 0 | Status: SHIPPED | OUTPATIENT
Start: 2025-06-26

## 2025-07-06 ENCOUNTER — APPOINTMENT (OUTPATIENT)
Dept: LAB | Facility: CLINIC | Age: 65
End: 2025-07-06
Payer: COMMERCIAL

## 2025-07-06 DIAGNOSIS — D47.2 SMOLDERING MULTIPLE MYELOMA: ICD-10-CM

## 2025-07-06 DIAGNOSIS — D50.9 IRON DEFICIENCY ANEMIA, UNSPECIFIED IRON DEFICIENCY ANEMIA TYPE: Primary | ICD-10-CM

## 2025-07-06 LAB
ALBUMIN SERPL BCG-MCNC: 4.1 G/DL (ref 3.5–5)
ALP SERPL-CCNC: 88 U/L (ref 34–104)
ALT SERPL W P-5'-P-CCNC: 22 U/L (ref 7–52)
ANION GAP SERPL CALCULATED.3IONS-SCNC: 5 MMOL/L (ref 4–13)
AST SERPL W P-5'-P-CCNC: 16 U/L (ref 13–39)
BASOPHILS # BLD AUTO: 0.06 THOUSANDS/ÂΜL (ref 0–0.1)
BASOPHILS NFR BLD AUTO: 1 % (ref 0–1)
BILIRUB SERPL-MCNC: 0.73 MG/DL (ref 0.2–1)
BUN SERPL-MCNC: 12 MG/DL (ref 5–25)
CALCIUM SERPL-MCNC: 9.4 MG/DL (ref 8.4–10.2)
CHLORIDE SERPL-SCNC: 101 MMOL/L (ref 96–108)
CO2 SERPL-SCNC: 30 MMOL/L (ref 21–32)
CREAT SERPL-MCNC: 0.82 MG/DL (ref 0.6–1.3)
EOSINOPHIL # BLD AUTO: 0.25 THOUSAND/ÂΜL (ref 0–0.61)
EOSINOPHIL NFR BLD AUTO: 4 % (ref 0–6)
ERYTHROCYTE [DISTWIDTH] IN BLOOD BY AUTOMATED COUNT: 15.5 % (ref 11.6–15.1)
FERRITIN SERPL-MCNC: 26 NG/ML (ref 30–336)
GFR SERPL CREATININE-BSD FRML MDRD: 92 ML/MIN/1.73SQ M
GLUCOSE P FAST SERPL-MCNC: 117 MG/DL (ref 65–99)
HCT VFR BLD AUTO: 42.7 % (ref 36.5–49.3)
HGB BLD-MCNC: 13.5 G/DL (ref 12–17)
IGA SERPL-MCNC: 97 MG/DL (ref 66–433)
IGG SERPL-MCNC: 1788 MG/DL (ref 635–1741)
IGM SERPL-MCNC: 132 MG/DL (ref 45–281)
IMM GRANULOCYTES # BLD AUTO: 0.01 THOUSAND/UL (ref 0–0.2)
IMM GRANULOCYTES NFR BLD AUTO: 0 % (ref 0–2)
IRON SATN MFR SERPL: 22 % (ref 15–50)
IRON SERPL-MCNC: 73 UG/DL (ref 50–212)
LYMPHOCYTES # BLD AUTO: 1.63 THOUSANDS/ÂΜL (ref 0.6–4.47)
LYMPHOCYTES NFR BLD AUTO: 28 % (ref 14–44)
MCH RBC QN AUTO: 29.3 PG (ref 26.8–34.3)
MCHC RBC AUTO-ENTMCNC: 31.6 G/DL (ref 31.4–37.4)
MCV RBC AUTO: 93 FL (ref 82–98)
MONOCYTES # BLD AUTO: 0.33 THOUSAND/ÂΜL (ref 0.17–1.22)
MONOCYTES NFR BLD AUTO: 6 % (ref 4–12)
NEUTROPHILS # BLD AUTO: 3.53 THOUSANDS/ÂΜL (ref 1.85–7.62)
NEUTS SEG NFR BLD AUTO: 61 % (ref 43–75)
NRBC BLD AUTO-RTO: 0 /100 WBCS
PLATELET # BLD AUTO: 235 THOUSANDS/UL (ref 149–390)
PMV BLD AUTO: 9.8 FL (ref 8.9–12.7)
POTASSIUM SERPL-SCNC: 4.4 MMOL/L (ref 3.5–5.3)
PROT SERPL-MCNC: 7.8 G/DL (ref 6.4–8.4)
RBC # BLD AUTO: 4.6 MILLION/UL (ref 3.88–5.62)
SODIUM SERPL-SCNC: 136 MMOL/L (ref 135–147)
TIBC SERPL-MCNC: 334.6 UG/DL (ref 250–450)
TRANSFERRIN SERPL-MCNC: 239 MG/DL (ref 203–362)
UIBC SERPL-MCNC: 262 UG/DL (ref 155–355)
WBC # BLD AUTO: 5.81 THOUSAND/UL (ref 4.31–10.16)

## 2025-07-06 PROCEDURE — 83540 ASSAY OF IRON: CPT

## 2025-07-06 PROCEDURE — 85025 COMPLETE CBC W/AUTO DIFF WBC: CPT

## 2025-07-06 PROCEDURE — 83550 IRON BINDING TEST: CPT

## 2025-07-06 PROCEDURE — 82728 ASSAY OF FERRITIN: CPT

## 2025-07-06 PROCEDURE — 82784 ASSAY IGA/IGD/IGG/IGM EACH: CPT

## 2025-07-06 PROCEDURE — 86334 IMMUNOFIX E-PHORESIS SERUM: CPT

## 2025-07-06 PROCEDURE — 80053 COMPREHEN METABOLIC PANEL: CPT

## 2025-07-06 PROCEDURE — 84165 PROTEIN E-PHORESIS SERUM: CPT

## 2025-07-06 PROCEDURE — 83521 IG LIGHT CHAINS FREE EACH: CPT

## 2025-07-06 PROCEDURE — 36415 COLL VENOUS BLD VENIPUNCTURE: CPT

## 2025-07-07 LAB
ALBUMIN SERPL ELPH-MCNC: 3.69 G/DL (ref 3.2–5.1)
ALBUMIN SERPL ELPH-MCNC: 49.8 % (ref 48–70)
ALPHA1 GLOB SERPL ELPH-MCNC: 0.29 G/DL (ref 0.15–0.47)
ALPHA1 GLOB SERPL ELPH-MCNC: 3.9 % (ref 1.8–7)
ALPHA2 GLOB SERPL ELPH-MCNC: 0.93 G/DL (ref 0.42–1.04)
ALPHA2 GLOB SERPL ELPH-MCNC: 12.5 % (ref 5.9–14.9)
BETA GLOB ABNORMAL SERPL ELPH-MCNC: 0.4 G/DL (ref 0.31–0.57)
BETA1 GLOB SERPL ELPH-MCNC: 5.4 % (ref 4.7–7.7)
BETA2 GLOB SERPL ELPH-MCNC: 4.4 % (ref 3.1–7.9)
BETA2+GAMMA GLOB SERPL ELPH-MCNC: 0.33 G/DL (ref 0.2–0.58)
GAMMA GLOB ABNORMAL SERPL ELPH-MCNC: 1.78 G/DL (ref 0.4–1.66)
GAMMA GLOB SERPL ELPH-MCNC: 24 % (ref 6.9–22.3)
IGG/ALB SER: 0.99 {RATIO} (ref 1.1–1.8)
M PROTEIN 1 SERPL ELPH-MCNC: 1.17 G/DL
M PROTEIN 2 SERPL ELPH-MCNC: 0.07 G/DL
PROT SERPL-MCNC: 7.4 G/DL (ref 6.4–8.4)

## 2025-07-07 PROCEDURE — 84165 PROTEIN E-PHORESIS SERUM: CPT | Performed by: STUDENT IN AN ORGANIZED HEALTH CARE EDUCATION/TRAINING PROGRAM

## 2025-07-07 PROCEDURE — 86334 IMMUNOFIX E-PHORESIS SERUM: CPT | Performed by: STUDENT IN AN ORGANIZED HEALTH CARE EDUCATION/TRAINING PROGRAM

## 2025-07-08 LAB
KAPPA LC FREE SER-MCNC: 31.4 MG/L (ref 3.3–19.4)
KAPPA LC FREE/LAMBDA FREE SER: 2.47 {RATIO} (ref 0.26–1.65)
LAMBDA LC FREE SERPL-MCNC: 12.7 MG/L (ref 5.7–26.3)

## 2025-07-15 ENCOUNTER — OFFICE VISIT (OUTPATIENT)
Dept: HEMATOLOGY ONCOLOGY | Facility: CLINIC | Age: 65
End: 2025-07-15
Payer: COMMERCIAL

## 2025-07-15 VITALS
BODY MASS INDEX: 37.11 KG/M2 | RESPIRATION RATE: 18 BRPM | HEART RATE: 96 BPM | OXYGEN SATURATION: 97 % | SYSTOLIC BLOOD PRESSURE: 116 MMHG | HEIGHT: 72 IN | TEMPERATURE: 98.1 F | WEIGHT: 274 LBS | DIASTOLIC BLOOD PRESSURE: 84 MMHG

## 2025-07-15 DIAGNOSIS — D47.2 SMOLDERING MULTIPLE MYELOMA: Primary | ICD-10-CM

## 2025-07-15 PROBLEM — J02.9 SORE THROAT: Status: RESOLVED | Noted: 2023-03-07 | Resolved: 2025-07-15

## 2025-07-15 PROBLEM — M25.532 LEFT WRIST PAIN: Status: RESOLVED | Noted: 2023-05-16 | Resolved: 2025-07-15

## 2025-07-15 PROBLEM — D50.9 IRON DEFICIENCY ANEMIA, UNSPECIFIED: Status: RESOLVED | Noted: 2023-06-05 | Resolved: 2025-07-15

## 2025-07-15 PROCEDURE — 99213 OFFICE O/P EST LOW 20 MIN: CPT | Performed by: INTERNAL MEDICINE

## 2025-07-28 DIAGNOSIS — E08.41 DIABETIC MONONEUROPATHY ASSOCIATED WITH DIABETES MELLITUS DUE TO UNDERLYING CONDITION (HCC): ICD-10-CM

## 2025-07-28 RX ORDER — GABAPENTIN 100 MG/1
100 CAPSULE ORAL
Qty: 30 CAPSULE | Refills: 0 | Status: SHIPPED | OUTPATIENT
Start: 2025-07-28

## 2025-07-29 DIAGNOSIS — E78.2 MIXED HYPERLIPIDEMIA: ICD-10-CM

## 2025-08-07 ENCOUNTER — OFFICE VISIT (OUTPATIENT)
Dept: FAMILY MEDICINE CLINIC | Facility: CLINIC | Age: 65
End: 2025-08-07

## 2025-08-07 VITALS
HEIGHT: 72 IN | OXYGEN SATURATION: 99 % | DIASTOLIC BLOOD PRESSURE: 81 MMHG | TEMPERATURE: 98.9 F | WEIGHT: 271 LBS | RESPIRATION RATE: 20 BRPM | HEART RATE: 90 BPM | BODY MASS INDEX: 36.7 KG/M2 | SYSTOLIC BLOOD PRESSURE: 125 MMHG

## 2025-08-07 DIAGNOSIS — E08.21 DIABETES MELLITUS DUE TO UNDERLYING CONDITION WITH DIABETIC NEPHROPATHY, WITH LONG-TERM CURRENT USE OF INSULIN (HCC): ICD-10-CM

## 2025-08-07 DIAGNOSIS — R05.1 ACUTE COUGH: Primary | ICD-10-CM

## 2025-08-07 DIAGNOSIS — J45.40 MODERATE PERSISTENT ASTHMA WITHOUT COMPLICATION: ICD-10-CM

## 2025-08-07 DIAGNOSIS — J45.909 UNCOMPLICATED ASTHMA, UNSPECIFIED ASTHMA SEVERITY, UNSPECIFIED WHETHER PERSISTENT: ICD-10-CM

## 2025-08-07 DIAGNOSIS — Z79.4 DIABETES MELLITUS DUE TO UNDERLYING CONDITION WITH DIABETIC NEPHROPATHY, WITH LONG-TERM CURRENT USE OF INSULIN (HCC): ICD-10-CM

## 2025-08-07 DIAGNOSIS — I10 ESSENTIAL HYPERTENSION: ICD-10-CM

## 2025-08-07 DIAGNOSIS — L21.9 SEBORRHEIC DERMATITIS: ICD-10-CM

## 2025-08-07 DIAGNOSIS — E11.42 TYPE 2 DIABETES MELLITUS WITH DIABETIC POLYNEUROPATHY, WITHOUT LONG-TERM CURRENT USE OF INSULIN (HCC): ICD-10-CM

## 2025-08-07 DIAGNOSIS — E78.2 MIXED HYPERLIPIDEMIA: ICD-10-CM

## 2025-08-07 PROCEDURE — 99214 OFFICE O/P EST MOD 30 MIN: CPT | Performed by: FAMILY MEDICINE

## 2025-08-07 PROCEDURE — G2211 COMPLEX E/M VISIT ADD ON: HCPCS | Performed by: FAMILY MEDICINE

## 2025-08-07 RX ORDER — LISINOPRIL 40 MG/1
40 TABLET ORAL DAILY
Qty: 90 TABLET | Refills: 0 | Status: SHIPPED | OUTPATIENT
Start: 2025-08-07

## 2025-08-07 RX ORDER — ATORVASTATIN CALCIUM 40 MG/1
40 TABLET, FILM COATED ORAL DAILY
Qty: 90 TABLET | Refills: 1 | Status: SHIPPED | OUTPATIENT
Start: 2025-08-07

## 2025-08-07 RX ORDER — KETOCONAZOLE 20 MG/ML
1 SHAMPOO, SUSPENSION TOPICAL 2 TIMES WEEKLY
Qty: 120 ML | Refills: 1 | Status: SHIPPED | OUTPATIENT
Start: 2025-08-07

## 2025-08-07 RX ORDER — LORATADINE 10 MG/1
10 TABLET ORAL DAILY
Qty: 90 TABLET | Refills: 2 | Status: SHIPPED | OUTPATIENT
Start: 2025-08-07

## 2025-08-07 RX ORDER — METFORMIN HYDROCHLORIDE 500 MG/1
500 TABLET, EXTENDED RELEASE ORAL 2 TIMES DAILY WITH MEALS
Qty: 180 TABLET | Refills: 1 | Status: SHIPPED | OUTPATIENT
Start: 2025-08-07 | End: 2025-09-06

## 2025-08-07 RX ORDER — AZITHROMYCIN 250 MG/1
TABLET, FILM COATED ORAL
Qty: 6 TABLET | Refills: 0 | Status: SHIPPED | OUTPATIENT
Start: 2025-08-07 | End: 2025-08-12

## 2025-08-07 RX ORDER — ALBUTEROL SULFATE 90 UG/1
INHALANT RESPIRATORY (INHALATION)
Qty: 17 G | Refills: 2 | Status: SHIPPED | OUTPATIENT
Start: 2025-08-07 | End: 2025-08-07 | Stop reason: ALTCHOICE

## 2025-08-07 RX ORDER — GUAIFENESIN 600 MG/1
600 TABLET, EXTENDED RELEASE ORAL EVERY 12 HOURS SCHEDULED
Qty: 14 TABLET | Refills: 0 | Status: SHIPPED | OUTPATIENT
Start: 2025-08-07

## 2025-08-08 ENCOUNTER — TELEPHONE (OUTPATIENT)
Dept: FAMILY MEDICINE CLINIC | Facility: CLINIC | Age: 65
End: 2025-08-08

## 2025-08-08 DIAGNOSIS — J42 CHRONIC BRONCHITIS WITH WHEEZING (HCC): Primary | ICD-10-CM

## 2025-08-08 RX ORDER — ALBUTEROL SULFATE 0.83 MG/ML
2.5 SOLUTION RESPIRATORY (INHALATION) EVERY 6 HOURS PRN
Qty: 30 ML | Refills: 1 | Status: SHIPPED | OUTPATIENT
Start: 2025-08-08

## 2025-08-08 RX ORDER — INSULIN GLARGINE 100 [IU]/ML
INJECTION, SOLUTION SUBCUTANEOUS
Qty: 15 ML | Refills: 2 | Status: SHIPPED | OUTPATIENT
Start: 2025-08-08

## 2025-08-11 ENCOUNTER — TELEPHONE (OUTPATIENT)
Dept: FAMILY MEDICINE CLINIC | Facility: CLINIC | Age: 65
End: 2025-08-11

## 2025-08-18 ENCOUNTER — OFFICE VISIT (OUTPATIENT)
Dept: FAMILY MEDICINE CLINIC | Facility: CLINIC | Age: 65
End: 2025-08-18

## 2025-08-18 VITALS
RESPIRATION RATE: 20 BRPM | TEMPERATURE: 98 F | WEIGHT: 273.8 LBS | HEIGHT: 72 IN | SYSTOLIC BLOOD PRESSURE: 127 MMHG | HEART RATE: 91 BPM | BODY MASS INDEX: 37.08 KG/M2 | OXYGEN SATURATION: 99 % | DIASTOLIC BLOOD PRESSURE: 83 MMHG

## 2025-08-18 DIAGNOSIS — L23.7 POISON IVY DERMATITIS: Primary | ICD-10-CM

## 2025-08-18 PROCEDURE — 99213 OFFICE O/P EST LOW 20 MIN: CPT | Performed by: FAMILY MEDICINE

## 2025-08-18 PROCEDURE — G2211 COMPLEX E/M VISIT ADD ON: HCPCS | Performed by: FAMILY MEDICINE

## 2025-08-18 RX ORDER — PREDNISONE 20 MG/1
40 TABLET ORAL DAILY
Qty: 10 TABLET | Refills: 0 | Status: SHIPPED | OUTPATIENT
Start: 2025-08-18 | End: 2025-08-23

## 2025-08-18 RX ORDER — BETAMETHASONE DIPROPIONATE 0.5 MG/G
CREAM TOPICAL 2 TIMES DAILY
Qty: 45 G | Refills: 0 | Status: SHIPPED | OUTPATIENT
Start: 2025-08-18

## 2025-08-22 LAB
CHEST PAIN STATEMENT: NORMAL
MAX DIASTOLIC BP: 84 MMHG
MAX PREDICTED HEART RATE: 158 BPM
PROTOCOL NAME: NORMAL
REASON FOR TERMINATION: NORMAL
STRESS POST EXERCISE DUR MIN: 3 MIN
STRESS POST EXERCISE DUR SEC: 0 SEC
STRESS POST PEAK HR: 125 BPM
STRESS POST PEAK SYSTOLIC BP: 164 MMHG
TARGET HR FORMULA: NORMAL
TEST INDICATION: NORMAL

## (undated) DEVICE — ANTIBACTERIAL UNDYED BRAIDED (POLYGLACTIN 910), SYNTHETIC ABSORBABLE SUTURE: Brand: COATED VICRYL

## (undated) DEVICE — TIP COVER ACCESSORY

## (undated) DEVICE — COLUMN DRAPE

## (undated) DEVICE — ADHESIVE SKN CLSR HISTOACRYL FLEX 0.5ML LF

## (undated) DEVICE — HEAVY DUTY TABLE COVER: Brand: CONVERTORS

## (undated) DEVICE — STRL UNIVERSAL MINOR GENERAL: Brand: CARDINAL HEALTH

## (undated) DEVICE — GLOVE SRG BIOGEL ECLIPSE 7.5

## (undated) DEVICE — INSUFLATION TUBING INSUFLOW (LEXION)

## (undated) DEVICE — ADHESIVE SKIN HIGH VISCOSITY EXOFIN 1ML

## (undated) DEVICE — MEGA SUTURECUT ND: Brand: ENDOWRIST

## (undated) DEVICE — GLOVE SRG BIOGEL ORTHOPEDIC 7.5

## (undated) DEVICE — SEAL

## (undated) DEVICE — INTENDED FOR TISSUE SEPARATION, AND OTHER PROCEDURES THAT REQUIRE A SHARP SURGICAL BLADE TO PUNCTURE OR CUT.: Brand: BARD-PARKER SAFETY BLADES SIZE 11, STERILE

## (undated) DEVICE — 3M™ IOBAN™ 2 ANTIMICROBIAL INCISE DRAPE 6650EZ: Brand: IOBAN™ 2

## (undated) DEVICE — PROGRASP FORCEPS: Brand: ENDOWRIST

## (undated) DEVICE — NEEDLE 25G X 1 1/2

## (undated) DEVICE — CANNULA SEAL

## (undated) DEVICE — MONOPOLAR CURVED SCISSORS: Brand: ENDOWRIST

## (undated) DEVICE — DRAPE SHEET THREE QUARTER

## (undated) DEVICE — BETHLEHEM UNIVERSAL MINOR GEN: Brand: CARDINAL HEALTH

## (undated) DEVICE — SUT VICRYL 3-0 SH 27 IN J416H

## (undated) DEVICE — INSUFFLATION NEEDLE TO ESTABLISH PNEUMOPERITONEUM.: Brand: INSUFFLATION NEEDLE

## (undated) DEVICE — Device: Brand: OMNICLOSE TROCAR SITE CLOSURE DEVICE

## (undated) DEVICE — BLADELESS OBTURATOR: Brand: WECK VISTA

## (undated) DEVICE — VISUALIZATION SYSTEM: Brand: CLEARIFY

## (undated) DEVICE — ARM DRAPE

## (undated) DEVICE — ELECTRO LUBE IS A SINGLE PATIENT USE DEVICE THAT IS INTENDED TO BE USED ON ELECTROSURGICAL ELECTRODES TO REDUCE STICKING.: Brand: KEY SURGICAL ELECTRO LUBE

## (undated) DEVICE — CHLORAPREP HI-LITE 26ML ORANGE

## (undated) DEVICE — INTENDED FOR TISSUE SEPARATION, AND OTHER PROCEDURES THAT REQUIRE A SHARP SURGICAL BLADE TO PUNCTURE OR CUT.: Brand: BARD-PARKER SAFETY BLADES SIZE 15, STERILE

## (undated) DEVICE — PLUMEPEN PRO 10FT

## (undated) DEVICE — SYRINGE 10ML LL

## (undated) DEVICE — ABSORBABLE WOUND CLOSURE DEVICE: Brand: SYNETURE

## (undated) DEVICE — DRAPE LAPAROTOMY W/POUCHES

## (undated) DEVICE — REDUCER: Brand: ENDOWRIST

## (undated) DEVICE — SUT VICRYL PLUS 0 UR-6 27IN VCP603H

## (undated) DEVICE — TRAY FOLEY 16FR URIMETER SURESTEP

## (undated) DEVICE — SUT MONOCRYL 4-0 PS-2 18 IN Y496G

## (undated) DEVICE — 3000CC GUARDIAN II: Brand: GUARDIAN